# Patient Record
Sex: FEMALE | Race: WHITE | NOT HISPANIC OR LATINO | Employment: FULL TIME | URBAN - METROPOLITAN AREA
[De-identification: names, ages, dates, MRNs, and addresses within clinical notes are randomized per-mention and may not be internally consistent; named-entity substitution may affect disease eponyms.]

---

## 2017-03-20 ENCOUNTER — ALLSCRIPTS OFFICE VISIT (OUTPATIENT)
Dept: OTHER | Facility: OTHER | Age: 37
End: 2017-03-20

## 2017-04-08 ENCOUNTER — HOSPITAL ENCOUNTER (EMERGENCY)
Facility: HOSPITAL | Age: 37
Discharge: HOME | End: 2017-04-08
Attending: EMERGENCY MEDICINE | Admitting: EMERGENCY MEDICINE
Payer: COMMERCIAL

## 2017-04-08 VITALS
WEIGHT: 145.5 LBS | TEMPERATURE: 98.3 F | HEIGHT: 63 IN | HEART RATE: 100 BPM | SYSTOLIC BLOOD PRESSURE: 112 MMHG | RESPIRATION RATE: 16 BRPM | OXYGEN SATURATION: 99 % | DIASTOLIC BLOOD PRESSURE: 64 MMHG | BODY MASS INDEX: 25.78 KG/M2

## 2017-04-08 DIAGNOSIS — F41.0 ANXIETY ATTACK: ICD-10-CM

## 2017-04-08 DIAGNOSIS — E86.0 DEHYDRATION: Primary | ICD-10-CM

## 2017-04-08 DIAGNOSIS — R11.10 VOMITING: ICD-10-CM

## 2017-04-08 LAB
ALBUMIN SERPL BCP-MCNC: 4.3 G/DL (ref 3.5–5)
ALP SERPL-CCNC: 69 U/L (ref 46–116)
ALT SERPL W P-5'-P-CCNC: 32 U/L (ref 12–78)
AMYLASE SERPL-CCNC: 56 IU/L (ref 25–115)
ANION GAP SERPL CALCULATED.3IONS-SCNC: 15 MMOL/L (ref 4–13)
AST SERPL W P-5'-P-CCNC: 22 U/L (ref 5–45)
BASOPHILS # BLD MANUAL: 0 THOUSAND/UL (ref 0–0.1)
BASOPHILS NFR MAR MANUAL: 0 % (ref 0–1)
BILIRUB SERPL-MCNC: 2.4 MG/DL (ref 0.2–1)
BILIRUB UR QL STRIP: NEGATIVE
BUN SERPL-MCNC: 20 MG/DL (ref 5–25)
CALCIUM SERPL-MCNC: 9.2 MG/DL (ref 8.3–10.1)
CHLORIDE SERPL-SCNC: 104 MMOL/L (ref 100–108)
CLARITY UR: CLEAR
CO2 SERPL-SCNC: 19 MMOL/L (ref 21–32)
COLOR UR: YELLOW
CREAT SERPL-MCNC: 0.83 MG/DL (ref 0.6–1.3)
EOSINOPHIL # BLD MANUAL: 0 THOUSAND/UL (ref 0–0.4)
EOSINOPHIL NFR BLD MANUAL: 0 % (ref 0–6)
ERYTHROCYTE [DISTWIDTH] IN BLOOD BY AUTOMATED COUNT: 12 % (ref 11.6–15.1)
GFR SERPL CREATININE-BSD FRML MDRD: >60 ML/MIN/1.73SQ M
GLUCOSE SERPL-MCNC: 151 MG/DL (ref 65–140)
GLUCOSE UR STRIP-MCNC: NEGATIVE MG/DL
HCG UR QL: NEGATIVE
HCT VFR BLD AUTO: 44.5 % (ref 34.8–46.1)
HGB BLD-MCNC: 15.8 G/DL (ref 11.5–15.4)
HGB UR QL STRIP.AUTO: NEGATIVE
KETONES UR STRIP-MCNC: ABNORMAL MG/DL
LEUKOCYTE ESTERASE UR QL STRIP: NEGATIVE
LIPASE SERPL-CCNC: 93 U/L (ref 73–393)
LYMPHOCYTES # BLD AUTO: 0.35 THOUSAND/UL (ref 0.6–4.47)
LYMPHOCYTES # BLD AUTO: 5 % (ref 14–44)
MCH RBC QN AUTO: 29.4 PG (ref 26.8–34.3)
MCHC RBC AUTO-ENTMCNC: 35.5 G/DL (ref 31.4–37.4)
MCV RBC AUTO: 83 FL (ref 82–98)
MONOCYTES # BLD AUTO: 0.21 THOUSAND/UL (ref 0–1.22)
MONOCYTES NFR BLD: 3 % (ref 4–12)
NEUTROPHILS # BLD MANUAL: 6.28 THOUSAND/UL (ref 1.85–7.62)
NEUTS BAND NFR BLD MANUAL: 3 % (ref 0–8)
NEUTS SEG NFR BLD AUTO: 88 % (ref 43–75)
NITRITE UR QL STRIP: NEGATIVE
NRBC BLD AUTO-RTO: 0 /100 WBCS
PH UR STRIP.AUTO: 6 [PH] (ref 4.5–8)
PLATELET # BLD AUTO: 220 THOUSANDS/UL (ref 149–390)
PLATELET BLD QL SMEAR: ADEQUATE
PMV BLD AUTO: 10.1 FL (ref 8.9–12.7)
POTASSIUM SERPL-SCNC: 4.2 MMOL/L (ref 3.5–5.3)
PROT SERPL-MCNC: 8 G/DL (ref 6.4–8.2)
PROT UR STRIP-MCNC: NEGATIVE MG/DL
RBC # BLD AUTO: 5.37 MILLION/UL (ref 3.81–5.12)
RBC MORPH BLD: NORMAL
SODIUM SERPL-SCNC: 138 MMOL/L (ref 136–145)
SP GR UR STRIP.AUTO: 1.02 (ref 1–1.03)
TOTAL CELLS COUNTED SPEC: 100
UROBILINOGEN UR QL STRIP.AUTO: 0.2 E.U./DL
VARIANT LYMPHS # BLD AUTO: 1 %
WBC # BLD AUTO: 6.9 THOUSAND/UL (ref 4.31–10.16)

## 2017-04-08 PROCEDURE — 96361 HYDRATE IV INFUSION ADD-ON: CPT

## 2017-04-08 PROCEDURE — 93005 ELECTROCARDIOGRAM TRACING: CPT

## 2017-04-08 PROCEDURE — 99283 EMERGENCY DEPT VISIT LOW MDM: CPT

## 2017-04-08 PROCEDURE — 80053 COMPREHEN METABOLIC PANEL: CPT | Performed by: EMERGENCY MEDICINE

## 2017-04-08 PROCEDURE — 96376 TX/PRO/DX INJ SAME DRUG ADON: CPT

## 2017-04-08 PROCEDURE — 96375 TX/PRO/DX INJ NEW DRUG ADDON: CPT

## 2017-04-08 PROCEDURE — 81025 URINE PREGNANCY TEST: CPT | Performed by: EMERGENCY MEDICINE

## 2017-04-08 PROCEDURE — 85007 BL SMEAR W/DIFF WBC COUNT: CPT | Performed by: EMERGENCY MEDICINE

## 2017-04-08 PROCEDURE — 36415 COLL VENOUS BLD VENIPUNCTURE: CPT | Performed by: EMERGENCY MEDICINE

## 2017-04-08 PROCEDURE — 82150 ASSAY OF AMYLASE: CPT | Performed by: EMERGENCY MEDICINE

## 2017-04-08 PROCEDURE — 85027 COMPLETE CBC AUTOMATED: CPT | Performed by: EMERGENCY MEDICINE

## 2017-04-08 PROCEDURE — 83690 ASSAY OF LIPASE: CPT | Performed by: EMERGENCY MEDICINE

## 2017-04-08 PROCEDURE — 81003 URINALYSIS AUTO W/O SCOPE: CPT | Performed by: EMERGENCY MEDICINE

## 2017-04-08 PROCEDURE — 96374 THER/PROPH/DIAG INJ IV PUSH: CPT

## 2017-04-08 RX ORDER — LORAZEPAM 2 MG/ML
INJECTION INTRAMUSCULAR
Status: COMPLETED
Start: 2017-04-08 | End: 2017-04-08

## 2017-04-08 RX ORDER — LORAZEPAM 1 MG/1
TABLET ORAL
Qty: 10 TABLET | Refills: 0 | Status: SHIPPED | OUTPATIENT
Start: 2017-04-08 | End: 2018-02-27 | Stop reason: HOSPADM

## 2017-04-08 RX ORDER — LORAZEPAM 2 MG/ML
1 INJECTION INTRAMUSCULAR ONCE
Status: COMPLETED | OUTPATIENT
Start: 2017-04-08 | End: 2017-04-08

## 2017-04-08 RX ORDER — ONDANSETRON 2 MG/ML
4 INJECTION INTRAMUSCULAR; INTRAVENOUS ONCE
Status: COMPLETED | OUTPATIENT
Start: 2017-04-08 | End: 2017-04-08

## 2017-04-08 RX ORDER — ONDANSETRON 2 MG/ML
INJECTION INTRAMUSCULAR; INTRAVENOUS
Status: COMPLETED
Start: 2017-04-08 | End: 2017-04-08

## 2017-04-08 RX ORDER — ONDANSETRON 4 MG/1
4 TABLET, FILM COATED ORAL EVERY 6 HOURS PRN
Qty: 10 TABLET | Refills: 0 | Status: SHIPPED | OUTPATIENT
Start: 2017-04-08 | End: 2017-04-08 | Stop reason: SDUPTHER

## 2017-04-08 RX ORDER — ONDANSETRON 4 MG/1
4 TABLET, FILM COATED ORAL EVERY 6 HOURS PRN
Qty: 10 TABLET | Refills: 0 | Status: SHIPPED | OUTPATIENT
Start: 2017-04-08 | End: 2018-02-27 | Stop reason: HOSPADM

## 2017-04-08 RX ORDER — KETOROLAC TROMETHAMINE 30 MG/ML
30 INJECTION, SOLUTION INTRAMUSCULAR; INTRAVENOUS ONCE
Status: COMPLETED | OUTPATIENT
Start: 2017-04-08 | End: 2017-04-08

## 2017-04-08 RX ADMIN — ONDANSETRON 4 MG: 2 INJECTION INTRAMUSCULAR; INTRAVENOUS at 13:50

## 2017-04-08 RX ADMIN — LORAZEPAM 1 MG: 2 INJECTION INTRAMUSCULAR at 14:50

## 2017-04-08 RX ADMIN — LORAZEPAM 1 MG: 2 INJECTION, SOLUTION INTRAMUSCULAR; INTRAVENOUS at 14:50

## 2017-04-08 RX ADMIN — LORAZEPAM 1 MG: 2 INJECTION INTRAMUSCULAR at 13:50

## 2017-04-08 RX ADMIN — SODIUM CHLORIDE 1000 ML: 0.9 INJECTION, SOLUTION INTRAVENOUS at 13:49

## 2017-04-08 RX ADMIN — LORAZEPAM 1 MG: 2 INJECTION, SOLUTION INTRAMUSCULAR; INTRAVENOUS at 13:50

## 2017-04-08 RX ADMIN — KETOROLAC TROMETHAMINE 30 MG: 30 INJECTION, SOLUTION INTRAMUSCULAR at 13:52

## 2017-04-08 RX ADMIN — ONDANSETRON 4 MG: 2 INJECTION INTRAMUSCULAR; INTRAVENOUS at 14:51

## 2017-04-08 RX ADMIN — SODIUM CHLORIDE 1000 ML: 0.9 INJECTION, SOLUTION INTRAVENOUS at 14:52

## 2017-04-11 LAB
ATRIAL RATE: 110 BPM
P AXIS: 62 DEGREES
PR INTERVAL: 144 MS
QRS AXIS: 93 DEGREES
QRSD INTERVAL: 94 MS
QT INTERVAL: 354 MS
QTC INTERVAL: 479 MS
T WAVE AXIS: 67 DEGREES
VENTRICULAR RATE: 110 BPM

## 2017-04-24 ENCOUNTER — ALLSCRIPTS OFFICE VISIT (OUTPATIENT)
Dept: OTHER | Facility: OTHER | Age: 37
End: 2017-04-24

## 2018-01-10 NOTE — PROGRESS NOTES
2016         RE: Sari Verde                                To: [facility]  MR#: [id number]                                   [address]   : [address]:                                              Thanh, [facility] Peterson   (Exam #: [id number])                           Fax: [phone]      The LMP of this 30year old,  1, para 0 patient was 2015,   giving her an JUAN of 2016 and a current gestational age of 37 weeks   6 days by dates  A sonographic examination was performed on 2016   using real time equipment  The ultrasound examination was performed using   abdominal technique  The patient has a BMI of 28 2  Her blood pressure   today was 127/78  Earliest ultrasound found in her record: 7/15/15  11w5d  16 JUAN      Problem list   1  Multiple fibroids with 2 fibroids larger than 5 cm  One intramural left   sided posterior at 7 5 x 6 6 x 7 3 cm and one subserosal fundal at 6 4 x   4 3 x 6 2 cm at 20 weeks  2  History of a prior myomectomy by hysteroscopic resection that should   not prevent a vaginal delivery  3  Patient has required one course of Indocin for a degenerating   fibroid this pregnancy  4  The FOB was a 10 lb baby           Cardiac motion was observed at 161 bpm       INDICATIONS      declines genetic screening   leiomyoma (fibroids)   prior myomectomies   Interval growth assesment      Exam Types      Level I      RESULTS      Fetus # 1 of 1   Vertex presentation   Fetal growth appeared normal   Placenta Location = Anterior   No placenta previa   Placenta Grade = II      MEASUREMENTS (* Included In Average GA)      OFD             12 3 cm   AC              36 8 cm        41 weeks 1 day * (91%)   BPD              9 6 cm        39 weeks 0 days* (70%)   HC              34 6 cm        39 weeks 3 days* (60%)   Femur            7 3 cm        36 weeks 6 days* (35%) Cerebellum       5 5 cm        36 weeks 4 days      HC/AC           0 94   FL/AC           0 20   FL/BPD          0 76   EFW (Ac/Fl/Hc)  3848 grams - 8 lbs 8 oz                 (86%)      THE AVERAGE GESTATIONAL AGE is 39 weeks 1 day +/- 21 days  AMNIOTIC FLUID      Q1: 4 2      Q2: 1 5      Q3: 7 3      Q4: 11 4   ZACARIAS Total = 24 4 cm   Amniotic Fluid: POLYHYDRAMNIOS      FETAL VESSELS                                     S/D   PI    RI    PSV   AEDV RF                                                    cm/s       Umbilical Artery           2 61  0 88  0 62        No   No      ANATOMY DETAILS      Visualized Appearing Sonographically Normal:   HEAD: (Calvarium, BPD Level, Lateral Ventricles, Choroid Plexus,   Cerebellum);    TH  CAV : (Diaphragm); HEART: (Proximal Right Outflow);      ABD  CAV , STOMACH, RIGHT KIDNEY, LEFT KIDNEY, BLADDER, PLACENTA      Suboptimally Visualized:   HEAD: (Cisterna Magna); HEART: (Cardiac Axis, Cardiac Position)      Not Visualized:   HEART: (Four Chamber View, Proximal Left Outflow)      FIBROIDS      Locn  Yayo  Locn  Region   Position  Consist     Size (cm)       Col Dopp   Yayo  Intramural  Corpus   Mid Ant               4 6 x 6 8 x 2 4      BIOPHYSICAL PROFILE      The Biophysical Profile score was 10/10  Breathin  Movement: 2  Tone: 2  AFV: 2  NST: 2      IMPRESSION      Ceja IUP   39 weeks and 1 day by this ultrasound  (JUAN=2016)   Vertex presentation   Fetal growth appeared normal   Regular fetal heart rate of 161 bpm   Polyhydramnios   Anterior placenta   No placenta previa      GENERAL COMMENT      On exam today the patient appears visibly upset  She appears to be very   concerned with increased pelvic pressure as well as is feeling very   uncomfortable at this point in the pregnancy  She denies any regular   contractions  Her abdomen is non-tender on exam       There has been appropriate interval fetal growth    Good fetal movement and   tone are seen  The amniotic fluid volume appears consistent with mild   polyhydramnios  The placenta is anterior and it appears sonographically   normal   Fetal Dopplers studies are normal for gestational age  The   anatomic structures listed above could not be optimally imaged today   because of fetal position; however, these structures were seen on a prior   ultrasound and appeared sonographically normal   The patient was informed   of today's findings and all of her questions were answered  The   limitations of ultrasound were reviewed with the patient, which she   accepts  Precautions and fetal kick counts were reviewed  The patient appears to be very concerned about the size of her baby as   well as worried about delivery  I reviewed with her that overall the   estimated fetal weight is within normal limits albeit above average  There is mild polyhydramnios  We discussed the various etiologies of   polyhydramnios including but not limited to diabetes, idiopathic,   gastrointestinal abnormalities at present fetal swallowing such as an   upper GI atresia, and neurogenic etiologies that prevent fetal swallowing  There is no sonographic evidence of a fetal structural abnormality on   today's ultrasound  We discussed the increased risk of labor and   premature rupture membranes  Given the new onset mild polyhydramnios, I recommend weekly antepartum   surveillance and consideration of delivery between 39-40 weeks given the   increased risk of adverse pregnancy outcomes and stillbirth at term in   patients with polyhydramnios  Consideration could also be given to repeat   Glucola testing, but given her gestational age, and the fact that the   estimated fetal weight is below the threshold for recommendation of    section by ACOG even if the patient had gestational diabetes, an   induction would be most appropriate        Please note, in addition to the time spent discussing the results of the   ultrasound, I spent approximately 15 minutes of face-to-face time with the   patient, greater than 50% of which was spent in counseling and the   coordination of care for this patient  Thank you very much for allowing us to participate in the care of this   very nice patient  Should you have any questions, please do not hesitate   to contact our office  GATITO Donis M D     Electronically signed 01/25/16 12:00

## 2018-01-13 VITALS
BODY MASS INDEX: 28.53 KG/M2 | WEIGHT: 161 LBS | DIASTOLIC BLOOD PRESSURE: 70 MMHG | HEIGHT: 63 IN | SYSTOLIC BLOOD PRESSURE: 112 MMHG

## 2018-01-13 NOTE — MISCELLANEOUS
Message      Recorded as Task   Date: 03/25/2016 08:56 AM, Created By: Nury Decker   Task Name: Medical Complaint Callback   Assigned To: Nazario Kirby   Regarding Patient: Edgar Herrera, Status: In Progress   Comment:    Patricarylan Jeronimo - 25 Mar 2016 8:56 AM     TASK CREATED  Caller: Self; Medical Complaint; [phone] (Home); [phone] (Work)  Patient had surgery yesterday, she is calling complaining of excruciating pain even after taking the percocet and toradol  She is also complaining of nausea  She has not done any soaks as her discharge instructions recommended  She is questioning if there is something else she can take for pain and if she can take zofran for the nausea, she has 4mg of zofran  She also mentioned to keep in mind she is nursing  She is going to try the soaks as her instructions say now, I told her someone would wait at least 45 minutes to call her  Sallie Flower - 25 Mar 2016 9:31 AM     TASK REPLIED TO: Previously Assigned To [facility],Team  zofran is safe to take  another option is to also prescribe phenergan- it is safe for nursing and it may help the percocet work better-  it will make her more sleepyClemencia Lavern - 25 Mar 2016 11:44 AM     TASK IN PROGRESS   Precious Perales - 25 Mar 2016 11:59 AM     TASK REPLIED TO: Previously Assigned To [address] with pt  Has some Zofran at home, but is looking for refil  Rx refil sent to [facility]  Pt would also like to try Vicodin - has some at home  Per [facility] this is also fine as long as it has been 3 hours since her last dose of Percocet  PT encouraged to use non-pharm methods of pain relief, ice, baths  Pt to call on call this weekend with any concerns  Active Problems    1  Acute pain (338 19) (R52)   2  Disruption of episiotomy wound in the puerperium (674 24) (O90 1)   3  Encounter for postpartum visit (V24 2) (Z39 2)   4  Fibroid (218 9) (D25 9)   5  Gastric reflux (530 81) (K21 9)   6   General counselling and advice on contraception (V25 09) (Z30 09)   7  History of migraine headaches (V12 49) (Z86 69)   8  Insomnia (780 52) (G47 00)   9  Sciatica of right side (724 3) (M54 31)    Current Meds   1  Evita 0 35 MG Oral Tablet; Take 1 tablet daily; Therapy: 65VQL2051 to (Evaluate:42Hub5174)  Requested for: 96TPI4395; Last   Rx:09Mar2016 Ordered   2  Complete Prenatal/DHA MISC; Therapy: (Recorded:24Jun2015) to Recorded   3  Ketorolac Tromethamine 10 MG Oral Tablet; TAKE 1 TABLET EVERY 6 HOURS AS   NEEDED; Therapy: 12QQK6592 to (Evaluate:23Apr2016); Last Rx:24Mar2016 Ordered   4  Lidocaine 5 % External Ointment; apply twice daily as needed  for pain; Therapy: 42MNN2564 to (Evaluate:03Feb2016); Last Rx:30Jan2016 Ordered   5  Oxycodone-Acetaminophen 5-325 MG Oral Tablet (Percocet); TAKE 1 TABLET Every 4   hours PRN pain; Therapy: 80EYG7510 to (Evaluate:29Mar2016); Last Rx:24Mar2016 Ordered   6  Ranitidine HCl - 150 MG Oral Tablet; TAKE 1 TABLET EVERY 12 HOURS DAILY; Therapy: 83RMH0008 to (Evaluate:17Oii8597); Last Rx:13Vgf1725 Ordered   7  Tylenol TABS; Therapy: (Recorded:15Jan2016) to Recorded    Allergies    1  No Known Drug Allergies    2  No Known Environmental Allergies   3   No Known Food Allergies    Plan  Acute pain, Disruption of episiotomy wound in the puerperium    · Ondansetron 4 MG Oral Tablet Dispersible; take one q6hrs for nausea and  vommiting    Signatures   Electronically signed by : Sangeeta Harrison, ; Mar 25 2016 12:00PM EST                       (Author)

## 2018-01-14 VITALS
SYSTOLIC BLOOD PRESSURE: 110 MMHG | BODY MASS INDEX: 28.53 KG/M2 | WEIGHT: 161 LBS | DIASTOLIC BLOOD PRESSURE: 64 MMHG | HEIGHT: 63 IN

## 2018-01-15 NOTE — PROGRESS NOTES
Assessment    1  Polyhydramnios in third trimester, not applicable or unspecified fetus (657 03)   (O40 3XX0)   2  Protein in urine (791 0) (R80 9)    Chief Complaint  Chief Complaint Free Text Note Form: The patient was seen today for an ultrasound and NST  Please see ultrasound report for additional details  Active Problems    1  Fibroid (218 9) (D25 9)   2  Gastric reflux (530 81) (K21 9)   3  History of migraine headaches (V12 49) (Z86 69)   4  Insomnia (780 52) (G47 00)   5  Pregnancy headache in third trimester (883 74,620 3) (J88 462,V78)   6  Pregnancy, first, obstetrical care, third trimester (V22 0) (Z34 03)   7  Protein in urine (791 0) (R80 9)   8  Sciatica of right side (724 3) (M54 31)    Past Medical History    1  History of anxiety (V11 8) (Z86 59)    Surgical History    1  History of Cholecystectomy Laparoscopic   2  History of Oral Surgery Tooth Extraction    Family History    1  Family history of malignant neoplasm of breast (V16 3) (Z80 3)    Social History    · Always uses seat belt   ·    · Never a smoker    Current Meds   1  Complete Prenatal/DHA MISC; Therapy: (Recorded:24Jun2015) to Recorded   2  Ranitidine HCl - 150 MG Oral Tablet; TAKE 1 TABLET EVERY 12 HOURS DAILY; Therapy: 40ADI1421 to (Evaluate:58Wda0712); Last Rx:03Alu5353 Ordered   3  Tylenol TABS; Therapy: (Recorded:15Jan2016) to Recorded    Allergies    1  No Known Drug Allergies    2  No Known Environmental Allergies   3  No Known Food Allergies    Vitals  Vital Signs [Data Includes: Current Encounter]    Recorded: 83RMP5991 97:85ZX   Systolic 161, LUE, Sitting   Diastolic 78, LUE, Sitting   Height 5 ft 3 in   Weight 159 lb 6 4 oz   BMI Calculated 28 24   BSA Calculated 1 76   Pain Scale 0     Procedure    G/P 1/0   EDC 2/2/16   /7   /78   Indication: polyhydramnios  Duration of Test 20 minutes  Result: Reactive and > 15 bpm with movement  Baseline Rate 150 bpm    Deceleration: None  Uterine Activity: None  Required # Stimuli Response: No    Comment: nst and fetal kick count instructions reviewed written and verbal instructions given pt receptive and verbalizes understanding   Fetal kick counts were reviewed with the patient  Recommend NST: weekly  Recommend ZACARIAS: weekly  Future Appointments    Date/Time Provider Specialty Site   2016 02:00 PM HIRAL Andrade   Obstetrics/Gynecology [facility]   2016 10:00 AM  [address]   2016 10:30 AM  [address]   2016 03:00 PM Rosey Molina MD Obstetrics/Gynecology [facility]     Signatures   Electronically signed by : Shasta Sicard, ; 2016 11:44AM EST                       (Author)    Electronically signed by : HIRAL Barnett ; 2016 11:52AM EST                       (Author)

## 2018-01-15 NOTE — MISCELLANEOUS
Message   Recorded as Task   Date: 01/25/2016 11:56 AM, Created By: Matt Ceja   Task Name: Call Back   Assigned To: [facility]y [facility],Team   Regarding Patient: Parker Brooke, Status: In Progress   Comment:    Matt Ceja - 25 Jan 2016 11:56 AM     TASK CREATED  Caller: Self; Results Inquiry; [phone] (Home)  pt stopped in office - had labs done ealier today in - [facility] in [facility]  She's anxious for results as soon as they come in - (she was here today for p-jimena u/s upstairs) - please call  [phone]   Raiza Trejo - 25 Jan 2016 12:54 PM     TASK REPLIED TO: Previously Assigned To [facility],Team  p/c to pt explained that this is a draw station and the results will not be available to us until later today if not tomorro  Pt will be informed as soon as we have the results  Raiza Trejo - 25 Jan 2016 12:54 PM     TASK IN PROGRESS   Precious Pete - 27 Jan 2016 9:11 AM     TASK REPLIED TO: Previously Assigned To [facility],Team  Pt delivered baby 1/26        Active Problems    1  Fibroid (218 9) (D25 9)   2  Gastric reflux (530 81) (K21 9)   3  History of migraine headaches (V12 49) (Z86 69)   4  Insomnia (780 52) (G47 00)   5  Polyhydramnios in third trimester, not applicable or unspecified fetus (657 03)   (O40 3XX0)   6  Pregnancy headache in third trimester (312 59,227 4) (S44 251,H29)   7  Pregnancy, first, obstetrical care, third trimester (V22 0) (Z34 03)   8  Protein in urine (791 0) (R80 9)   9  Sciatica of right side (724 3) (M54 31)    Current Meds   1  Complete Prenatal/DHA MISC; Therapy: (Recorded:24Jun2015) to Recorded   2  Ranitidine HCl - 150 MG Oral Tablet; TAKE 1 TABLET EVERY 12 HOURS DAILY; Therapy: 70DSS7364 to (Evaluate:43Ceb3722); Last Rx:20Rqb5024 Ordered   3  Tylenol TABS; Therapy: (Recorded:15Jan2016) to Recorded    Allergies    1  No Known Drug Allergies    2  No Known Environmental Allergies   3   No Known Food Allergies    Signatures   Electronically signed by : Celine Siomn, ; Jan 27 2016  9:12AM EST                       (Author)

## 2018-01-18 NOTE — MISCELLANEOUS
Message   Recorded as Task   Date: 05/11/2016 09:16 AM, Created By: Marky Augustine   Task Name: Medical Complaint Callback   Assigned To: Marques Mayer   Regarding Patient: Noah Reyes, Status: Active   Anna Barber - 11 May 2016 9:16 AM     TASK CREATED  Pt called office today requesting to speak with you  Pt was hesitant to speak fully with me, however, states she is feeling a worsening of her anxiety  States she has spoken with you about this in the past  Last few weeks have been very hard and she feels something needs to be done - would like to discuss starting medication again  Pt requesting call back if you are available?   increased anxiety- history of anxiety/depression in past- was treated with kkonipin and lexapro  will restart meds  also discussed getting ou to f the house- walks/ exercise etc  healhty diet  has help with baby Ernestine   mom and  very supportive      Plan  Anxiety    · ClonazePAM 0 5 MG Oral Tablet; TAKE 1 TABLET 3 TIMES DAILY AS NEEDED  Postpartum depression    · Escitalopram Oxalate 10 MG Oral Tablet (Lexapro); take 1 tablet every day    Signatures   Electronically signed by : Donna Neely MD; May 11 2016 12:59PM EST                       (Author)

## 2018-02-27 ENCOUNTER — OFFICE VISIT (OUTPATIENT)
Dept: OBGYN CLINIC | Facility: CLINIC | Age: 38
End: 2018-02-27
Payer: COMMERCIAL

## 2018-02-27 VITALS — BODY MASS INDEX: 26.05 KG/M2 | HEIGHT: 63 IN | WEIGHT: 147 LBS

## 2018-02-27 DIAGNOSIS — Z01.419 ENCOUNTER FOR ANNUAL ROUTINE GYNECOLOGICAL EXAMINATION: Primary | ICD-10-CM

## 2018-02-27 DIAGNOSIS — F41.9 ANXIETY: ICD-10-CM

## 2018-02-27 PROCEDURE — S0612 ANNUAL GYNECOLOGICAL EXAMINA: HCPCS | Performed by: OBSTETRICS & GYNECOLOGY

## 2018-02-27 RX ORDER — ESCITALOPRAM OXALATE 10 MG/1
10 TABLET ORAL DAILY
Refills: 3 | COMMUNITY
Start: 2017-12-17 | End: 2018-02-27 | Stop reason: SDUPTHER

## 2018-02-27 RX ORDER — ESCITALOPRAM OXALATE 10 MG/1
10 TABLET ORAL DAILY
Qty: 90 TABLET | Refills: 3 | Status: SHIPPED | OUTPATIENT
Start: 2018-02-27 | End: 2019-07-25 | Stop reason: SDUPTHER

## 2018-02-27 RX ORDER — CLONAZEPAM 0.5 MG/1
1 TABLET ORAL 3 TIMES DAILY PRN
COMMUNITY
Start: 2016-05-11 | End: 2018-02-27 | Stop reason: SDUPTHER

## 2018-02-27 RX ORDER — CLONAZEPAM 0.5 MG/1
0.5 TABLET ORAL 3 TIMES DAILY PRN
Qty: 30 TABLET | Refills: 0 | Status: SHIPPED | OUTPATIENT
Start: 2018-02-27 | End: 2019-03-05 | Stop reason: SDUPTHER

## 2018-02-27 NOTE — PROGRESS NOTES
Assessment/Plan:    Encounter for annual routine gynecological examination  Pap/HPV  neg/neg   Due   Contraception: Mirena IUD inserted 3/10/17    H/o 3rd degree laceration repair/breakdown/revision  Dyspareunia now resolved  Most likely not interested in another pregnancy at this time  Postpartum depression /anxiety - doing well on Lexapro  Klonopin as needed  Diagnoses and all orders for this visit:    Encounter for annual routine gynecological examination    Anxiety  -     escitalopram (LEXAPRO) 10 mg tablet; Take 1 tablet (10 mg total) by mouth daily  -     clonazePAM (KlonoPIN) 0 5 mg tablet; Take 1 tablet (0 5 mg total) by mouth 3 (three) times a day as needed for anxiety    Other orders  -     Discontinue: clonazePAM (KlonoPIN) 0 5 mg tablet; Take 1 tablet by mouth 3 (three) times a day as needed  -     Discontinue: escitalopram (LEXAPRO) 10 mg tablet; Take 10 mg by mouth daily        Subjective:      Patient ID: Filiberto Santiago is a 40 y o  female  40year old  here for annual exam     Doing well, amenorrheic with Mirena  Dyspareunia improved  Gynecologic Exam   The patient's pertinent negatives include no genital itching, genital lesions, genital odor, genital rash, pelvic pain, vaginal bleeding or vaginal discharge  The patient is experiencing no pain  Pertinent negatives include no chills, constipation, diarrhea, fever, nausea, sore throat or vomiting  Her past medical history is significant for a gynecological surgery (3rd degree repair revision)  The following portions of the patient's history were reviewed and updated as appropriate: She  has a past medical history of Anxiety; Depression; Disruption of episiotomy wound in the puerperium; External hemorrhoids; Fibroid uterus; GERD (gastroesophageal reflux disease);  Headache; Leiomyoma of uterus; Migraine; Migraine; Polyhydramnios in third trimester, not applicable or unspecified fetus; Pregnancy headache in third trimester; Protein in urine; Sciatica of right side; Third degree laceration of perineum, type 3B (1/26/2016); and Varicella  She   Patient Active Problem List    Diagnosis Date Noted    Encounter for annual routine gynecological examination 02/27/2018    Anxiety 05/11/2016    Fibroid uterus 01/26/2016    GERD (gastroesophageal reflux disease) 01/26/2016    Third degree laceration of perineum, type 3B 01/26/2016     She  has a past surgical history that includes Myomectomy; [place] tooth extraction; pr post colporrhaphy,rectum/vagina (N/A, 3/24/2016); and Cholecystectomy (2006)  Her family history includes Cancer in her maternal aunt and mother  She  reports that she has never smoked  She has never used smokeless tobacco  She reports that she drinks alcohol  She reports that she does not use drugs  Current Outpatient Prescriptions   Medication Sig Dispense Refill    clonazePAM (KlonoPIN) 0 5 mg tablet Take 1 tablet (0 5 mg total) by mouth 3 (three) times a day as needed for anxiety 30 tablet 0    escitalopram (LEXAPRO) 10 mg tablet Take 1 tablet (10 mg total) by mouth daily 90 tablet 3     No current facility-administered medications for this visit  Current Outpatient Prescriptions on File Prior to Visit   Medication Sig    [DISCONTINUED] acetaminophen (TYLENOL) 325 mg tablet Take 2 tablets (650 mg total) by mouth every 6 (six) hours as needed for headaches   [DISCONTINUED] LORazepam (ATIVAN) 1 mg tablet Take one pill 1mg if you experience an anxiety attack that you cannot control    [DISCONTINUED] ondansetron (ZOFRAN) 4 mg tablet Take 1 tablet by mouth every 6 (six) hours as needed for nausea or vomiting for up to 10 doses    [DISCONTINUED] oxyCODONE-acetaminophen (PERCOCET) 5-325 mg per tablet Take 1 tablet by mouth every 4 (four) hours as needed for moderate pain for up to 5 doses   Max Daily Amount: 6 tablets    [DISCONTINUED] Prenatal Vit-Fe Fumarate-FA (PRENATAL VITAMIN PO) Take by mouth     [DISCONTINUED] ranitidine (ZANTAC) 150 mg tablet Take 150 mg by mouth Daily at 2am      No current facility-administered medications on file prior to visit  She is allergic to macrolides and ketolides       Review of Systems   Constitutional: Negative for activity change, appetite change, chills, fatigue and fever  HENT: Negative for rhinorrhea, sneezing and sore throat  Eyes: Negative for visual disturbance  Respiratory: Negative for cough, shortness of breath and wheezing  Cardiovascular: Negative for chest pain, palpitations and leg swelling  Gastrointestinal: Negative for abdominal distention, constipation, diarrhea, nausea and vomiting  Genitourinary: Negative for difficulty urinating, pelvic pain and vaginal discharge  Neurological: Negative for syncope and light-headedness  Objective:      Ht 5' 3" (1 6 m)   Wt 66 7 kg (147 lb)   Breastfeeding? No   BMI 26 04 kg/m²          Physical Exam   Constitutional: She is oriented to person, place, and time  Genitourinary: Vagina normal and uterus normal  No breast swelling, tenderness, discharge or bleeding  There is no rash, tenderness, lesion or injury on the right labia  There is no rash, tenderness, lesion or injury on the left labia  Uterus is not deviated, not enlarged, not fixed and not tender  Cervix exhibits no motion tenderness, no discharge and no friability  Right adnexum displays no mass, no tenderness and no fullness  Left adnexum displays no mass, no tenderness and no fullness  No tenderness or bleeding in the vagina  No vaginal discharge found  Neurological: She is alert and oriented to person, place, and time  She has normal reflexes

## 2018-02-27 NOTE — PATIENT INSTRUCTIONS
Levonorgestrel (Into the uterus)   Levonorgestrel (mut-gvu-aom-LONDON-trel)  Prevents pregnancy and treats heavy menstrual bleeding  This is an intrauterine device (IUD), which is a reversible form of birth control  This IUD slowly releases levonorgestrel, a hormone  Brand Name(s): Tyrone Pacer, Mirena, [place]   There may be other brand names for this medicine  When This Medicine Should Not Be Used: This device is not right for everyone  Do not use it if you had an allergic reaction to levonorgestrel, or you are pregnant  How to Use This Medicine:   Device  · The IUD is usually inserted by your doctor during your monthly period  You will need to see your doctor 4 to 6 weeks after the IUD is placed and then once a year  · Your IUD has a string or "tail" that is made of plastic thread  About one or two inches of this string hangs into your vagina  You cannot see this string, and it will not cause problems when you have sex  Check your IUD after each monthly period  You may not be protected against pregnancy if you cannot feel the string or if you feel plastic  Do the following to check the placement of your IUD:  [facility] your hands with soap and warm water  Dry them with a clean towel  ¨ Bend your knees and squat low to the ground  ¨ Gently put your index finger high inside your vagina  The cervix is at the top of the vagina  Find the IUD string coming from your cervix  Never pull on the string  You should not be able to feel the plastic of the IUD itself  Wash your hands after you are done checking your IUD string  · Your doctor will need to replace your IUD after 3 years for [facility] or [place], or after 5 years for [facility] or [place] [address]  You will also need to have it replaced if it comes out of your uterus  Drugs and Foods to Avoid:   Ask your doctor or pharmacist before using any other medicine, including over-the-counter medicines, vitamins, and herbal products    · Some medicines can affect how this device works  Tell your doctor if you are using a blood thinner (including warfarin)  Warnings While Using This Medicine:   · Tell your doctor if you are breastfeeding, or you have had a baby, miscarriage, or  in the past 3 months  Tell your doctor if you have liver disease (including tumor or cancer), breast cancer, heart or blood circulation problems, including a history of heart valve problems, heart disease, blood clotting problems, stroke, heart attack, or high blood pressure  Tell your doctor if you have problems with your immune system or have had surgery on your female organs (especially fallopian tubes)  · Tell your doctor if you have had any problems, infections, or other conditions that affected your reproductive system  There are many problems that could make an IUD a bad choice for you, including if you have fibroids, unexplained bleeding, a uterus that has an unusual shape, a recent infection, pelvic inflammatory disease, abnormal Pap test, ectopic pregnancy, cancer or suspected cancer, or an existing IUD  · There is a small chance that you could get pregnant when using an IUD, just as there is with any birth control  If you get pregnant, your doctor may remove your IUD to lower the risk of miscarriage or other problems  · This medicine may cause the following problems:  ¨ Increased risk of ectopic pregnancy (pregnancy outside the uterus)  ¨ Increased risk of a serious infection called pelvic inflammatory disease (PID)  ¨ Increased risk for ovarian cysts  ¨ Perforation (hole in the wall of your uterus), which can damage other organs  · You might have some spotting and cramping during the first weeks after the IUD has been inserted  These symptoms should decrease or go away within a few weeks up to 6 months  · You could have less bleeding or even stop having periods by the end of the first year   Call your doctor if you have a change from the regular bleeding pattern after you have had your IUD for awhile, such as more bleeding or if you miss a period (and you were having periods even with your IUD)  · An IUD can slip partly or all of the way out of your uterus  If this happens, use condoms or another form of birth control, and call your doctor right away  · This IUD will not protect you from HIV/AIDS, herpes, or other sexually transmitted diseases  · If you have the Deborrah Ramonet or Teri Semen, tell your healthcare provider before you have an MRI test   Possible Side Effects While Using This Medicine:   Call your doctor right away if you notice any of these side effects:  · Allergic reaction: Itching or hives, swelling in your face or hands, swelling or tingling in your mouth or throat, chest tightness, trouble breathing  · Chest pain, problems with speech or walking, numbness or weakness in your arm or leg or on one side of your body  · Heavy bleeding from your vagina  · Pain during sex, or if your partner feels the hard plastic of the IUD during sex  · Severe headache, vision changes  · Stomach or pelvic pain, tenderness, or cramping that is sudden or severe  · Vaginal discharge has a bad smell, fever, chills, sores on your genitals  · Yellow skin or eyes  If you notice these less serious side effects, talk with your doctor:   · Acne or other skin changes  · Breast pain  · Change in bleeding pattern after the first few months  · Dizziness or lightheadedness after IUD is placed  · Mild itching around your vagina and genitals  If you notice other side effects that you think are caused by this medicine, tell your doctor  Call your doctor for medical advice about side effects  You may report side effects to FDA at 7-125-FDA-5255  © 2017 [address] [employer] Information is for End User's use only and may not be sold, redistributed or otherwise used for commercial purposes  The above information is an  only  It is not intended as medical advice for individual conditions or treatments  Talk to your doctor, nurse or pharmacist before following any medical regimen to see if it is safe and effective for you

## 2018-02-27 NOTE — ASSESSMENT & PLAN NOTE
Pap/HPV 2015 neg/neg   Due 2020  Contraception: Mirena IUD inserted 3/10/17    H/o 3rd degree laceration repair/breakdown/revision  Dyspareunia now resolved  Most likely not interested in another pregnancy at this time  Postpartum depression /anxiety - doing well on Lexapro  Klonopin as needed

## 2018-03-21 ENCOUNTER — HOSPITAL ENCOUNTER (EMERGENCY)
Facility: HOSPITAL | Age: 38
Discharge: HOME | End: 2018-03-21
Attending: EMERGENCY MEDICINE | Admitting: EMERGENCY MEDICINE
Payer: COMMERCIAL

## 2018-03-21 ENCOUNTER — APPOINTMENT (EMERGENCY)
Dept: RADIOLOGY | Facility: HOSPITAL | Age: 38
End: 2018-03-21
Payer: COMMERCIAL

## 2018-03-21 ENCOUNTER — APPOINTMENT (EMERGENCY)
Dept: CT IMAGING | Facility: HOSPITAL | Age: 38
End: 2018-03-21
Payer: COMMERCIAL

## 2018-03-21 VITALS
SYSTOLIC BLOOD PRESSURE: 99 MMHG | WEIGHT: 140 LBS | BODY MASS INDEX: 24.8 KG/M2 | RESPIRATION RATE: 18 BRPM | HEART RATE: 106 BPM | HEIGHT: 63 IN | OXYGEN SATURATION: 97 % | TEMPERATURE: 98.1 F | DIASTOLIC BLOOD PRESSURE: 50 MMHG

## 2018-03-21 DIAGNOSIS — R07.9 CHEST PAIN: ICD-10-CM

## 2018-03-21 DIAGNOSIS — K52.9 GASTROENTERITIS: ICD-10-CM

## 2018-03-21 DIAGNOSIS — R10.9 ABDOMINAL PAIN, VOMITING, AND DIARRHEA: Primary | ICD-10-CM

## 2018-03-21 DIAGNOSIS — R19.7 ABDOMINAL PAIN, VOMITING, AND DIARRHEA: Primary | ICD-10-CM

## 2018-03-21 DIAGNOSIS — R11.10 ABDOMINAL PAIN, VOMITING, AND DIARRHEA: Primary | ICD-10-CM

## 2018-03-21 DIAGNOSIS — R11.10 VOMITING: ICD-10-CM

## 2018-03-21 LAB
ALBUMIN SERPL BCP-MCNC: 4.2 G/DL (ref 3.5–5)
ALP SERPL-CCNC: 53 U/L (ref 46–116)
ALT SERPL W P-5'-P-CCNC: 30 U/L (ref 12–78)
ANION GAP SERPL CALCULATED.3IONS-SCNC: 19 MMOL/L (ref 4–13)
AST SERPL W P-5'-P-CCNC: 17 U/L (ref 5–45)
ATRIAL RATE: 92 BPM
BACTERIA UR QL AUTO: ABNORMAL /HPF
BASOPHILS # BLD MANUAL: 0 THOUSAND/UL (ref 0–0.1)
BASOPHILS NFR MAR MANUAL: 0 % (ref 0–1)
BILIRUB SERPL-MCNC: 2.6 MG/DL (ref 0.2–1)
BILIRUB UR QL STRIP: NEGATIVE
BUN SERPL-MCNC: 24 MG/DL (ref 5–25)
CALCIUM SERPL-MCNC: 8.9 MG/DL (ref 8.3–10.1)
CHLORIDE SERPL-SCNC: 106 MMOL/L (ref 100–108)
CLARITY UR: CLEAR
CO2 SERPL-SCNC: 17 MMOL/L (ref 21–32)
COLOR UR: YELLOW
CREAT SERPL-MCNC: 0.87 MG/DL (ref 0.6–1.3)
DEPRECATED D DIMER PPP: 448 NG/ML (FEU) (ref 0–424)
EOSINOPHIL # BLD MANUAL: 0 THOUSAND/UL (ref 0–0.4)
EOSINOPHIL NFR BLD MANUAL: 0 % (ref 0–6)
ERYTHROCYTE [DISTWIDTH] IN BLOOD BY AUTOMATED COUNT: 12.3 % (ref 11.6–15.1)
EXT PREG TEST URINE: NORMAL
GFR SERPL CREATININE-BSD FRML MDRD: 86 ML/MIN/1.73SQ M
GLUCOSE SERPL-MCNC: 172 MG/DL (ref 65–140)
GLUCOSE UR STRIP-MCNC: NEGATIVE MG/DL
HCT VFR BLD AUTO: 43.2 % (ref 34.8–46.1)
HGB BLD-MCNC: 15.2 G/DL (ref 11.5–15.4)
HGB UR QL STRIP.AUTO: ABNORMAL
KETONES UR STRIP-MCNC: ABNORMAL MG/DL
LEUKOCYTE ESTERASE UR QL STRIP: NEGATIVE
LIPASE SERPL-CCNC: 100 U/L (ref 73–393)
LYMPHOCYTES # BLD AUTO: 0.39 THOUSAND/UL (ref 0.6–4.47)
LYMPHOCYTES # BLD AUTO: 4 % (ref 14–44)
MCH RBC QN AUTO: 29.5 PG (ref 26.8–34.3)
MCHC RBC AUTO-ENTMCNC: 35.2 G/DL (ref 31.4–37.4)
MCV RBC AUTO: 84 FL (ref 82–98)
MONOCYTES # BLD AUTO: 0.2 THOUSAND/UL (ref 0–1.22)
MONOCYTES NFR BLD: 2 % (ref 4–12)
NEUTROPHILS # BLD MANUAL: 9.27 THOUSAND/UL (ref 1.85–7.62)
NEUTS BAND NFR BLD MANUAL: 6 % (ref 0–8)
NEUTS SEG NFR BLD AUTO: 88 % (ref 43–75)
NITRITE UR QL STRIP: NEGATIVE
NON-SQ EPI CELLS URNS QL MICRO: ABNORMAL /HPF
NRBC BLD AUTO-RTO: 0 /100 WBCS
P AXIS: 60 DEGREES
PH UR STRIP.AUTO: 7 [PH] (ref 4.5–8)
PLATELET # BLD AUTO: 203 THOUSANDS/UL (ref 149–390)
PLATELET BLD QL SMEAR: ADEQUATE
PMV BLD AUTO: 10.1 FL (ref 8.9–12.7)
POTASSIUM SERPL-SCNC: 3.5 MMOL/L (ref 3.5–5.3)
PR INTERVAL: 142 MS
PROT SERPL-MCNC: 7.8 G/DL (ref 6.4–8.2)
PROT UR STRIP-MCNC: NEGATIVE MG/DL
QRS AXIS: 93 DEGREES
QRSD INTERVAL: 92 MS
QT INTERVAL: 396 MS
QTC INTERVAL: 489 MS
RBC # BLD AUTO: 5.15 MILLION/UL (ref 3.81–5.12)
RBC #/AREA URNS AUTO: ABNORMAL /HPF
SODIUM SERPL-SCNC: 142 MMOL/L (ref 136–145)
SP GR UR STRIP.AUTO: 1.01 (ref 1–1.03)
T WAVE AXIS: 62 DEGREES
TOTAL CELLS COUNTED SPEC: 100
TROPONIN I SERPL-MCNC: <0.02 NG/ML
UROBILINOGEN UR QL STRIP.AUTO: 0.2 E.U./DL
VENTRICULAR RATE: 92 BPM
WBC # BLD AUTO: 9.86 THOUSAND/UL (ref 4.31–10.16)
WBC #/AREA URNS AUTO: ABNORMAL /HPF

## 2018-03-21 PROCEDURE — 93005 ELECTROCARDIOGRAM TRACING: CPT

## 2018-03-21 PROCEDURE — 71275 CT ANGIOGRAPHY CHEST: CPT

## 2018-03-21 PROCEDURE — 85379 FIBRIN DEGRADATION QUANT: CPT | Performed by: EMERGENCY MEDICINE

## 2018-03-21 PROCEDURE — 84484 ASSAY OF TROPONIN QUANT: CPT | Performed by: EMERGENCY MEDICINE

## 2018-03-21 PROCEDURE — 96376 TX/PRO/DX INJ SAME DRUG ADON: CPT

## 2018-03-21 PROCEDURE — 96366 THER/PROPH/DIAG IV INF ADDON: CPT

## 2018-03-21 PROCEDURE — 96375 TX/PRO/DX INJ NEW DRUG ADDON: CPT

## 2018-03-21 PROCEDURE — 85027 COMPLETE CBC AUTOMATED: CPT | Performed by: EMERGENCY MEDICINE

## 2018-03-21 PROCEDURE — 99284 EMERGENCY DEPT VISIT MOD MDM: CPT

## 2018-03-21 PROCEDURE — 96361 HYDRATE IV INFUSION ADD-ON: CPT

## 2018-03-21 PROCEDURE — 96365 THER/PROPH/DIAG IV INF INIT: CPT

## 2018-03-21 PROCEDURE — 85007 BL SMEAR W/DIFF WBC COUNT: CPT | Performed by: EMERGENCY MEDICINE

## 2018-03-21 PROCEDURE — 80053 COMPREHEN METABOLIC PANEL: CPT | Performed by: EMERGENCY MEDICINE

## 2018-03-21 PROCEDURE — 81025 URINE PREGNANCY TEST: CPT | Performed by: EMERGENCY MEDICINE

## 2018-03-21 PROCEDURE — 81001 URINALYSIS AUTO W/SCOPE: CPT | Performed by: EMERGENCY MEDICINE

## 2018-03-21 PROCEDURE — 71046 X-RAY EXAM CHEST 2 VIEWS: CPT

## 2018-03-21 PROCEDURE — 36415 COLL VENOUS BLD VENIPUNCTURE: CPT

## 2018-03-21 PROCEDURE — 83690 ASSAY OF LIPASE: CPT | Performed by: EMERGENCY MEDICINE

## 2018-03-21 PROCEDURE — 96368 THER/DIAG CONCURRENT INF: CPT

## 2018-03-21 PROCEDURE — 93010 ELECTROCARDIOGRAM REPORT: CPT | Performed by: INTERNAL MEDICINE

## 2018-03-21 RX ORDER — ONDANSETRON 2 MG/ML
4 INJECTION INTRAMUSCULAR; INTRAVENOUS ONCE
Status: COMPLETED | OUTPATIENT
Start: 2018-03-21 | End: 2018-03-21

## 2018-03-21 RX ORDER — POTASSIUM CHLORIDE 20 MEQ/1
40 TABLET, EXTENDED RELEASE ORAL ONCE
Status: COMPLETED | OUTPATIENT
Start: 2018-03-21 | End: 2018-03-21

## 2018-03-21 RX ORDER — PROMETHAZINE HYDROCHLORIDE 25 MG/ML
25 INJECTION, SOLUTION INTRAMUSCULAR; INTRAVENOUS ONCE
Status: DISCONTINUED | OUTPATIENT
Start: 2018-03-21 | End: 2018-03-21

## 2018-03-21 RX ORDER — LORAZEPAM 2 MG/ML
0.5 INJECTION INTRAMUSCULAR ONCE
Status: COMPLETED | OUTPATIENT
Start: 2018-03-21 | End: 2018-03-21

## 2018-03-21 RX ORDER — DIPHENHYDRAMINE HYDROCHLORIDE 50 MG/ML
25 INJECTION INTRAMUSCULAR; INTRAVENOUS ONCE
Status: DISCONTINUED | OUTPATIENT
Start: 2018-03-21 | End: 2018-03-21

## 2018-03-21 RX ORDER — DICYCLOMINE HCL 20 MG
20 TABLET ORAL ONCE
Status: COMPLETED | OUTPATIENT
Start: 2018-03-21 | End: 2018-03-21

## 2018-03-21 RX ORDER — KETOROLAC TROMETHAMINE 30 MG/ML
15 INJECTION, SOLUTION INTRAMUSCULAR; INTRAVENOUS ONCE
Status: COMPLETED | OUTPATIENT
Start: 2018-03-21 | End: 2018-03-21

## 2018-03-21 RX ORDER — MAGNESIUM SULFATE HEPTAHYDRATE 40 MG/ML
2 INJECTION, SOLUTION INTRAVENOUS ONCE
Status: COMPLETED | OUTPATIENT
Start: 2018-03-21 | End: 2018-03-21

## 2018-03-21 RX ORDER — ONDANSETRON 4 MG/1
4 TABLET, ORALLY DISINTEGRATING ORAL EVERY 8 HOURS PRN
Qty: 30 TABLET | Refills: 0 | Status: SHIPPED | OUTPATIENT
Start: 2018-03-21 | End: 2021-08-24 | Stop reason: HOSPADM

## 2018-03-21 RX ADMIN — SODIUM CHLORIDE, SODIUM LACTATE, POTASSIUM CHLORIDE, AND CALCIUM CHLORIDE 1000 ML: .6; .31; .03; .02 INJECTION, SOLUTION INTRAVENOUS at 06:46

## 2018-03-21 RX ADMIN — DICYCLOMINE HYDROCHLORIDE 20 MG: 20 TABLET ORAL at 05:15

## 2018-03-21 RX ADMIN — SODIUM CHLORIDE, SODIUM LACTATE, POTASSIUM CHLORIDE, AND CALCIUM CHLORIDE 1000 ML: .6; .31; .03; .02 INJECTION, SOLUTION INTRAVENOUS at 05:16

## 2018-03-21 RX ADMIN — LORAZEPAM 0.5 MG: 2 INJECTION, SOLUTION INTRAMUSCULAR; INTRAVENOUS at 07:16

## 2018-03-21 RX ADMIN — ONDANSETRON 4 MG: 2 INJECTION INTRAMUSCULAR; INTRAVENOUS at 04:35

## 2018-03-21 RX ADMIN — SODIUM CHLORIDE 1000 ML: 0.9 INJECTION, SOLUTION INTRAVENOUS at 04:32

## 2018-03-21 RX ADMIN — POTASSIUM CHLORIDE 40 MEQ: 1500 TABLET, EXTENDED RELEASE ORAL at 07:19

## 2018-03-21 RX ADMIN — IOHEXOL 85 ML: 350 INJECTION, SOLUTION INTRAVENOUS at 07:49

## 2018-03-21 RX ADMIN — KETOROLAC TROMETHAMINE 15 MG: 30 INJECTION, SOLUTION INTRAMUSCULAR at 06:45

## 2018-03-21 RX ADMIN — LORAZEPAM 0.5 MG: 2 INJECTION INTRAMUSCULAR; INTRAVENOUS at 05:25

## 2018-03-21 RX ADMIN — MAGNESIUM SULFATE HEPTAHYDRATE 2 G: 40 INJECTION, SOLUTION INTRAVENOUS at 06:10

## 2018-03-21 RX ADMIN — ONDANSETRON 4 MG: 2 INJECTION INTRAMUSCULAR; INTRAVENOUS at 05:14

## 2018-03-21 NOTE — ED PROVIDER NOTES
History  Chief Complaint   Patient presents with    Vomiting     c/o vomiting at least 15 times and lose stools since 9 pm last night   also c/o chest pain and dizziness, hx of panic attacks  Took an ativan at 12am but thinks it was thrown up     40y o  year-old female presents with 8 hours of epigastric pain without radiation  Patient associates 15 episodes of non bloody, non bilious emesis and numerous amounts of nonmucousy diarrhea  Patient describes moderate crampy that came on gradually and worsened  Patient states vomiting aggravates the pain and nothing alleviates it  Patient affirms prior chest pain  Notes that she has repeated panic attacks and states that she took lorazepam approximately midnight prior to 1 of the episodes of vomiting  States that she has increasing panic attacks whenever she has episodes such as this  ROS: Patient denies fever/chills, dyspnea, anorexia, vaginal bleeding or discharge, constipation, diaphoresis, groin pain, dysuria, hematuria, melena, or back/neck pain  All other systems reviewed and negative  Patient affirms contacts with similar symptoms  Patient denies any recent use of antibiotics, international travel, or trauma  Patient notes history of prior cholecystectomy  Objective:   Vital signs reviewed  Constitutional: moderate acute distress  Eyes: No scleral icterus  HENT: Head normocephalic  Pharynx moist    CV: Regular rate and rhythm  Respiratory: Lungs clear to auscultation bilaterally without adventitious sounds  Abdomen: Inspection of an abdomen with previous abdominal surgical incisions noted without erythema, rashes or ecchymosis noted  No abdominal pulsations noted  Normal bowel sounds with no bruit auscultated  Soft abdomen  Palpitation noted tenderness in the epigastrium with no tenderness in the lower abdominal field; tenderness not over McBurney's point  No masses or pulsatile aorta noted on examination   No rebound or guarding noted on examination, non-peritoneal exam  Negative psoas, obturator, and Rovsing's signs  Negative Colón's sign  Back: No rash or signs of herpes zoster  Skin: No ecchymosis of the umbilicus (negative Keith's sign) or flank (negative Grey Henry's sign)  Warm and dry  Extremities: Non-tender lower extremities without asymmetry  Neuro: Alert  Answers questions appropriately  Psych: Normal mood and affect  Medical Decision Making   Epigastric Pain with a broad differential  Patient does not have any history of cardiac disease or any symptoms concerning with angina equivalents so ACS less likely  Patient has no history of risk factors or associated symptoms concerning for pancreatitis  Patient has a non-surgical abdomen with no history and no exam findings concerning for appendicitis, obstruction, or other intraabdominal pathology  Based on the patient's history and exam most likely infectious or inflammatory  Will attempt symptomatic management with the patient with ondansetron and lorazepam      As patient has a moderate risk of pathology, will obtain laboratory evaluation including CBC to evaluate for anemia and leukocytosis; CMP to evaluate electrolytes, renal and hepatic function; lipase to evaluate potential for new onset pancreatitis  Will obtain urinalysis to evaluate for possible urinary infectious sources and ketones to evaluate potential hydration state  Will reassess patient after symptomatic treatment to ensure improvement in symptoms and no additional findings  Reassessment:  Patient's vomiting and abdominal pain have improved but now patient notes progressive chest pain similar to the prior episode that she described  Initial evaluation with EKG and troponin on remarkable however patient is on implanted estrogen containing products, as such D-dimer sent for evaluation of alternative etiologies though patient's overall history makes pulmonary embolism less likely    Patient is low risk [place] and can be ruled out with a negative D-dimer  If positive, CTA of the chest will be obtained to further evaluate this  Patient notes continued anxiety, will treat this with additional lorazepam   Patient signed out for reassessment for Dr Camelia Nicole  Vomiting       Prior to Admission Medications   Prescriptions Last Dose Informant Patient Reported? Taking? clonazePAM (KlonoPIN) 0 5 mg tablet 3/21/2018 at Unknown time  No Yes   Sig: Take 1 tablet (0 5 mg total) by mouth 3 (three) times a day as needed for anxiety   escitalopram (LEXAPRO) 10 mg tablet 3/21/2018 at Unknown time  No Yes   Sig: Take 1 tablet (10 mg total) by mouth daily      Facility-Administered Medications: None       Past Medical History:   Diagnosis Date    Anxiety     Depression     Disruption of episiotomy wound in the puerperium     Last assessed 06/22/16    External hemorrhoids     Last assessed 02/05/16    Fibroid uterus     GERD (gastroesophageal reflux disease)     Headache     Leiomyoma of uterus     Migraine     Migraine     Polyhydramnios in third trimester, not applicable or unspecified fetus     Last assessed 01/25/16    Pregnancy headache in third trimester     Resolved 02/05/16    Protein in urine     Last assessed 01/25/16    Sciatica of right side     Third degree laceration of perineum, type 3B 1/26/2016    Varicella        Past Surgical History:   Procedure Laterality Date    CHOLECYSTECTOMY  2006    MYOMECTOMY      CA POST COLPORRHAPHY,RECTUM/VAGINA N/A 3/24/2016    Procedure: Tracy Pen ;  Surgeon: Robet Peabody, MD;  [facility];  Service: Gynecology    WISDOM TOOTH EXTRACTION         Family History   Problem Relation Age of Onset    Cancer Mother      Breast    Cancer Maternal Aunt      Breast     I have reviewed and agree with the history as documented      Social History   Substance Use Topics    Smoking status: Never Smoker    Smokeless tobacco: Never Used Comment: As per allscripts Former smoker    Alcohol use Yes      Comment: socail         Review of Systems   Gastrointestinal: Positive for vomiting  All other systems reviewed and are negative        Physical Exam  ED Triage Vitals [03/21/18 0414]   Temperature Pulse Respirations Blood Pressure SpO2   98 1 °F (36 7 °C) 100 18 113/84 99 %      Temp Source Heart Rate Source Patient Position - Orthostatic VS BP Location FiO2 (%)   Oral Monitor Sitting Right arm --      Pain Score       8           Orthostatic Vital Signs  Vitals:    03/21/18 0414 03/21/18 1037   BP: 113/84 99/50   Pulse: 100 (!) 106   Patient Position - Orthostatic VS: Sitting Lying       Physical Exam    ED Medications  Medications   ondansetron (ZOFRAN) injection 4 mg (4 mg Intravenous Given 3/21/18 0435)   sodium chloride 0 9 % bolus 1,000 mL (0 mL Intravenous Stopped 3/21/18 0516)   lactated ringers bolus 1,000 mL (0 mL Intravenous Stopped 3/21/18 0611)   ondansetron (ZOFRAN) injection 4 mg (4 mg Intravenous Given 3/21/18 0514)   dicyclomine (BENTYL) tablet 20 mg (20 mg Oral Given 3/21/18 0515)   LORazepam (ATIVAN) 2 mg/mL injection 0 5 mg (0 5 mg Intravenous Given 3/21/18 0525)   magnesium sulfate 2 g/50 mL IVPB (premix) 2 g (0 g Intravenous Stopped 3/21/18 0730)   lactated ringers bolus 1,000 mL (0 mL Intravenous Stopped 3/21/18 1052)   ketorolac (TORADOL) injection 15 mg (15 mg Intravenous Given 3/21/18 0645)   potassium chloride (K-DUR,KLOR-CON) CR tablet 40 mEq (40 mEq Oral Given 3/21/18 0719)   LORazepam (ATIVAN) 2 mg/mL injection 0 5 mg (0 5 mg Intravenous Given 3/21/18 0716)   iohexol (OMNIPAQUE) 350 MG/ML injection (MULTI-DOSE) 85 mL (85 mL Intravenous Given 3/21/18 0749)       Diagnostic Studies  Results Reviewed     Procedure Component Value Units Date/Time    D-dimer, quantitative [[id number]]  (Abnormal) Collected:  03/21/18 0657    Lab Status:  Final result Specimen:  Blood from Arm, Left Updated:  03/21/18 0719     D-Dimer, Quant 448 (H) ng/ml (FEU)     POCT pregnancy, urine [[id number]]  (Normal) Resulted:  03/21/18 0703    Lab Status:  Final result Updated:  03/21/18 0703     EXT PREG TEST UR (Ref: Negative) neg    Urine Microscopic [[id number]]  (Abnormal) Collected:  03/21/18 0612    Lab Status:  Final result Specimen:  Urine from Urine, Clean Catch Updated:  03/21/18 0627     RBC, UA 1-2 (A) /hpf      WBC, UA 2-4 (A) /hpf      Epithelial Cells Occasional /hpf      Bacteria, UA Occasional /hpf     UA w Reflex to Microscopic [[id number]]  (Abnormal) Collected:  03/21/18 0612    Lab Status:  Final result Specimen:  Urine from Urine, Clean Catch Updated:  03/21/18 0619     Color, UA Yellow     Clarity, UA Clear     Specific Gravity, UA 1 015     pH, UA 7 0     Leukocytes, UA Negative     Nitrite, UA Negative     Protein, UA Negative mg/dl      Glucose, UA Negative mg/dl      Ketones, UA 40 (2+) (A) mg/dl      Urobilinogen, UA 0 2 E U /dl      Bilirubin, UA Negative     Blood, UA Trace-Intact (A)    Troponin I [[id number]]  (Normal) Collected:  03/21/18 0526    Lab Status:  Final result Specimen:  Blood from Arm, Right Updated:  03/21/18 0557     Troponin I <0 02 ng/mL     Narrative:         [employer] Chemistry analyzer 99% cutoff is > 0 04 ng/mL in network labs    o cTnI 99% cutoff is useful only when applied to patients in the clinical setting of myocardial ischemia  o cTnI 99% cutoff should be interpreted in the context of clinical history, ECG findings and possibly cardiac imaging to establish correct diagnosis  o cTnI 99% cutoff may be suggestive but clearly not indicative of a coronary event without the clinical setting of myocardial ischemia      CBC and differential [[id number]]  (Abnormal) Collected:  03/21/18 0432    Lab Status:  Final result Specimen:  Blood from Arm, Right Updated:  03/21/18 0502     WBC 9 86 Thousand/uL      RBC 5 15 (H) Million/uL      Hemoglobin 15 2 g/dL      Hematocrit 43 2 %      MCV 84 fL      MCH 29 5 pg MCHC 35 2 g/dL      RDW 12 3 %      MPV 10 1 fL      Platelets 844 Thousands/uL      nRBC 0 /100 WBCs     Narrative: This is an appended report  These results have been appended to a previously verified report  Comprehensive metabolic panel [[id number]]  (Abnormal) Collected:  03/21/18 0432    Lab Status:  Final result Specimen:  Blood from Arm, Right Updated:  03/21/18 0453     Sodium 142 mmol/L      Potassium 3 5 mmol/L      Chloride 106 mmol/L      CO2 17 (L) mmol/L      Anion Gap 19 (H) mmol/L      BUN 24 mg/dL      Creatinine 0 87 mg/dL      Glucose 172 (H) mg/dL      Calcium 8 9 mg/dL      AST 17 U/L      ALT 30 U/L      Alkaline Phosphatase 53 U/L      Total Protein 7 8 g/dL      Albumin 4 2 g/dL      Total Bilirubin 2 60 (H) mg/dL      eGFR 86 ml/min/1 73sq m     Narrative:         National Kidney Disease Education Program recommendations are as follows:  GFR calculation is accurate only with a steady state creatinine  Chronic Kidney disease less than 60 ml/min/1 73 sq  meters  Kidney failure less than 15 ml/min/1 73 sq  meters  Lipase [[id number]]  (Normal) Collected:  03/21/18 0432    Lab Status:  Final result Specimen:  Blood from Arm, Right Updated:  03/21/18 0453     Lipase 100 u/L                  CTA ED chest PE study   Final Result by Gume Main MD (03/21 2116)      No acute pathology  No pulmonary embolism  4 mm right middle lobe nodule  Based on current Fleischner Society 2017 Guidelines on incidental pulmonary nodule, no routine follow-up is needed if the patient is considered low risk for lung cancer  If the patient is considered high risk for lung    cancer, 12 month follow-up non-contrast chest CT is recommended  Workstation performed: [id number]         XR chest 2 views   Final Result by Douglas Burgos DO (03/21 3935)      No acute cardiopulmonary disease              Workstation performed: [id number]                    Procedures  Procedures       Phone Contacts  ED Phone Contact    ED Course  ED Course as of Mar 21 2007   Wed Mar 21, 2018   0533 EKG demonstrates sinus rhythm  There is a borderline QTC at 489  No acute ST segment changes  Will hold additional QTC prolonging agents  MDM  CritCare Time    Disposition  Final diagnoses:   Abdominal pain, vomiting, and diarrhea   Chest pain   Vomiting   Gastroenteritis     Time reflects when diagnosis was documented in both MDM as applicable and the Disposition within this note     Time User Action Codes Description Comment    3/21/2018  6:49 AM Nancyann Gory Add [R11 10] Vomiting     3/21/2018  6:49 AM Nancyann Gory Add [R10 9,  R11 10,  R19 7] Abdominal pain, vomiting, and diarrhea     3/21/2018  6:49 AM Nancyann Gory Modify [R10 9,  R11 10,  R19 7] Abdominal pain, vomiting, and diarrhea     3/21/2018  6:49 AM Nancyann Gory Remove [R11 10] Vomiting     3/21/2018  6:49 AM Nancyann Gory Add [R07 9] Chest pain     3/21/2018 10:43 AM Colby Houstona Add [R11 10] Vomiting     3/21/2018 10:43 AM Josh Felix Add [K52 9] Gastroenteritis       ED Disposition     ED Disposition Condition Comment    Discharge  Yariel Ross discharge to home/self care      Condition at discharge: Stable        Follow-up Information     Follow up With Specialties Details Why Contact Info Additional Information    Tai Carlton MD Obstetrics and Gynecology Schedule an appointment as soon as possible for a visit  [address] Emergency Department Emergency Medicine  If symptoms worsen [address] [place] [id number] [phone] [place] ED, [address] [place] [address], [id number]        Discharge Medication List as of 3/21/2018 10:46 AM      START taking these medications    Details   ondansetron (ZOFRAN-ODT) 4 mg disintegrating tablet Take 1 tablet (4 mg total) by mouth every 8 (eight) hours as needed for nausea or vomiting, Starting Wed 3/21/2018, Print         CONTINUE these medications which have NOT CHANGED    Details   clonazePAM (KlonoPIN) 0 5 mg tablet Take 1 tablet (0 5 mg total) by mouth 3 (three) times a day as needed for anxiety, Starting Tue 2/27/2018, Print      escitalopram (LEXAPRO) 10 mg tablet Take 1 tablet (10 mg total) by mouth daily, Starting Tue 2/27/2018, Normal           No discharge procedures on file      ED Provider  Electronically Signed by           Han Reyes MD  03/21/18 2011

## 2018-03-21 NOTE — DISCHARGE INSTRUCTIONS
Abdominal Pain   WHAT YOU NEED TO KNOW:   Abdominal pain can be dull, achy, or sharp  You may have pain in one area of your abdomen, or in your entire abdomen  Your pain may be caused by a condition such as constipation, food sensitivity or poisoning, infection, or a blockage  Abdominal pain can also be from a hernia, appendicitis, or an ulcer  Liver, gallbladder, or kidney conditions can also cause abdominal pain  The cause of your abdominal pain may be unknown  DISCHARGE INSTRUCTIONS:   Return to the emergency department if:   · You have new chest pain or shortness of breath  · You have pulsing pain in your upper abdomen or lower back that suddenly becomes constant  · Your pain is in the right lower abdominal area and worsens with movement  · You have a fever over 100 4°F (38°C) or shaking chills  · You are vomiting and cannot keep food or liquids down  · Your pain does not improve or gets worse over the next 8 to 12 hours  · You see blood in your vomit or bowel movements, or they look black and tarry  · Your skin or the whites of your eyes turn yellow  · You are a woman and have a large amount of vaginal bleeding that is not your monthly period  Contact your healthcare provider if:   · You have pain in your lower back  · You are a man and have pain in your testicles  · You have pain when you urinate  · You have questions or concerns about your condition or care  Follow up with your healthcare provider within 24 hours or as directed:  Write down your questions so you remember to ask them during your visits  Medicines:   · Medicines  may be given to calm your stomach and prevent vomiting or to decrease pain  Ask how to take pain medicine safely  · Take your medicine as directed  Contact your healthcare provider if you think your medicine is not helping or if you have side effects  Tell him of her if you are allergic to any medicine   Keep a list of the medicines, vitamins, and herbs you take  Include the amounts, and when and why you take them  Bring the list or the pill bottles to follow-up visits  Carry your medicine list with you in case of an emergency  © 2017 [employer] Information is for End User's use only and may not be sold, redistributed or otherwise used for commercial purposes  All illustrations and images included in CareNotes® are the copyrighted property of A [employer]  or Ronan Barkley  The above information is an  only  It is not intended as medical advice for individual conditions or treatments  Talk to your doctor, nurse or pharmacist before following any medical regimen to see if it is safe and effective for you  Acute Diarrhea   WHAT YOU NEED TO KNOW:   Acute diarrhea starts quickly and lasts a short time, usually 1 to 3 days  It can last up to 2 weeks  You may not be able to control your diarrhea  Acute diarrhea usually stops on its own  DISCHARGE INSTRUCTIONS:   Return to the emergency department if:   · You feel confused  · Your heartbeat is faster than normal      · Your eyes look deeply sunken, or you have no tears when you cry  · You urinate less than usual, or your urine is dark yellow  · You have blood or mucus in your stools  · You have severe abdominal pain  · You are unable to drink any liquids  Contact your healthcare provider if:   · Your symptoms do not get better with treatment  · You have a fever higher than 101 3°F (38 5°C)  · You have trouble eating and drinking because you are vomiting  · You are thirsty or have a dry mouth  · Your diarrhea does not get better in 7 days  · You have questions or concerns about your condition or care  Follow up with your healthcare provider as directed:  Write down your questions so you remember to ask them during your visits     Medicines:  · Diarrhea medicine  is an over-the-counter medicine that helps slow or stop your diarrhea  If you take other medicines, talk to your healthcare provider before you take diarrhea medicine  · Antibiotics  may be given to help treat an infection caused by bacteria  · Antiparasitics  may be given to treat an infection caused by parasites  · Take your medicine as directed  Contact your healthcare provider if you think your medicine is not helping or if you have side effects  Tell him of her if you are allergic to any medicine  Keep a list of the medicines, vitamins, and herbs you take  Include the amounts, and when and why you take them  Bring the list or the pill bottles to follow-up visits  Carry your medicine list with you in case of an emergency  Self-care:   · Drink liquids as directed  Liquids will help prevent dehydration caused by diarrhea  Ask your healthcare provider how much liquid to drink each day and which liquids are best for you  You may need to drink an oral rehydration solution (ORS)  An ORS has the right amounts of water, salts, and sugar you need to replace body fluids  You can buy an ORS at most grocery stores and pharmacies  · Eat foods that are easy to digest   Examples include rice, lentils, cereal, bananas, potatoes, and bread  It also includes some fruits (bananas, melon), well-cooked vegetables, and lean meats  Avoid foods high in fiber, fat, and sugar  Also avoid caffeine, alcohol, dairy, and red meat until your diarrhea is gone  Prevent acute diarrhea:   · Wash your hands often  Use soap and water  Wash your hands before you eat or prepare food  Also wash your hands after you use the bathroom  Use an alcohol-based hand gel when soap and water are not available  · Keep bathroom surfaces clean  This helps prevent the spread of germs that cause acute diarrhea  · Wash fruits and vegetables well before you eat them  This can help remove germs that cause diarrhea   If possible, remove the skin from fruits and vegetables, or cook them well before you eat them  · Cook meat as directed  ¨ Cook ground meat  to 160°F      ¨ Cook ground poultry, whole poultry, or cuts of poultry  to at least 165°F  Remove the meat from heat  Let it stand for 3 minutes before you eat it  ¨ Cook whole cuts of meat other than poultry  to at least 145°F  Remove the meat from heat  Let it stand for 3 minutes before you eat it  · Wash dishes that have touched raw meat with hot water and soap  This includes cutting boards, utensils, dishes, and serving containers  · Place raw or cooked meat in the refrigerator as soon as possible  Bacteria can grow in meat that is left at room temperature too long  · Do not eat raw or undercooked oysters, clams, or mussels  These foods may be contaminated and cause infection  · Drink filtered or treated water only when you travel  Do not put ice in your drinks  Drink bottled water whenever possible  © 2017 [employer] Information is for End User's use only and may not be sold, redistributed or otherwise used for commercial purposes  All illustrations and images included in CareNotes® are the copyrighted property of [employer]  or Ronan Barkley  The above information is an  only  It is not intended as medical advice for individual conditions or treatments  Talk to your doctor, nurse or pharmacist before following any medical regimen to see if it is safe and effective for you  Acute Nausea and Vomiting   WHAT YOU NEED TO KNOW:   Acute nausea and vomiting start suddenly, worsen quickly, and last a short time  DISCHARGE INSTRUCTIONS:   Return to the emergency department if:   · You see blood in your vomit or your bowel movements  · You have sudden, severe pain in your chest and upper abdomen after hard vomiting or retching  · You have swelling in your neck and chest      · You are dizzy, cold, and thirsty and your eyes and mouth are dry      · You are urinating very little or not at all  · You have muscle weakness, leg cramps, and trouble breathing  · Your heart is beating much faster than normal      · You continue to vomit for more than 48 hours  Contact your healthcare provider if:   · You have frequent dry heaves (vomiting but nothing comes out)  · Your nausea and vomiting does not get better or go away after you use medicine  · You have questions or concerns about your condition or treatment  Medicines: You may need any of the following:  · Medicines  may be given to calm your stomach and stop your vomiting  You may also need medicines to help you feel more relaxed or to stop nausea and vomiting caused by motion sickness  · Gastrointestinal stimulants  are used to help empty your stomach and bowels  This may help decrease nausea and vomiting  · Take your medicine as directed  Contact your healthcare provider if you think your medicine is not helping or if you have side effects  Tell him or her if you are allergic to any medicine  Keep a list of the medicines, vitamins, and herbs you take  Include the amounts, and when and why you take them  Bring the list or the pill bottles to follow-up visits  Carry your medicine list with you in case of an emergency  Prevent or manage acute nausea and vomiting:   · Do not drink alcohol  Alcohol may upset or irritate your stomach  Too much alcohol can also cause acute nausea and vomiting  · Control stress  Headaches due to stress may cause nausea and vomiting  Find ways to relax and manage your stress  Get more rest and sleep  · Drink more liquids as directed  Vomiting can lead to dehydration  It is important to drink more liquids to help replace lost body fluids  Ask your healthcare provider how much liquid to drink each day and which liquids are best for you  Your provider may recommend that you drink an oral rehydration solution (ORS)   ORS contains water, salts, and sugar that are needed to replace the lost body fluids  Ask what kind of ORS to use, how much to drink, and where to get it  · Eat smaller meals, more often  Eat small amounts of food every 2 to 3 hours, even if you are not hungry  Food in your stomach may decrease your nausea  · Talk to your healthcare provider before you take over-the-counter (OTC) medicines  These medicines can cause serious problems if you use certain other medicines, or you have a medical condition  You may have problems if you use too much or use them for longer than the label says  Follow directions on the label carefully  Follow up with your healthcare provider as directed:  Write down your questions so you remember to ask them during your follow-up visits  © 2017 [facility] Information is for End User's use only and may not be sold, redistributed or otherwise used for commercial purposes  All illustrations and images included in CareNotes® are the copyrighted property of A [employer] M [employer]  or Ronan Barkley  The above information is an  only  It is not intended as medical advice for individual conditions or treatments  Talk to your doctor, nurse or pharmacist before following any medical regimen to see if it is safe and effective for you

## 2018-03-21 NOTE — ED CARE HANDOFF
Emergency Department Sign Out Note        Sign out and transfer of care from Dr Mauricio Proctor  See Separate Emergency Department note  The patient, Hailee Mesa, was evaluated by the previous provider for vomiting and chest pain  Workup Completed:  Labs and IVF resucitation    ED Course / Workup Pending (followup): Waiting for Ddimer and reevaluation after meds and IVF  ED Course      Procedures  MDM  Number of Diagnoses or Management Options  Abdominal pain, vomiting, and diarrhea: new and requires workup  Chest pain: new and requires workup  Gastroenteritis:   Vomiting:   Diagnosis management comments: 7:35 AM  D-dimer elevated  Will send for CT chest rule out Pe     9:27 AM  Patient is sleeping  CT chest negative for PE  Will p o  trial and wakes up and attempt Dc     10:22 AM  Patient awake  States she's feeling better  Will PO trial and attempt d/c          Amount and/or Complexity of Data Reviewed  Clinical lab tests: reviewed  Tests in the radiology section of CPT®: reviewed and ordered  Tests in the medicine section of CPT®: reviewed  Decide to obtain previous medical records or to obtain history from someone other than the patient: yes  Review and summarize past medical records: yes  Discuss the patient with other providers: yes  Independent visualization of images, tracings, or specimens: yes    Risk of Complications, Morbidity, and/or Mortality  Presenting problems: high  Diagnostic procedures: high  Management options: moderate      CritCare Time      Disposition  Final diagnoses:   Abdominal pain, vomiting, and diarrhea   Chest pain   Vomiting   Gastroenteritis     Time reflects when diagnosis was documented in both MDM as applicable and the Disposition within this note     Time User Action Codes Description Comment    3/21/2018  6:49 AM Carol Chao [R11 10] Vomiting     3/21/2018  6:49 AM Carol Chao [R10 9,  R11 10,  R19 7] Abdominal pain, vomiting, and diarrhea     3/21/2018  6:49 AM Dewitte Beery Modify [R10 9,  R11 10,  R19 7] Abdominal pain, vomiting, and diarrhea     3/21/2018  6:49 AM Dewitte Beery Remove [R11 10] Vomiting     3/21/2018  6:49 AM Dewitte Beery Add [R07 9] Chest pain     3/21/2018 10:43 AM Jacob Bridge Add [R11 10] Vomiting     3/21/2018 10:43 AM Liliana Felix Add [K52 9] Gastroenteritis       ED Disposition     ED Disposition Condition Comment    Discharge  Filiberto Santiago discharge to home/self care  Condition at discharge: Stable        Follow-up Information     Follow up With Specialties Details Why Contact Info Additional Information    J Carlos Phillips MD Obstetrics and Gynecology Schedule an appointment as soon as possible for a visit  [address] Emergency Department Emergency Medicine  If symptoms worsen [address] [place] [id number]  [phone] [place] [facility], [address], [place], [facility]        Discharge Medication List as of 3/21/2018 10:46 AM      START taking these medications    Details   ondansetron (ZOFRAN-ODT) 4 mg disintegrating tablet Take 1 tablet (4 mg total) by mouth every 8 (eight) hours as needed for nausea or vomiting, Starting Wed 3/21/2018, Print         CONTINUE these medications which have NOT CHANGED    Details   clonazePAM (KlonoPIN) 0 5 mg tablet Take 1 tablet (0 5 mg total) by mouth 3 (three) times a day as needed for anxiety, Starting Tue 2/27/2018, Print      escitalopram (LEXAPRO) 10 mg tablet Take 1 tablet (10 mg total) by mouth daily, Starting Tue 2/27/2018, Normal           No discharge procedures on file         ED Provider  Electronically Signed by     Marco Baldwin DO  03/21/18 7982

## 2018-03-21 NOTE — ED NOTES
Pt yelling multiple times for nurse, pt was asked if she had her call bell in reach, it was  Pt states her "insides feel weird" and c/o nausea  Nurse notified        Tapany Brine  03/21/18 1986

## 2019-03-05 ENCOUNTER — ANNUAL EXAM (OUTPATIENT)
Dept: OBGYN CLINIC | Facility: CLINIC | Age: 39
End: 2019-03-05
Payer: COMMERCIAL

## 2019-03-05 VITALS
WEIGHT: 145.13 LBS | SYSTOLIC BLOOD PRESSURE: 112 MMHG | HEIGHT: 63 IN | BODY MASS INDEX: 25.71 KG/M2 | DIASTOLIC BLOOD PRESSURE: 86 MMHG

## 2019-03-05 DIAGNOSIS — F41.9 ANXIETY: ICD-10-CM

## 2019-03-05 DIAGNOSIS — Z01.419 ENCOUNTER FOR GYNECOLOGICAL EXAMINATION WITHOUT ABNORMAL FINDING: Primary | ICD-10-CM

## 2019-03-05 DIAGNOSIS — Z80.3 FAMILY HISTORY OF BREAST CANCER: ICD-10-CM

## 2019-03-05 PROCEDURE — S0612 ANNUAL GYNECOLOGICAL EXAMINA: HCPCS | Performed by: OBSTETRICS & GYNECOLOGY

## 2019-03-05 RX ORDER — CLONAZEPAM 0.5 MG/1
0.5 TABLET ORAL 3 TIMES DAILY PRN
Qty: 30 TABLET | Refills: 0 | Status: SHIPPED | OUTPATIENT
Start: 2019-03-05 | End: 2019-03-05 | Stop reason: SDUPTHER

## 2019-03-05 RX ORDER — CLONAZEPAM 0.5 MG/1
0.5 TABLET ORAL 3 TIMES DAILY PRN
Qty: 30 TABLET | Refills: 0 | Status: SHIPPED | OUTPATIENT
Start: 2019-03-05 | End: 2020-03-09 | Stop reason: SDUPTHER

## 2019-03-05 NOTE — PROGRESS NOTES
Assessment/Plan:    Encounter for gynecological examination without abnormal finding  Pap/HPV current  Contraception IUD Mirena placed     Family history of breast cancer (mother, Aunt) plan for yearly 3D mammogram   MOther had BRCA testing, negative       Diagnoses and all orders for this visit:    Encounter for gynecological examination without abnormal finding    Anxiety  -     clonazePAM (KlonoPIN) 0 5 mg tablet; Take 1 tablet (0 5 mg total) by mouth 3 (three) times a day as needed for anxiety    Family history of breast cancer  -     Mammo screening bilateral w 3d & cad; Future    Other orders  -     levonorgestrel (MIRENA) 20 MCG/24HR IUD; 1 each by Intrauterine route once          Subjective:      Patient ID: Saint Render is a 41 y o  female  Patient here for yearly exam  No current complaints at this time  Age of first period:2    Lmp: no periods w/ iud  Birth control: mirena (inserted )  Last pap: 7/15/15 neg,hpv- (due )  Patient is not a smoker  Patient is a social drinker  Patient doesn't exercise  NO complaints  Doing well    Daughter is 3  Family history of breast cancer- mother was tested for BRCA neg  Gynecologic Exam   The patient's pertinent negatives include no genital itching, genital lesions, genital odor, genital rash, pelvic pain, vaginal bleeding or vaginal discharge  The patient is experiencing no pain  Pertinent negatives include no chills, constipation, diarrhea, fever, frequency, nausea, painful intercourse, sore throat, urgency or vomiting         The following portions of the patient's history were reviewed and updated as appropriate:   She  has a past medical history of Anxiety, Depression, Disruption of episiotomy wound in the puerperium, External hemorrhoids, Fibroid uterus, GERD (gastroesophageal reflux disease), Headache, Leiomyoma of uterus, Migraine, Migraine, Polyhydramnios in third trimester, not applicable or unspecified fetus, Pregnancy headache in third trimester, Protein in urine, Sciatica of right side, Third degree laceration of perineum, type 3b (1/26/2016), and Varicella  She   Patient Active Problem List    Diagnosis Date Noted    Encounter for gynecological examination without abnormal finding 03/05/2019    Family history of breast cancer 03/05/2019    Anxiety 05/11/2016    Fibroid uterus 01/26/2016    GERD (gastroesophageal reflux disease) 01/26/2016    Third degree laceration of perineum, type 3b 01/26/2016     She  has a past surgical history that includes Myomectomy; [place] tooth extraction; pr post colporrhaphy,rectum/vagina (N/A, 3/24/2016); and Cholecystectomy (2006)  Her family history includes Cancer in her maternal aunt and mother  She  reports that she has never smoked  She has never used smokeless tobacco  She reports that she drinks alcohol  She reports that she does not use drugs  Current Outpatient Medications   Medication Sig Dispense Refill    clonazePAM (KlonoPIN) 0 5 mg tablet Take 1 tablet (0 5 mg total) by mouth 3 (three) times a day as needed for anxiety 30 tablet 0    escitalopram (LEXAPRO) 10 mg tablet Take 1 tablet (10 mg total) by mouth daily 90 tablet 3    levonorgestrel (MIRENA) 20 MCG/24HR IUD 1 each by Intrauterine route once      ondansetron (ZOFRAN-ODT) 4 mg disintegrating tablet Take 1 tablet (4 mg total) by mouth every 8 (eight) hours as needed for nausea or vomiting (Patient not taking: Reported on 3/5/2019) 30 tablet 0     No current facility-administered medications for this visit        Current Outpatient Medications on File Prior to Visit   Medication Sig    escitalopram (LEXAPRO) 10 mg tablet Take 1 tablet (10 mg total) by mouth daily    levonorgestrel (MIRENA) 20 MCG/24HR IUD 1 each by Intrauterine route once    [DISCONTINUED] clonazePAM (KlonoPIN) 0 5 mg tablet Take 1 tablet (0 5 mg total) by mouth 3 (three) times a day as needed for anxiety    ondansetron (ZOFRAN-ODT) 4 mg disintegrating tablet Take 1 tablet (4 mg total) by mouth every 8 (eight) hours as needed for nausea or vomiting (Patient not taking: Reported on 3/5/2019)     No current facility-administered medications on file prior to visit  She is allergic to demerol [meperidine] and macrolides and ketolides       Review of Systems   Constitutional: Negative for activity change, appetite change, chills, fatigue and fever  HENT: Negative for rhinorrhea, sneezing and sore throat  Eyes: Negative for visual disturbance  Respiratory: Negative for cough, shortness of breath and wheezing  Cardiovascular: Negative for chest pain, palpitations and leg swelling  Gastrointestinal: Negative for abdominal distention, constipation, diarrhea, nausea and vomiting  Genitourinary: Negative for difficulty urinating, frequency, pelvic pain, urgency and vaginal discharge  Neurological: Negative for syncope and light-headedness  Objective:      /86 (BP Location: Right arm, Patient Position: Sitting, Cuff Size: Standard)   Ht 5' 3" (1 6 m)   Wt 65 8 kg (145 lb 2 oz)   LMP  (LMP Unknown)   Breastfeeding? No   BMI 25 71 kg/m²          Physical Exam   Constitutional: She is oriented to person, place, and time  Pulmonary/Chest: No breast tenderness, discharge or bleeding  Genitourinary: Vagina normal and uterus normal  No breast tenderness, discharge or bleeding  There is no rash, tenderness, lesion or injury on the right labia  There is no rash, tenderness, lesion or injury on the left labia  Uterus is not deviated, not enlarged, not fixed and not tender  Cervix exhibits no motion tenderness, no discharge and no friability  Right adnexum displays no mass, no tenderness and no fullness  Left adnexum displays no mass, no tenderness and no fullness  No tenderness or bleeding in the vagina  No vaginal discharge found  Neurological: She is alert and oriented to person, place, and time

## 2019-03-05 NOTE — ASSESSMENT & PLAN NOTE
Pap/HPV current  Contraception IUD Mirena placed 2017    Family history of breast cancer (mother, Aunt) plan for yearly 3D mammogram   MOther had BRCA testing, negative

## 2019-03-05 NOTE — PATIENT INSTRUCTIONS
Wellness Visit for Adults   WHAT YOU NEED TO KNOW:   What is a wellness visit? A wellness visit is when you see your healthcare provider to get screened for health problems  You can also get advice on how to stay healthy  Write down your questions so you remember to ask them  Ask your healthcare provider how often you should have a wellness visit  What happens at a wellness visit? Your healthcare provider will ask about your health, and your family history of health problems  This includes high blood pressure, heart disease, and cancer  He or she will ask if you have symptoms that concern you, if you smoke, and about your mood  You may also be asked about your intake of medicines, supplements, food, and alcohol  Any of the following may be done:  · Your weight  will be checked  Your height may also be checked so your body mass index (BMI) can be calculated  Your BMI shows if you are at a healthy weight  · Your blood pressure  and heart rate will be checked  Your temperature may also be checked  · Blood and urine tests  may be done  Blood tests may be done to check your cholesterol levels  Abnormal cholesterol levels increase your risk for heart disease and stroke  You may also need a blood or urine test to check for diabetes if you are at increased risk  Urine tests may be done to look for signs of an infection or kidney disease  · A physical exam  includes checking your heartbeat and lungs with a stethoscope  Your healthcare provider may also check your skin to look for sun damage  · Screening tests  may be recommended  A screening test is done to check for diseases that may not cause symptoms  The screening tests you may need depend on your age, gender, family history, and lifestyle habits  For example, colorectal screening may be recommended if you are 48years old or older  What screening tests do I need if I am a woman? · A Pap smear  is used to screen for cervical cancer   Pap smears are usually done every 3 to 5 years depending on your age  You may need them more often if you have had abnormal Pap smear test results in the past  Ask your healthcare provider how often you should have a Pap smear  · A mammogram  is an x-ray of your breasts to screen for breast cancer  Experts recommend mammograms every 2 years starting at age 48 years  You may need a mammogram at age 52 years or younger if you have an increased risk for breast cancer  Talk to your healthcare provider about when you should start having mammograms and how often you need them  What vaccines might I need? · Get an influenza vaccine  every year  The influenza vaccine protects you from the flu  Several types of viruses cause the flu  The viruses change over time, so new vaccines are made each year  · Get a tetanus-diphtheria (Td) booster vaccine  every 10 years  This vaccine protects you against tetanus and diphtheria  Tetanus is a severe infection that may cause painful muscle spasms and lockjaw  Diphtheria is a severe bacterial infection that causes a thick covering in the back of your mouth and throat  · Get a human papillomavirus (HPV) vaccine  if you are female and aged 23 to 32 or male 23 to 24 and never received it  This vaccine protects you from HPV infection  HPV is the most common infection spread by sexual contact  HPV may also cause vaginal, penile, and anal cancers  · Get a pneumococcal vaccine  if you are aged 72 years or older  The pneumococcal vaccine is an injection given to protect you from pneumococcal disease  Pneumococcal disease is an infection caused by pneumococcal bacteria  The infection may cause pneumonia, meningitis, or an ear infection  · Get a shingles vaccine  if you are aged 61 or older, even if you have had shingles before  The shingles vaccine is an injection to protect you from the varicella-zoster virus  This is the same virus that causes chickenpox   Shingles is a painful rash that develops in people who had chickenpox or have been exposed to the virus  How can I eat healthy? My Plate is a model for planning healthy meals  It shows the types and amounts of foods that should go on your plate  Fruits and vegetables make up about half of your plate, and grains and protein make up the other half  A serving of dairy is included on the side of your plate  The amount of calories and serving sizes you need depends on your age, gender, weight, and height  Examples of healthy foods are listed below:  · Eat a variety of vegetables  such as dark green, red, and orange vegetables  You can also include canned vegetables low in sodium (salt) and frozen vegetables without added butter or sauces  · Eat a variety of fresh fruits , canned fruit in 100% juice, frozen fruit, and dried fruit  · Include whole grains  At least half of the grains you eat should be whole grains  Examples include whole-wheat bread, wheat pasta, brown rice, and whole-grain cereals such as oatmeal     · Eat a variety of protein foods such as seafood (fish and shellfish), lean meat, and poultry without skin (turkey and chicken)  Examples of lean meats include pork leg, shoulder, or tenderloin, and beef round, sirloin, tenderloin, and extra lean ground beef  Other protein foods include eggs and egg substitutes, beans, peas, soy products, nuts, and seeds  · Choose low-fat dairy products such as skim or 1% milk or low-fat yogurt, cheese, and cottage cheese  · Limit unhealthy fats  such as butter, hard margarine, and shortening  How much exercise do I need? Exercise at least 30 minutes per day on most days of the week  Some examples of exercise include walking, biking, dancing, and swimming  You can also fit in more physical activity by taking the stairs instead of the elevator or parking farther away from stores  Include muscle strengthening activities 2 days each week  Regular exercise provides many health benefits  It helps you manage your weight, and decreases your risk for type 2 diabetes, heart disease, stroke, and high blood pressure  Exercise can also help improve your mood  Ask your healthcare provider about the best exercise plan for you  What are some general health and safety guidelines I should follow? · Do not smoke  Nicotine and other chemicals in cigarettes and cigars can cause lung damage  Ask your healthcare provider for information if you currently smoke and need help to quit  E-cigarettes or smokeless tobacco still contain nicotine  Talk to your healthcare provider before you use these products  · Limit alcohol  A drink of alcohol is 12 ounces of beer, 5 ounces of wine, or 1½ ounces of liquor  · Lose weight, if needed  Being overweight increases your risk of certain health conditions  These include heart disease, high blood pressure, type 2 diabetes, and certain types of cancer  · Protect your skin  Do not sunbathe or use tanning beds  Use sunscreen with a SPF 15 or higher  Apply sunscreen at least 15 minutes before you go outside  Reapply sunscreen every 2 hours  Wear protective clothing, hats, and sunglasses when you are outside  · Drive safely  Always wear your seatbelt  Make sure everyone in your car wears a seatbelt  A seatbelt can save your life if you are in an accident  Do not use your cell phone when you are driving  This could distract you and cause an accident  Pull over if you need to make a call or send a text message  · Practice safe sex  Use latex condoms if are sexually active and have more than one partner  Your healthcare provider may recommend screening tests for sexually transmitted infections (STIs)  · Wear helmets, lifejackets, and protective gear  Always wear a helmet when you ride a bike or motorcycle, go skiing, or play sports that could cause a head injury  Wear protective equipment when you play sports   Wear a lifejacket when you are on a boat or doing water sports  CARE AGREEMENT:   You have the right to help plan your care  Learn about your health condition and how it may be treated  Discuss treatment options with your caregivers to decide what care you want to receive  You always have the right to refuse treatment  The above information is an  only  It is not intended as medical advice for individual conditions or treatments  Talk to your doctor, nurse or pharmacist before following any medical regimen to see if it is safe and effective for you  © 2017 [employer] Information is for End User's use only and may not be sold, redistributed or otherwise used for commercial purposes  All illustrations and images included in CareNotes® are the copyrighted property of [employer]  or [employer]

## 2019-07-25 DIAGNOSIS — F41.9 ANXIETY: ICD-10-CM

## 2019-07-25 RX ORDER — ESCITALOPRAM OXALATE 10 MG/1
10 TABLET ORAL DAILY
Qty: 90 TABLET | Refills: 0 | Status: SHIPPED | OUTPATIENT
Start: 2019-07-25 | End: 2019-10-13 | Stop reason: SDUPTHER

## 2019-10-13 DIAGNOSIS — F41.9 ANXIETY: ICD-10-CM

## 2019-10-13 RX ORDER — ESCITALOPRAM OXALATE 10 MG/1
TABLET ORAL
Qty: 90 TABLET | Refills: 2 | Status: SHIPPED | OUTPATIENT
Start: 2019-10-13 | End: 2020-03-09 | Stop reason: SDUPTHER

## 2020-03-09 ENCOUNTER — ANNUAL EXAM (OUTPATIENT)
Dept: OBGYN CLINIC | Facility: CLINIC | Age: 40
End: 2020-03-09
Payer: COMMERCIAL

## 2020-03-09 VITALS
DIASTOLIC BLOOD PRESSURE: 70 MMHG | BODY MASS INDEX: 24.63 KG/M2 | SYSTOLIC BLOOD PRESSURE: 106 MMHG | WEIGHT: 139 LBS | HEIGHT: 63 IN

## 2020-03-09 DIAGNOSIS — Z01.419 ENCOUNTER FOR GYNECOLOGICAL EXAMINATION WITHOUT ABNORMAL FINDING: Primary | ICD-10-CM

## 2020-03-09 DIAGNOSIS — F41.9 ANXIETY: ICD-10-CM

## 2020-03-09 PROCEDURE — 87624 HPV HI-RISK TYP POOLED RSLT: CPT | Performed by: OBSTETRICS & GYNECOLOGY

## 2020-03-09 PROCEDURE — S0612 ANNUAL GYNECOLOGICAL EXAMINA: HCPCS | Performed by: OBSTETRICS & GYNECOLOGY

## 2020-03-09 PROCEDURE — G0145 SCR C/V CYTO,THINLAYER,RESCR: HCPCS | Performed by: OBSTETRICS & GYNECOLOGY

## 2020-03-09 RX ORDER — CLONAZEPAM 0.5 MG/1
0.5 TABLET ORAL 3 TIMES DAILY PRN
Qty: 30 TABLET | Refills: 0 | Status: SHIPPED | OUTPATIENT
Start: 2020-03-09 | End: 2021-03-23 | Stop reason: SDUPTHER

## 2020-03-09 RX ORDER — ESCITALOPRAM OXALATE 10 MG/1
10 TABLET ORAL DAILY
Qty: 90 TABLET | Refills: 3 | Status: SHIPPED | OUTPATIENT
Start: 2020-03-09 | End: 2021-03-23 | Stop reason: SDUPTHER

## 2020-03-09 NOTE — ASSESSMENT & PLAN NOTE
Pap/HPV collected today  Mammogram ordered    Contraception Mirena placed 2017    Encourage healthy diet, exercise, Calcium 1200mg per day and at least 800 iu Vitamin D daily

## 2020-03-09 NOTE — PROGRESS NOTES
Assessment/Plan:    Encounter for gynecological examination without abnormal finding  Pap/HPV collected today  Mammogram ordered    Contraception Mirena placed 2017    Encourage healthy diet, exercise, Calcium 1200mg per day and at least 800 iu Vitamin D daily  Diagnoses and all orders for this visit:    Encounter for gynecological examination without abnormal finding  -     Liquid-based pap, screening    Anxiety  -     escitalopram (LEXAPRO) 10 mg tablet; Take 1 tablet (10 mg total) by mouth daily  -     clonazePAM (KlonoPIN) 0 5 mg tablet; Take 1 tablet (0 5 mg total) by mouth 3 (three) times a day as needed for anxiety          Subjective:      Patient ID: Jj Riggins is a 46 y o  female  Here today for her annual Exam  Current complaints no complaints    Menarche 16  LMP does not get a cycle with her Mirena   Birth Control IUD-Mirena   Last PAP 7/15-due today  Last Mammo still has active order due to family history     Smoking Status no  Drinking Status socially   Recreational Drug Use  no  Exercise Routine yes goes to gym 3 days a week      Overall doing well  Daughter is now 3, on  2 days a week  Gynecologic Exam   The patient's pertinent negatives include no genital itching, genital lesions, genital odor, genital rash, pelvic pain, vaginal bleeding or vaginal discharge  The patient is experiencing no pain  Pertinent negatives include no chills, constipation, diarrhea, fever, frequency, nausea, painful intercourse, sore throat, urgency or vomiting  She is sexually active  She uses an IUD for contraception         The following portions of the patient's history were reviewed and updated as appropriate:   She  has a past medical history of Anxiety, Depression, Disruption of episiotomy wound in the puerperium, External hemorrhoids, Fibroid uterus, GERD (gastroesophageal reflux disease), Headache, Leiomyoma of uterus, Migraine, Migraine, Polyhydramnios in third trimester, not applicable or unspecified fetus, Pregnancy headache in third trimester, Protein in urine, Sciatica of right side, Third degree laceration of perineum, type 3b (1/26/2016), and Varicella  She   Patient Active Problem List    Diagnosis Date Noted    Encounter for gynecological examination without abnormal finding 03/05/2019    Family history of breast cancer 03/05/2019    Anxiety 05/11/2016    Fibroid uterus 01/26/2016    GERD (gastroesophageal reflux disease) 01/26/2016    Third degree laceration of perineum, type 3b 01/26/2016     She  has a past surgical history that includes Myomectomy; Richey tooth extraction; pr post colporrhaphy,rectum/vagina (N/A, 3/24/2016); and Cholecystectomy (2006)  Her family history includes Cancer in her maternal aunt and mother  She  reports that she has never smoked  She has never used smokeless tobacco  She reports that she drinks alcohol  She reports that she does not use drugs  Current Outpatient Medications   Medication Sig Dispense Refill    clonazePAM (KlonoPIN) 0 5 mg tablet Take 1 tablet (0 5 mg total) by mouth 3 (three) times a day as needed for anxiety 30 tablet 0    escitalopram (LEXAPRO) 10 mg tablet Take 1 tablet (10 mg total) by mouth daily 90 tablet 3    levonorgestrel (MIRENA) 20 MCG/24HR IUD 1 each by Intrauterine route once      ondansetron (ZOFRAN-ODT) 4 mg disintegrating tablet Take 1 tablet (4 mg total) by mouth every 8 (eight) hours as needed for nausea or vomiting (Patient not taking: Reported on 3/5/2019) 30 tablet 0     No current facility-administered medications for this visit        Current Outpatient Medications on File Prior to Visit   Medication Sig    levonorgestrel (MIRENA) 20 MCG/24HR IUD 1 each by Intrauterine route once    ondansetron (ZOFRAN-ODT) 4 mg disintegrating tablet Take 1 tablet (4 mg total) by mouth every 8 (eight) hours as needed for nausea or vomiting (Patient not taking: Reported on 3/5/2019)    [DISCONTINUED] clonazePAM (KlonoPIN) 0 5 mg tablet Take 1 tablet (0 5 mg total) by mouth 3 (three) times a day as needed for anxiety    [DISCONTINUED] escitalopram (LEXAPRO) 10 mg tablet TAKE 1 TABLET BY MOUTH EVERY DAY     No current facility-administered medications on file prior to visit  She is allergic to demerol [meperidine] and macrolides and ketolides       Review of Systems   Constitutional: Negative for activity change, appetite change, chills, fatigue and fever  HENT: Negative for rhinorrhea, sneezing and sore throat  Eyes: Negative for visual disturbance  Respiratory: Negative for cough, shortness of breath and wheezing  Cardiovascular: Negative for chest pain, palpitations and leg swelling  Gastrointestinal: Negative for abdominal distention, constipation, diarrhea, nausea and vomiting  Genitourinary: Negative for difficulty urinating, frequency, pelvic pain, urgency and vaginal discharge  Neurological: Negative for syncope and light-headedness  Objective:      /70 (BP Location: Right arm, Patient Position: Sitting, Cuff Size: Standard)   Ht 5' 3" (1 6 m)   Wt 63 kg (139 lb)   BMI 24 62 kg/m²          Physical Exam   Constitutional: She is oriented to person, place, and time  Pulmonary/Chest: Right breast exhibits no inverted nipple, no mass, no nipple discharge, no skin change and no tenderness  Left breast exhibits no inverted nipple, no mass, no nipple discharge, no skin change and no tenderness  No breast tenderness, discharge or bleeding  Breasts are symmetrical    Genitourinary: Vagina normal and uterus normal  No breast tenderness, discharge or bleeding  There is no rash, tenderness, lesion or injury on the right labia  There is no rash, tenderness, lesion or injury on the left labia  Uterus is not deviated, not enlarged, not fixed and not tender  Cervix exhibits no motion tenderness, no discharge and no friability   Right adnexum displays no mass, no tenderness and no fullness  Left adnexum displays no mass, no tenderness and no fullness  No tenderness or bleeding in the vagina  No vaginal discharge found  Neurological: She is alert and oriented to person, place, and time

## 2020-03-09 NOTE — PATIENT INSTRUCTIONS

## 2020-03-11 LAB
HPV HR 12 DNA CVX QL NAA+PROBE: NEGATIVE
HPV16 DNA CVX QL NAA+PROBE: NEGATIVE
HPV18 DNA CVX QL NAA+PROBE: NEGATIVE

## 2020-03-16 LAB
LAB AP GYN PRIMARY INTERPRETATION: NORMAL
Lab: NORMAL

## 2020-03-17 ENCOUNTER — TELEPHONE (OUTPATIENT)
Dept: OBGYN CLINIC | Facility: CLINIC | Age: 40
End: 2020-03-17

## 2020-03-17 NOTE — TELEPHONE ENCOUNTER
Left message reviewing results per Dr Vinicio Harden, advised to call back with any questions   ----- Message from Vitaly Mcknight MD sent at 3/16/2020 12:11 PM EDT -----  Please call patient  Pap and HPV are negative

## 2020-09-01 ENCOUNTER — TELEPHONE (OUTPATIENT)
Dept: OBGYN CLINIC | Age: 40
End: 2020-09-01

## 2020-09-01 NOTE — TELEPHONE ENCOUNTER
Pt need refill on Lexapro 10 mg tablet and would like that sent to [facility] in [facility] in [address]  Thank you!

## 2021-03-05 ENCOUNTER — TELEPHONE (OUTPATIENT)
Dept: OBGYN CLINIC | Facility: CLINIC | Age: 41
End: 2021-03-05

## 2021-03-05 NOTE — TELEPHONE ENCOUNTER
Patient needs a letter stating we prescribe  Erica Murcia for aniexty disorder  When letter is complete please call patient to get fax

## 2021-03-05 NOTE — TELEPHONE ENCOUNTER
Spoke to pt and told her I will write the letter for her  She cannot find fax so I told her if she sets up [employer] she will be able to receive it  Sent her email to sign up

## 2021-03-23 ENCOUNTER — ANNUAL EXAM (OUTPATIENT)
Dept: OBGYN CLINIC | Facility: CLINIC | Age: 41
End: 2021-03-23
Payer: COMMERCIAL

## 2021-03-23 VITALS — WEIGHT: 138.2 LBS | DIASTOLIC BLOOD PRESSURE: 70 MMHG | BODY MASS INDEX: 24.48 KG/M2 | SYSTOLIC BLOOD PRESSURE: 118 MMHG

## 2021-03-23 DIAGNOSIS — F41.9 ANXIETY: ICD-10-CM

## 2021-03-23 DIAGNOSIS — Z12.31 SCREENING MAMMOGRAM, ENCOUNTER FOR: ICD-10-CM

## 2021-03-23 DIAGNOSIS — Z01.419 ENCOUNTER FOR GYNECOLOGICAL EXAMINATION WITHOUT ABNORMAL FINDING: Primary | ICD-10-CM

## 2021-03-23 PROCEDURE — S0612 ANNUAL GYNECOLOGICAL EXAMINA: HCPCS | Performed by: OBSTETRICS & GYNECOLOGY

## 2021-03-23 RX ORDER — CLONAZEPAM 0.5 MG/1
0.5 TABLET ORAL 3 TIMES DAILY PRN
Qty: 30 TABLET | Refills: 0 | Status: SHIPPED | OUTPATIENT
Start: 2021-03-23 | End: 2021-08-12 | Stop reason: SDUPTHER

## 2021-03-23 RX ORDER — ESCITALOPRAM OXALATE 10 MG/1
10 TABLET ORAL DAILY
Qty: 90 TABLET | Refills: 3 | Status: SHIPPED | OUTPATIENT
Start: 2021-03-23 | End: 2021-08-24 | Stop reason: HOSPADM

## 2021-03-23 NOTE — ASSESSMENT & PLAN NOTE
Pap/HPV current  Mammogram ordered  IUD plan for exchange next year    Encourage healthy diet, exercise, Calcium 1200mg per day and at least 800 iu Vitamin D daily

## 2021-03-23 NOTE — PATIENT INSTRUCTIONS
Wellness Visit for Adults   WHAT YOU NEED TO KNOW:   What is a wellness visit? A wellness visit is when you see your healthcare provider to get screened for health problems  Your healthcare provider will also give you advice on how to stay healthy  Write down your questions so you remember to ask them  Ask your healthcare provider how often you should have a wellness visit  What happens at a wellness visit? Your healthcare provider will ask about your health, and your family history of health problems  This includes high blood pressure, heart disease, and cancer  He or she will ask if you have symptoms that concern you, if you smoke, and about your mood  You may also be asked about your intake of medicines, supplements, food, and alcohol  Any of the following may be done:  · Your weight  will be checked  Your height may also be checked so your body mass index (BMI) can be calculated  Your BMI shows if you are at a healthy weight  · Your blood pressure  and heart rate will be checked  Your temperature may also be checked  · Blood and urine tests  may be done  Blood tests may be done to check your cholesterol levels  Abnormal cholesterol levels increase your risk for heart disease and stroke  You may also need a blood or urine test to check for diabetes if you are at increased risk  Urine tests may be done to look for signs of an infection or kidney disease  · A physical exam  includes checking your heartbeat and lungs with a stethoscope  Your healthcare provider may also check your skin to look for sun damage  · Screening tests  may be recommended  A screening test is done to check for diseases that may not cause symptoms  The screening tests you may need depend on your age, gender, family history, and lifestyle habits  For example, colorectal screening may be recommended if you are 48years old or older  What screening tests do I need if I am a woman?    · A Pap smear  is used to screen for cervical cancer  Pap smears are usually done every 3 to 5 years depending on your age  You may need them more often if you have had abnormal Pap smear test results in the past  Ask your healthcare provider how often you should have a Pap smear  · A mammogram  is an x-ray of your breasts to screen for breast cancer  Experts recommend mammograms every 2 years starting at age 48 years  You may need a mammogram at age 52 years or younger if you have an increased risk for breast cancer  Talk to your healthcare provider about when you should start having mammograms and how often you need them  What vaccines might I need? · Get an influenza vaccine  every year  The influenza vaccine protects you from the flu  Several types of viruses cause the flu  The viruses change over time, so new vaccines are made each year  · Get a tetanus-diphtheria (Td) booster vaccine  every 10 years  This vaccine protects you against tetanus and diphtheria  Tetanus is a severe infection that may cause painful muscle spasms and lockjaw  Diphtheria is a severe bacterial infection that causes a thick covering in the back of your mouth and throat  · Get a human papillomavirus (HPV) vaccine  if you are female and aged 23 to 32 or male 23 to 24 and never received it  This vaccine protects you from HPV infection  HPV is the most common infection spread by sexual contact  HPV may also cause vaginal, penile, and anal cancers  · Get a pneumococcal vaccine  if you are aged 72 years or older  The pneumococcal vaccine is an injection given to protect you from pneumococcal disease  Pneumococcal disease is an infection caused by pneumococcal bacteria  The infection may cause pneumonia, meningitis, or an ear infection  · Get a shingles vaccine  if you are 60 or older, even if you have had shingles before  The shingles vaccine is an injection to protect you from the varicella-zoster virus  This is the same virus that causes chickenpox   Shingles is a painful rash that develops in people who had chickenpox or have been exposed to the virus  How can I eat healthy? My Plate is a model for planning healthy meals  It shows the types and amounts of foods that should go on your plate  Fruits and vegetables make up about half of your plate, and grains and protein make up the other half  A serving of dairy is included on the side of your plate  The amount of calories and serving sizes you need depends on your age, gender, weight, and height  Examples of healthy foods are listed below:  · Eat a variety of vegetables  such as dark green, red, and orange vegetables  You can also include canned vegetables low in sodium (salt) and frozen vegetables without added butter or sauces  · Eat a variety of fresh fruits , canned fruit in 100% juice, frozen fruit, and dried fruit  · Include whole grains  At least half of the grains you eat should be whole grains  Examples include whole-wheat bread, wheat pasta, brown rice, and whole-grain cereals such as oatmeal     · Eat a variety of protein foods such as seafood (fish and shellfish), lean meat, and poultry without skin (turkey and chicken)  Examples of lean meats include pork leg, shoulder, or tenderloin, and beef round, sirloin, tenderloin, and extra lean ground beef  Other protein foods include eggs and egg substitutes, beans, peas, soy products, nuts, and seeds  · Choose low-fat dairy products such as skim or 1% milk or low-fat yogurt, cheese, and cottage cheese  · Limit unhealthy fats  such as butter, hard margarine, and shortening  How much exercise do I need? Exercise at least 30 minutes per day on most days of the week  Some examples of exercise include walking, biking, dancing, and swimming  You can also fit in more physical activity by taking the stairs instead of the elevator or parking farther away from stores  Include muscle strengthening activities 2 days each week   Regular exercise provides many health benefits  It helps you manage your weight, and decreases your risk for type 2 diabetes, heart disease, stroke, and high blood pressure  Exercise can also help improve your mood  Ask your healthcare provider about the best exercise plan for you  What are some general health and safety guidelines I should follow? · Do not smoke  Nicotine and other chemicals in cigarettes and cigars can cause lung damage  Ask your healthcare provider for information if you currently smoke and need help to quit  E-cigarettes or smokeless tobacco still contain nicotine  Talk to your healthcare provider before you use these products  · Limit alcohol  A drink of alcohol is 12 ounces of beer, 5 ounces of wine, or 1½ ounces of liquor  · Lose weight, if needed  Being overweight increases your risk of certain health conditions  These include heart disease, high blood pressure, type 2 diabetes, and certain types of cancer  · Protect your skin  Do not sunbathe or use tanning beds  Use sunscreen with a SPF 15 or higher  Apply sunscreen at least 15 minutes before you go outside  Reapply sunscreen every 2 hours  Wear protective clothing, hats, and sunglasses when you are outside  · Drive safely  Always wear your seatbelt  Make sure everyone in your car wears a seatbelt  A seatbelt can save your life if you are in an accident  Do not use your cell phone when you are driving  This could distract you and cause an accident  Pull over if you need to make a call or send a text message  · Practice safe sex  Use latex condoms if are sexually active and have more than one partner  Your healthcare provider may recommend screening tests for sexually transmitted infections (STIs)  · Wear helmets, lifejackets, and protective gear  Always wear a helmet when you ride a bike or motorcycle, go skiing, or play sports that could cause a head injury  Wear protective equipment when you play sports   Wear a lifejacket when you are on a boat or doing water sports  CARE AGREEMENT:   You have the right to help plan your care  Learn about your health condition and how it may be treated  Discuss treatment options with your healthcare providers to decide what care you want to receive  You always have the right to refuse treatment  The above information is an  only  It is not intended as medical advice for individual conditions or treatments  Talk to your doctor, nurse or pharmacist before following any medical regimen to see if it is safe and effective for you  © Copyright [address] Information is for End User's use only and may not be sold, redistributed or otherwise used for commercial purposes   All illustrations and images included in CareNotes® are the copyrighted property of [employer]  or [address] [place] [address]

## 2021-03-23 NOTE — PROGRESS NOTES
Assessment/Plan:    Encounter for gynecological examination without abnormal finding  Pap/HPV current  Mammogram ordered  IUD plan for exchange next year    Encourage healthy diet, exercise, Calcium 1200mg per day and at least 800 iu Vitamin D daily  Diagnoses and all orders for this visit:    Encounter for gynecological examination without abnormal finding    Screening mammogram, encounter for  -     Mammo screening bilateral w 3d & cad; Future    Anxiety  -     clonazePAM (KlonoPIN) 0 5 mg tablet; Take 1 tablet (0 5 mg total) by mouth 3 (three) times a day as needed for anxiety  -     escitalopram (LEXAPRO) 10 mg tablet; Take 1 tablet (10 mg total) by mouth daily          Subjective:      Patient ID: Linda You is a 45 y o  female  Patient presents for a routine annual visit  Menarche-12 Y/O  Last Pap Smear- 3/9/20 Neg-HPV  LMP-Mirena   Birth control-Mirena    Non smoker  Social drinker  Currently sexually active  Patient would like refills of klonopin and lexapro sent to pharmacy on file  Daughter is now 11! 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  Currently working from home ( is as well) but everything is going well  Anxiety is controlled with klonopin and lexapro  Is in process of getting approval for medical marijuana card as well  Gynecologic Exam  The patient's pertinent negatives include no genital itching, genital lesions, genital odor, genital rash, pelvic pain, vaginal bleeding or vaginal discharge  The patient is experiencing no pain  Pertinent negatives include no chills, constipation, diarrhea, fever, frequency, nausea, painful intercourse, sore throat, urgency or vomiting  She is sexually active         The following portions of the patient's history were reviewed and updated as appropriate:   She  has a past medical history of Anxiety, Depression, Disruption of episiotomy wound in the puerperium, External hemorrhoids, Fibroid uterus, GERD (gastroesophageal reflux disease), Headache, Leiomyoma of uterus, Migraine, Migraine, Polyhydramnios in third trimester, not applicable or unspecified fetus, Pregnancy headache in third trimester, Protein in urine, Sciatica of right side, Third degree laceration of perineum, type 3b (1/26/2016), and Varicella  She   Patient Active Problem List    Diagnosis Date Noted    Encounter for gynecological examination without abnormal finding 03/05/2019    Family history of breast cancer 03/05/2019    Anxiety 05/11/2016    Fibroid uterus 01/26/2016    GERD (gastroesophageal reflux disease) 01/26/2016    Third degree laceration of perineum, type 3b 01/26/2016     She  has a past surgical history that includes Myomectomy; [place] tooth extraction; pr post colporrhaphy,rectum/vagina (N/A, 3/24/2016); and Cholecystectomy (2006)  Her family history includes Cancer in her maternal aunt and mother  She  reports that she has never smoked  She has never used smokeless tobacco  She reports current alcohol use  She reports that she does not use drugs  Current Outpatient Medications   Medication Sig Dispense Refill    clonazePAM (KlonoPIN) 0 5 mg tablet Take 1 tablet (0 5 mg total) by mouth 3 (three) times a day as needed for anxiety 30 tablet 0    escitalopram (LEXAPRO) 10 mg tablet Take 1 tablet (10 mg total) by mouth daily 90 tablet 3    levonorgestrel (MIRENA) 20 MCG/24HR IUD 1 each by Intrauterine route once      ondansetron (ZOFRAN-ODT) 4 mg disintegrating tablet Take 1 tablet (4 mg total) by mouth every 8 (eight) hours as needed for nausea or vomiting (Patient not taking: Reported on 3/5/2019) 30 tablet 0     No current facility-administered medications for this visit        Current Outpatient Medications on File Prior to Visit   Medication Sig    levonorgestrel (MIRENA) 20 MCG/24HR IUD 1 each by Intrauterine route once    [DISCONTINUED] clonazePAM (KlonoPIN) 0 5 mg tablet Take 1 tablet (0 5 mg total) by mouth 3 (three) times a day as needed for anxiety    [DISCONTINUED] escitalopram (LEXAPRO) 10 mg tablet Take 1 tablet (10 mg total) by mouth daily    ondansetron (ZOFRAN-ODT) 4 mg disintegrating tablet Take 1 tablet (4 mg total) by mouth every 8 (eight) hours as needed for nausea or vomiting (Patient not taking: Reported on 3/5/2019)     No current facility-administered medications on file prior to visit  She is allergic to demerol [meperidine] and macrolides and ketolides       Review of Systems   Constitutional: Negative for activity change, appetite change, chills, fatigue and fever  HENT: Negative for rhinorrhea, sneezing and sore throat  Eyes: Negative for visual disturbance  Respiratory: Negative for cough, shortness of breath and wheezing  Cardiovascular: Negative for chest pain, palpitations and leg swelling  Gastrointestinal: Negative for abdominal distention, constipation, diarrhea, nausea and vomiting  Genitourinary: Negative for difficulty urinating, frequency, pelvic pain, urgency and vaginal discharge  Neurological: Negative for syncope and light-headedness  Objective:      /70   Wt 62 7 kg (138 lb 3 2 oz)   BMI 24 48 kg/m²          Physical Exam  Chest:      Breasts: Breasts are symmetrical          Right: No inverted nipple, mass, nipple discharge, skin change or tenderness  Left: No inverted nipple, mass, nipple discharge, skin change or tenderness  Genitourinary:     Labia:         Right: No rash, tenderness, lesion or injury  Left: No rash, tenderness, lesion or injury  Vagina: Normal  No vaginal discharge, tenderness or bleeding  Cervix: No cervical motion tenderness, discharge or friability  Uterus: Not deviated, not enlarged, not fixed and not tender  Adnexa:         Right: No mass, tenderness or fullness  Left: No mass, tenderness or fullness          Comments: String visualized  Neurological:      Mental Status: She is alert and oriented to person, place, and time

## 2021-04-08 DIAGNOSIS — Z23 ENCOUNTER FOR IMMUNIZATION: ICD-10-CM

## 2021-05-27 ENCOUNTER — TELEPHONE (OUTPATIENT)
Dept: OBGYN CLINIC | Facility: CLINIC | Age: 41
End: 2021-05-27

## 2021-07-26 ENCOUNTER — HOSPITAL ENCOUNTER (EMERGENCY)
Facility: HOSPITAL | Age: 41
Discharge: HOME | End: 2021-07-26
Attending: EMERGENCY MEDICINE | Admitting: EMERGENCY MEDICINE
Payer: COMMERCIAL

## 2021-07-26 VITALS
HEART RATE: 76 BPM | BODY MASS INDEX: 24.63 KG/M2 | RESPIRATION RATE: 18 BRPM | HEIGHT: 63 IN | TEMPERATURE: 97.9 F | OXYGEN SATURATION: 97 % | SYSTOLIC BLOOD PRESSURE: 126 MMHG | WEIGHT: 139 LBS | DIASTOLIC BLOOD PRESSURE: 81 MMHG

## 2021-07-26 DIAGNOSIS — F41.0 PANIC ATTACK: ICD-10-CM

## 2021-07-26 LAB
ANION GAP SERPL CALCULATED.3IONS-SCNC: 13 MMOL/L (ref 4–13)
BASOPHILS # BLD AUTO: 0.06 THOUSANDS/ΜL (ref 0–0.1)
BASOPHILS NFR BLD AUTO: 1 % (ref 0–1)
BILIRUB UR QL STRIP: NEGATIVE
BUN SERPL-MCNC: 9 MG/DL (ref 5–25)
CALCIUM SERPL-MCNC: 9 MG/DL (ref 8.3–10.1)
CHLORIDE SERPL-SCNC: 104 MMOL/L (ref 100–108)
CLARITY UR: CLEAR
CO2 SERPL-SCNC: 21 MMOL/L (ref 21–32)
COLOR UR: YELLOW
CREAT SERPL-MCNC: 0.9 MG/DL (ref 0.6–1.3)
EOSINOPHIL # BLD AUTO: 0.08 THOUSAND/ΜL (ref 0–0.61)
EOSINOPHIL NFR BLD AUTO: 1 % (ref 0–6)
ERYTHROCYTE [DISTWIDTH] IN BLOOD BY AUTOMATED COUNT: 12.4 % (ref 11.6–15.1)
EXT PREG TEST URINE: NEGATIVE
EXT. CONTROL ED NAV: NORMAL
GFR SERPL CREATININE-BSD FRML MDRD: 81 ML/MIN/1.73SQ M
GLUCOSE SERPL-MCNC: 110 MG/DL (ref 65–140)
GLUCOSE UR STRIP-MCNC: NEGATIVE MG/DL
HCT VFR BLD AUTO: 43.9 % (ref 34.8–46.1)
HGB BLD-MCNC: 15.4 G/DL (ref 11.5–15.4)
HGB UR QL STRIP.AUTO: NEGATIVE
IMM GRANULOCYTES # BLD AUTO: 0.03 THOUSAND/UL (ref 0–0.2)
IMM GRANULOCYTES NFR BLD AUTO: 0 % (ref 0–2)
KETONES UR STRIP-MCNC: ABNORMAL MG/DL
LEUKOCYTE ESTERASE UR QL STRIP: NEGATIVE
LYMPHOCYTES # BLD AUTO: 2.13 THOUSANDS/ΜL (ref 0.6–4.47)
LYMPHOCYTES NFR BLD AUTO: 24 % (ref 14–44)
MCH RBC QN AUTO: 29.8 PG (ref 26.8–34.3)
MCHC RBC AUTO-ENTMCNC: 35.1 G/DL (ref 31.4–37.4)
MCV RBC AUTO: 85 FL (ref 82–98)
MONOCYTES # BLD AUTO: 0.37 THOUSAND/ΜL (ref 0.17–1.22)
MONOCYTES NFR BLD AUTO: 4 % (ref 4–12)
NEUTROPHILS # BLD AUTO: 6.33 THOUSANDS/ΜL (ref 1.85–7.62)
NEUTS SEG NFR BLD AUTO: 70 % (ref 43–75)
NITRITE UR QL STRIP: NEGATIVE
NRBC BLD AUTO-RTO: 0 /100 WBCS
PH UR STRIP.AUTO: 8 [PH]
PLATELET # BLD AUTO: 257 THOUSANDS/UL (ref 149–390)
PMV BLD AUTO: 9.8 FL (ref 8.9–12.7)
POTASSIUM SERPL-SCNC: 3.4 MMOL/L (ref 3.5–5.3)
PROT UR STRIP-MCNC: NEGATIVE MG/DL
RBC # BLD AUTO: 5.16 MILLION/UL (ref 3.81–5.12)
SODIUM SERPL-SCNC: 138 MMOL/L (ref 136–145)
SP GR UR STRIP.AUTO: 1.01 (ref 1–1.03)
UROBILINOGEN UR QL STRIP.AUTO: 0.2 E.U./DL
WBC # BLD AUTO: 9 THOUSAND/UL (ref 4.31–10.16)

## 2021-07-26 PROCEDURE — 81003 URINALYSIS AUTO W/O SCOPE: CPT | Performed by: EMERGENCY MEDICINE

## 2021-07-26 PROCEDURE — 99285 EMERGENCY DEPT VISIT HI MDM: CPT | Performed by: EMERGENCY MEDICINE

## 2021-07-26 PROCEDURE — 81025 URINE PREGNANCY TEST: CPT | Performed by: EMERGENCY MEDICINE

## 2021-07-26 PROCEDURE — 96376 TX/PRO/DX INJ SAME DRUG ADON: CPT

## 2021-07-26 PROCEDURE — 85025 COMPLETE CBC W/AUTO DIFF WBC: CPT | Performed by: EMERGENCY MEDICINE

## 2021-07-26 PROCEDURE — 99284 EMERGENCY DEPT VISIT MOD MDM: CPT

## 2021-07-26 PROCEDURE — 36415 COLL VENOUS BLD VENIPUNCTURE: CPT | Performed by: EMERGENCY MEDICINE

## 2021-07-26 PROCEDURE — 93005 ELECTROCARDIOGRAM TRACING: CPT

## 2021-07-26 PROCEDURE — 96361 HYDRATE IV INFUSION ADD-ON: CPT

## 2021-07-26 PROCEDURE — 96374 THER/PROPH/DIAG INJ IV PUSH: CPT

## 2021-07-26 PROCEDURE — 80048 BASIC METABOLIC PNL TOTAL CA: CPT | Performed by: EMERGENCY MEDICINE

## 2021-07-26 PROCEDURE — 96375 TX/PRO/DX INJ NEW DRUG ADDON: CPT

## 2021-07-26 RX ORDER — LORAZEPAM 1 MG/1
1 TABLET ORAL ONCE
Status: DISCONTINUED | OUTPATIENT
Start: 2021-07-26 | End: 2021-07-26 | Stop reason: HOSPADM

## 2021-07-26 RX ORDER — KETOROLAC TROMETHAMINE 30 MG/ML
15 INJECTION, SOLUTION INTRAMUSCULAR; INTRAVENOUS ONCE
Status: COMPLETED | OUTPATIENT
Start: 2021-07-26 | End: 2021-07-26

## 2021-07-26 RX ORDER — NAPROXEN 250 MG/1
500 TABLET ORAL ONCE
Status: DISCONTINUED | OUTPATIENT
Start: 2021-07-26 | End: 2021-07-26 | Stop reason: HOSPADM

## 2021-07-26 RX ORDER — OLANZAPINE 5 MG/1
10 TABLET, ORALLY DISINTEGRATING ORAL ONCE
Status: COMPLETED | OUTPATIENT
Start: 2021-07-26 | End: 2021-07-26

## 2021-07-26 RX ORDER — LORAZEPAM 2 MG/ML
1 INJECTION INTRAMUSCULAR ONCE
Status: COMPLETED | OUTPATIENT
Start: 2021-07-26 | End: 2021-07-26

## 2021-07-26 RX ORDER — PROMETHAZINE HYDROCHLORIDE 25 MG/ML
12.5 INJECTION, SOLUTION INTRAMUSCULAR; INTRAVENOUS ONCE
Status: COMPLETED | OUTPATIENT
Start: 2021-07-26 | End: 2021-07-26

## 2021-07-26 RX ORDER — ACETAMINOPHEN 325 MG/1
975 TABLET ORAL ONCE
Status: DISCONTINUED | OUTPATIENT
Start: 2021-07-26 | End: 2021-07-26 | Stop reason: HOSPADM

## 2021-07-26 RX ORDER — ONDANSETRON 4 MG/1
4 TABLET, ORALLY DISINTEGRATING ORAL EVERY 6 HOURS PRN
Qty: 20 TABLET | Refills: 0 | Status: SHIPPED | OUTPATIENT
Start: 2021-07-26 | End: 2021-08-24 | Stop reason: HOSPADM

## 2021-07-26 RX ORDER — ONDANSETRON 2 MG/ML
4 INJECTION INTRAMUSCULAR; INTRAVENOUS ONCE
Status: COMPLETED | OUTPATIENT
Start: 2021-07-26 | End: 2021-07-26

## 2021-07-26 RX ORDER — METOCLOPRAMIDE 10 MG/1
10 TABLET ORAL ONCE
Status: DISCONTINUED | OUTPATIENT
Start: 2021-07-26 | End: 2021-07-26 | Stop reason: HOSPADM

## 2021-07-26 RX ADMIN — SODIUM CHLORIDE 1000 ML: 0.9 INJECTION, SOLUTION INTRAVENOUS at 14:53

## 2021-07-26 RX ADMIN — OLANZAPINE 10 MG: 5 TABLET, ORALLY DISINTEGRATING ORAL at 16:12

## 2021-07-26 RX ADMIN — LORAZEPAM 1 MG: 2 INJECTION INTRAMUSCULAR; INTRAVENOUS at 15:46

## 2021-07-26 RX ADMIN — PROMETHAZINE HYDROCHLORIDE 12.5 MG: 25 INJECTION INTRAMUSCULAR; INTRAVENOUS at 16:12

## 2021-07-26 RX ADMIN — KETOROLAC TROMETHAMINE 15 MG: 30 INJECTION, SOLUTION INTRAMUSCULAR at 14:52

## 2021-07-26 RX ADMIN — LORAZEPAM 1 MG: 2 INJECTION INTRAMUSCULAR; INTRAVENOUS at 14:52

## 2021-07-26 RX ADMIN — ONDANSETRON 4 MG: 2 INJECTION INTRAMUSCULAR; INTRAVENOUS at 14:52

## 2021-07-26 NOTE — Clinical Note
Ryan Norwood was seen and treated in our emergency department on 7/26/2021  Diagnosis:     Consuello Mcburney  may return to work on return date  She may return on this date: 07/28/2021         If you have any questions or concerns, please don't hesitate to call        Mamta Archibald MD    ______________________________           _______________          _______________  Hospital Representative                              Date                                Time

## 2021-07-27 LAB
ATRIAL RATE: 74 BPM
ATRIAL RATE: 76 BPM
P AXIS: 74 DEGREES
P AXIS: 79 DEGREES
PR INTERVAL: 144 MS
PR INTERVAL: 146 MS
QRS AXIS: 93 DEGREES
QRS AXIS: 93 DEGREES
QRSD INTERVAL: 104 MS
QRSD INTERVAL: 92 MS
QT INTERVAL: 406 MS
QT INTERVAL: 414 MS
QTC INTERVAL: 451 MS
QTC INTERVAL: 465 MS
T WAVE AXIS: 63 DEGREES
T WAVE AXIS: 69 DEGREES
VENTRICULAR RATE: 74 BPM
VENTRICULAR RATE: 76 BPM

## 2021-07-27 PROCEDURE — 93010 ELECTROCARDIOGRAM REPORT: CPT | Performed by: INTERNAL MEDICINE

## 2021-08-09 ENCOUNTER — HOSPITAL ENCOUNTER (EMERGENCY)
Facility: HOSPITAL | Age: 41
Discharge: HOME | End: 2021-08-09
Attending: EMERGENCY MEDICINE | Admitting: EMERGENCY MEDICINE
Payer: COMMERCIAL

## 2021-08-09 VITALS
OXYGEN SATURATION: 100 % | HEART RATE: 85 BPM | SYSTOLIC BLOOD PRESSURE: 124 MMHG | RESPIRATION RATE: 23 BRPM | DIASTOLIC BLOOD PRESSURE: 69 MMHG | TEMPERATURE: 97.9 F | BODY MASS INDEX: 24.6 KG/M2 | WEIGHT: 138.89 LBS

## 2021-08-09 DIAGNOSIS — F41.9 ANXIETY: Primary | ICD-10-CM

## 2021-08-09 DIAGNOSIS — F41.0 PANIC ATTACK: ICD-10-CM

## 2021-08-09 LAB
ALBUMIN SERPL BCP-MCNC: 4.3 G/DL (ref 3.5–5)
ALP SERPL-CCNC: 59 U/L (ref 46–116)
ALT SERPL W P-5'-P-CCNC: 38 U/L (ref 12–78)
ANION GAP SERPL CALCULATED.3IONS-SCNC: 19 MMOL/L (ref 4–13)
AST SERPL W P-5'-P-CCNC: 27 U/L (ref 5–45)
BASOPHILS # BLD AUTO: 0.02 THOUSANDS/ΜL (ref 0–0.1)
BASOPHILS NFR BLD AUTO: 0 % (ref 0–1)
BILIRUB SERPL-MCNC: 2.94 MG/DL (ref 0.2–1)
BUN SERPL-MCNC: 15 MG/DL (ref 5–25)
CALCIUM SERPL-MCNC: 8.9 MG/DL (ref 8.3–10.1)
CHLORIDE SERPL-SCNC: 104 MMOL/L (ref 100–108)
CO2 SERPL-SCNC: 18 MMOL/L (ref 21–32)
CREAT SERPL-MCNC: 0.92 MG/DL (ref 0.6–1.3)
EOSINOPHIL # BLD AUTO: 0 THOUSAND/ΜL (ref 0–0.61)
EOSINOPHIL NFR BLD AUTO: 0 % (ref 0–6)
ERYTHROCYTE [DISTWIDTH] IN BLOOD BY AUTOMATED COUNT: 11.9 % (ref 11.6–15.1)
ETHANOL SERPL-MCNC: <3 MG/DL (ref 0–3)
GFR SERPL CREATININE-BSD FRML MDRD: 79 ML/MIN/1.73SQ M
GLUCOSE SERPL-MCNC: 129 MG/DL (ref 65–140)
HCT VFR BLD AUTO: 40.6 % (ref 34.8–46.1)
HGB BLD-MCNC: 14.3 G/DL (ref 11.5–15.4)
IMM GRANULOCYTES # BLD AUTO: 0.04 THOUSAND/UL (ref 0–0.2)
IMM GRANULOCYTES NFR BLD AUTO: 0 % (ref 0–2)
LYMPHOCYTES # BLD AUTO: 0.88 THOUSANDS/ΜL (ref 0.6–4.47)
LYMPHOCYTES NFR BLD AUTO: 9 % (ref 14–44)
MCH RBC QN AUTO: 29.7 PG (ref 26.8–34.3)
MCHC RBC AUTO-ENTMCNC: 35.2 G/DL (ref 31.4–37.4)
MCV RBC AUTO: 84 FL (ref 82–98)
MONOCYTES # BLD AUTO: 0.23 THOUSAND/ΜL (ref 0.17–1.22)
MONOCYTES NFR BLD AUTO: 2 % (ref 4–12)
NEUTROPHILS # BLD AUTO: 8.44 THOUSANDS/ΜL (ref 1.85–7.62)
NEUTS SEG NFR BLD AUTO: 89 % (ref 43–75)
NRBC BLD AUTO-RTO: 0 /100 WBCS
PLATELET # BLD AUTO: 262 THOUSANDS/UL (ref 149–390)
PMV BLD AUTO: 10.1 FL (ref 8.9–12.7)
POTASSIUM SERPL-SCNC: 3.7 MMOL/L (ref 3.5–5.3)
PROT SERPL-MCNC: 7.8 G/DL (ref 6.4–8.2)
RBC # BLD AUTO: 4.82 MILLION/UL (ref 3.81–5.12)
SODIUM SERPL-SCNC: 141 MMOL/L (ref 136–145)
TROPONIN I SERPL-MCNC: <0.02 NG/ML
WBC # BLD AUTO: 9.61 THOUSAND/UL (ref 4.31–10.16)

## 2021-08-09 PROCEDURE — 96361 HYDRATE IV INFUSION ADD-ON: CPT

## 2021-08-09 PROCEDURE — 96374 THER/PROPH/DIAG INJ IV PUSH: CPT

## 2021-08-09 PROCEDURE — 99285 EMERGENCY DEPT VISIT HI MDM: CPT | Performed by: EMERGENCY MEDICINE

## 2021-08-09 PROCEDURE — 84484 ASSAY OF TROPONIN QUANT: CPT | Performed by: EMERGENCY MEDICINE

## 2021-08-09 PROCEDURE — 96372 THER/PROPH/DIAG INJ SC/IM: CPT

## 2021-08-09 PROCEDURE — 85025 COMPLETE CBC W/AUTO DIFF WBC: CPT | Performed by: EMERGENCY MEDICINE

## 2021-08-09 PROCEDURE — 80053 COMPREHEN METABOLIC PANEL: CPT | Performed by: EMERGENCY MEDICINE

## 2021-08-09 PROCEDURE — 93005 ELECTROCARDIOGRAM TRACING: CPT

## 2021-08-09 PROCEDURE — 99284 EMERGENCY DEPT VISIT MOD MDM: CPT

## 2021-08-09 PROCEDURE — 82077 ASSAY SPEC XCP UR&BREATH IA: CPT | Performed by: EMERGENCY MEDICINE

## 2021-08-09 PROCEDURE — 36415 COLL VENOUS BLD VENIPUNCTURE: CPT | Performed by: EMERGENCY MEDICINE

## 2021-08-09 RX ORDER — HYDROXYZINE 50 MG/1
50 TABLET, FILM COATED ORAL EVERY 8 HOURS PRN
Qty: 40 TABLET | Refills: 0 | Status: SHIPPED | OUTPATIENT
Start: 2021-08-09 | End: 2021-08-24 | Stop reason: HOSPADM

## 2021-08-09 RX ORDER — ONDANSETRON 2 MG/ML
4 INJECTION INTRAMUSCULAR; INTRAVENOUS ONCE
Status: COMPLETED | OUTPATIENT
Start: 2021-08-09 | End: 2021-08-09

## 2021-08-09 RX ORDER — DIAZEPAM 5 MG/ML
10 INJECTION, SOLUTION INTRAMUSCULAR; INTRAVENOUS ONCE
Status: COMPLETED | OUTPATIENT
Start: 2021-08-09 | End: 2021-08-09

## 2021-08-09 RX ORDER — BENZTROPINE MESYLATE 1 MG/ML
2 INJECTION INTRAMUSCULAR; INTRAVENOUS ONCE
Status: COMPLETED | OUTPATIENT
Start: 2021-08-09 | End: 2021-08-09

## 2021-08-09 RX ADMIN — ONDANSETRON 4 MG: 2 INJECTION INTRAMUSCULAR; INTRAVENOUS at 18:33

## 2021-08-09 RX ADMIN — BENZTROPINE MESYLATE 2 MG: 1 INJECTION INTRAMUSCULAR; INTRAVENOUS at 14:32

## 2021-08-09 RX ADMIN — SODIUM CHLORIDE 2000 ML: 0.9 INJECTION, SOLUTION INTRAVENOUS at 17:45

## 2021-08-09 RX ADMIN — DIAZEPAM 10 MG: 5 INJECTION, SOLUTION INTRAMUSCULAR; INTRAVENOUS at 14:32

## 2021-08-09 NOTE — ED NOTES
Patient offered outpatient resources to follow up with at discharge, per the request of the ED physician      Pelon Rascon LMSW  08/09/21  [id number]

## 2021-08-09 NOTE — ED PROVIDER NOTES
History  Chief Complaint   Patient presents with    Panic Attack     Pt's  states the pt has been having a panic attack the last 4 hours  Pt took clonopine at home with no relief      45year old female patient with known history of anxiety presents emergency department having a panic attack since this morning  She tried her Klonopin at home, and then tried some hold Ativan one of which worked  Patient is apparently going back to work today after vacation and started with her panic attack  She does have a large amount of life stress including selling house  She is not suicidal, not homicidal, she is here in the company of her   I had crisis come and speak with patient to give her appropriate resources for referral as I explained to her that the emergency department is not a in place for primary psychiatric care and that although I am happy to see her in attempt to help her with her panic attack the medications that she needs has to be done through a primary psychiatrist         History provided by:  Patient   used: No    Panic Attack  Presenting symptoms: no aggressive behavior, no paranoid behavior, no suicidal thoughts and no suicidal threats    Patient accompanied by:  Family member  Degree of incapacity (severity):  Mild  Onset quality:  Gradual  Timing:  Constant  Progression:  Worsening  Chronicity:  New  Context: not drug abuse and not noncompliant    Treatment compliance: All of the time  Worsened by:  Nothing  Ineffective treatments:  Anti-anxiety medications  Associated symptoms: no abdominal pain, no chest pain and no hyperventilation        Prior to Admission Medications   Prescriptions Last Dose Informant Patient Reported? Taking?    clonazePAM (KlonoPIN) 0 5 mg tablet   No No   Sig: Take 1 tablet (0 5 mg total) by mouth 3 (three) times a day as needed for anxiety   escitalopram (LEXAPRO) 10 mg tablet   No No   Sig: Take 1 tablet (10 mg total) by mouth daily levonorgestrel (MIRENA) 20 MCG/24HR IUD  Self Yes No   Si each by Intrauterine route once   ondansetron (ZOFRAN-ODT) 4 mg disintegrating tablet  Self No No   Sig: Take 1 tablet (4 mg total) by mouth every 8 (eight) hours as needed for nausea or vomiting   Patient not taking: Reported on 3/5/2019   ondansetron (ZOFRAN-ODT) 4 mg disintegrating tablet   No No   Sig: Take 1 tablet (4 mg total) by mouth every 6 (six) hours as needed for nausea      Facility-Administered Medications: None       Past Medical History:   Diagnosis Date    Anxiety     Depression     Disruption of episiotomy wound in the puerperium     Last assessed 16    External hemorrhoids     Last assessed 16    Fibroid uterus     GERD (gastroesophageal reflux disease)     Headache     Leiomyoma of uterus     Migraine     Migraine     Polyhydramnios in third trimester, not applicable or unspecified fetus     Last assessed 16    Pregnancy headache in third trimester     Resolved 16    Protein in urine     Last assessed 16    Sciatica of right side     Third degree laceration of perineum, type 3b 2016    Varicella        Past Surgical History:   Procedure Laterality Date    CHOLECYSTECTOMY  2006    MYOMECTOMY      NV POST COLPORRHAPHY,RECTUM/VAGINA N/A 3/24/2016    Procedure: Geri Hargrove ;  Surgeon: Donna Neely MD;  Location: [facility];  Service: Gynecology    WISDOM TOOTH EXTRACTION         Family History   Problem Relation Age of Onset    Cancer Mother         Breast    Cancer Maternal Aunt         Breast     I have reviewed and agree with the history as documented  E-Cigarette/Vaping     E-Cigarette/Vaping Substances     Social History     Tobacco Use    Smoking status: Never Smoker    Smokeless tobacco: Never Used   Substance Use Topics    Alcohol use: Yes     Comment: socially     Drug use: No       Review of Systems   Cardiovascular: Negative for chest pain  Gastrointestinal: Negative for abdominal pain  Psychiatric/Behavioral: Negative for paranoia and suicidal ideas  All other systems reviewed and are negative  Physical Exam  Physical Exam  Vitals and nursing note reviewed  Constitutional:       Appearance: She is well-developed  HENT:      Head: Normocephalic and atraumatic  Right Ear: External ear normal       Left Ear: External ear normal    Eyes:      Conjunctiva/sclera: Conjunctivae normal    Neck:      Thyroid: No thyromegaly  Vascular: No JVD  Trachea: No tracheal deviation  Cardiovascular:      Rate and Rhythm: Normal rate  Pulmonary:      Effort: Pulmonary effort is normal       Breath sounds: Normal breath sounds  No stridor  Abdominal:      General: There is no distension  Palpations: Abdomen is soft  There is no mass  Tenderness: There is no abdominal tenderness  There is no guarding  Hernia: No hernia is present  Musculoskeletal:         General: No tenderness or deformity  Normal range of motion  Lymphadenopathy:      Cervical: No cervical adenopathy  Skin:     General: Skin is warm  Coloration: Skin is not pale  Findings: No erythema or rash  Neurological:      Mental Status: She is alert and oriented to person, place, and time     Psychiatric:         Behavior: Behavior normal          Vital Signs  ED Triage Vitals [08/09/21 1236]   Temperature Pulse Respirations Blood Pressure SpO2   97 9 °F (36 6 °C) 96 (!) 26 138/65 100 %      Temp Source Heart Rate Source Patient Position - Orthostatic VS BP Location FiO2 (%)   Oral Monitor Sitting Left arm --      Pain Score       --           Vitals:    08/09/21 1236 08/09/21 1730   BP: 138/65 124/69   Pulse: 96 85   Patient Position - Orthostatic VS: Sitting          Visual Acuity      ED Medications  Medications   diazepam (VALIUM) injection 10 mg (10 mg Intramuscular Given 8/9/21 1432)   benztropine (COGENTIN) injection 2 mg (2 mg Intramuscular Given 8/9/21 1432)   sodium chloride 0 9 % bolus 2,000 mL (0 mL Intravenous Stopped 8/9/21 1839)   ondansetron (ZOFRAN) injection 4 mg (4 mg Intravenous Given 8/9/21 1833)       Diagnostic Studies  Results Reviewed     Procedure Component Value Units Date/Time    Troponin I [[id number]]  (Normal) Collected: 08/09/21 1722    Lab Status: Final result Specimen: Blood from Arm, Right Updated: 08/09/21 1750     Troponin I <0 02 ng/mL     Comprehensive metabolic panel [[id number]]  (Abnormal) Collected: 08/09/21 1722    Lab Status: Final result Specimen: Blood from Arm, Right Updated: 08/09/21 1747     Sodium 141 mmol/L      Potassium 3 7 mmol/L      Chloride 104 mmol/L      CO2 18 mmol/L      ANION GAP 19 mmol/L      BUN 15 mg/dL      Creatinine 0 92 mg/dL      Glucose 129 mg/dL      Calcium 8 9 mg/dL      AST 27 U/L      ALT 38 U/L      Alkaline Phosphatase 59 U/L      Total Protein 7 8 g/dL      Albumin 4 3 g/dL      Total Bilirubin 2 94 mg/dL      eGFR 79 ml/min/1 73sq m     Narrative:      [place] guidelines for Chronic Kidney Disease (CKD):     Stage 1 with normal or high GFR (GFR > 90 mL/min/1 73 square meters)    Stage 2 Mild CKD (GFR = 60-89 mL/min/1 73 square meters)    Stage 3A Moderate CKD (GFR = 45-59 mL/min/1 73 square meters)    Stage 3B Moderate CKD (GFR = 30-44 mL/min/1 73 square meters)    Stage 4 Severe CKD (GFR = 15-29 mL/min/1 73 square meters)    Stage 5 End Stage CKD (GFR <15 mL/min/1 73 square meters)  Note: GFR calculation is accurate only with a steady state creatinine    Ethanol [[id number]]  (Normal) Collected: 08/09/21 1722    Lab Status: Final result Specimen: Blood from Arm, Right Updated: 08/09/21 1742     Ethanol Lvl <3 mg/dL     CBC and differential [[id number]]  (Abnormal) Collected: 08/09/21 1722    Lab Status: Final result Specimen: Blood from Arm, Right Updated: 08/09/21 1731     WBC 9 61 Thousand/uL      RBC 4 82 Million/uL      Hemoglobin 14 3 g/dL      Hematocrit 40 6 %      MCV 84 fL      MCH 29 7 pg      MCHC 35 2 g/dL      RDW 11 9 %      MPV 10 1 fL      Platelets 680 Thousands/uL      nRBC 0 /100 WBCs      Neutrophils Relative 89 %      Immat GRANS % 0 %      Lymphocytes Relative 9 %      Monocytes Relative 2 %      Eosinophils Relative 0 %      Basophils Relative 0 %      Neutrophils Absolute 8 44 Thousands/µL      Immature Grans Absolute 0 04 Thousand/uL      Lymphocytes Absolute 0 88 Thousands/µL      Monocytes Absolute 0 23 Thousand/µL      Eosinophils Absolute 0 00 Thousand/µL      Basophils Absolute 0 02 Thousands/µL     Novel Coronavirus (Covid-19),PCR [facility] [[id number]]     Lab Status: No result Specimen: Nares from Nose                  No orders to display              Procedures  Procedures         ED Course                             SBIRT 20yo+      Most Recent Value   SBIRT (24 yo +)   In order to provide better care to our patients, we are screening all of our patients for alcohol and drug use  Would it be okay to ask you these screening questions? Yes Filed at: 08/09/2021 1417   Initial Alcohol Screen: US AUDIT-C    1  How often do you have a drink containing alcohol?  0 Filed at: 08/09/2021 1417   2  How many drinks containing alcohol do you have on a typical day you are drinking? 0 Filed at: 08/09/2021 1417   3a  Male UNDER 65: How often do you have five or more drinks on one occasion? 0 Filed at: 08/09/2021 1417   3b  FEMALE Any Age, or MALE 65+: How often do you have 4 or more drinks on one occassion? 0 Filed at: 08/09/2021 1417   Audit-C Score  0 Filed at: 08/09/2021 1417   BULMARO: How many times in the past year have you    Used an illegal drug or used a prescription medication for non-medical reasons?   Never Filed at: 08/09/2021 1417                    MDM  Number of Diagnoses or Management Options  Anxiety: new and requires workup  Panic attack: new and requires workup     Amount and/or Complexity of Data Reviewed  Clinical lab tests: ordered and reviewed  Tests in the radiology section of CPT®: reviewed and ordered  Decide to obtain previous medical records or to obtain history from someone other than the patient: yes  Review and summarize past medical records: yes    Patient Progress  Patient progress: stable      Disposition  Final diagnoses:   Anxiety   Panic attack     Time reflects when diagnosis was documented in both MDM as applicable and the Disposition within this note     Time User Action Codes Description Comment    8/9/2021  2:50 PM Rochele Grundy Add [F41 9] Anxiety     8/9/2021  2:50 PM Rochele Grundy Add [F41 0] Panic attack       ED Disposition     ED Disposition Condition Date/Time Comment    Discharge Stable Mon Aug 9, 2021  2:50 PM Antionette Selby discharge to home/self care  Follow-up Information    None         Discharge Medication List as of 8/9/2021  2:51 PM      START taking these medications    Details   hydrOXYzine HCL (ATARAX) 50 mg tablet Take 1 tablet (50 mg total) by mouth every 8 (eight) hours as needed for itching for up to 10 days, Starting Mon 8/9/2021, Until Thu 8/19/2021 at 2359, Normal         CONTINUE these medications which have NOT CHANGED    Details   clonazePAM (KlonoPIN) 0 5 mg tablet Take 1 tablet (0 5 mg total) by mouth 3 (three) times a day as needed for anxiety, Starting Tue 3/23/2021, Normal      escitalopram (LEXAPRO) 10 mg tablet Take 1 tablet (10 mg total) by mouth daily, Starting Tue 3/23/2021, Normal      levonorgestrel (MIRENA) 20 MCG/24HR IUD 1 each by Intrauterine route once, Historical Med      !! ondansetron (ZOFRAN-ODT) 4 mg disintegrating tablet Take 1 tablet (4 mg total) by mouth every 8 (eight) hours as needed for nausea or vomiting, Starting Wed 3/21/2018, Print      !! ondansetron (ZOFRAN-ODT) 4 mg disintegrating tablet Take 1 tablet (4 mg total) by mouth every 6 (six) hours as needed for nausea, Starting Mon 7/26/2021, Print       ! ! - Potential duplicate medications found  Please discuss with provider  No discharge procedures on file      PDMP Review     None          ED Provider  Electronically Signed by           Natalee Olvera DO  08/10/21 5691

## 2021-08-09 NOTE — ED NOTES
CW met briefly with pt who denied any suicidal or homicidal ideations, but admitted to feeling very anxious since she woke up this morning  Pt repeatedly had difficulty staying focused on this evaluation, putting her head down in her lap, fidgeting with her IV and asking for her  father  Upon speaking with pt's , he noted that she is being prescribed Lexapro 10mg via her OB, Dr Kimber Jenkins for the past 5 yrs, as well as Klonopin prn  Pt has no psychiatrist, and as per , has not been attending her therapy appt's   notes that current stressors include 2 of their friends currently , their upcoming move this month and their daughter about to start  and pt being worried that their daughter will get carsick on the school bus  CW discussed in-pt Tx with pt and her , who are both not interested in this   notes that he scheduled an luther't with Raquel in [facility] for Wednesday of this week for an initial consult  He is asking for something to help pt sleep tonight  Both CW and pt's nurse Stephani Borjas discussed this case with Dr Sherryle Krone who will prescribe Zofran for pt and D/C her home      Anahy Jackson Miranda, KEKE  21 18:40

## 2021-08-10 LAB
ATRIAL RATE: 71 BPM
P AXIS: 69 DEGREES
PR INTERVAL: 130 MS
QRS AXIS: 91 DEGREES
QRSD INTERVAL: 98 MS
QT INTERVAL: 434 MS
QTC INTERVAL: 471 MS
T WAVE AXIS: 62 DEGREES
VENTRICULAR RATE: 71 BPM

## 2021-08-10 PROCEDURE — 93010 ELECTROCARDIOGRAM REPORT: CPT | Performed by: INTERNAL MEDICINE

## 2021-08-11 NOTE — ED PROVIDER NOTES
History  Chief Complaint   Patient presents with    Panic Attack     Pt reports panic attack that began around noon, hx of same  Has rescue meds at home she took with no relief  C/o nausea  History provided by:  Patient and spouse   used: No    Panic Attack  Presenting symptoms: no hallucinations and no suicidal thoughts    Presenting symptoms comment:  Anxiety  Patient accompanied by: spouse  Degree of incapacity (severity): Moderate  Onset quality:  Gradual  Timing:  Constant  Progression:  Worsening  Chronicity:  Recurrent  Treatment compliance: All of the time  Relieved by:  Nothing  Exacerbated by: "moving/selling a house"  Ineffective treatments:  Anti-anxiety medications  Associated symptoms: anxiety    Associated symptoms: no abdominal pain and no chest pain        Prior to Admission Medications   Prescriptions Last Dose Informant Patient Reported? Taking?    clonazePAM (KlonoPIN) 0 5 mg tablet   No No   Sig: Take 1 tablet (0 5 mg total) by mouth 3 (three) times a day as needed for anxiety   escitalopram (LEXAPRO) 10 mg tablet   No No   Sig: Take 1 tablet (10 mg total) by mouth daily   levonorgestrel (MIRENA) 20 MCG/24HR IUD  Self Yes No   Si each by Intrauterine route once   ondansetron (ZOFRAN-ODT) 4 mg disintegrating tablet  Self No No   Sig: Take 1 tablet (4 mg total) by mouth every 8 (eight) hours as needed for nausea or vomiting   Patient not taking: Reported on 3/5/2019      Facility-Administered Medications: None       Past Medical History:   Diagnosis Date    Anxiety     Depression     Disruption of episiotomy wound in the puerperium     Last assessed 16    External hemorrhoids     Last assessed 16    Fibroid uterus     GERD (gastroesophageal reflux disease)     Headache     Leiomyoma of uterus     Migraine     Migraine     Polyhydramnios in third trimester, not applicable or unspecified fetus     Last assessed 16    Pregnancy headache in third trimester     Resolved 02/05/16    Protein in urine     Last assessed 01/25/16    Sciatica of right side     Third degree laceration of perineum, type 3b 1/26/2016    Varicella        Past Surgical History:   Procedure Laterality Date    CHOLECYSTECTOMY  2006    MYOMECTOMY      [place] POST COLPORRHAPHY,RECTUM/VAGINA N/A 3/24/2016    Procedure: Shawnee Espinal ;  Surgeon: Alice Carter MD;  [facility];  Service: Gynecology    WISDOM TOOTH EXTRACTION         Family History   Problem Relation Age of Onset    Cancer Mother         Breast    Cancer Maternal Aunt         Breast     I have reviewed and agree with the history as documented  E-Cigarette/Vaping     E-Cigarette/Vaping Substances     Social History     Tobacco Use    Smoking status: Never Smoker    Smokeless tobacco: Never Used   Substance Use Topics    Alcohol use: Yes     Comment: socially     Drug use: No       Review of Systems   Constitutional: Negative for chills and fever  HENT: Negative for ear pain and sore throat  Eyes: Negative for pain and visual disturbance  Respiratory: Negative for cough and shortness of breath  Cardiovascular: Positive for palpitations  Negative for chest pain  Gastrointestinal: Negative for abdominal pain and vomiting  Genitourinary: Negative for dysuria and hematuria  Musculoskeletal: Negative for arthralgias and back pain  Skin: Negative for color change and rash  Neurological: Negative for seizures and syncope  Psychiatric/Behavioral: Negative for hallucinations and suicidal ideas  The patient is nervous/anxious  All other systems reviewed and are negative  Physical Exam  Physical Exam  Vitals and nursing note reviewed  Constitutional:       General: She is not in acute distress  Appearance: She is well-developed  HENT:      Head: Normocephalic and atraumatic     Eyes:      Conjunctiva/sclera: Conjunctivae normal    Cardiovascular:      Rate and Rhythm: Normal rate and regular rhythm  Heart sounds: No murmur heard  Pulmonary:      Effort: Pulmonary effort is normal  No respiratory distress  Breath sounds: Normal breath sounds  Abdominal:      Palpations: Abdomen is soft  Tenderness: There is no abdominal tenderness  Musculoskeletal:      Cervical back: Neck supple  Skin:     General: Skin is warm and dry  Capillary Refill: Capillary refill takes less than 2 seconds  Neurological:      General: No focal deficit present  Mental Status: She is alert and oriented to person, place, and time  Psychiatric:      Comments: Appears anxious and is intermittently tearful   Denies SI/HI, AH/VH         Vital Signs  ED Triage Vitals   Temperature Pulse Respirations Blood Pressure SpO2   07/26/21 1404 07/26/21 1404 07/26/21 1404 07/26/21 1404 07/26/21 1404   97 9 °F (36 6 °C) 88 20 147/88 100 %      Temp Source Heart Rate Source Patient Position - Orthostatic VS BP Location FiO2 (%)   07/26/21 1404 07/26/21 1404 07/26/21 1404 07/26/21 1404 --   Oral Monitor Sitting Left arm       Pain Score       07/26/21 1436       7           Vitals:    07/26/21 1404 07/26/21 1616   BP: 147/88 126/81   Pulse: 88 76   Patient Position - Orthostatic VS: Sitting          Visual Acuity      ED Medications  Medications   sodium chloride 0 9 % bolus 1,000 mL (0 mL Intravenous Stopped 7/26/21 1616)   LORazepam (ATIVAN) injection 1 mg (1 mg Intravenous Given 7/26/21 1452)   ketorolac (TORADOL) injection 15 mg (15 mg Intravenous Given 7/26/21 1452)   ondansetron (ZOFRAN) injection 4 mg (4 mg Intravenous Given 7/26/21 1452)   LORazepam (ATIVAN) injection 1 mg (1 mg Intravenous Given 7/26/21 1546)   promethazine (PHENERGAN) injection 12 5 mg (12 5 mg Intravenous Given 7/26/21 1612)   OLANZapine (ZyPREXA ZYDIS) dispersible tablet 10 mg (10 mg Oral Given 7/26/21 1612)       Diagnostic Studies  Results Reviewed     Procedure Component Value Units Date/Time    UA (URINE) with reflex to Scope [[id number]]  (Abnormal) Collected: 07/26/21 1520    Lab Status: Final result Specimen: Urine, Clean Catch Updated: 07/26/21 1533     Color, UA Yellow     Clarity, UA Clear     Specific Gravity, UA 1 015     pH, UA 8 0     Leukocytes, UA Negative     Nitrite, UA Negative     Protein, UA Negative mg/dl      Glucose, UA Negative mg/dl      Ketones, UA 15 (1+) mg/dl      Urobilinogen, UA 0 2 E U /dl      Bilirubin, UA Negative     Blood, UA Negative    POCT pregnancy, urine [[id number]]  (Normal) Resulted: 07/26/21 1532    Lab Status: Final result Updated: 07/26/21 1532     EXT PREG TEST UR (Ref: Negative) negative     Control valid    Basic metabolic panel [[id number]]  (Abnormal) Collected: 07/26/21 1453    Lab Status: Final result Specimen: Blood from Arm, Right Updated: 07/26/21 1507     Sodium 138 mmol/L      Potassium 3 4 mmol/L      Chloride 104 mmol/L      CO2 21 mmol/L      ANION GAP 13 mmol/L      BUN 9 mg/dL      Creatinine 0 90 mg/dL      Glucose 110 mg/dL      Calcium 9 0 mg/dL      eGFR 81 ml/min/1 73sq m     Narrative:      [place] guidelines for Chronic Kidney Disease (CKD):     Stage 1 with normal or high GFR (GFR > 90 mL/min/1 73 square meters)    Stage 2 Mild CKD (GFR = 60-89 mL/min/1 73 square meters)    Stage 3A Moderate CKD (GFR = 45-59 mL/min/1 73 square meters)    Stage 3B Moderate CKD (GFR = 30-44 mL/min/1 73 square meters)    Stage 4 Severe CKD (GFR = 15-29 mL/min/1 73 square meters)    Stage 5 End Stage CKD (GFR <15 mL/min/1 73 square meters)  Note: GFR calculation is accurate only with a steady state creatinine    CBC and differential [[id number]]  (Abnormal) Collected: 07/26/21 1453    Lab Status: Final result Specimen: Blood from Arm, Right Updated: 07/26/21 1457     WBC 9 00 Thousand/uL      RBC 5 16 Million/uL      Hemoglobin 15 4 g/dL      Hematocrit 43 9 %      MCV 85 fL      MCH 29 8 pg      MCHC 35 1 g/dL      RDW 12 4 %      MPV 9 8 fL      Platelets 123 Thousands/uL      nRBC 0 /100 WBCs      Neutrophils Relative 70 %      Immat GRANS % 0 %      Lymphocytes Relative 24 %      Monocytes Relative 4 %      Eosinophils Relative 1 %      Basophils Relative 1 %      Neutrophils Absolute 6 33 Thousands/µL      Immature Grans Absolute 0 03 Thousand/uL      Lymphocytes Absolute 2 13 Thousands/µL      Monocytes Absolute 0 37 Thousand/µL      Eosinophils Absolute 0 08 Thousand/µL      Basophils Absolute 0 06 Thousands/µL                  No orders to display              Procedures  Procedures         ED Course  ED Course as of Aug 10 2246   Mon Jul 26, 2021   1535 EKG reviewed by me, normal sinus rhythm at rate of 74  Rightward axis within the right bundle tom block, normal intervals, no acute ST changes to suggest cardiac ischemia  SBIRT 22yo+      Most Recent Value   SBIRT (24 yo +)   In order to provide better care to our patients, we are screening all of our patients for alcohol and drug use  Would it be okay to ask you these screening questions? Yes Filed at: 07/26/2021 1540   Initial Alcohol Screen: US AUDIT-C    1  How often do you have a drink containing alcohol?  0 Filed at: 07/26/2021 1540   2  How many drinks containing alcohol do you have on a typical day you are drinking? 0 Filed at: 07/26/2021 1540   3b  FEMALE Any Age, or MALE 65+: How often do you have 4 or more drinks on one occassion? 0 Filed at: 07/26/2021 1540   Audit-C Score  0 Filed at: 07/26/2021 1540   BULMARO: How many times in the past year have you    Used an illegal drug or used a prescription medication for non-medical reasons? Never Filed at: 07/26/2021 1540                    MDM  Number of Diagnoses or Management Options  Panic attack: new and requires workup  Diagnosis management comments: 44 yo F with hx anxiety presented c/o of a panic attack   States she is under a lot of stress 2/2 stress from work and recently moving/selling her home  She takes a daily SSRI and took a clonazepam earlier with minimal relief  describes racing thoughts and anxiousness along with palpitations, nausea, and "tightness" in her chest, all typical of panic attacks she has had in the past  Part way though pt's ED stay, she was joined by her , who agreed that her current sx were similar if not nearly identical to sx she has had in the past, stating "she gets these a few times a year " review of the medical record also shows similar presentations in the past      Due to c/o palpitations and chest "tightness," ekg and troponin were performed and were unremarkable/reassuring  Vitals and PE were also reassuring  Patient was treated sx and improved  Pt denied SI/HI and though I offered the opportunity to speak to our ED crisis worker, patient declined  Discussed op f/u and return precautions  Amount and/or Complexity of Data Reviewed  Clinical lab tests: ordered and reviewed  Review and summarize past medical records: yes  Independent visualization of images, tracings, or specimens: yes        Disposition  Final diagnoses:   Panic attack     Time reflects when diagnosis was documented in both MDM as applicable and the Disposition within this note     Time User Action Codes Description Comment    7/26/2021  3:40 PM Meagan Mckinney Add [F41 0] Panic attack     7/26/2021  3:40 PM Meagan Mckinney Modify [F41 0] Panic attack       ED Disposition     ED Disposition Condition Date/Time Comment    Discharge Stable Mon Jul 26, 2021 [place] 73 discharge to home/self care              Follow-up Information     Follow up With Specialties Details Why Contact Info    Mary Rowell, [address] [place], Nurse Practitioner   21 [phone] [address]  [phone]            Discharge Medication List as of 7/26/2021  3:41 PM      START taking these medications    Details   !! ondansetron (ZOFRAN-ODT) 4 mg disintegrating tablet Take 1 tablet (4 mg total) by mouth every 6 (six) hours as needed for nausea, Starting Mon 7/26/2021, Print       !! - Potential duplicate medications found  Please discuss with provider  CONTINUE these medications which have NOT CHANGED    Details   clonazePAM (KlonoPIN) 0 5 mg tablet Take 1 tablet (0 5 mg total) by mouth 3 (three) times a day as needed for anxiety, Starting Tue 3/23/2021, Normal      escitalopram (LEXAPRO) 10 mg tablet Take 1 tablet (10 mg total) by mouth daily, Starting Tue 3/23/2021, Normal      levonorgestrel (MIRENA) 20 MCG/24HR IUD 1 each by Intrauterine route once, Historical Med      !! ondansetron (ZOFRAN-ODT) 4 mg disintegrating tablet Take 1 tablet (4 mg total) by mouth every 8 (eight) hours as needed for nausea or vomiting, Starting Wed 3/21/2018, Print       !! - Potential duplicate medications found  Please discuss with provider  No discharge procedures on file      PDMP Review     None          ED Provider  Electronically Signed by           Tai Martel MD  08/10/21 2565

## 2021-08-12 ENCOUNTER — HOSPITAL ENCOUNTER (EMERGENCY)
Facility: HOSPITAL | Age: 41
End: 2021-08-13
Attending: EMERGENCY MEDICINE | Admitting: EMERGENCY MEDICINE
Payer: COMMERCIAL

## 2021-08-12 ENCOUNTER — TELEPHONE (OUTPATIENT)
Dept: OBGYN CLINIC | Facility: CLINIC | Age: 41
End: 2021-08-12

## 2021-08-12 DIAGNOSIS — F41.9 ANXIETY: Primary | ICD-10-CM

## 2021-08-12 DIAGNOSIS — F41.9 ANXIETY: ICD-10-CM

## 2021-08-12 DIAGNOSIS — F32.9 MAJOR DEPRESSION: ICD-10-CM

## 2021-08-12 LAB
AMPHETAMINES SERPL QL SCN: NEGATIVE
ANION GAP SERPL CALCULATED.3IONS-SCNC: 16 MMOL/L (ref 4–13)
BARBITURATES UR QL: NEGATIVE
BASOPHILS # BLD AUTO: 0.04 THOUSANDS/ΜL (ref 0–0.1)
BASOPHILS NFR BLD AUTO: 1 % (ref 0–1)
BENZODIAZ UR QL: POSITIVE
BUN SERPL-MCNC: 15 MG/DL (ref 5–25)
CALCIUM SERPL-MCNC: 8.8 MG/DL (ref 8.3–10.1)
CHLORIDE SERPL-SCNC: 107 MMOL/L (ref 100–108)
CO2 SERPL-SCNC: 19 MMOL/L (ref 21–32)
COCAINE UR QL: NEGATIVE
CREAT SERPL-MCNC: 0.84 MG/DL (ref 0.6–1.3)
EOSINOPHIL # BLD AUTO: 0.01 THOUSAND/ΜL (ref 0–0.61)
EOSINOPHIL NFR BLD AUTO: 0 % (ref 0–6)
ERYTHROCYTE [DISTWIDTH] IN BLOOD BY AUTOMATED COUNT: 11.8 % (ref 11.6–15.1)
EXT PREG TEST URINE: NEGATIVE
EXT. CONTROL ED NAV: NEGATIVE
GFR SERPL CREATININE-BSD FRML MDRD: 88 ML/MIN/1.73SQ M
GLUCOSE SERPL-MCNC: 97 MG/DL (ref 65–140)
HCT VFR BLD AUTO: 41.4 % (ref 34.8–46.1)
HGB BLD-MCNC: 14.9 G/DL (ref 11.5–15.4)
IMM GRANULOCYTES # BLD AUTO: 0.02 THOUSAND/UL (ref 0–0.2)
IMM GRANULOCYTES NFR BLD AUTO: 0 % (ref 0–2)
LYMPHOCYTES # BLD AUTO: 1.95 THOUSANDS/ΜL (ref 0.6–4.47)
LYMPHOCYTES NFR BLD AUTO: 25 % (ref 14–44)
MCH RBC QN AUTO: 29.7 PG (ref 26.8–34.3)
MCHC RBC AUTO-ENTMCNC: 36 G/DL (ref 31.4–37.4)
MCV RBC AUTO: 83 FL (ref 82–98)
METHADONE UR QL: NEGATIVE
MONOCYTES # BLD AUTO: 0.45 THOUSAND/ΜL (ref 0.17–1.22)
MONOCYTES NFR BLD AUTO: 6 % (ref 4–12)
NEUTROPHILS # BLD AUTO: 5.38 THOUSANDS/ΜL (ref 1.85–7.62)
NEUTS SEG NFR BLD AUTO: 68 % (ref 43–75)
NRBC BLD AUTO-RTO: 0 /100 WBCS
OPIATES UR QL SCN: NEGATIVE
OXYCODONE+OXYMORPHONE UR QL SCN: NEGATIVE
PCP UR QL: NEGATIVE
PLATELET # BLD AUTO: 249 THOUSANDS/UL (ref 149–390)
PMV BLD AUTO: 9.4 FL (ref 8.9–12.7)
POTASSIUM SERPL-SCNC: 3.1 MMOL/L (ref 3.5–5.3)
RBC # BLD AUTO: 5.02 MILLION/UL (ref 3.81–5.12)
SODIUM SERPL-SCNC: 142 MMOL/L (ref 136–145)
THC UR QL: POSITIVE
TROPONIN I SERPL-MCNC: <0.02 NG/ML
WBC # BLD AUTO: 7.85 THOUSAND/UL (ref 4.31–10.16)

## 2021-08-12 PROCEDURE — U0003 INFECTIOUS AGENT DETECTION BY NUCLEIC ACID (DNA OR RNA); SEVERE ACUTE RESPIRATORY SYNDROME CORONAVIRUS 2 (SARS-COV-2) (CORONAVIRUS DISEASE [COVID-19]), AMPLIFIED PROBE TECHNIQUE, MAKING USE OF HIGH THROUGHPUT TECHNOLOGIES AS DESCRIBED BY CMS-2020-01-R: HCPCS | Performed by: PHYSICIAN ASSISTANT

## 2021-08-12 PROCEDURE — 80307 DRUG TEST PRSMV CHEM ANLYZR: CPT | Performed by: PHYSICIAN ASSISTANT

## 2021-08-12 PROCEDURE — 81025 URINE PREGNANCY TEST: CPT | Performed by: PHYSICIAN ASSISTANT

## 2021-08-12 PROCEDURE — 85025 COMPLETE CBC W/AUTO DIFF WBC: CPT | Performed by: PHYSICIAN ASSISTANT

## 2021-08-12 PROCEDURE — 84484 ASSAY OF TROPONIN QUANT: CPT | Performed by: PHYSICIAN ASSISTANT

## 2021-08-12 PROCEDURE — 93005 ELECTROCARDIOGRAM TRACING: CPT

## 2021-08-12 PROCEDURE — 99285 EMERGENCY DEPT VISIT HI MDM: CPT

## 2021-08-12 PROCEDURE — 80048 BASIC METABOLIC PNL TOTAL CA: CPT | Performed by: PHYSICIAN ASSISTANT

## 2021-08-12 PROCEDURE — 36415 COLL VENOUS BLD VENIPUNCTURE: CPT | Performed by: PHYSICIAN ASSISTANT

## 2021-08-12 PROCEDURE — 82077 ASSAY SPEC XCP UR&BREATH IA: CPT | Performed by: PHYSICIAN ASSISTANT

## 2021-08-12 PROCEDURE — U0005 INFEC AGEN DETEC AMPLI PROBE: HCPCS | Performed by: PHYSICIAN ASSISTANT

## 2021-08-12 PROCEDURE — 96372 THER/PROPH/DIAG INJ SC/IM: CPT

## 2021-08-12 PROCEDURE — 99285 EMERGENCY DEPT VISIT HI MDM: CPT | Performed by: PHYSICIAN ASSISTANT

## 2021-08-12 RX ORDER — HALOPERIDOL 5 MG/ML
5 INJECTION INTRAMUSCULAR ONCE
Status: COMPLETED | OUTPATIENT
Start: 2021-08-12 | End: 2021-08-12

## 2021-08-12 RX ORDER — CLONAZEPAM 0.5 MG/1
0.5 TABLET ORAL 3 TIMES DAILY
Qty: 30 TABLET | Refills: 0 | Status: SHIPPED | OUTPATIENT
Start: 2021-08-12 | End: 2021-08-24 | Stop reason: HOSPADM

## 2021-08-12 RX ORDER — ONDANSETRON 4 MG/1
4 TABLET, ORALLY DISINTEGRATING ORAL ONCE
Status: COMPLETED | OUTPATIENT
Start: 2021-08-12 | End: 2021-08-12

## 2021-08-12 RX ORDER — POTASSIUM CHLORIDE 20 MEQ/1
40 TABLET, EXTENDED RELEASE ORAL ONCE
Status: COMPLETED | OUTPATIENT
Start: 2021-08-13 | End: 2021-08-13

## 2021-08-12 RX ADMIN — HALOPERIDOL LACTATE 5 MG: 5 INJECTION, SOLUTION INTRAMUSCULAR at 23:20

## 2021-08-12 RX ADMIN — ONDANSETRON 4 MG: 4 TABLET, ORALLY DISINTEGRATING ORAL at 23:20

## 2021-08-12 NOTE — TELEPHONE ENCOUNTER
I called and a man answered pts phone, saying "this is [employer] phone," I asked to speak to charles  Pt got on phone and I told her that medication was sent to pharm  Man got back on the call saying [facility] [place] [facility]  I informed that since KTM is out of the office unsure how often she is checking her messages so not sure when we would hear back to change to diff pharm  Agreed to wait until tomorrow to pick it up and would call if there are any issues

## 2021-08-12 NOTE — LETTER
[address] [facility] [address]  [address] [place] [address]  Dept: [phone]      EMTALA TRANSFER CONSENT    NAME Leda Sierra                                         1980                              MRN [id number]    I have been informed of my rights regarding examination, treatment, and transfer   by Dr Gerry Orellana MD    Benefits: Continuity of care    Risks: Potential for delay in receiving treatment      Consent for Transfer:  I acknowledge that my medical condition has been evaluated and explained to me by the emergency department physician or other qualified medical person and/or my attending physician, who has recommended that I be transferred to the service of  Accepting Physician: Brittny Marrufo at [address], [address] : [address] [place]  The above potential benefits of such transfer, the potential risks associated with such transfer, and the probable risks of not being transferred have been explained to me, and I fully understand them  The doctor has explained that, in my case, the benefits of transfer outweigh the risks  I agree to be transferred  I authorize the performance of emergency medical procedures and treatments upon me in both transit and upon arrival at the receiving facility  Additionally, I authorize the release of any and all medical records to the receiving facility and request they be transported with me, if possible  I understand that the safest mode of transportation during a medical emergency is an ambulance and that the Hospital advocates the use of this mode of transport  Risks of traveling to the receiving facility by car, including absence of medical control, life sustaining equipment, such as oxygen, and medical personnel has been explained to me and I fully understand them  (HEAVEN CORRECT BOX BELOW)  [  ]  I consent to the stated transfer and to be transported by ambulance/helicopter    [  ]  I consent to the stated transfer, but refuse transportation by ambulance and accept full responsibility for my transportation by car  I understand the risks of non-ambulance transfers and I exonerate the Hospital and its staff from any deterioration in my condition that results from this refusal     X___________________________________________    DATE  21  TIME________  Signature of patient or legally responsible individual signing on patient behalf           RELATIONSHIP TO PATIENT_________________________          Provider Certification    NAME Jez Anderson                                         1980                              MRN [id number]    A medical screening exam was performed on the above named patient  Based on the examination:    Condition Necessitating Transfer The primary encounter diagnosis was Anxiety  A diagnosis of Major depression was also pertinent to this visit  Patient Condition: The patient has been stabilized such that within reasonable medical probability, no material deterioration of the patient condition or the condition of the unborn child(dilan) is likely to result from the transfer    Reason for Transfer: Level of Care needed not available at this facility    Transfer Requirements: [facility] [address] [place]   · Space available and qualified personnel available for treatment as acknowledged by [facility], [phone]  · Agreed to accept transfer and to provide appropriate medical treatment as acknowledged by       [facility]  · Appropriate medical records of the examination and treatment of the patient are provided at the time of transfer   [address] [facility], [address] _______  · Transfer will be performed by qualified personnel from [address] [place] [address]  and appropriate transfer equipment as required, including the use of necessary and appropriate life support measures      Provider Certification: I have examined the patient and explained the following risks and benefits of being transferred/refusing transfer to the patient/family:  General risk, such as traffic hazards, adverse weather conditions, rough terrain or turbulence, possible failure of equipment (including vehicle or aircraft), or consequences of actions of persons outside the control of the transport personnel, The patient is stable for psychiatric transfer because they are medically stable, and is protected from harming him/herself or others during transport      Based on these reasonable risks and benefits to the patient and/or the unborn child(dilan), and based upon the information available at the time of the patients examination, I certify that the medical benefits reasonably to be expected from the provision of appropriate medical treatments at another medical facility outweigh the increasing risks, if any, to the individuals medical condition, and in the case of labor to the unborn child, from effecting the transfer      X____________________________________________ DATE 08/13/21        TIME_______      ORIGINAL - SEND TO MEDICAL RECORDS   COPY - SEND WITH PATIENT DURING TRANSFER

## 2021-08-12 NOTE — TELEPHONE ENCOUNTER
Pt lm on rx line that she needed a refill of her klonopin and recently in ER for panic attack  They did give her referral to psychiatry and had behavioral health eval at bedside

## 2021-08-13 ENCOUNTER — HOSPITAL ENCOUNTER (INPATIENT)
Facility: HOSPITAL | Age: 41
LOS: 11 days | Discharge: HOME | DRG: 880 | End: 2021-08-24
Attending: STUDENT IN AN ORGANIZED HEALTH CARE EDUCATION/TRAINING PROGRAM | Admitting: STUDENT IN AN ORGANIZED HEALTH CARE EDUCATION/TRAINING PROGRAM
Payer: COMMERCIAL

## 2021-08-13 VITALS
OXYGEN SATURATION: 100 % | TEMPERATURE: 98 F | SYSTOLIC BLOOD PRESSURE: 132 MMHG | DIASTOLIC BLOOD PRESSURE: 82 MMHG | RESPIRATION RATE: 18 BRPM | HEART RATE: 72 BPM

## 2021-08-13 DIAGNOSIS — F32.9 MAJOR DEPRESSION: ICD-10-CM

## 2021-08-13 DIAGNOSIS — F41.9 ANXIETY: ICD-10-CM

## 2021-08-13 DIAGNOSIS — F41.0 PANIC DISORDER WITHOUT AGORAPHOBIA WITH SEVERE PANIC ATTACKS: Primary | ICD-10-CM

## 2021-08-13 LAB
ETHANOL SERPL-MCNC: <3 MG/DL (ref 0–3)
POTASSIUM SERPL-SCNC: 3.7 MMOL/L (ref 3.5–5.3)
SARS-COV-2 RNA RESP QL NAA+PROBE: NEGATIVE

## 2021-08-13 PROCEDURE — 36415 COLL VENOUS BLD VENIPUNCTURE: CPT | Performed by: EMERGENCY MEDICINE

## 2021-08-13 PROCEDURE — 84132 ASSAY OF SERUM POTASSIUM: CPT | Performed by: EMERGENCY MEDICINE

## 2021-08-13 RX ORDER — LORAZEPAM 2 MG/ML
2 INJECTION INTRAMUSCULAR EVERY 6 HOURS PRN
Status: CANCELLED | OUTPATIENT
Start: 2021-08-13

## 2021-08-13 RX ORDER — HYDROXYZINE HYDROCHLORIDE 25 MG/1
100 TABLET, FILM COATED ORAL
Status: CANCELLED | OUTPATIENT
Start: 2021-08-13

## 2021-08-13 RX ORDER — BENZTROPINE MESYLATE 1 MG/ML
1 INJECTION INTRAMUSCULAR; INTRAVENOUS
Status: CANCELLED | OUTPATIENT
Start: 2021-08-13

## 2021-08-13 RX ORDER — OLANZAPINE 10 MG/1
2.5 INJECTION, POWDER, LYOPHILIZED, FOR SOLUTION INTRAMUSCULAR
Status: DISCONTINUED | OUTPATIENT
Start: 2021-08-13 | End: 2021-08-24 | Stop reason: HOSPADM

## 2021-08-13 RX ORDER — LORAZEPAM 1 MG/1
2 TABLET ORAL ONCE
Status: COMPLETED | OUTPATIENT
Start: 2021-08-13 | End: 2021-08-13

## 2021-08-13 RX ORDER — OLANZAPINE 2.5 MG/1
5 TABLET ORAL
Status: CANCELLED | OUTPATIENT
Start: 2021-08-13

## 2021-08-13 RX ORDER — OLANZAPINE 5 MG/1
10 TABLET, ORALLY DISINTEGRATING ORAL ONCE
Status: COMPLETED | OUTPATIENT
Start: 2021-08-13 | End: 2021-08-13

## 2021-08-13 RX ORDER — LORAZEPAM 1 MG/1
1 TABLET ORAL ONCE
Status: COMPLETED | OUTPATIENT
Start: 2021-08-13 | End: 2021-08-13

## 2021-08-13 RX ORDER — DIPHENHYDRAMINE HYDROCHLORIDE 50 MG/ML
50 INJECTION INTRAMUSCULAR; INTRAVENOUS EVERY 6 HOURS PRN
Status: CANCELLED | OUTPATIENT
Start: 2021-08-13

## 2021-08-13 RX ORDER — BENZTROPINE MESYLATE 1 MG/ML
1 INJECTION INTRAMUSCULAR; INTRAVENOUS
Status: DISCONTINUED | OUTPATIENT
Start: 2021-08-13 | End: 2021-08-24 | Stop reason: HOSPADM

## 2021-08-13 RX ORDER — OLANZAPINE 5 MG/1
5 TABLET ORAL
Status: DISCONTINUED | OUTPATIENT
Start: 2021-08-13 | End: 2021-08-24 | Stop reason: HOSPADM

## 2021-08-13 RX ORDER — ACETAMINOPHEN 325 MG/1
975 TABLET ORAL EVERY 6 HOURS PRN
Status: CANCELLED | OUTPATIENT
Start: 2021-08-13

## 2021-08-13 RX ORDER — DIPHENHYDRAMINE HYDROCHLORIDE 50 MG/ML
50 INJECTION INTRAMUSCULAR; INTRAVENOUS EVERY 6 HOURS PRN
Status: DISCONTINUED | OUTPATIENT
Start: 2021-08-13 | End: 2021-08-14

## 2021-08-13 RX ORDER — MAGNESIUM HYDROXIDE/ALUMINUM HYDROXICE/SIMETHICONE 120; 1200; 1200 MG/30ML; MG/30ML; MG/30ML
30 SUSPENSION ORAL EVERY 4 HOURS PRN
Status: CANCELLED | OUTPATIENT
Start: 2021-08-13

## 2021-08-13 RX ORDER — ACETAMINOPHEN 325 MG/1
650 TABLET ORAL EVERY 4 HOURS PRN
Status: CANCELLED | OUTPATIENT
Start: 2021-08-13

## 2021-08-13 RX ORDER — MAGNESIUM HYDROXIDE/ALUMINUM HYDROXICE/SIMETHICONE 120; 1200; 1200 MG/30ML; MG/30ML; MG/30ML
30 SUSPENSION ORAL EVERY 4 HOURS PRN
Status: DISCONTINUED | OUTPATIENT
Start: 2021-08-13 | End: 2021-08-24 | Stop reason: HOSPADM

## 2021-08-13 RX ORDER — HYDROXYZINE 50 MG/1
50 TABLET, FILM COATED ORAL
Status: DISCONTINUED | OUTPATIENT
Start: 2021-08-13 | End: 2021-08-14

## 2021-08-13 RX ORDER — ONDANSETRON 4 MG/1
4 TABLET, ORALLY DISINTEGRATING ORAL ONCE
Status: COMPLETED | OUTPATIENT
Start: 2021-08-13 | End: 2021-08-13

## 2021-08-13 RX ORDER — LORAZEPAM 2 MG/ML
2 INJECTION INTRAMUSCULAR EVERY 6 HOURS PRN
Status: DISCONTINUED | OUTPATIENT
Start: 2021-08-13 | End: 2021-08-21

## 2021-08-13 RX ORDER — OLANZAPINE 10 MG/1
2.5 INJECTION, POWDER, LYOPHILIZED, FOR SOLUTION INTRAMUSCULAR
Status: CANCELLED | OUTPATIENT
Start: 2021-08-13

## 2021-08-13 RX ORDER — OLANZAPINE 2.5 MG/1
2.5 TABLET ORAL
Status: DISCONTINUED | OUTPATIENT
Start: 2021-08-13 | End: 2021-08-24 | Stop reason: HOSPADM

## 2021-08-13 RX ORDER — POLYETHYLENE GLYCOL 3350 17 G/17G
17 POWDER, FOR SOLUTION ORAL DAILY PRN
Status: DISCONTINUED | OUTPATIENT
Start: 2021-08-13 | End: 2021-08-24 | Stop reason: HOSPADM

## 2021-08-13 RX ORDER — ACETAMINOPHEN 325 MG/1
975 TABLET ORAL EVERY 6 HOURS PRN
Status: DISCONTINUED | OUTPATIENT
Start: 2021-08-13 | End: 2021-08-24 | Stop reason: HOSPADM

## 2021-08-13 RX ORDER — TRAZODONE HYDROCHLORIDE 50 MG/1
50 TABLET ORAL
Status: DISCONTINUED | OUTPATIENT
Start: 2021-08-13 | End: 2021-08-24 | Stop reason: HOSPADM

## 2021-08-13 RX ORDER — POLYETHYLENE GLYCOL 3350 17 G/17G
17 POWDER, FOR SOLUTION ORAL DAILY PRN
Status: CANCELLED | OUTPATIENT
Start: 2021-08-13

## 2021-08-13 RX ORDER — TRAZODONE HYDROCHLORIDE 50 MG/1
50 TABLET ORAL
Status: CANCELLED | OUTPATIENT
Start: 2021-08-13

## 2021-08-13 RX ORDER — HYDROXYZINE HYDROCHLORIDE 25 MG/1
25 TABLET, FILM COATED ORAL
Status: DISCONTINUED | OUTPATIENT
Start: 2021-08-13 | End: 2021-08-14

## 2021-08-13 RX ORDER — OLANZAPINE 10 MG/1
5 INJECTION, POWDER, LYOPHILIZED, FOR SOLUTION INTRAMUSCULAR
Status: DISCONTINUED | OUTPATIENT
Start: 2021-08-13 | End: 2021-08-24 | Stop reason: HOSPADM

## 2021-08-13 RX ORDER — OLANZAPINE 10 MG/1
5 INJECTION, POWDER, LYOPHILIZED, FOR SOLUTION INTRAMUSCULAR
Status: CANCELLED | OUTPATIENT
Start: 2021-08-13

## 2021-08-13 RX ORDER — HYDROXYZINE HYDROCHLORIDE 25 MG/1
25 TABLET, FILM COATED ORAL
Status: CANCELLED | OUTPATIENT
Start: 2021-08-13

## 2021-08-13 RX ORDER — ACETAMINOPHEN 325 MG/1
650 TABLET ORAL EVERY 4 HOURS PRN
Status: DISCONTINUED | OUTPATIENT
Start: 2021-08-13 | End: 2021-08-24 | Stop reason: HOSPADM

## 2021-08-13 RX ORDER — HYDROXYZINE HYDROCHLORIDE 25 MG/1
50 TABLET, FILM COATED ORAL
Status: CANCELLED | OUTPATIENT
Start: 2021-08-13

## 2021-08-13 RX ORDER — CLONAZEPAM 0.5 MG/1
0.5 TABLET ORAL 3 TIMES DAILY
Qty: 30 TABLET | Refills: 0 | OUTPATIENT
Start: 2021-08-13

## 2021-08-13 RX ORDER — ACETAMINOPHEN 325 MG/1
650 TABLET ORAL EVERY 6 HOURS PRN
Status: CANCELLED | OUTPATIENT
Start: 2021-08-13

## 2021-08-13 RX ORDER — HYDROXYZINE 50 MG/1
100 TABLET, FILM COATED ORAL
Status: DISCONTINUED | OUTPATIENT
Start: 2021-08-13 | End: 2021-08-21

## 2021-08-13 RX ORDER — OLANZAPINE 2.5 MG/1
2.5 TABLET ORAL
Status: CANCELLED | OUTPATIENT
Start: 2021-08-13

## 2021-08-13 RX ORDER — ACETAMINOPHEN 325 MG/1
650 TABLET ORAL EVERY 6 HOURS PRN
Status: DISCONTINUED | OUTPATIENT
Start: 2021-08-13 | End: 2021-08-24 | Stop reason: HOSPADM

## 2021-08-13 RX ADMIN — LORAZEPAM 1 MG: 1 TABLET ORAL at 11:48

## 2021-08-13 RX ADMIN — LORAZEPAM 1 MG: 1 TABLET ORAL at 10:16

## 2021-08-13 RX ADMIN — POTASSIUM CHLORIDE 40 MEQ: 1500 TABLET, EXTENDED RELEASE ORAL at 06:42

## 2021-08-13 RX ADMIN — OLANZAPINE 10 MG: 5 TABLET, ORALLY DISINTEGRATING ORAL at 12:35

## 2021-08-13 RX ADMIN — LORAZEPAM 2 MG: 1 TABLET ORAL at 19:13

## 2021-08-13 RX ADMIN — ONDANSETRON 4 MG: 4 TABLET, ORALLY DISINTEGRATING ORAL at 16:41

## 2021-08-13 NOTE — ED NOTES
Pt's  is at bedside   brought extra belongings for Pt  Belongings were checked for contraband, and include:      Toiletries  Makeup and brushes   Extra clothing   Slippers   Family pictures     Mukul Henny  08/13/21 9790

## 2021-08-13 NOTE — ED NOTES
Patient is accepted at [facility]  [facility]    Transportation is arranged with [facility]  Transportation is scheduled for TBD  Patient may go to the floor at [email or web address] report is to be called to [phone] prior to patient transfer

## 2021-08-13 NOTE — ED NOTES
No beds currently available within [facility]  Bed search initiated:    [facility]- beds available  [facility]- beds available      Clinical faxed to Lawson Song and Vinh for review  Bed search to continue if needed

## 2021-08-13 NOTE — ED NOTES
Pt presents to the ED with her  for the 3rd time in the past week or two due to c/o severe and debilitating anxiety  Pt is observed pacing, placing her head in her lap, moaning and stating that she "can't take the pain " Pt states "everything hurts"  Pt denies any suicidal or homicidal ideations, nor any auditory or visual hallucinations at this time  Pt does appear to be suffering from some form of psychosis, yelling for her father who is , as per pt's   Pt denies any D &A problems, nor any leagl issues, but notes she has a Medical Marijuana card for her anxiety  Pt denies any Hx of abuse or neglect  Pt reports good sleep, but states she hasn't been eating for the past 4 days, only drinking water and gatorade  Pt c/o nausea, but is observed as though she is making herself gag  Pt continues to scream for meds, and willfully states she will not cooperate with staff until she gets meds  Pt states she works f/t in [employer], but notes she hasn't been able to do any work for the past 4 days  Pt notes current stressors include an upcoming move within her current neighborhood and her 11 yr old daughter about to start kinderten and pt is worried her daughter will become carsick on the school bus  Pt notes she has not seen her therapist in 2 yrs, and has been prescribed both Klonopin and Lexapro for the past 5 yrs via her OB/GYN, Dr Nancie Johansen  Pt adds that she seer [place] once a year, but the doctor continues to prescribe these meds without seeing pt more frequently  Pt has been to the Ed at both [facility] and to [facility] seeking meds  It is noted in pt's chart that she was also prescribed Klonopin today by her OB/GYN but when she couldn't get said script at the pharmacy, she came to the ED @ [facility] today asking for meds   CW discussed Tx options with pt, as did her provider, Olena Fang PA-C, and pt agreed to sign a 201 for in-pt Tx  Pt's  is in favor of pt receiving this level of care as he agrees that pt cannot function as is, and she cannot continue to go on this way without intervention  CW discussed this case with David Heck PA-C who is in agreement with this Tx plan      Jackson Espinosa Lucile Golds  08/13/21 00:24

## 2021-08-13 NOTE — ED NOTES
CIS met with patient to have her sign EMTALA  Patient appears anxious and is requesting medication before she leaves  Nurse was notified by the tech

## 2021-08-13 NOTE — ED NOTES
Pt requesting more anxiety medications, and is complaining of nausea  Staff noted that Pt did not eat her breakfast and did not want any lunch  RN made aware  Pt was given apple juice and offered crackers or toast  Pt declined any food  RN made Pt aware that she will continue to feel nauseous if she does not eat  A light dinner tray was ordered for Pt  Pt will be given crackers in the mean time        [facility]  08/13/21 4938

## 2021-08-13 NOTE — ED NOTES
Insurance Authorization for admission:   Phone call placed to [employer] number:[phone]   Spoke to Bárbara Schaeffer    As per insurance patient is not listed on account needed to add her to plan, and will contact once precert can be completed      EVS (Eligibility Verification System) called - [phone]    Automated system indicates: not on file

## 2021-08-13 NOTE — ED NOTES
Both pt and Dr Gideon Rowley signed the 12 document      [facility], [facility], [address]  08/12/21 23:29

## 2021-08-13 NOTE — ED NOTES
Patient's , Jordan Gaucher, requested contact information for [address]  CIS provided phone number, address for [address], and phone number for [facility]  German reports that he informed patient's employer that she would not be at work, and he will contact  for work excuse paperwork after she is admitted

## 2021-08-13 NOTE — ED CARE HANDOFF
Emergency Department Sign Out Note        The patient, Bulmaro Abreu, was evaluated by the previous provider for anxiety  She signed a 201  She is medically clear for inpatient psychiatric treatment at this time  Procedures  MDM    Disposition  Final diagnoses:   Anxiety     Time reflects when diagnosis was documented in both MDM as applicable and the Disposition within this note     Time User Action Codes Description Comment    8/13/2021  9:17 AM Papo Millervern Add [F41 9] Anxiety       ED Disposition     ED Disposition Condition Date/Time Comment    Transfer to [facility] Aug 13, 2021  9:17 AM Bulmaro Abreu should be transferred out to [facility] and has been medically cleared  MD Documentation      Most Recent Value   Sending MD Dr Malissa Valverde    None       Patient's Medications   Discharge Prescriptions    No medications on file     No discharge procedures on file         ED Provider  Electronically Signed by     Ani Hoff MD  08/13/21 2141

## 2021-08-13 NOTE — ED NOTES
[facility]IS informed of new pickup time of 2130  CIS informed Christiano Knight nurse Marisol and Grand leach nurse  Report to be called to: Jimmy Mccloud, [phone]

## 2021-08-13 NOTE — ED NOTES
Insurance Authorization for admission:   Phone call placed to [employer] number: [phone]  Spoke to Barbaraann Seip  3 days approved  Level of care: 201/IP  Review on 8/16/2021  Authorization #   [id number]       EVS (Eligibility Verification System) called - [phone]    Automated system indicates:     Insurance Authorization for Transportation:    Not needed for higher level of care

## 2021-08-14 PROBLEM — F43.23 ADJUSTMENT DISORDER WITH MIXED ANXIETY AND DEPRESSED MOOD: Status: ACTIVE | Noted: 2021-08-14

## 2021-08-14 PROBLEM — Z00.8 MEDICAL CLEARANCE FOR PSYCHIATRIC ADMISSION: Status: ACTIVE | Noted: 2019-03-05

## 2021-08-14 LAB
ALBUMIN SERPL BCP-MCNC: 3.9 G/DL (ref 3.5–5)
ALP SERPL-CCNC: 54 U/L (ref 46–116)
ALT SERPL W P-5'-P-CCNC: 49 U/L (ref 12–78)
ANION GAP SERPL CALCULATED.3IONS-SCNC: 7 MMOL/L (ref 4–13)
AST SERPL W P-5'-P-CCNC: 27 U/L (ref 5–45)
BASOPHILS # BLD AUTO: 0.06 THOUSANDS/ΜL (ref 0–0.1)
BASOPHILS NFR BLD AUTO: 1 % (ref 0–1)
BILIRUB SERPL-MCNC: 2.34 MG/DL (ref 0.2–1)
BUN SERPL-MCNC: 13 MG/DL (ref 5–25)
CALCIUM SERPL-MCNC: 8.7 MG/DL (ref 8.3–10.1)
CHLORIDE SERPL-SCNC: 112 MMOL/L (ref 100–108)
CHOLEST SERPL-MCNC: 155 MG/DL (ref 50–200)
CO2 SERPL-SCNC: 22 MMOL/L (ref 21–32)
CREAT SERPL-MCNC: 0.71 MG/DL (ref 0.6–1.3)
EOSINOPHIL # BLD AUTO: 0.15 THOUSAND/ΜL (ref 0–0.61)
EOSINOPHIL NFR BLD AUTO: 2 % (ref 0–6)
ERYTHROCYTE [DISTWIDTH] IN BLOOD BY AUTOMATED COUNT: 12.1 % (ref 11.6–15.1)
GFR SERPL CREATININE-BSD FRML MDRD: 108 ML/MIN/1.73SQ M
GLUCOSE P FAST SERPL-MCNC: 73 MG/DL (ref 65–99)
GLUCOSE SERPL-MCNC: 73 MG/DL (ref 65–140)
HCG SERPL QL: NEGATIVE
HCT VFR BLD AUTO: 40.6 % (ref 34.8–46.1)
HDLC SERPL-MCNC: 44 MG/DL
HGB BLD-MCNC: 14.4 G/DL (ref 11.5–15.4)
IMM GRANULOCYTES # BLD AUTO: 0.01 THOUSAND/UL (ref 0–0.2)
IMM GRANULOCYTES NFR BLD AUTO: 0 % (ref 0–2)
LDLC SERPL CALC-MCNC: 88 MG/DL (ref 0–100)
LYMPHOCYTES # BLD AUTO: 3.24 THOUSANDS/ΜL (ref 0.6–4.47)
LYMPHOCYTES NFR BLD AUTO: 44 % (ref 14–44)
MCH RBC QN AUTO: 30.2 PG (ref 26.8–34.3)
MCHC RBC AUTO-ENTMCNC: 35.5 G/DL (ref 31.4–37.4)
MCV RBC AUTO: 85 FL (ref 82–98)
MONOCYTES # BLD AUTO: 0.44 THOUSAND/ΜL (ref 0.17–1.22)
MONOCYTES NFR BLD AUTO: 6 % (ref 4–12)
NEUTROPHILS # BLD AUTO: 3.44 THOUSANDS/ΜL (ref 1.85–7.62)
NEUTS SEG NFR BLD AUTO: 47 % (ref 43–75)
NONHDLC SERPL-MCNC: 111 MG/DL
NRBC BLD AUTO-RTO: 0 /100 WBCS
PLATELET # BLD AUTO: 204 THOUSANDS/UL (ref 149–390)
PMV BLD AUTO: 10.5 FL (ref 8.9–12.7)
POTASSIUM SERPL-SCNC: 3.3 MMOL/L (ref 3.5–5.3)
PROT SERPL-MCNC: 7 G/DL (ref 6.4–8.2)
RBC # BLD AUTO: 4.77 MILLION/UL (ref 3.81–5.12)
SODIUM SERPL-SCNC: 141 MMOL/L (ref 136–145)
TRIGL SERPL-MCNC: 113 MG/DL
TSH SERPL DL<=0.05 MIU/L-ACNC: 2.11 UIU/ML (ref 0.36–3.74)
WBC # BLD AUTO: 7.34 THOUSAND/UL (ref 4.31–10.16)

## 2021-08-14 PROCEDURE — 93005 ELECTROCARDIOGRAM TRACING: CPT

## 2021-08-14 PROCEDURE — 86592 SYPHILIS TEST NON-TREP QUAL: CPT | Performed by: NURSE PRACTITIONER

## 2021-08-14 PROCEDURE — 84703 CHORIONIC GONADOTROPIN ASSAY: CPT | Performed by: NURSE PRACTITIONER

## 2021-08-14 PROCEDURE — 82652 VIT D 1 25-DIHYDROXY: CPT | Performed by: NURSE PRACTITIONER

## 2021-08-14 PROCEDURE — 99232 SBSQ HOSP IP/OBS MODERATE 35: CPT | Performed by: PHYSICIAN ASSISTANT

## 2021-08-14 PROCEDURE — 99223 1ST HOSP IP/OBS HIGH 75: CPT | Performed by: STUDENT IN AN ORGANIZED HEALTH CARE EDUCATION/TRAINING PROGRAM

## 2021-08-14 PROCEDURE — 80053 COMPREHEN METABOLIC PANEL: CPT | Performed by: NURSE PRACTITIONER

## 2021-08-14 PROCEDURE — 84443 ASSAY THYROID STIM HORMONE: CPT | Performed by: NURSE PRACTITIONER

## 2021-08-14 PROCEDURE — 80061 LIPID PANEL: CPT | Performed by: NURSE PRACTITIONER

## 2021-08-14 PROCEDURE — 85025 COMPLETE CBC W/AUTO DIFF WBC: CPT | Performed by: NURSE PRACTITIONER

## 2021-08-14 RX ORDER — CLONAZEPAM 0.5 MG/1
0.5 TABLET ORAL 2 TIMES DAILY
Status: DISCONTINUED | OUTPATIENT
Start: 2021-08-14 | End: 2021-08-14

## 2021-08-14 RX ORDER — LORAZEPAM 1 MG/1
1 TABLET ORAL ONCE
Status: COMPLETED | OUTPATIENT
Start: 2021-08-14 | End: 2021-08-14

## 2021-08-14 RX ORDER — ONDANSETRON 4 MG/1
4 TABLET, ORALLY DISINTEGRATING ORAL EVERY 6 HOURS PRN
Status: DISCONTINUED | OUTPATIENT
Start: 2021-08-14 | End: 2021-08-14

## 2021-08-14 RX ORDER — ESCITALOPRAM OXALATE 10 MG/1
10 TABLET ORAL DAILY
Status: COMPLETED | OUTPATIENT
Start: 2021-08-14 | End: 2021-08-14

## 2021-08-14 RX ORDER — CLONAZEPAM 0.5 MG/1
0.5 TABLET ORAL 2 TIMES DAILY
Status: DISCONTINUED | OUTPATIENT
Start: 2021-08-14 | End: 2021-08-15

## 2021-08-14 RX ORDER — LORAZEPAM 1 MG/1
1 TABLET ORAL EVERY 4 HOURS PRN
Status: DISCONTINUED | OUTPATIENT
Start: 2021-08-14 | End: 2021-08-24 | Stop reason: HOSPADM

## 2021-08-14 RX ORDER — PROMETHAZINE HYDROCHLORIDE 25 MG/1
25 TABLET ORAL EVERY 6 HOURS PRN
Status: DISCONTINUED | OUTPATIENT
Start: 2021-08-14 | End: 2021-08-24 | Stop reason: HOSPADM

## 2021-08-14 RX ADMIN — LORAZEPAM 1 MG: 1 TABLET ORAL at 12:47

## 2021-08-14 RX ADMIN — ESCITALOPRAM OXALATE 10 MG: 10 TABLET ORAL at 12:47

## 2021-08-14 RX ADMIN — OLANZAPINE 5 MG: 5 TABLET, FILM COATED ORAL at 20:28

## 2021-08-14 RX ADMIN — CLONAZEPAM 0.5 MG: 0.5 TABLET ORAL at 17:03

## 2021-08-14 RX ADMIN — HYDROXYZINE HYDROCHLORIDE 100 MG: 50 TABLET, FILM COATED ORAL at 17:23

## 2021-08-14 RX ADMIN — LORAZEPAM 1 MG: 1 TABLET ORAL at 19:27

## 2021-08-14 RX ADMIN — PROMETHAZINE HYDROCHLORIDE 25 MG: 25 TABLET ORAL at 18:53

## 2021-08-14 RX ADMIN — TRAZODONE HYDROCHLORIDE 50 MG: 50 TABLET ORAL at 22:39

## 2021-08-14 NOTE — ED NOTES
[facility] arrived at approximately 2140 to transport patient  Patient left with CTS at 2150       Antionette HIRAL Chapa  08/13/21 2150

## 2021-08-14 NOTE — TREATMENT PLAN
RN will meet with patient twice daily to assess any concerns and will give education on diagnosis, prescribed medications, and healthy coping skills  RN will encourage and support healthy coping skills and group attendance

## 2021-08-14 NOTE — NURSING NOTE
Pt was severely anxious after dinner, tearful, breathing heavily, and requesting Ativan  Pt received scheduled evening Klonopin at 1703 and received PRN Atarax 100 mg PO at 1723 for severe anxiety  PRN ineffective  Pt was resting in bed briefly but at 1815, pt reported that she was nauseated and threw up recently, pt continues to report severe anxiety  Pt requested Zofran, pharmacy reported that pt had prolonged QTc on 8/9/21, physician changed order to Phenergan  Pt received Phenergan 25 mg PO at 1853 for nausea and vomiting  Pt continues to panic at 1927, requested PRN Ativan for severe anxiety, pt received Ativan 1 mg PO at 1927 for severe anxiety  PRN Phenergan was partially effective for nausea, pt denies further vomiting  PRN Ativan was ineffective, pt remains severely anxious and moderately agitated at 2028  Pt received Zyprexa 5 mg PO at 2028 for moderate agitation  Pt reports attempting to use coping skills, distractions, and deep breathing, reports nothing is helping  PRN Zyprexa partially effective, pt appears calmer at the end of the shift and reports that she is finally starting to feel better at 2230

## 2021-08-14 NOTE — TREATMENT PLAN
TREATMENT PLAN REVIEW - [id number] Mary Ann Huynh 45 y o  1980 female MRN: [id number]    [address] [place] [address] Room / Bed: Lucila Aleman [facility] 909-66 Encounter: [id number]          Admit Date/Time:  8/13/2021 10:44 PM    Treatment Team: Attending Provider: Jamey Ascencio MD; Consulting Physician: Lizabeth Gallego MD; Patient Care Technician: Fabio Obregon; Licensed Practical Nurse: Selma Agarwal LPN; Security: Jim Tolentino; Registered Nurse: Rabia Johnson RN; Registered Nurse: Magda Larson, NAIN; Patient Care Technician: Sedrick Young; Patient Care Assistant: Aubrey Lopez    Diagnosis: Principal Problem:    Panic disorder without agoraphobia with severe panic attacks  Active Problems:    Medical clearance for psychiatric admission    Adjustment disorder with mixed anxiety and depressed mood    Patient Strengths/Assets: average or above intelligence, capable of independent living, communication skills, family ties, financial means   Patient Barriers/Limitations: limited motivation, low self esteem  Short Term Goals: decrease in depressive symptoms, decrease in anxiety symptoms    Long Term Goals: improvement in depression, improvement in anxiety    Progress Towards Goals: restarting psychiatric medications as prescribed    Recommended Treatment: medication management, patient medication education, group therapy, milieu therapy, continued Behavioral Health psychiatric evaluation/assessment process     Treatment Frequency: daily medication monitoring, group and milieu therapy daily, monitoring through interdisciplinary rounds, monitoring through weekly patient care conferences    Expected Discharge Date: 4 days - 8/18/2021    Discharge Plan: referral for outpatient medication management with a psychiatrist, referral for outpatient psychotherapy    Treatment Plan Created/Updated By: Yevgeniy Arriaza MD

## 2021-08-14 NOTE — NURSING NOTE
Pt denies SI/HI, verbally contracts for safety  Tearful in conversation-states feeling stressed out over home situation of buying a new house and moving  Also patients daughter is starting  this fall which has caused some added stress  Patient states she had a psychologist who she would speak with however stopped seeing them several years ago and would like to start seeing someone again soon  Pt reports poor appetite due to anxiety  Reviewed scheduled medications  Pt attending groups and social with peers  Denies any concerns at this time

## 2021-08-14 NOTE — CONSULTS
Chantelle Brothers 20 1980, 45 y o  female MRN: [id number]  Unit/Bed#: [facility] 254-02 Encounter: [id number]  Primary Care Provider: No primary care provider on file  Date and time admitted to hospital: 8/13/2021 10:44 PM    Inpatient consult for Medical Clearance for [address] patient  Consult performed by: Erica Ruth PA-C  Consult ordered by: OLE Paula        Medical clearance for psychiatric admission  Assessment & Plan  · Vital signs stable at time of assessment  · CBC, CMP, TSH ordered not yet drawn  · EKG: NSR normal QTc  · Patient appears medically stable at this time for inpatient psychiatric treatment    Anxiety  Assessment & Plan  · Presented to the ED with worsening panic attacks  · Further plan per psychiatry    VTE Prophylaxis: Reason for no pharmacologic prophylaxis low risk, ambulate  / reason for no mechanical VTE prophylaxis psychiatric unit, ambulate     Recommendations for Discharge:  · Follow up with PCP after discharge    Counseling / Coordination of Care Time: 20 minutes  Greater than 50% of total time spent on patient counseling and coordination of care  Collaboration of Care: Were Recommendations Directly Discussed with Primary Treatment Team? - No     History of Present Illness:    Martín Reyes is a 45 y o  female who is originally admitted to the psychiatry service due to panic attacks  We are consulted for medical clearance  Past medical history significant for anxiety  Presented to the ED due to worsening panic attacks  Denies any physical complaints including chest pain/palpitations, shortness of breath, nausea/vomiting, abdominal pain  Review of Systems:    Review of Systems   Constitutional: Negative for chills, fatigue, fever and unexpected weight change  HENT: Negative for congestion, sore throat and trouble swallowing  Eyes: Negative for photophobia, pain and visual disturbance     Respiratory: Negative for cough, shortness of breath and wheezing  Cardiovascular: Negative for chest pain, palpitations and leg swelling  Gastrointestinal: Negative for abdominal pain, constipation, diarrhea, nausea and vomiting  Endocrine: Negative for polyuria  Genitourinary: Negative for difficulty urinating, dysuria, flank pain, hematuria and urgency  Musculoskeletal: Negative for back pain, myalgias, neck pain and neck stiffness  Skin: Negative for pallor and rash  Neurological: Negative for dizziness, tremors, syncope, weakness, light-headedness, numbness and headaches  Hematological: Does not bruise/bleed easily  Psychiatric/Behavioral: Negative for agitation and confusion  The patient is nervous/anxious  Past Medical and Surgical History:     Past Medical History:   Diagnosis Date    Anxiety     Depression     Disruption of episiotomy wound in the puerperium     Last assessed 06/22/16    External hemorrhoids     Last assessed 02/05/16    Fibroid uterus     GERD (gastroesophageal reflux disease)     Headache     Leiomyoma of uterus     Migraine     Migraine     Polyhydramnios in third trimester, not applicable or unspecified fetus     Last assessed 01/25/16    Pregnancy headache in third trimester     Resolved 02/05/16    Protein in urine     Last assessed 01/25/16    Sciatica of right side     Third degree laceration of perineum, type 3b 1/26/2016    Varicella        Past Surgical History:   Procedure Laterality Date    CHOLECYSTECTOMY  2006    MYOMECTOMY      ME POST COLPORRHAPHY,RECTUM/VAGINA N/A 3/24/2016    Procedure: Donald Carreno ;  Surgeon: Kimber Hampton MD;  Location: [facility];  Service: Gynecology    WISDOM TOOTH EXTRACTION         Meds/Allergies:    all medications and allergies reviewed    Allergies:    Allergies   Allergen Reactions    Demerol [Meperidine] Hyperactivity    Macrolides And Ketolides        Social History:     Marital Status: /Civil Union    Substance Use History:   Social History     Substance and Sexual Activity   Alcohol Use Yes    Comment: socially; 3-4 drinks/week (8/13)     Social History     Tobacco Use   Smoking Status Never Smoker   Smokeless Tobacco Never Used     Social History     Substance and Sexual Activity   Drug Use Yes    Types: Marijuana    Comment: Pt has a medical marijuana card (8/13)       Family History:    Family History   Problem Relation Age of Onset    Cancer Mother         Breast    Cancer Maternal Aunt         Breast       Physical Exam:     Vitals:   Blood Pressure: 115/75 (08/13/21 2320)  Pulse: 91 (08/13/21 2320)  Temperature: 98 8 °F (37 1 °C) (08/13/21 2320)  Temp Source: Tympanic (08/13/21 2320)  Respirations: 16 (08/13/21 2320)  Height: 5' 3" (160 cm) (08/13/21 2320)  Weight - Scale: 57 5 kg (126 lb 12 8 oz) (08/13/21 2320)  SpO2: 99 % (08/13/21 2320)    Physical Exam  Vitals and nursing note reviewed  Constitutional:       Appearance: Normal appearance  Comments: Appears comfortable, no acute distress   HENT:      Head: Normocephalic  Eyes:      General: No scleral icterus  Extraocular Movements: Extraocular movements intact  Conjunctiva/sclera: Conjunctivae normal    Cardiovascular:      Rate and Rhythm: Normal rate and regular rhythm  Heart sounds: S1 normal and S2 normal    Pulmonary:      Effort: Pulmonary effort is normal       Breath sounds: Normal breath sounds  No wheezing, rhonchi or rales  Abdominal:      General: Bowel sounds are normal       Palpations: Abdomen is soft  Tenderness: There is no abdominal tenderness  There is no guarding or rebound  Musculoskeletal:         General: No swelling, tenderness or deformity  Cervical back: Normal range of motion  Comments: Ambulating unit without difficulty, no edema   Skin:     General: Skin is warm and dry     Neurological:      Mental Status: She is alert and oriented to person, place, and time    Psychiatric:         Mood and Affect: Mood is anxious  Speech: Speech normal          Behavior: Behavior normal            Additional Data:     Lab Results: I have personally reviewed pertinent reports  Results from last 7 days   Lab Units 08/12/21  2323   WBC Thousand/uL 7 85   HEMOGLOBIN g/dL 14 9   HEMATOCRIT % 41 4   PLATELETS Thousands/uL 249   NEUTROS PCT % 68   LYMPHS PCT % 25   MONOS PCT % 6   EOS PCT % 0     Results from last 7 days   Lab Units 08/13/21  1012 08/12/21  2323 08/09/21  1722   SODIUM mmol/L  --  142 141   POTASSIUM mmol/L 3 7 3 1* 3 7   CHLORIDE mmol/L  --  107 104   CO2 mmol/L  --  19* 18*   BUN mg/dL  --  15 15   CREATININE mg/dL  --  0 84 0 92   ANION GAP mmol/L  --  16* 19*   CALCIUM mg/dL  --  8 8 8 9   ALBUMIN g/dL  --   --  4 3   TOTAL BILIRUBIN mg/dL  --   --  2 94*   ALK PHOS U/L  --   --  59   ALT U/L  --   --  38   AST U/L  --   --  27   GLUCOSE RANDOM mg/dL  --  97 129         Results from last 7 days   Lab Units 08/12/21 2323 08/09/21  1722   TROPONIN I ng/mL <0 02 <0 02     No results found for: HGBA1C            Imaging: I have personally reviewed pertinent reports  No orders to display       EKG, Pathology, and Other Studies Reviewed on Admission:   · EKG: NSR normal qtc    ** Please Note: This note has been constructed using a voice recognition system   **

## 2021-08-14 NOTE — ASSESSMENT & PLAN NOTE
· Vital signs stable at time of assessment  · CBC, CMP, TSH ordered not yet drawn  · EKG: NSR normal QTc  · Patient appears medically stable at this time for inpatient psychiatric treatment

## 2021-08-14 NOTE — NURSING NOTE
Pt is 201 from [place] ED  Pt denies SI/HI/AVH  Pt stated that prior to ED admission she had experienced an uncontrolled panic attack which led her to seeking treatment  She also stated that she has had increased anxiety related to buying a new home and selling her current home, family related issues (which she did not wish to disclose), and her [de-identified] year old daughter starting school soon  Pt UDS was Benzo and THC (+)  Pt stated that she has a medical marijuana card  Pt also stated that she has a Mirena IUD implant  Patient contracts for safety on the unit  Pt is currently resting in bed and has no questions or concerns at this time

## 2021-08-14 NOTE — PLAN OF CARE
Pt is new admission     Problem: Ineffective Coping  Goal: Cooperates with admission process  Description: Interventions:   - Complete admission process  Outcome: Not Progressing  Goal: Identifies ineffective coping skills  Outcome: Not Progressing  Goal: Identifies healthy coping skills  Outcome: Not Progressing  Goal: Demonstrates healthy coping skills  Outcome: Not Progressing  Goal: Participates in unit activities  Description: Interventions:  - Provide therapeutic environment   - Provide required programming   - Redirect inappropriate behaviors   Outcome: Not Progressing  Goal: Patient/Family participate in treatment and DC plans  Description: Interventions:  - Provide therapeutic environment  Outcome: Not Progressing  Goal: Patient/Family verbalizes awareness of resources  Outcome: Not Progressing  Goal: Understands least restrictive measures  Description: Interventions:  - Utilize least restrictive behavior  Outcome: Not Progressing  Goal: Free from restraint events  Description: - Utilize least restrictive measures   - Provide behavioral interventions   - Redirect inappropriate behaviors   Outcome: Not Progressing     Problem: Risk for Self Injury/Neglect  Goal: Treatment Goal: Remain safe during length of stay, learn and adopt new coping skills, and be free of self-injurious ideation, impulses and acts at the time of discharge  Outcome: Not Progressing  Goal: Verbalize thoughts and feelings  Description: Interventions:  - Assess and re-assess patient's lethality and potential for self-injury  - Engage patient in 1:1 interactions, daily, for a minimum of 15 minutes  - Encourage patient to express feelings, fears, frustrations, hopes  - Establish rapport/trust with patient   Outcome: Not Progressing  Goal: Refrain from harming self  Description: Interventions:  - Monitor patient closely, per order  - Develop a trusting relationship  - Supervise medication ingestion, monitor effects and side effects   Outcome: Not Progressing  Goal: Attend and participate in unit activities, including therapeutic, recreational, and educational groups  Description: Interventions:  - Provide therapeutic and educational activities daily, encourage attendance and participation, and document same in the medical record  - Obtain collateral information, encourage visitation and family involvement in care   Outcome: Not Progressing  Goal: Recognize maladaptive responses and adopt new coping mechanisms  Outcome: Not Progressing  Goal: Complete daily ADLs, including personal hygiene independently, as able  Description: Interventions:  - Observe, teach, and assist patient with ADLS  - Monitor and promote a balance of rest/activity, with adequate nutrition and elimination  Outcome: Not Progressing     Problem: Depression  Goal: Treatment Goal: Demonstrate behavioral control of depressive symptoms, verbalize feelings of improved mood/affect, and adopt new coping skills prior to discharge  Outcome: Not Progressing  Goal: Verbalize thoughts and feelings  Description: Interventions:  - Assess and re-assess patient's level of risk   - Engage patient in 1:1 interactions, daily, for a minimum of 15 minutes   - Encourage patient to express feelings, fears, frustrations, hopes   Outcome: Not Progressing  Goal: Refrain from harming self  Description: Interventions:  - Monitor patient closely, per order   - Supervise medication ingestion, monitor effects and side effects   Outcome: Not Progressing  Goal: Refrain from isolation  Description: Interventions:  - Develop a trusting relationship   - Encourage socialization   Outcome: Not Progressing  Goal: Refrain from self-neglect  Outcome: Not Progressing  Goal: Attend and participate in unit activities, including therapeutic, recreational, and educational groups  Description: Interventions:  - Provide therapeutic and educational activities daily, encourage attendance and participation, and document same in the medical record   Outcome: Not Progressing  Goal: Complete daily ADLs, including personal hygiene independently, as able  Description: Interventions:  - Observe, teach, and assist patient with ADLS  -  Monitor and promote a balance of rest/activity, with adequate nutrition and elimination   Outcome: Not Progressing     Problem: Anxiety  Goal: Anxiety is at manageable level  Description: Interventions:  - Assess and monitor patient's anxiety level  - Monitor for signs and symptoms (heart palpitations, chest pain, shortness of breath, headaches, nausea, feeling jumpy, restlessness, irritable, apprehensive)  - Collaborate with interdisciplinary team and initiate plan and interventions as ordered    - Guys patient to unit/surroundings  - Explain treatment plan  - Encourage participation in care  - Encourage verbalization of concerns/fears  - Identify coping mechanisms  - Assist in developing anxiety-reducing skills  - Administer/offer alternative therapies  - Limit or eliminate stimulants  Outcome: Not Progressing

## 2021-08-14 NOTE — PLAN OF CARE
Problem: Risk for Self Injury/Neglect  Goal: Treatment Goal: Remain safe during length of stay, learn and adopt new coping skills, and be free of self-injurious ideation, impulses and acts at the time of discharge  Outcome: Progressing  Goal: Verbalize thoughts and feelings  Description: Interventions:  - Assess and re-assess patient's lethality and potential for self-injury  - Engage patient in 1:1 interactions, daily, for a minimum of 15 minutes  - Encourage patient to express feelings, fears, frustrations, hopes  - Establish rapport/trust with patient   Outcome: Progressing  Goal: Refrain from harming self  Description: Interventions:  - Monitor patient closely, per order  - Develop a trusting relationship  - Supervise medication ingestion, monitor effects and side effects   Outcome: Progressing  Goal: Attend and participate in unit activities, including therapeutic, recreational, and educational groups  Description: Interventions:  - Provide therapeutic and educational activities daily, encourage attendance and participation, and document same in the medical record  - Obtain collateral information, encourage visitation and family involvement in care   Outcome: Progressing  Goal: Recognize maladaptive responses and adopt new coping mechanisms  Outcome: Progressing  Goal: Complete daily ADLs, including personal hygiene independently, as able  Description: Interventions:  - Observe, teach, and assist patient with ADLS  - Monitor and promote a balance of rest/activity, with adequate nutrition and elimination  Outcome: Progressing     Problem: Depression  Goal: Treatment Goal: Demonstrate behavioral control of depressive symptoms, verbalize feelings of improved mood/affect, and adopt new coping skills prior to discharge  Outcome: Progressing  Goal: Verbalize thoughts and feelings  Description: Interventions:  - Assess and re-assess patient's level of risk   - Engage patient in 1:1 interactions, daily, for a minimum of 15 minutes   - Encourage patient to express feelings, fears, frustrations, hopes   Outcome: Progressing  Goal: Refrain from harming self  Description: Interventions:  - Monitor patient closely, per order   - Supervise medication ingestion, monitor effects and side effects   Outcome: Progressing  Goal: Refrain from isolation  Description: Interventions:  - Develop a trusting relationship   - Encourage socialization   Outcome: Progressing  Goal: Refrain from self-neglect  Outcome: Progressing  Goal: Attend and participate in unit activities, including therapeutic, recreational, and educational groups  Description: Interventions:  - Provide therapeutic and educational activities daily, encourage attendance and participation, and document same in the medical record   Outcome: Progressing  Goal: Complete daily ADLs, including personal hygiene independently, as able  Description: Interventions:  - Observe, teach, and assist patient with ADLS  -  Monitor and promote a balance of rest/activity, with adequate nutrition and elimination   Outcome: Progressing     Problem: Anxiety  Goal: Anxiety is at manageable level  Description: Interventions:  - Assess and monitor patient's anxiety level  - Monitor for signs and symptoms (heart palpitations, chest pain, shortness of breath, headaches, nausea, feeling jumpy, restlessness, irritable, apprehensive)  - Collaborate with interdisciplinary team and initiate plan and interventions as ordered    - Morris patient to unit/surroundings  - Explain treatment plan  - Encourage participation in care  - Encourage verbalization of concerns/fears  - Identify coping mechanisms  - Assist in developing anxiety-reducing skills  - Administer/offer alternative therapies  - Limit or eliminate stimulants  Outcome: Progressing

## 2021-08-14 NOTE — NURSING NOTE
Pt is visible in the milieu and is pleasant and cooperative in conversation with a bright affect  Pt reports to this writer regarding her peers, "I talked to a few people, making me feel better" and denies SI/HI/AVH  Pt also was hoping she could speak with the doctor about taking Klonopin and Ativan together for sleep  Education was given to pt on both medication as neither of them are for sleep  Pt verbalized understanding of the same  Pt denies having any other questions or concerns at the present

## 2021-08-14 NOTE — H&P
Psychiatric Evaluation - [address] [place] [address] Anum 45 y o  female MRN: [id number]  Unit/Bed#: Kellee Dumont 895-47 Encounter: [id number]      Chief Complaint:  "I have been having panic attacks over the past 4 days "    History of Present Illness   Per ED Note by Mele Huff on 21:  Pt presents to the ED with her  for the 3rd time in the past week or two due to c/o severe and debilitating anxiety  Pt is observed pacing, placing her head in her lap, moaning and stating that she "can't take the pain " Pt states "everything hurts"  Pt denies any suicidal or homicidal ideations, nor any auditory or visual hallucinations at this time  Pt does appear to be suffering from some form of psychosis, yelling for her father who is , as per pt's   Pt denies any D &A problems, nor any leagl issues, but notes she has a Medical Marijuana card for her anxiety  Pt denies any Hx of abuse or neglect  Pt reports good sleep, but states she hasn't been eating for the past 4 days, only drinking water and gatorade  Pt c/o nausea, but is observed as though she is making herself gag  Pt continues to scream for meds, and willfully states she will not cooperate with staff until she gets meds  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, but notes she hasn't been able to do any work for the past 4 days  Pt notes current stressors include an upcoming move within her current neighborhood and her 11 yr old daughter about to start kinderten and pt is worried her daughter will become carsick on the school bus  Pt notes she has not seen her therapist in 2 yrs, and has been prescribed both Klonopin and Lexapro for the past 5 yrs via her OB/GYN, Dr Saeed Pfeiffer  Pt adds that she seer [facility] once a year, but the doctor continues to prescribe these meds without seeing pt more frequently  Pt has been to the Ed at both [facility] and to [facility] seeking meds   It is noted in pt's chart that she was also prescribed Klonopin today by her OB/GYN but when she couldn't get said script at the pharmacy, she came to the ED @ [facility] today asking for meds  CW discussed Tx options with pt, as did her provider, Ozzie Gottlieb PA-C, and pt agreed to sign a 201 for in-pt Tx  Pt's  is in favor of pt receiving this level of care as he agrees that pt cannot function as is, and she cannot continue to go on this way without intervention  CW discussed this case with Tess Braga PA-C who is in agreement with this Tx plan  Patient was admitted to psychiatric unit on a voluntarily 201 commitment basis  Per Admission Interview with Dr Izabella Hood on 8/14/21:  45 y o  White female,  for 12 years, domiciled with , daughter (12 y/o) in [place], currently employed in [employer] for 13 years, [address] [facility] significant for h/o panic disorder, 2 past psychiatric hospitalizations (1st time at 26 y/o for panic disorder, 2nd hospitalization at [facility] about7 years ago for panic disorder), no past suicide attempts, no h/o self-injurious behaviors, no h/o physical aggression, PMH significant for migraine, substance abuse history significant for medical cannabis, social alcohol use, presents to [facility] inpatient psychiatry unit transferred from [facility] ED for inpatient psychiatric hospitalization for debilitating anxiety, r/o psychosis, with Leda Sierra reporting "I have been having panic attacks over the past 4 days "    Per patient, patient reports that she has been doing well for several years up until about the end of July 2021  She reports that there has been a lot of work stressors reports feeling overwhelmed at work at times  She reports that she puts a lot of pressure on herself at work  She reports that she is currently in the process of moving to a new house in [place], reports closing is at the end of the month  She reports that her daughter will be starting  at the end of month    She reports some worries about her daughter on the bus, reports worrying that she will get motion sick on the bus  She reports that she takes Lexapro 10 mg daily and then takes Klonopin 0 5 mg up to bid prn anxiety  She reports that over the past week, she has been taking up to Klonopin 0 5 mg three times per day  She reports that it was helping for a bit but then when she gets more worked up it stops working  She reports that her psychiatric medications were being prescribed by her Ob/Gyn doctor  She reports that she was seeing a psychiatrist Dr Lali Yee for several year who retired about 5 years ago  She was pregnant with her daughter at the time so then Ob/Gyn started prescribing the medications after the pregnancy  She reports her Zandra/Gyn had maintained her on the Lexapro since that time  Patient reports that she has been psychologist for several years but hasn't seen him in about 3 years  She reports about 3 weeks ago, she started getting panic attacks again  She reports that she went to [facility] ED on 7/26 While in the ED, she received Lorazepam, Promethazine, and Olanzapine  She was discharged on Zofran prn  She reports that she subsequently was on vacation the first week on August   During that time, she was feeling pretty good, didn't have any panic attacks while she was there  When she returned home and went back to work, her anxiety came back  She started having panic attacks again and went to [facility] ED on 8/9/21  Per the ED note, she received Diazepam 10 mg and Cogentin 2 mg  She reports on the Diazepam she was having double vision  She was discharged on a script of Hydroxyzine 50 mg prn and continued to use her Klonopin  She reports that it would last for a little bit but then would have another panic spiral   She has an upcoming psychiatric evaluation scheduled at [facility] on Wednesday 8/18/21    She returned to the [facility] ED on 8/11/21 with panic attacks and was discharged on Ativan 1 mg prn for anxiety  She reports that following being discharged from [facility] ED, she quickly started feeling overwhelmed again and went to [facility] ED  When she arrived at [facility] ED, she was pretty agitated and received Haldol 5 mg IM  During the ED stay, she also received Ativan total dose of 4 mg and Olanzapine 10 mg  She was subsequently transferred to [address] [place] [address] for inpatient psychiatric hospitalization  In terms of mood symptoms, patient describes her mood as "happy," denying overwhelming feelings of sadness or depression, denying anger or irritability  Patient reports that she is generally able to sleep at night with medication, sleeping about 8 hours per night  Patient reports that she hasn't been eating well, has no appetite  She reports trying to stay hydrated  She reports that she wasn't working over the past few weeks due to her anxiety  She denies any passive or active suicidal ideation, intent, or plan  In terms of anxiety symptoms, patient reports that she worries about having panic attacks, has been having them on a daily basis several times per day  She rates her anxiety 10/10 intensity over the past few weeks  She reports some social anxiety  She denies any PTSD symptoms  On psychiatric ROS, patient denies any perceptual disturbances  She denies any feelings of paranoia  She denies any h/o or current manic symptoms  Patient denies any h/o physical or sexual abuse  She reports taking the medication on daily basis  Historical Information     Past Psychiatric History:   H/o panic disorder, 2 past psychiatric hospitalizations (1st time at 26 y/o for panic disorder, 2nd hospitalization at [facility] about7 years ago for panic disorder), no past suicide attempts, no h/o self-injurious behaviors, no h/o physical aggression  Currently in outpatient psychotherapy with Mimi Dan    Has an upcoming intake appointment at [facility] on Wednesday 8/18/21  Jeanine Gallardo Past Psychiatric medication trial: None (has taken Valium in ED, Hydroxyzine, Zyprexa, Haldol in ED)  Current Psych Medications: Lexapro 10 mg daily, Klonopin 0 5 mg bid prn anxiety, Ativan 1 mg prn    Substance Abuse History:  Social History     Tobacco History     Smoking Status  Never Smoker    Smokeless Tobacco Use  Never Used          Alcohol History     Alcohol Use Status  Yes Comment  socially; 3-4 drinks/week (8/13)          Drug Use     Drug Use Status  Yes Types  Marijuana Comment  Pt has a medical marijuana card (8/13)          Sexual Activity     Sexually Active  Yes Partners  Male Birth Control/Protection  I U D  Activities of Daily Living    Not Asked               Additional Substance Use Detail     Questions Responses    Problems Due to Past Use of Alcohol? No    Problems Due to Past Use of Substances?  No    Alcohol Use Frequency 1 or 2 times/week    Cannabis frequency 3 or more times/week    Comment: 3 or more times/week on 8/14/2021     Heroin Frequency Denies use in past 12 months    Cannabis method     Comment: Various methods     Cocaine frequency Never used    Comment: Never used on 8/14/2021     Crack Cocaine Frequency Denies use in past 12 months    Methamphetamine Frequency Denies use in past 12 months    Narcotic Frequency Denies use in past 12 months    Benzodiazepine Frequency Denies use in past 12 months    Amphetamine frequency Denies use in past 12 months    Barbituate Frequency Denies use use in past 12 months    Inhalant frequency Never used    Comment: Never used on 8/14/2021     Hallucinogen frequency Never used    Comment: Never used on 8/14/2021     Ecstasy frequency Never used    Comment: Never used on 8/14/2021     Other drug frequency Never used    Comment: Never used on 8/14/2021     Opiate frequency Denies use in past 12 months    Last reviewed by Sanya Boston on 8/14/2021      Medical Cannabis- uses it a few times per day  Alcohol- about once per week  No cigarette use    I have assessed this patient for substance use within the past 12 months    Family Psychiatric History:   None  No FH of suicide    Social History:  Lives with , daughter (12 y/o)  Graduated college, works in [employer] over past 10-15 years   works as a  for the arm  Access to firearm- kept locked up  Abuse History: None        Past Medical History:   Diagnosis Date    Anxiety     Depression     Disruption of episiotomy wound in the puerperium     Last assessed 06/22/16    External hemorrhoids     Last assessed 02/05/16    Fibroid uterus     GERD (gastroesophageal reflux disease)     Headache     Leiomyoma of uterus     Migraine     Migraine     Polyhydramnios in third trimester, not applicable or unspecified fetus     Last assessed 01/25/16    Pregnancy headache in third trimester     Resolved 02/05/16    Protein in urine     Last assessed 01/25/16    Sciatica of right side     Third degree laceration of perineum, type 3b 1/26/2016    Varicella        Medical Review Of Systems:  Comprehensive ROS was negative except as noted in HPI and Headaches, stomach aches, tremor, palpitations      Meds/Allergies   all current active meds have been reviewed  Allergies   Allergen Reactions    Demerol [Meperidine] Hyperactivity    Macrolides And Ketolides        Objective   Vital signs in last 24 hours:  Temp:  [98 °F (36 7 °C)-99 1 °F (37 3 °C)] 99 1 °F (37 3 °C)  HR:  [72-91] 81  Resp:  [15-18] 15  BP: (104-132)/(70-82) 104/70    Mental status:  Appearance restless and fidgety, dressed in casual clothing, adequate hygiene and grooming, cooperative with interview, anxious and tearful throughout interview   Mood "Anxious, overwhelmed"   Affect Appears constricted in depressed range, tearful and anxious, stable, mood-congruent   Speech Normal rate, rhythm, and volume   Thought Processes Linear and goal directed Associations intact associations   Hallucinations Denies any auditory or visual hallucinations   Thought Content No passive or active suicidal or homicidal ideation, intent, or plan  Feelings of hopelessness and despair   Orientation Oriented to person, place, time, and situation   Recent and Remote Memory Grossly intact   Attention Span Inattentive at times   Intellect Appears to be of Average Intelligence   Insight Insight intact   Judgement judgment was impaired   Muscle Strength Muscle strength and tone were normal   Language Within normal limits   Fund of Knowledge Age appropriate   Pain None       Patient Strengths/Assets: average or above intelligence, capable of independent living, communication skills, family ties, financial means   Patient Barriers/Limitations: limited motivation, low self esteem  Lab Results:   I have personally reviewed all pertinent laboratory/tests results  Labs in last 72 hours:   Recent Labs     08/12/21  2323 08/13/21  1012   WBC 7 85  --    RBC 5 02  --    HGB 14 9  --    HCT 41 4  --      --    RDW 11 8  --    NEUTROABS 5 38  --    SODIUM 142  --    K 3 1* 3 7     --    CO2 19*  --    BUN 15  --    CREATININE 0 84  --    GLUC 97  --    CALCIUM 8 8  --        EKG, Pathology, and Other Studies (8/10/21): NSR with sinus arrythmia, incomplete RBBB, QTc 471     Code Status: Level 1 - Full Code    Assessment/Plan   Principal Problem:    Panic disorder without agoraphobia with severe panic attacks  Active Problems:    Medical clearance for psychiatric admission    Adjustment disorder with mixed anxiety and depressed mood    45 y o   White female,  for 12 years, domiciled with , daughter (12 y/o) in [facility] [facility], currently employed in [employer] for 13 years, [address] significant for h/o panic disorder, 2 past psychiatric hospitalizations (1st time at 26 y/o for panic disorder, 2nd hospitalization at [facility] about7 years ago for panic disorder), no past suicide attempts, no h/o self-injurious behaviors, no h/o physical aggression, PMH significant for migraine, substance abuse history significant for medical cannabis, social alcohol use, presents to [facility] inpatient psychiatry unit transferred from [facility] ED for inpatient psychiatric hospitalization for debilitating anxiety, r/o psychosis, with Chemo Plan reporting "I have been having panic attacks over the past 4 days "    On assessment today, patient with a long history of panic disorder with 2 past psychiatric hospitalizations for severe anxiety most recently several years ago presenting with a severe exacerbation of panic disorder with multiple panic attacks everyday, feeling overwhelmed and unable to function at work, several ED visits over the past few weeks due to anxiety, in psychosocial context of upcoming move, work stressors, daughter transitioning to   No current passive or active SI, intent, or plan but feelings of despair and hopelessness  Given current severity of symptoms, patient would benefit from inpatient psychiatric hospitalization at this time  Plan:   Risks, benefits and possible side effects of Medications:   Risks, benefits, and possible side effects of medications explained to patient and patient verbalizes understanding  Plan:  1  Admit to [facility] on 201 status for safety and treatment of panic disorder  2  No 1:1 CO needed at this time as patient feels safe on the unit  3  Psych- Will re-start Lexapro 10 mg daily for 1 dose (patient hasn't taken in several days), then titrate to Lexapro 15 mg daily starting tomorrow  Will start standing Klonopin 0 5 mg bid given severity of anxiety  Will use Ativan 1 mg q4 prn break-through panic attacks  4  Medical- No active medical issues  5  CW to gain collateral from , confirm outpatient appointments  Certification: Estimated length of stay: More than 2 midnights    I certify that inpatient services are medically necessary for this patient for a duration of greater that 2 midnights  See H&P and MD Progress Notes for additional information about the patient's course of treatment

## 2021-08-14 NOTE — PROGRESS NOTES
Pt belongings @ bedside:  t-shirt x2  Pullover (no correa or drawstring)  Sports bra  Socks x2 pair  glasses    @ locker:    Large duffle bag "TRAVIS"  Sweatshirts x3  Shorts x2  Leggings x2  Socks x3 pr  Sports bra  Underwear x7  Slides  Sandals  t-shirts x3    Contact lenses  Contact solution  Lip balm  Deodorant  Toothpaste  Toothbrush  Contact lens case  Glasses case  Lotion  Hair Curl cream  Hair mousse  Retinol cream  Retinol serum  dermalogica serum  Facial cleanser  muliti-v cream  Hydration serum  Moisturizer  Eye cream  Makeup bag w?  Brushes, powders, eye liners, eyelash tool, hair bands, enmanuel pins, compact  ziploc bag containing assorted photos

## 2021-08-14 NOTE — TREATMENT TEAM
08/14/21 0700   Team Meeting   Meeting Type Daily Rounds   Team Members Present   Team Members Present Physician;Nurse   Physician Team Member Dr Ximena Pulido Team Member CM   Patient/Family Present   Patient Present No   Patient's Family Present No     AM rounds- new admit- 3rd visit to ED in one week  Anxious and pacing, stating "everything hurts" Reports feeling anxiety is overwhelming and having multiple anxiety attacks       Readmit score 11

## 2021-08-15 PROBLEM — F39 MOOD DISORDER (HCC): Status: ACTIVE | Noted: 2021-08-14

## 2021-08-15 LAB
ATRIAL RATE: 65 BPM
P AXIS: 59 DEGREES
PR INTERVAL: 138 MS
QRS AXIS: 87 DEGREES
QRSD INTERVAL: 98 MS
QT INTERVAL: 436 MS
QTC INTERVAL: 453 MS
T WAVE AXIS: 73 DEGREES
VENTRICULAR RATE: 65 BPM

## 2021-08-15 PROCEDURE — 93010 ELECTROCARDIOGRAM REPORT: CPT | Performed by: INTERNAL MEDICINE

## 2021-08-15 PROCEDURE — 99232 SBSQ HOSP IP/OBS MODERATE 35: CPT | Performed by: STUDENT IN AN ORGANIZED HEALTH CARE EDUCATION/TRAINING PROGRAM

## 2021-08-15 RX ORDER — CLONAZEPAM 1 MG/1
1 TABLET ORAL 2 TIMES DAILY
Status: DISCONTINUED | OUTPATIENT
Start: 2021-08-15 | End: 2021-08-24 | Stop reason: HOSPADM

## 2021-08-15 RX ADMIN — CLONAZEPAM 0.5 MG: 0.5 TABLET ORAL at 09:56

## 2021-08-15 RX ADMIN — PROMETHAZINE HYDROCHLORIDE 25 MG: 25 TABLET ORAL at 16:51

## 2021-08-15 RX ADMIN — ESCITALOPRAM 15 MG: 5 TABLET, FILM COATED ORAL at 09:56

## 2021-08-15 RX ADMIN — CLONAZEPAM 1 MG: 1 TABLET ORAL at 17:16

## 2021-08-15 RX ADMIN — LORAZEPAM 1 MG: 1 TABLET ORAL at 02:37

## 2021-08-15 RX ADMIN — HYDROXYZINE HYDROCHLORIDE 100 MG: 50 TABLET, FILM COATED ORAL at 10:46

## 2021-08-15 RX ADMIN — OLANZAPINE 5 MG: 10 INJECTION, POWDER, LYOPHILIZED, FOR SOLUTION INTRAMUSCULAR at 11:57

## 2021-08-15 RX ADMIN — HYDROXYZINE HYDROCHLORIDE 100 MG: 50 TABLET, FILM COATED ORAL at 18:55

## 2021-08-15 RX ADMIN — OLANZAPINE 5 MG: 5 TABLET, FILM COATED ORAL at 21:57

## 2021-08-15 NOTE — NURSING NOTE
Good effect obtained from Ativan 1 mg po prn given at 0235 for moderate anxiety, as observed asleep with the hr  & remains asleep at present  Will continue to monitor

## 2021-08-15 NOTE — PLAN OF CARE
Problem: Risk for Self Injury/Neglect  Goal: Treatment Goal: Remain safe during length of stay, learn and adopt new coping skills, and be free of self-injurious ideation, impulses and acts at the time of discharge  Outcome: Progressing  Goal: Verbalize thoughts and feelings  Description: Interventions:  - Assess and re-assess patient's lethality and potential for self-injury  - Engage patient in 1:1 interactions, daily, for a minimum of 15 minutes  - Encourage patient to express feelings, fears, frustrations, hopes  - Establish rapport/trust with patient   Outcome: Progressing  Goal: Refrain from harming self  Description: Interventions:  - Monitor patient closely, per order  - Develop a trusting relationship  - Supervise medication ingestion, monitor effects and side effects   Outcome: Progressing  Goal: Attend and participate in unit activities, including therapeutic, recreational, and educational groups  Description: Interventions:  - Provide therapeutic and educational activities daily, encourage attendance and participation, and document same in the medical record  - Obtain collateral information, encourage visitation and family involvement in care   Outcome: Progressing  Goal: Recognize maladaptive responses and adopt new coping mechanisms  Outcome: Progressing  Goal: Complete daily ADLs, including personal hygiene independently, as able  Description: Interventions:  - Observe, teach, and assist patient with ADLS  - Monitor and promote a balance of rest/activity, with adequate nutrition and elimination  Outcome: Progressing     Problem: Depression  Goal: Treatment Goal: Demonstrate behavioral control of depressive symptoms, verbalize feelings of improved mood/affect, and adopt new coping skills prior to discharge  Outcome: Progressing  Goal: Verbalize thoughts and feelings  Description: Interventions:  - Assess and re-assess patient's level of risk   - Engage patient in 1:1 interactions, daily, for a minimum of 15 minutes   - Encourage patient to express feelings, fears, frustrations, hopes   Outcome: Progressing  Goal: Refrain from harming self  Description: Interventions:  - Monitor patient closely, per order   - Supervise medication ingestion, monitor effects and side effects   Outcome: Progressing  Goal: Refrain from isolation  Description: Interventions:  - Develop a trusting relationship   - Encourage socialization   Outcome: Progressing  Goal: Refrain from self-neglect  Outcome: Progressing  Goal: Attend and participate in unit activities, including therapeutic, recreational, and educational groups  Description: Interventions:  - Provide therapeutic and educational activities daily, encourage attendance and participation, and document same in the medical record   Outcome: Progressing  Goal: Complete daily ADLs, including personal hygiene independently, as able  Description: Interventions:  - Observe, teach, and assist patient with ADLS  -  Monitor and promote a balance of rest/activity, with adequate nutrition and elimination   Outcome: Progressing     Problem: Anxiety  Goal: Anxiety is at manageable level  Description: Interventions:  - Assess and monitor patient's anxiety level  - Monitor for signs and symptoms (heart palpitations, chest pain, shortness of breath, headaches, nausea, feeling jumpy, restlessness, irritable, apprehensive)  - Collaborate with interdisciplinary team and initiate plan and interventions as ordered    - [place] patient to unit/surroundings  - Explain treatment plan  - Encourage participation in care  - Encourage verbalization of concerns/fears  - Identify coping mechanisms  - Assist in developing anxiety-reducing skills  - Administer/offer alternative therapies  - Limit or eliminate stimulants  Outcome: Progressing

## 2021-08-15 NOTE — NURSING NOTE
Pt slept through breakfast When pt woke, states anxiety was elevated  Pt received scheduled medication however approached desk about 45 minutes later reporting anxiety remained elevated and wanting prn ativan "I want to nip this in the bud before it gets worse "  Explained to patient she could not receive this prn as she just received scheduled medication and needing to wait for effects however attempted to discuss with patient several healthy coping skills she could try to help alleviate some anxiety  Pt however disinterested with this stating "the anxiety is bad  Only ativan in IV form helps " Pt requesting to shower, explained to patient the showers were not open at this time  Another peer states to this patient "Just tell them you're anxious and they will let you in "  pt then began sitting on the floor in hallway, rocking back and forth  Pulling at shirt and hair  Pt states "please help me! Im so anxious!" This writer sat in patients room with patient, practiced several different coping techniques such as deep breathing, grounding exercises and stretches  Pt states neither was effective and began marching in room, dropped down to floor and was thrashing her body around on floor and bed stating "I need help! I need the ER!" Pt then states she doesn't feel like a shower will help and would like a bath  Explained to patient the unit does not have baths for patients to use  Pt reports feeling nauseas and feeling like she may vomit  Pt currently sitting on floor of room, tearful  States "I'll try to hold it together until I can get meds   That's the only thing that will help when its this bad "

## 2021-08-15 NOTE — NURSING NOTE
Pt reported to this writer that she threw up  It was unwitnessed  Pt still struggling with anxiety and "panic attacks"  This writer offered patient Zyprexa 5 mg IM for severe agitation @9776  Patient agreed to the IM due not being able to keep down PO medication  Broset 3  Upon follow up, pt is resting in bed  Pt no longer rocking back and forth in bed or hysterically crying  Medication effective

## 2021-08-15 NOTE — PROGRESS NOTES
Progress Note - [address] [place] [address] Anum 45 y o  female MRN: [id number]  Unit/Bed#: Myriam Rosas 821-28 Encounter: [id number]    Subjective:    Per nursing, patient was tearful in conversation and stressed over stressors yesterday afternoon  She felt better talking to peers yesterday afternoon  She had severe anxiety after dinner requesting Ativan  She continued to escalate throughout the evening receiving Zyprexa 5 mg prn at 8:30 PM   reported to nursing staff that patient tends to be med seeking when having severe anxiety  Overnight, patient was given Trazodone 50 mg prn and Ativan 1 mg prn  She received Zyprexa 5 mg IM at 12 PM       Per patient, patient reports feeling drowsy  She reports that yesterday was a tough day, reports her anxiety was very high  She reports that the only thing that has been helping has been the prn Zyprexa  She reports decreased appetite with nausea at times  She denies any suicidal or homicidal ideation,m intent, or plan  She endorses headaches, significant feelings of anxiety inside her body  Behavior over the last 24 hours:  improved  Medication side effects: No  ROS: headache, drowsiness    Objective:    Temp:  [98 2 °F (36 8 °C)] 98 2 °F (36 8 °C)  HR:  [83] 83  Resp:  [17] 17  BP: (130)/(84) 130/84    Mental Status Evaluation:  Appearance:  Lying in bed, appearing drowsy, comfortable   Behavior:  No tics, tremors, or behaviors observed   Speech:  Normal rate, rhythm, and volume   Mood:  "Anxious"   Affect:  Appears mildly constricted in depressed range, stable, mood-congruent   Thought Process:  Linear and goal directed   Associations intact associations   Thought Content:  No passive or active suicidal or homicidal ideation, intent, or plan     Perceptual Disturbances: Denies any auditory or visual hallucinations   Sensorium:  Oriented to person, place, time, and situation   Memory:  recent and remote memory grossly intact   Consciousness:  alert and awake Attention: mild concentration impairments   Insight:  Limited   Judgment: fair   Gait/Station: normal gait/station   Motor Activity: no abnormal movements       Labs: I have personally reviewed all pertinent laboratory/tests results  Labs in last 72 hours:   Recent Labs     08/14/21 0704 08/14/21  0705   WBC  --  7 34   RBC  --  4 77   HGB  --  14 4   HCT  --  40 6   PLT  --  204   RDW  --  12 1   NEUTROABS  --  3 44   SODIUM 141  --    K 3 3*  --    *  --    CO2 22  --    BUN 13  --    CREATININE 0 71  --    GLUC 73  --    GLUF 73  --    CALCIUM 8 7  --    AST 27  --    ALT 49  --    ALKPHOS 54  --    TP 7 0  --    ALB 3 9  --    TBILI 2 34*  --    CHOLESTEROL 155  --    HDL 44  --    TRIG 113  --    LDLCALC 88  --    [id number] 2 110  --    PREGSERUM Negative  --        Progress Toward Goals: Progressing    Recommended Treatment: Continue with group therapy, milieu therapy and occupational therapy  Risks, benefits and possible side effects of Medications:   Risks, benefits, and possible side effects of medications explained to patient and patient verbalizes understanding  Medications: all current active meds have been reviewed    Current Facility-Administered Medications   Medication Dose Route Frequency Provider Last Rate    acetaminophen  650 mg Oral Q6H PRN Taran Monroy MD      acetaminophen  650 mg Oral Q4H PRN Taran Monroy MD      acetaminophen  975 mg Oral Q6H PRN Taran Monroy MD      aluminum-magnesium hydroxide-simethicone  30 mL Oral Q4H PRN Taran Monroy MD      benztropine  1 mg Intramuscular Q4H PRN Max 6/day Taran Monroy MD      clonazePAM  0 5 mg Oral BID Taran Monroy MD      escitalopram  15 mg Oral Daily Taran Monroy MD      hydrOXYzine HCL  100 mg Oral Q6H PRN Max 4/day Taran Monroy MD      Or    LORazepam  2 mg Intramuscular Q6H PRN Taran Monroy MD      LORazepam  1 mg Oral Q4H PRN Taran Monroy MD      OLANZapine  5 mg Oral Q4H PRN Max 3/day Basilio Collado MD      Or    OLANZapine  2 5 mg Intramuscular Q4H PRN Max 3/day Lowella Tobias, MD      OLANZapine  5 mg Oral Q3H PRN Max 3/day Donovanella MD Tobias      Or    OLANZapine  5 mg Intramuscular Q3H PRN Max 3/day Donovanella Tobias, MD      OLANZapine  2 5 mg Oral Q4H PRN Max 6/day Basilio Collado MD      polyethylene glycol  17 g Oral Daily PRN Basilio Collado MD      promethazine  25 mg Oral Q6H PRN Darra Prose      traZODone  50 mg Oral HS PRN Basilio Collado MD             Assessment/Plan   Principal Problem:    Panic disorder without agoraphobia with severe panic attacks  Active Problems:    Medical clearance for psychiatric admission    Adjustment disorder with mixed anxiety and depressed mood    46 y/o Female with panic disorder- continues to have severe anxiety with periods of agitation due to internal restlessness, has been using Ativan prn and Zyprexa prn regularly    Plan:  -Will titrate Klonopin to 1 mg bid  -Will continue Lexapro 15 mg daily  -Continue to encourage patient to utilize coping skills  -May consider standing Zyprexa if continues to require SGA for agitaion

## 2021-08-15 NOTE — NURSING NOTE
Was given Trazodone 50 mg po prn/sleep at 2239  Good effect obtained, as was sleeping in bed at the onset of the shift & remained asleep until 0235, when awakened with Moderate anxiety  (Lisa=18) Given Ativan 1 mg po prn/same  Reminded that we will be alternating medications for anxiety & cooperative with same  Effect pending

## 2021-08-15 NOTE — NURSING NOTE
Pt received Phenergan 25 mg PO at 1651 for nausea  PRN partially effective, though pt's appetite remains low due to anxiety  Pt received Atarax 100 mg PO at [address] [facility] for severe anxiety  Anxiety is improved since yesterday but remains severe  PRN partially effective, though pt remains anxious and agitated at HS and requests PRN Zyprexa  Pt received Zyprexa 5 mg PO at 2157 for moderate agitation  Pt sleeping on reassessment

## 2021-08-15 NOTE — TREATMENT TEAM
08/15/21 0700   Team Meeting   Meeting Type Daily Rounds   Team Members Present   Team Members Present Physician;Nurse   Physician Team Member Dr Sriram Ocampo Team Member CM   Patient/Family Present   Patient Present No   Patient's Family Present No     AM rounds- social on the unit, denies SI/HI, reports elevated anxiety  Loud and tearful in the evening  Focused on medications and requesting combinations of medications  Needing redirection for this  Poor sleep overnight-sleeping only 45 minutes overall

## 2021-08-15 NOTE — NURSING NOTE
Pt is seclusive to room and is scant in conversation  Pt currently rates her anxiety 2/10 and denies depression as well as SI/HI/AVH  Pt reports that she is presently doing "good" and is sleepy from her medication  Pt denies having any questions or concerns at the present

## 2021-08-15 NOTE — NURSING NOTE
Pt signed TAYLOR after  called for updates  Anna Hsu, pt's  reported to this writer via telephone that the pt has not been taking Ativan and Klonopin every night  Pt "came home from the ED Thursday morning with 12 pills of Ativan 1mg and 8 pills are left"  Pt called  after lunch today and reported to him that she was "happy to eat lunch and she had a grill cheese sandwich and he told her that was great"  "Then she called me late afternoon and told him that she was having panic attacks again"  "Then she called two hours later to inform me that she was feeling better after she was given Ativan"  Pt's  continued to report, pt has been using her medical marijuana card for [facility] "Vape oil pens and vape from flower" and that the marijuana use has been "frequent, a couple times a night and every weekend"  "It's always a very social thing  She has been using marijuana her whole life"  Pt's  continued, pt "doesn't have a psychiatrist or an active therapist"  Pt "has not seen Dr Vianey Brady in years"  "Michelle Patterson is a drug seeker in her panic mode, she wants to go to sleep, she wants the symptoms to go away"  "She will throw up and not eat"  Pt  expressed concern for the pt's well being and wanted the doctor to be informed so the can be helped

## 2021-08-16 LAB
1,25(OH)2D3 SERPL-MCNC: 80.4 PG/ML (ref 19.9–79.3)
ALBUMIN SERPL BCP-MCNC: 3.7 G/DL (ref 3.5–5)
ALP SERPL-CCNC: 56 U/L (ref 46–116)
ALT SERPL W P-5'-P-CCNC: 33 U/L (ref 12–78)
ANION GAP SERPL CALCULATED.3IONS-SCNC: 10 MMOL/L (ref 4–13)
AST SERPL W P-5'-P-CCNC: 17 U/L (ref 5–45)
BILIRUB SERPL-MCNC: 2.3 MG/DL (ref 0.2–1)
BUN SERPL-MCNC: 14 MG/DL (ref 5–25)
CALCIUM SERPL-MCNC: 8.4 MG/DL (ref 8.3–10.1)
CHLORIDE SERPL-SCNC: 107 MMOL/L (ref 100–108)
CO2 SERPL-SCNC: 24 MMOL/L (ref 21–32)
CREAT SERPL-MCNC: 0.79 MG/DL (ref 0.6–1.3)
GFR SERPL CREATININE-BSD FRML MDRD: 95 ML/MIN/1.73SQ M
GLUCOSE P FAST SERPL-MCNC: 81 MG/DL (ref 65–99)
GLUCOSE SERPL-MCNC: 81 MG/DL (ref 65–140)
POTASSIUM SERPL-SCNC: 3.6 MMOL/L (ref 3.5–5.3)
PROT SERPL-MCNC: 6.8 G/DL (ref 6.4–8.2)
RPR SER QL: NORMAL
SODIUM SERPL-SCNC: 141 MMOL/L (ref 136–145)

## 2021-08-16 PROCEDURE — 80053 COMPREHEN METABOLIC PANEL: CPT | Performed by: STUDENT IN AN ORGANIZED HEALTH CARE EDUCATION/TRAINING PROGRAM

## 2021-08-16 PROCEDURE — 99232 SBSQ HOSP IP/OBS MODERATE 35: CPT | Performed by: STUDENT IN AN ORGANIZED HEALTH CARE EDUCATION/TRAINING PROGRAM

## 2021-08-16 RX ORDER — GABAPENTIN 300 MG/1
300 CAPSULE ORAL 3 TIMES DAILY
Status: DISCONTINUED | OUTPATIENT
Start: 2021-08-16 | End: 2021-08-17

## 2021-08-16 RX ADMIN — GABAPENTIN 300 MG: 300 CAPSULE ORAL at 12:07

## 2021-08-16 RX ADMIN — GABAPENTIN 300 MG: 300 CAPSULE ORAL at 15:32

## 2021-08-16 RX ADMIN — CLONAZEPAM 1 MG: 1 TABLET ORAL at 17:57

## 2021-08-16 RX ADMIN — TRAZODONE HYDROCHLORIDE 50 MG: 50 TABLET ORAL at 21:38

## 2021-08-16 RX ADMIN — PROMETHAZINE HYDROCHLORIDE 25 MG: 25 TABLET ORAL at 10:46

## 2021-08-16 RX ADMIN — OLANZAPINE 5 MG: 10 INJECTION, POWDER, LYOPHILIZED, FOR SOLUTION INTRAMUSCULAR at 09:53

## 2021-08-16 RX ADMIN — HYDROXYZINE HYDROCHLORIDE 100 MG: 50 TABLET, FILM COATED ORAL at 12:08

## 2021-08-16 RX ADMIN — CLONAZEPAM 1 MG: 1 TABLET ORAL at 08:58

## 2021-08-16 RX ADMIN — ESCITALOPRAM 15 MG: 5 TABLET, FILM COATED ORAL at 08:57

## 2021-08-16 RX ADMIN — GABAPENTIN 300 MG: 300 CAPSULE ORAL at 21:36

## 2021-08-16 NOTE — PLAN OF CARE
Pt did not attend any of four groups nor complete a self assessment interview yet     Problem: Ineffective Coping  Goal: Participates in unit activities  Description: Interventions:  - Provide therapeutic environment   - Provide required programming   - Redirect inappropriate behaviors   Outcome: Not Progressing

## 2021-08-16 NOTE — NURSING NOTE
Pt is seclusive to room and is cooperative in conversation  Pt is tearful at times during the interview due to her anxiety which she reports she was recently given medication for  Pt denies  SI/HI/AVH and was encouraged to utilize breathing and walking as coping skills for her anxiety  Pt denies having any questions or concerns at the present

## 2021-08-16 NOTE — PLAN OF CARE
Problem: Risk for Self Injury/Neglect  Goal: Treatment Goal: Remain safe during length of stay, learn and adopt new coping skills, and be free of self-injurious ideation, impulses and acts at the time of discharge  Outcome: Progressing  Goal: Verbalize thoughts and feelings  Description: Interventions:  - Assess and re-assess patient's lethality and potential for self-injury  - Engage patient in 1:1 interactions, daily, for a minimum of 15 minutes  - Encourage patient to express feelings, fears, frustrations, hopes  - Establish rapport/trust with patient   Outcome: Progressing  Goal: Refrain from harming self  Description: Interventions:  - Monitor patient closely, per order  - Develop a trusting relationship  - Supervise medication ingestion, monitor effects and side effects   Outcome: Progressing  Goal: Attend and participate in unit activities, including therapeutic, recreational, and educational groups  Description: Interventions:  - Provide therapeutic and educational activities daily, encourage attendance and participation, and document same in the medical record  - Obtain collateral information, encourage visitation and family involvement in care   Outcome: Progressing  Goal: Recognize maladaptive responses and adopt new coping mechanisms  Outcome: Progressing  Goal: Complete daily ADLs, including personal hygiene independently, as able  Description: Interventions:  - Observe, teach, and assist patient with ADLS  - Monitor and promote a balance of rest/activity, with adequate nutrition and elimination  Outcome: Progressing     Problem: Depression  Goal: Treatment Goal: Demonstrate behavioral control of depressive symptoms, verbalize feelings of improved mood/affect, and adopt new coping skills prior to discharge  Outcome: Progressing  Goal: Verbalize thoughts and feelings  Description: Interventions:  - Assess and re-assess patient's level of risk   - Engage patient in 1:1 interactions, daily, for a minimum of 15 minutes   - Encourage patient to express feelings, fears, frustrations, hopes   Outcome: Progressing  Goal: Refrain from harming self  Description: Interventions:  - Monitor patient closely, per order   - Supervise medication ingestion, monitor effects and side effects   Outcome: Progressing  Goal: Refrain from isolation  Description: Interventions:  - Develop a trusting relationship   - Encourage socialization   Outcome: Progressing  Goal: Refrain from self-neglect  Outcome: Progressing  Goal: Attend and participate in unit activities, including therapeutic, recreational, and educational groups  Description: Interventions:  - Provide therapeutic and educational activities daily, encourage attendance and participation, and document same in the medical record   Outcome: Progressing  Goal: Complete daily ADLs, including personal hygiene independently, as able  Description: Interventions:  - Observe, teach, and assist patient with ADLS  -  Monitor and promote a balance of rest/activity, with adequate nutrition and elimination   Outcome: Progressing     Problem: Anxiety  Goal: Anxiety is at manageable level  Description: Interventions:  - Assess and monitor patient's anxiety level  - Monitor for signs and symptoms (heart palpitations, chest pain, shortness of breath, headaches, nausea, feeling jumpy, restlessness, irritable, apprehensive)  - Collaborate with interdisciplinary team and initiate plan and interventions as ordered    - [place] patient to unit/surroundings  - Explain treatment plan  - Encourage participation in care  - Encourage verbalization of concerns/fears  - Identify coping mechanisms  - Assist in developing anxiety-reducing skills  - Administer/offer alternative therapies  - Limit or eliminate stimulants  Outcome: Progressing

## 2021-08-16 NOTE — PROGRESS NOTES
Progress Note - [address] [place] [address] Anum 45 y o  female MRN: [id number]  Unit/Bed#: Ada Norris 254-02 Encounter: [id number]       Patient was visited on unit for continuing care; chart reviewed and discussed with the nursing staff  On approach, the patient was sitting in her room, moving her legs and irritable, reported feeling extremely anxious and having a panic attack, slightly irritable, preoccupied with getting medication for anxiety, asking for Ativan or IM meds  She denied any depressive sxs, but noted feeling very anxious; however, could not elaborate on her stressors  Reported poor appetite and nausea and vomitting earlier this morning, but denied N/V at this time  Denied any changes in her energy level, and denied insomnia  Denied A/VH currently  Denied SI/HI, intent or plan upon direct inquiry at this time  Patient continues to be anxious and irritable intermittently, asking for PRN meds, and has received Atarax 100 mg po at 1046, 1855 yesterday and 1208 today, as well as Ativan 1 mg at 2:37, and Zyprexa 5 mg po at 2157 yesterday and Zyprexa 5 mg IM at 1157 yesterday and 953 this morning, as well as Phenergan 25 mg at 1651 yesterday and 1046 this morning  Patient accepted all offered medications and reported feeling the same  No adverse effects of medications noted or reported  The patient started on Neurontin 300 mg TID for anxiety and somatic sxs, and other meds maintained the same dose as just up-titrated yesterday       Current Mental Status Evaluation:  Appearance and attitude: appeared as stated age, casually dressed with good hygiene, sitting in her room w/ PMA and irritable  Eye contact: good  Motor Function: PMA  Gait/station: normal gait/station and normal balance  Speech: normal for rate, rhythm, volume, latency, amount  Language: Able to name objects and Able to repeat phrases  Mood/affect: anxious, irritable / Affect was constricted, hyper-intense, mood-congruent  Thought Processes: linear, preoccupied with receiving PRN medication  Thought content: denied suicidal ideations or homicidal ideations, no overt delusions elicited, preoccupied with receiving PRN medication  Associations: concrete associations  Perceptual disturbances: denies Auditory/Visual/Tactile Hallucinations  Orientation: oriented to time, person, place and to the situational context  Cognitive Function: intact  Memory: not cooperative with formal MMSE  Intellect: average  Fund of knowledge: aware of current events, aware of past history and vocabulary average  Impulse control: fair  Insight/judgment: fair/fair    Pain: denied  Pain scale: 0    Vital signs in last 24 hours:    Temp:  [97 7 °F (36 5 °C)-98 1 °F (36 7 °C)] 97 7 °F (36 5 °C)  HR:  [91-95] 95  Resp:  [16-17] 16  BP: (123-144)/(81-90) 123/90    Laboratory results: I have personally reviewed all pertinent laboratory/tests results    Most Recent Labs:   Lab Results   Component Value Date    WBC 7 34 08/14/2021    RBC 4 77 08/14/2021    HGB 14 4 08/14/2021    HCT 40 6 08/14/2021     08/14/2021    RDW 12 1 08/14/2021    NEUTROABS 3 44 08/14/2021    SODIUM 141 08/16/2021    K 3 6 08/16/2021     08/16/2021    CO2 24 08/16/2021    BUN 14 08/16/2021    CREATININE 0 79 08/16/2021    GLUC 81 08/16/2021    CALCIUM 8 4 08/16/2021    AST 17 08/16/2021    ALT 33 08/16/2021    ALKPHOS 56 08/16/2021    TP 6 8 08/16/2021    ALB 3 7 08/16/2021    TBILI 2 30 (H) 08/16/2021    CHOLESTEROL 155 08/14/2021    HDL 44 08/14/2021    TRIG 113 08/14/2021    [address] 88 08/14/2021    [place] 111 08/14/2021    [id number] 2 110 08/14/2021    PREGUR NEGATIVE 08/12/2021    PREGSERUM Negative 08/14/2021    RPR Non-Reactive 08/14/2021       Progress Toward Goals: limited improvement    Assessment:  Principal Problem:    Panic disorder without agoraphobia with severe panic attacks  Active Problems:    Medical clearance for psychiatric admission    Mood disorder [facility])        Plan:  - Continue medication titration and treatment plan; adjust medication to optimize treatment response and as clinically indicated       Scheduled medications:  Current Facility-Administered Medications   Medication Dose Route Frequency Provider Last Rate    acetaminophen  650 mg Oral Q6H PRN Geovanni Green MD      acetaminophen  650 mg Oral Q4H PRN Geovanni Green MD      acetaminophen  975 mg Oral Q6H PRN Geovanni Green MD      aluminum-magnesium hydroxide-simethicone  30 mL Oral Q4H PRN Geovanni Green MD      benztropine  1 mg Intramuscular Q4H PRN Max 6/day Geovanni Green MD      clonazePAM  1 mg Oral BID Geovanni Green MD      escitalopram  15 mg Oral Daily Geovanni Green MD      gabapentin  300 mg Oral TID Diomedes Pham MD      hydrOXYzine HCL  100 mg Oral Q6H PRN Max 4/day Geovanni Green MD      Or    LORazepam  2 mg Intramuscular Q6H PRN Geovanni Green MD      LORazepam  1 mg Oral Q4H PRN Geovanni Green MD      OLANZapine  5 mg Oral Q4H PRN Max 3/day Geovanni Green MD      Or    OLANZapine  2 5 mg Intramuscular Q4H PRN Max 3/day Geovanni Green MD      OLANZapine  5 mg Oral Q3H PRN Max 3/day Geovanni Green MD      Or    OLANZapine  5 mg Intramuscular Q3H PRN Max 3/day Geovanni Green MD      OLANZapine  2 5 mg Oral Q4H PRN Max 6/day Geovanni Green MD      polyethylene glycol  17 g Oral Daily PRN Geovanni Green MD      promethazine  25 mg Oral Q6H PRN Daryl Sloan      traZODone  50 mg Oral HS PRN Geovanni Green MD          PRN:  [employer]  acetaminophen    acetaminophen    acetaminophen    aluminum-magnesium hydroxide-simethicone    benztropine    hydrOXYzine HCL **OR** LORazepam    LORazepam    OLANZapine **OR** OLANZapine    OLANZapine **OR** OLANZapine    OLANZapine    polyethylene glycol    promethazine    traZODone    - Observation: routine    - VS: as per unit protocol  - Diet: Regular diet  - Psychoeducation (benefits and potential risks) discussed, importance of compliance with the psychiatric treatment reiterated, and the patient verbalized understanding of the matter  - Encourage group attendance and milieu therapy    - The pt was educated and agreed to verbalize any suicidal thoughts, frustrations or concerns to the nursing staff, immediately  - Dispo: Returning to the living arrangement pending psychiatric stabilization      Next of Kin  · Extended Emergency Contact Information  · Primary Emergency Contact: German Huynh  · Address: [address]  ·          [facility], [facility] [address]  · Home Phone: [phone]  · Mobile Phone: [phone]  · Relation: Spouse  · Secondary Emergency Contact: Татьяна Barillas  · Address: [address]  ·          [address] [facility]  · Mobile Phone: [phone]  · Relation: Mother      Counseling / Coordination of Care     Total floor / unit time spent today 20 minutes  Greater than 50% of total time was spent with the patient and / or family counseling and / or coordination of care  A description of the counseling / coordination of care  Patient's Rights, confidentiality and exceptions to confidentiality, use of automated medical record, [address] [place] [address] staff access to medical record, and consent to treatment reviewed      Judi Pollock MD  Attending [address]

## 2021-08-16 NOTE — NURSING NOTE
Given PRN Zyprexa 5mg IM given per Dr Downey Kidney  Pt sleeping presently in no signs of distress  Pt given Phenergan 25mg at 1046 for nausea

## 2021-08-16 NOTE — NURSING NOTE
Pt given Atarax 100mg @ 5139 for increased anxiety  Lisa score 26  On follow up pt sleeping in no signs of distress

## 2021-08-16 NOTE — NURSING NOTE
Pt reports feeling anxious throughout the day and describes it as "panick attack"  Received multiple PRNs  Staff encouraged coping skills such as walking halls, utilizing sound machine and a handout on grounding techniques  Pt minimally responsive to these suggestions and requesting Ativan  Received Atarax 100mg and newly ordered Neurontin per provider  Currently sleeping in bed

## 2021-08-17 LAB
ATRIAL RATE: 82 BPM
P AXIS: 63 DEGREES
PR INTERVAL: 134 MS
QRS AXIS: 86 DEGREES
QRSD INTERVAL: 90 MS
QT INTERVAL: 398 MS
QTC INTERVAL: 464 MS
T WAVE AXIS: 67 DEGREES
VENTRICULAR RATE: 82 BPM

## 2021-08-17 PROCEDURE — 99232 SBSQ HOSP IP/OBS MODERATE 35: CPT | Performed by: STUDENT IN AN ORGANIZED HEALTH CARE EDUCATION/TRAINING PROGRAM

## 2021-08-17 PROCEDURE — 93010 ELECTROCARDIOGRAM REPORT: CPT | Performed by: INTERNAL MEDICINE

## 2021-08-17 RX ORDER — GABAPENTIN 400 MG/1
400 CAPSULE ORAL 3 TIMES DAILY
Status: DISCONTINUED | OUTPATIENT
Start: 2021-08-17 | End: 2021-08-18

## 2021-08-17 RX ORDER — ESCITALOPRAM OXALATE 20 MG/1
20 TABLET ORAL DAILY
Status: DISCONTINUED | OUTPATIENT
Start: 2021-08-17 | End: 2021-08-24 | Stop reason: HOSPADM

## 2021-08-17 RX ADMIN — LORAZEPAM 1 MG: 1 TABLET ORAL at 14:21

## 2021-08-17 RX ADMIN — CLONAZEPAM 1 MG: 1 TABLET ORAL at 18:47

## 2021-08-17 RX ADMIN — GABAPENTIN 400 MG: 400 CAPSULE ORAL at 16:29

## 2021-08-17 RX ADMIN — GABAPENTIN 400 MG: 400 CAPSULE ORAL at 21:13

## 2021-08-17 RX ADMIN — PROMETHAZINE HYDROCHLORIDE 25 MG: 25 TABLET ORAL at 12:48

## 2021-08-17 RX ADMIN — ESCITALOPRAM OXALATE 20 MG: 20 TABLET ORAL at 09:48

## 2021-08-17 RX ADMIN — GABAPENTIN 300 MG: 300 CAPSULE ORAL at 09:48

## 2021-08-17 RX ADMIN — HYDROXYZINE HYDROCHLORIDE 100 MG: 50 TABLET, FILM COATED ORAL at 12:23

## 2021-08-17 RX ADMIN — CLONAZEPAM 1 MG: 1 TABLET ORAL at 09:48

## 2021-08-17 NOTE — PROGRESS NOTES
Progress Note - [address] [place] [address] Anum 45 y o  female MRN: [id number]  Unit/Bed#: [facility] 254-02 Encounter: [id number]       Patient was visited on unit for continuing care; chart reviewed and discussed with the nursing staff  On approach, the patient was calm and cooperative  She reported feeling "much better" this morning, and stated that she thinks like herself after a long time  Endorsed better mood, appetite, and energy level  No problem initiating and maintaining sleep  Denied A/VH currently  Denied SI/HI, intent or plan upon direct inquiry at this time  Supportive psychotherapy provided, and the patient was educated about mindfulness and breathing techniques to control her anxiety sxs and panic attacks  She was receptive to the education  However, the patient was visited later in the hallway after lunch, while she was restless and reported feeling nauseous and anxious, asking for PRN meds  No reports of aggression or self-injurious behavior on unit  Received Phenergan 25 mg and Atarax 100 mg at 1223 today for anxiety, and Trazodone 50 mg last night at 2138 for insomnia  Patient accepted all offered medications and reported feeling better  No adverse effects of medications noted or reported  Neurontin increased to 400 mg TID  Klonopin to be tapered off as tolerated subsequently       Current Mental Status Evaluation:  Appearance and attitude: appeared as stated age, casually dressed with good hygiene, more pleasant and calm  Eye contact: good  Motor Function: within normal limits, intact gait, No PMA/PMR  Gait/station: normal gait/station and normal balance  Speech: normal for rate, rhythm, volume, latency, amount  Language: Able to name objects and Able to repeat phrases  Mood/affect: less anxious, less dysphoric / Affect was constricted but reactive, mood congruent  Thought Processes: sequential and goal-directed  Thought content: denied suicidal ideations or homicidal ideations, no overt delusions elicited  Associations: concrete associations  Perceptual disturbances: denies Auditory/Visual/Tactile Hallucinations  Orientation: oriented to time, person, place and to the situational context  Cognitive Function: intact  Memory: intact short-term and long-term  Intellect: average  Fund of knowledge: aware of current events, aware of past history and vocabulary average  Impulse control: good  Insight/judgment: fair/fair    Vital signs in last 24 hours:    Temp:  [97 8 °F (36 6 °C)-100 °F (37 8 °C)] 98 °F (36 7 °C)  HR:  [] 102  Resp:  [16] 16  BP: (124-142)/(90-91) 124/91    Laboratory results: I have personally reviewed all pertinent laboratory/tests results    Most Recent Labs:   Lab Results   Component Value Date    WBC 7 34 08/14/2021    RBC 4 77 08/14/2021    HGB 14 4 08/14/2021    HCT 40 6 08/14/2021     08/14/2021    RDW 12 1 08/14/2021    NEUTROABS 3 44 08/14/2021    SODIUM 141 08/16/2021    K 3 6 08/16/2021     08/16/2021    CO2 24 08/16/2021    BUN 14 08/16/2021    CREATININE 0 79 08/16/2021    GLUC 81 08/16/2021    CALCIUM 8 4 08/16/2021    AST 17 08/16/2021    ALT 33 08/16/2021    ALKPHOS 56 08/16/2021    TP 6 8 08/16/2021    ALB 3 7 08/16/2021    TBILI 2 30 (H) 08/16/2021    CHOLESTEROL 155 08/14/2021    HDL 44 08/14/2021    TRIG 113 08/14/2021    [facility] 08/14/[facility]    [place] 111 08/14/2021    [id number] 2 110 08/14/2021    PREGUR NEGATIVE 08/12/2021    PREGSERUM Negative 08/14/2021    RPR Non-Reactive 08/14/2021       Progress Toward Goals: improving gradually    Assessment:  Principal Problem:    Panic disorder without agoraphobia with severe panic attacks  Active Problems:    Medical clearance for psychiatric admission    Mood disorder ([facility] [address] )        Plan:  - Continue medication titration and treatment plan; adjust medication to optimize treatment response and as clinically indicated       Scheduled medications:  Current Facility-Administered Medications   Medication Dose Route Frequency Provider Last Rate    acetaminophen  650 mg Oral Q6H PRN Mirian Johnson MD      acetaminophen  650 mg Oral Q4H PRN Mirian Johnson MD      acetaminophen  975 mg Oral Q6H PRN Mirian Johnson MD      aluminum-magnesium hydroxide-simethicone  30 mL Oral Q4H PRN Mirian Johnson MD      benztropine  1 mg Intramuscular Q4H PRN Max 6/day Mirian Johnson MD      clonazePAM  1 mg Oral BID Mirian Johnson MD      escitalopram  20 mg Oral Daily Gerard Browning MD      gabapentin  400 mg Oral TID Gerard Browning MD      hydrOXYzine HCL  100 mg Oral Q6H PRN Max 4/day Mirian Johnson MD      Or    LORazepam  2 mg Intramuscular Q6H PRN Mirian Johnson MD      LORazepam  1 mg Oral Q4H PRN Mirian Johnson MD      OLANZapine  5 mg Oral Q4H PRN Max 3/day Mirian Johnson MD      Or    OLANZapine  2 5 mg Intramuscular Q4H PRN Max 3/day Mirian Johnson MD      OLANZapine  5 mg Oral Q3H PRN Max 3/day Mirian Johnson MD      Or    OLANZapine  5 mg Intramuscular Q3H PRN Max 3/day Mirian Johnson MD      OLANZapine  2 5 mg Oral Q4H PRN Max 6/day Mirian Johnson MD      polyethylene glycol  17 g Oral Daily PRN Mirian Johnson MD      promethazine  25 mg Oral Q6H PRN Alvin Awad      traZODone  50 mg Oral HS PRN Mirian Johnson MD          PRN:  Mayme Anderson  acetaminophen    acetaminophen    acetaminophen    aluminum-magnesium hydroxide-simethicone    benztropine    hydrOXYzine HCL **OR** LORazepam    LORazepam    OLANZapine **OR** OLANZapine    OLANZapine **OR** OLANZapine    OLANZapine    polyethylene glycol    promethazine    traZODone    - Observation: routine    - VS: as per unit protocol  - Diet: Regular diet  - Psychoeducation (benefits and potential risks) discussed, importance of compliance with the psychiatric treatment reiterated, and the patient verbalized understanding of the matter  - Encourage group attendance and milieu therapy    - The pt was educated and agreed to verbalize any suicidal thoughts, frustrations or concerns to the nursing staff, immediately  - Dispo: Returning to the living arrangement pending psychiatric stabilization      Next of Kin  Extended Emergency Contact Information  Primary Emergency Contact: German Huynh  Address: [facility], [address] Phone: [phone]  Mobile Phone: [phone]  Relation: Spouse  Secondary Emergency Contact: Татьяна Barillas  Address:  [address] [facility]           Meggan Diasgamayank [address] [place] [address]  Mobile Phone: [phone]  Relation: Mother      Counseling / Coordination of Care     Total floor / unit time spent today 20 minutes  Greater than 50% of total time was spent with the patient and / or family counseling and / or coordination of care  A description of the counseling / coordination of care  Patient's Rights, confidentiality and exceptions to confidentiality, use of automated medical record, [facility] staff access to medical record, and consent to treatment reviewed      Dalton Samuels MD  Attending [address]

## 2021-08-17 NOTE — NURSING NOTE
RN spoke with after lunch  Reports feeling better today and was able to eat 50% of meal   Visible on unit and attending groups  A short time later, pt sitting on the floor rocking  Pt waiting for med RN to return in order to receive PRN for anxiety and nausea at that time  Received PRNs and is currently resting in bed

## 2021-08-17 NOTE — PROGRESS NOTES
Diagnosis of Panic disorder without agoraphobia with severe panic attacks reviewed  Short term goals for decrease in depressive symptoms, decrease in anxiety symptoms discussed  All present parties in agreement and tx plan signed      08/17/21 1400   Team Meeting   Meeting Type Tx Team Meeting   Team Members Present   Team Members Present Physician;Nurse;   Physician Team Member Dr Quan Fay Team Member Gordon Management Team Member Fantasma   Patient/Family Present   Patient Present No (pt declined to meet with team)   Patient's Family Present No

## 2021-08-17 NOTE — NURSING NOTE
Pt remains somatic and histrionic in her presentation, frequent requests for prn's, states prn's are ineffective  Demanding IM zyprexa for anxiety, stating "I know I have an order for it, the doctor ordered it"  Pt wailing and doubled over in hallway, at times breathing heavily, at times holding her breath, refuses to go to relaxation group "nothing works when I get like this  I go to the emergency room"  Pt given prn ativan 1mg po at 1421and encouraged to try relaxation techniques currently being taught in goup but so far pt refuses

## 2021-08-17 NOTE — NURSING NOTE
Pt visible at medication room with her roommate trying to speak for her to get prn for anxiety  Pt was intrusive directing roommate to say she was anxious, even going as far as asking if roommate had "ativan" ordered  Pt redirected multiple times to let roommate speak for herself, when roommate told RN she was "sad" pt told her again "no your anxious " Pt redirected one last time to focus on self

## 2021-08-17 NOTE — CASE MANAGEMENT
CM attempted to meet with pt to complete intake and review treatment plan but pt was sound asleep in her room  CM will attempt again on 8/17/2021

## 2021-08-17 NOTE — PROGRESS NOTES
New admission - 201 from [facility] ED  Increased anxiety and possible psychosis  UDS + for Benzos and THC  Pt visible and bright affect  Denies SI/HI   Slept for 4 hours then was up reading and writing in journal      DC: TBD - dc date not yet determined     08/16/21 0756   Team Meeting   Meeting Type Daily Rounds   Team Members Present   Team Members Present Physician;Nurse;;Occupational Therapist   Physician Team Member Dr Adia Chen / Lex Perrin Team Member Hua Garcia / Leopoldo Berlin Management Team Member Shannan Salgado / Remigio Kaye   OT Team Member Mandy Pretty   Patient/Family Present   Patient Present No   Patient's Family Present No

## 2021-08-17 NOTE — CASE MANAGEMENT
CM attempted to meet with pt to complete intake but pt refused  Pt was in her room, on her bed with her head between her legs  CM asked pt if she was up to meeting with CM and pt reported "no I am not " CM informed pt that CM will try again at a later time and pt responded by saying "okay "     Pt signed TAYLOR for her  Enoc Keenanrodríguez, so CM informed pt that CM will call  to provide an update and pt verbalized agreement  CM will attempt to meet with pt again to complete intake and begin dc planning

## 2021-08-17 NOTE — PROGRESS NOTES
Pt was very labile during the day  Reported having Panic attacks  Given an IM of Zyprexa  Reported that it did not work but then fell asleep  Pt med seeking when anxious, staff attempted to redirect pt  Focused on Ativan  Pt slept overnight       DC: TBD - dc date not yet determined      08/17/21 0754   Team Meeting   Meeting Type Daily Rounds   Team Members Present   Team Members Present Physician;Nurse;;Occupational Therapist   Physician Team Member Dr Jaelyn Angelo / Joesph Bejarano Team Member Micheline Tohmpson / Rashel Gifford Management Team Member Andi Kaplan / Stephon Jovel / Bharath Frank   OT Team Member Devorah Queen   Patient/Family Present   Patient Present No   Patient's Family Present No

## 2021-08-17 NOTE — NURSING NOTE
Pt not visible in community , isolative to room  Pt talked about her panic attack earlier cannot identify any triggers, except "I started not feeling right when I was eating lunch " Pt questioned if she has problems eating at home, and is that why she uses medical marijuana pt responded "yes " Pt encouraged to attempt to have something to eat for dinner because of the medications she takes, pt remains in her room refusing dinner

## 2021-08-17 NOTE — PLAN OF CARE
Pt was cooperative during self assessment interview and spontaneous during group on approaching happiness  Hyper verbal at times when expressing her enthusiasm for decorating  Pt  did not attend afternoon groups    Problem: Ineffective Coping  Goal: Participates in unit activities  Description: Interventions:  - Provide therapeutic environment   - Provide required programming   - Redirect inappropriate behaviors   Outcome: Progressing

## 2021-08-18 PROCEDURE — 99232 SBSQ HOSP IP/OBS MODERATE 35: CPT | Performed by: PHYSICIAN ASSISTANT

## 2021-08-18 RX ORDER — OLANZAPINE 5 MG/1
5 TABLET ORAL
Status: DISCONTINUED | OUTPATIENT
Start: 2021-08-18 | End: 2021-08-24 | Stop reason: HOSPADM

## 2021-08-18 RX ORDER — GABAPENTIN 400 MG/1
400 CAPSULE ORAL 4 TIMES DAILY
Status: DISCONTINUED | OUTPATIENT
Start: 2021-08-18 | End: 2021-08-24 | Stop reason: HOSPADM

## 2021-08-18 RX ADMIN — ACETAMINOPHEN 650 MG: 325 TABLET, FILM COATED ORAL at 17:55

## 2021-08-18 RX ADMIN — ESCITALOPRAM OXALATE 20 MG: 20 TABLET ORAL at 08:26

## 2021-08-18 RX ADMIN — GABAPENTIN 400 MG: 400 CAPSULE ORAL at 17:55

## 2021-08-18 RX ADMIN — CLONAZEPAM 1 MG: 1 TABLET ORAL at 08:26

## 2021-08-18 RX ADMIN — OLANZAPINE 5 MG: 5 TABLET, FILM COATED ORAL at 21:32

## 2021-08-18 RX ADMIN — GABAPENTIN 400 MG: 400 CAPSULE ORAL at 08:26

## 2021-08-18 RX ADMIN — CLONAZEPAM 1 MG: 1 TABLET ORAL at 17:55

## 2021-08-18 RX ADMIN — GABAPENTIN 400 MG: 400 CAPSULE ORAL at 21:32

## 2021-08-18 RX ADMIN — HYDROXYZINE HYDROCHLORIDE 100 MG: 50 TABLET, FILM COATED ORAL at 12:58

## 2021-08-18 NOTE — NURSING NOTE
Upon waking up this morning, pt expresses hopefulness that today would be better in regards to anxiety  Reports having a good night of sleep  Pt remained seculsive to room throughout the day and expresses having "no energy" d/t poor intake over the last several days  Pt states "I can't eat when I'm in a panic state"  Pt states the anxiety causes pt to vomit which then leads to more anxiety  Pt has a large cup of ice at bedside as well as crackers

## 2021-08-18 NOTE — PLAN OF CARE
Problem: Ineffective Coping  Goal: Identifies healthy coping skills  Outcome: Progressing     Problem: Ineffective Coping  Goal: Participates in unit activities  Description: Interventions:  - Provide therapeutic environment   - Provide required programming   - Redirect inappropriate behaviors   Outcome: Progressing     Problem: Ineffective Coping  Goal: Patient/Family verbalizes awareness of resources  Outcome: Progressing

## 2021-08-18 NOTE — NURSING NOTE
Pleasant , concerned about panic attacks  When waking up in am, given journal to list positive and thing to be happy  About  When she feels anxious

## 2021-08-18 NOTE — CASE MANAGEMENT
CM received a call from pt  Celia Calhoun ([phone])      wanted to pass along some information as well as things he has noticed talking to pt in the past few days  He reported her main barrier at this time has been food  He reported that she does not like how her body feels after she eats or takes medications (n[place]au) and then she thinks too much then gets into a "loop of anxiety and panic "      reported that he has an idea of why pt had a difficult day and he felt that pt reported that doctor talked to pt about potential dc for Thursday, she got very excited and her body had adrenaline reactions which lead to her panic and anxiety  He reported that then pt tends to  throw up to release the panic  Pt reported to  on the phone that she feels that staff and peers are watching her and judging her while she is eating and it causes panic  Pt also reported that she is concerned that staff will think she has an eating disorder but she does not   reported that pt had first panic episode in college, her mom told he she had to go somewhere involuntarily  Pt Panicked, threw up and then she reportedly was treated as she had an eating disorder  Hisband and pt feel that situation was traumatic for pt and it is appearing again at this time   reported "we have been together 15 years and its "been bad like this twice "  then reported that "it always goes back to food issues "     reported that the food issues are barely there on a regular basis, but pt experiences generalized anxiety on a daily basis but she is usually able to get through it and "sleep it off "      reported that pt uses CBD oil pens and medical marijuana        reported that pt said she "knows shes not ready to leave and that she needs to fight to 'feel good' "     informed CM that she has been seeing therapist Dr Brody Samuels for a long time and pt reported he has been very helpful   reported that Dr Montserrat Blackmon schedule has not been open to see pt as much as she needs an appointment   also reported that pt was just set up with [facility] for an intake for psychiatrist and her first appt was for this morning  CM will follow up to reschedule intake for after pt is discharged   hopes pt will be open to seeing a new therapist at [facility] that may have more availability  CM will continue to provide  with updates

## 2021-08-18 NOTE — QUICK NOTE
Pt had a drop off of snacks and clothing  Did not go through them yet  Three plastic bags in printer room with labels on them  Pt was given some of her Gatorade after this writer checked to make sure the seal was not broken prior to this writer opening it and given to pt  Red Gatorade opened and back in belongings

## 2021-08-18 NOTE — PROGRESS NOTES
Progress Note - [address] 45 y o  female MRN: [id number]   Unit/Bed#: [facility]of[facility] 254-02 Encounter: [id number]    Behavior over the last 24 hours: eduarda Oliver Is a 71-year-old female with a history of panic disorder who presents for psychiatric follow-up  Patient is initially calm and cooperative upon approach, however, she becomes visibly anxious and upset as the conversation progresses  She states that she is unable to eat or drink secondary to severe nausea in the context of ongoing anxiety  She states, "I get like this  I get so worked up that I make myself throw up and eventually I have to go to the ER to get IV fluids  "  She has been medication compliant and her gabapentin was adjusted yesterday  Staff reports that she has been seeking p r n  medications including injectable Zyprexa which she feels helps her calm down  She is at times tearful during the encounter and states that she misses her  and daughter  No SI or HI  No AH or VH      Sleep: decreased  Appetite: poor oral intake  Medication side effects: No   ROS: reports nausea, vomiting and denies any abd pain, heartburn, constipation or bloating, all other systems are negative    Mental Status Evaluation:    Appearance:  age appropriate, casually dressed, adequate grooming   Behavior:  restless and fidgety   Speech:  fluent, clear, slow   Mood:  dysphoric, still very anxious   Affect:  tearful, mood-congruent   Thought Process:  organized, linear   Associations: intact associations   Thought Content:  no overt delusions, negative thoughts, ruminating thoughts   Perceptual Disturbances: no auditory hallucinations, no visual hallucinations, does not appear responding to internal stimuli   Risk Potential: Suicidal ideation - None at present, contracts for safety on the unit  Homicidal ideation - None at present  Potential for aggression - No   Sensorium:  oriented to person, place and time/date   Memory: recent and remote memory grossly intact   Consciousness:  alert and awake   Attention/Concentration: attention span and concentration are age appropriate   Insight:  fair   Judgment: fair   Gait/Station: normal gait/station   Motor Activity: no abnormal movements     Vital signs in last 24 hours:    Temp:  [99 °F (37 2 °C)-99 6 °F (37 6 °C)] 99 °F (37 2 °C)  HR:  [] 92  Resp:  [16-18] 16  BP: (123-150)/(66-78) 123/66    Laboratory results: I have personally reviewed all pertinent laboratory/tests results    Most Recent Labs:   Lab Results   Component Value Date    WBC 7 34 08/14/2021    RBC 4 77 08/14/2021    HGB 14 4 08/14/2021    HCT 40 6 08/14/2021     08/14/2021    RDW 12 1 08/14/2021    NEUTROABS 3 44 08/14/2021    SODIUM 141 08/16/2021    K 3 6 08/16/2021     08/16/2021    CO2 24 08/16/2021    BUN 14 08/16/2021    CREATININE 0 79 08/16/2021    GLUC 81 08/16/2021    CALCIUM 8 4 08/16/2021    AST 17 08/16/2021    ALT 33 08/16/2021    ALKPHOS 56 08/16/2021    TP 6 8 08/16/2021    ALB 3 7 08/16/2021    TBILI 2 30 (H) 08/16/2021    CHOLESTEROL 155 08/14/2021    HDL 44 08/14/2021    TRIG 113 08/14/2021    [address] 88 08/14/2021    [place] 111 08/14/2021    [id number] 2 110 08/14/2021    PREGUR NEGATIVE 08/12/2021    PREGSERUM Negative 08/14/2021    RPR Non-Reactive 08/14/2021       Progress Toward Goals: progressing, still very anxious, poor PO intake    Assessment/Plan   Principal Problem:    Panic disorder without agoraphobia with severe panic attacks  Active Problems:    Medical clearance for psychiatric admission    Mood disorder ([facility] [place] [address] )      Recommended Treatment:     Planned medication and treatment changes: All current active medications have been reviewed  Encourage group therapy, milieu therapy and occupational therapy  Behavioral Health checks every 7 minutes    Start Zyprexa 5mg qhs scheduled for adjunctive treatment of depression and anxiety  Should also help stimulate appetite  Discussed Buspar and Remeron as other options as well but feel as though Zyprexa may work faster given that she has responded favorably to Zyprexa IM during this hospitalization  Increase gabapentin to 400mg QID for anxiety/panic  PRN Atarax for anxiety  PRN Phenergan for nausea  EKG from 8/14 demonstrated a QTc of 464  Encouraged adequate PO fluid intake and coping strategies for anxiety  Patient receptive to counseling and in agreement with medication adjustments      Current Facility-Administered Medications   Medication Dose Route Frequency Provider Last Rate    acetaminophen  650 mg Oral Q6H PRN Genoveva Mccarty MD      acetaminophen  650 mg Oral Q4H PRN Genoveva Mccarty MD      acetaminophen  975 mg Oral Q6H PRN Genoveva Mccarty MD      aluminum-magnesium hydroxide-simethicone  30 mL Oral Q4H PRN Genoveva Mccarty MD      benztropine  1 mg Intramuscular Q4H PRN Max 6/day Genoveva Mccarty MD      clonazePAM  1 mg Oral BID Genoveva Mccarty MD      escitalopram  20 mg Oral Daily Sharon Crabtree MD      gabapentin  400 mg Oral 4x Daily Nidhi Sterling PA-C      hydrOXYzine HCL  100 mg Oral Q6H PRN Max 4/day Genoveva Mccarty MD      Or    LORazepam  2 mg Intramuscular Q6H PRN Genoveva Mccarty MD      LORazepam  1 mg Oral Q4H PRN Genoveva Mccarty MD      OLANZapine  5 mg Oral Q4H PRN Max 3/day Genoveva Mccarty MD      Or    OLANZapine  2 5 mg Intramuscular Q4H PRN Max 3/day Genoveva Mccarty MD      OLANZapine  5 mg Oral Q3H PRN Max 3/day Genoveva Mccarty MD      Or    OLANZapine  5 mg Intramuscular Q3H PRN Max 3/day Genoveva Mccarty MD      OLANZapine  2 5 mg Oral Q4H PRN Max 6/day Genoveva Mccarty MD      OLANZapine  5 mg Oral HS Nidhi Sterling PA-C      polyethylene glycol  17 g Oral Daily PRN Genoveva Mccarty MD      promethazine  25 mg Oral Q6H PRN Laray Eisenmenger      traZODone  50 mg Oral HS PRN Genoveva Mccarty MD       Risks / Benefits of Treatment:    Risks, benefits, and possible side effects of medications explained to patient and patient verbalizes understanding and agreement for treatment  Counseling / Coordination of Care:    Patient's progress discussed with staff in treatment team meeting  Medications, treatment progress and treatment plan reviewed with patient      Jimmie Berg PA-C 08/18/21

## 2021-08-19 PROCEDURE — 99232 SBSQ HOSP IP/OBS MODERATE 35: CPT | Performed by: PHYSICIAN ASSISTANT

## 2021-08-19 RX ADMIN — OLANZAPINE 5 MG: 5 TABLET, FILM COATED ORAL at 21:29

## 2021-08-19 RX ADMIN — GABAPENTIN 400 MG: 400 CAPSULE ORAL at 13:24

## 2021-08-19 RX ADMIN — GABAPENTIN 400 MG: 400 CAPSULE ORAL at 17:23

## 2021-08-19 RX ADMIN — CLONAZEPAM 1 MG: 1 TABLET ORAL at 08:07

## 2021-08-19 RX ADMIN — GABAPENTIN 400 MG: 400 CAPSULE ORAL at 08:07

## 2021-08-19 RX ADMIN — GABAPENTIN 400 MG: 400 CAPSULE ORAL at 21:30

## 2021-08-19 RX ADMIN — ACETAMINOPHEN 650 MG: 325 TABLET, FILM COATED ORAL at 08:08

## 2021-08-19 RX ADMIN — CLONAZEPAM 1 MG: 1 TABLET ORAL at 17:23

## 2021-08-19 RX ADMIN — ACETAMINOPHEN 650 MG: 325 TABLET, FILM COATED ORAL at 21:29

## 2021-08-19 RX ADMIN — ESCITALOPRAM OXALATE 20 MG: 20 TABLET ORAL at 08:07

## 2021-08-19 RX ADMIN — HYDROXYZINE HYDROCHLORIDE 100 MG: 50 TABLET, FILM COATED ORAL at 10:31

## 2021-08-19 NOTE — NURSING NOTE
Patient more isolated to her room today  Denies SI, and appeared to be improving this morning, visibly less anxious and more communicative  Pt then just briefly thanked the staff for switching out her locker items and after being validated began to cry, however never produced tears and asked a staff member to walk and talk with her  After a few minutes, pt was calm and agreed to use the radio to cope  Is presently in bed resting, will continue to monitor her anxiety and document changes in her progress

## 2021-08-19 NOTE — NURSING NOTE
Patient on phone at times with , crying and then returning to a pleasant affect, then back to crying again  Pt refused lunch and dinner today, claims that despite breakfast indicating 100% consumed she feels this is not true  Patient reports that she feels better in the evenings, yet refused to go down to dinner  Pt is redirectable after talking with a staff member, however can work herself back up to hysterics quickly in conversation  Pt offered a snack, as 2 providers have agreed upon her not needing ensure when she can consume food, as she has been able to do so on several occasions since her admission  Pt  called unit to check in with pt primary nurse regarding his wifes current state and was upset that he was allegedly told yesterday that she would be receiving Ensures with her meals to help her caloric intake  This writer validated husbands concerns and informed him that this writer sifted through the documentation and was unable to find a note or order by a provider regarding the Ensure being ordered  Pt was encouraged to change her menu when planning her choices to reflect more bland options that may bring less of a smell to help decrease the nausea to which pt seemed open-minded to do  She also agreed to try a snack at this time due to be unable to eat dinner

## 2021-08-19 NOTE — DISCHARGE INSTR - APPOINTMENTS
Sindy Jarquin RN, our [employer], will be calling you after your discharge, on the phone number that you provided  She will be available as an additional support, if needed  If you wish to speak with her, you may contact Moses Fernández at [phone]

## 2021-08-19 NOTE — DISCHARGE INSTR - OTHER ORDERS
You will be discharged today to your home at [address] [facility] - your  will pick you up around [address]  Telephone Service [phone] provides 24 hour, 7 day a week crisis phone service for any individual in mental health crisis in [facility] [place]  The telephone service is a hotline, manned by a trained professional  Individuals can receive support, information and referral services 24 hours a day 7 days a week  Text CONNECT to 558879 from anywhere in the [place], anytime, about any type of crisis  A live, trained Crisis Counselor receives the text and lets you know that they are here to listen  The volunteer Crisis Counselor will help you move from a hot moment to a cool moment  The [employer] is a toll free telephone line that can be accessed by consumers who are looking for someone to talk to for support or guidance  It is a support service that is designed to promote recovery while focusing on strengths and providing guidance  This line is run by a provider, but is staffed with consumers and/or certified peer specialists who receive a great deal of training and supervision  MH/DS funds this program  The phone number is [phone]  The hours of operation are from 6 PM to 10 PM daily  [employer] and [employer]  Call [phone] for crisis and emergency counseling and referral 24 hours a day, and services [facility]  [employer] and [place] Crisis Intervention Services provides assessment, crisis intervention counseling, crisis stabilization, referral and linkage with other services, and assessment of the need for emergency hospitalization at consumers homes or at other community locations   The [facility] [place] Crisis Intervention Service provides a registered nurse and a mental health professional who, in collaboration with a licensed psychiatrist, assess crises related to consumers psychiatric medication  [place] Suicide Prevention Hotline [phone]    NAME SPECIALITY INSURANCES/PAYMENTS ADDRESS/PHONE   Depression/Bipolar Support Group 1st & 3rd Wednesdays of each Month - 7:00 pm - 9:00 pm [facility] [address], [address] [facility]   Alcoholics Anonymous  Age: All The only requirement is a desire to stop drinking  Free Answering Service for times, locations and assistance  [phone]     Get Help Now    For information on how to access Drug and Alcohol treatment services please contact:  After hours 24/7 number:  [facility]:  [phone]     [address] Outpatient:  [phone]      [place]  Alcohol Commission  [facility] [address] [facility], [address] · [phone]     [address] · [phone]     Automatic Data on Drug Abuse (Teens)     [address] [place] [address] [facility] and Childhood Psychiatry/Teens: Alcohol and other Drugs (Teens)     [facility]  org - Drug and Alcohol Information (Teens)     Partnership for Drug-free Kids  (Parents)  5-453-HYSMYBVS ([phone])     Automatic Data on Drug Abuse (Parents)        During your stay your Hospital  provided you with information on [facility] Partial program but reported you wanted traditional outpatient treatment at this time  If you change your mind and would like to pursue partial, please call them at the below number to discuss your options       [facility] Partial Hospitalization Program   Phone: [phone]   (Please contact [employer] [phone] to see if they accept your insurance and if you would be eligible for

## 2021-08-19 NOTE — PLAN OF CARE
Pt engaged in 3 of 4 sessions not able to remain in full sessions due to tearfulness    Problem: Ineffective Coping  Goal: Participates in unit activities  Description: Interventions:  - Provide therapeutic environment   - Provide required programming   - Redirect inappropriate behaviors   Outcome: Progressing

## 2021-08-19 NOTE — PROGRESS NOTES
Progress Note - [address] 45 y o  female MRN: [id number]   Unit/Bed#: -01 Encounter: [id number]    Behavior over the last 24 hours: limited improvement  Melody Is a 23-year-old female with a history of panic disorder with severe panic attacks who presents for psychiatric follow-up  Patient is pleasant, calm and cooperative upon approach  Feels the medication changes from yesterday have been helpful, and notes improved sleep last night and feeling "more stable" throughout the day so far  However, she continues to endorse severe anxiety with associated chest tightness, nausea and feelings of impending doom  She is tolerating p o  fluids again and was able to eat a little bit  Her affect is brighter relative to yesterday and she does laugh and smile appropriately during our conversation  Denies any SI or HI  Denies any AH or VH  Continues to in endorse severe anxiety      Sleep: improved, slept better  Appetite: improving  Medication side effects: No   ROS: reports chest discomfort, nausea and restlessness, all other systems are negative    Mental Status Evaluation:    Appearance:  age appropriate, casually dressed, adequate grooming   Behavior:  pleasant, cooperative, more calm   Speech:  normal rate and volume   Mood:  still very anxious   Affect:  reactive, slightly brighter   Thought Process:  organized, linear   Associations: intact associations   Thought Content:  no overt delusions, negative thinking, intrusive thoughts, ruminating thoughts   Perceptual Disturbances: no auditory hallucinations, no visual hallucinations, does not appear responding to internal stimuli   Risk Potential: Suicidal ideation - None at present  Homicidal ideation - None at present  Potential for aggression - No   Sensorium:  oriented to person, place and time/date   Memory:  recent and remote memory grossly intact   Consciousness:  alert and awake   Attention/Concentration: attention span and concentration are age appropriate   Insight:  fair   Judgment: fair   Gait/Station: normal gait/station   Motor Activity: no abnormal movements     Vital signs in last 24 hours:    Temp:  [98 3 °F (36 8 °C)-99 1 °F (37 3 °C)] 98 3 °F (36 8 °C)  HR:  [104-135] 104  Resp:  [16-18] 18  BP: (111-158)/() 111/78    Laboratory results: I have personally reviewed all pertinent laboratory/tests results    Most Recent Labs:   Lab Results   Component Value Date    WBC 7 34 08/14/2021    RBC 4 77 08/14/2021    HGB 14 4 08/14/2021    HCT 40 6 08/14/2021     08/14/2021    RDW 12 1 08/14/2021    NEUTROABS 3 44 08/14/2021    SODIUM 141 08/16/2021    K 3 6 08/16/2021     08/16/2021    CO2 24 08/16/2021    BUN 14 08/16/2021    CREATININE 0 79 08/16/2021    GLUC 81 08/16/2021    CALCIUM 8 4 08/16/2021    AST 17 08/16/2021    ALT 33 08/16/2021    ALKPHOS 56 08/16/2021    TP 6 8 08/16/2021    ALB 3 7 08/16/2021    TBILI 2 30 (H) 08/16/2021    CHOLESTEROL 155 08/14/2021    HDL 44 08/14/2021    TRIG 113 08/14/2021    [facility] 88 08/14/2021    [place] 111 08/14/2021    IER1FXTLJWGG 2 110 08/14/2021    PREGUR NEGATIVE 08/12/2021    PREGSERUM Negative 08/14/2021    RPR Non-Reactive 08/14/2021       Progress Toward Goals: progressing, attends groups, participates in milieu therapy, still very anxious, working on coping skills    Assessment/Plan   Principal Problem:    Panic disorder without agoraphobia with severe panic attacks  Active Problems:    Medical clearance for psychiatric admission    Mood disorder ([facility] [address] )      Recommended Treatment:     Planned medication and treatment changes: All current active medications have been reviewed  Encourage group therapy, milieu therapy and occupational therapy  Behavioral Health checks every 7 minutes    Patient somewhat improved today relative to yesterday  Feels med adjustments were helpful  Continue current medications   Continue to encourage effective coping skills and behavioral strategies first before becoming reliant on PRN anxiolytics  Patient will benefit from outpatient therapy and she states she is motivated to pursue this upon eventual discharge  She feels she is making progress but believes she would end up right back in the hospital if discharged today  Current Facility-Administered Medications   Medication Dose Route Frequency Provider Last Rate    acetaminophen  650 mg Oral Q6H PRN Juanita Seo MD      acetaminophen  650 mg Oral Q4H PRN Juanita Seo MD      acetaminophen  975 mg Oral Q6H PRN Juanita Seo MD      aluminum-magnesium hydroxide-simethicone  30 mL Oral Q4H PRN Juanita Seo MD      benztropine  1 mg Intramuscular Q4H PRN Max 6/day Juanita Seo MD      clonazePAM  1 mg Oral BID Juanita Seo MD      escitalopram  20 mg Oral Daily Odilia Jones MD      gabapentin  400 mg Oral 4x Daily Destini Vega PA-C      hydrOXYzine HCL  100 mg Oral Q6H PRN Max 4/day Juanita Seo MD      Or    LORazepam  2 mg Intramuscular Q6H PRN Juanita Seo MD      LORazepam  1 mg Oral Q4H PRN Juanita Seo MD      OLANZapine  5 mg Oral Q4H PRN Max 3/day Juanita Seo MD      Or    OLANZapine  2 5 mg Intramuscular Q4H PRN Max 3/day Juanita Seo MD      OLANZapine  5 mg Oral Q3H PRN Max 3/day Juanita Seo MD      Or    OLANZapine  5 mg Intramuscular Q3H PRN Max 3/day Juanita Seo MD      OLANZapine  2 5 mg Oral Q4H PRN Max 6/day Juanita Seo MD      OLANZapine  5 mg Oral HS Destini Vega PA-C      polyethylene glycol  17 g Oral Daily PRN Juanita Seo MD      promethazine  25 mg Oral Q6H PRN Marina Apgar      traZODone  50 mg Oral HS PRN Juanita Seo MD       Risks / Benefits of Treatment:    Risks, benefits, and possible side effects of medications explained to patient and patient verbalizes understanding and agreement for treatment      Counseling / Coordination of Care:    Patient's progress discussed with staff in treatment team meeting  Medications, treatment progress and treatment plan reviewed with patient      Lennox Hone, PA-C 08/19/21

## 2021-08-19 NOTE — QUICK NOTE
Pt had teams video call with  and daughter while this writer monitored   This writer noticed  patient ate a full bag of gold fish while talking to family members  Pt is calm after the video chat ended and more relaxed  Pt is now resting in room  Pt expressed extreme thankfulness to have had the chance to video chat with her family

## 2021-08-19 NOTE — PROGRESS NOTES
Pt joined group initially without phsyical discomfort yet only tolerated 15 of 30 min  session before leaving the room without excuse       08/19/21 1000   Activity/Group Checklist   Group Community meeting  (recovery signs/symptoms/bucket list opportunities )   Attendance Attended  (left without excuse)   Attendance Duration (min) 0-15   Interactions Other (Comment)  (presented as physcially sore and in discomfort )   Affect/Mood Other (Comment)  (pt  focus: neck pain displayed low ability to focus to topic)   Goals Achieved Other (Comment)  (Pt not engaging in group discussion on signs )

## 2021-08-19 NOTE — PROGRESS NOTES
Patient arrived to unit with the following    Pants-3    Snacks  Peanut butter crackers  mahendra butter cookies  Fruit snacks  Cheerios  Mini muffins  snack cake  doritos  Crackers  Boo

## 2021-08-19 NOTE — PROGRESS NOTES
Pt continues to experience panic attacks  Denies SI  Attended group  Pt was rocking back and forth due to anxiety  Was demanding IM Zyprexa  Reported that she did not sleep well       DC: TBD - dc date not yet determined      08/18/21 9750   Team Meeting   Meeting Type Daily Rounds   Team Members Present   Team Members Present Physician;Nurse;Occupational Therapist;   Physician Team Member Dr Bernice Copoer / Keegan Og Team Member [facility] Team Member Hailey Huff / Makenzie Brooks   OT Team Member Marlo   Patient/Family Present   Patient Present No   Patient's Family Present No

## 2021-08-19 NOTE — PLAN OF CARE
Problem: Risk for Self Injury/Neglect  Goal: Treatment Goal: Remain safe during length of stay, learn and adopt new coping skills, and be free of self-injurious ideation, impulses and acts at the time of discharge  Outcome: Progressing  Goal: Verbalize thoughts and feelings  Description: Interventions:  - Assess and re-assess patient's lethality and potential for self-injury  - Engage patient in 1:1 interactions, daily, for a minimum of 15 minutes  - Encourage patient to express feelings, fears, frustrations, hopes  - Establish rapport/trust with patient   Outcome: Progressing  Goal: Refrain from harming self  Description: Interventions:  - Monitor patient closely, per order  - Develop a trusting relationship  - Supervise medication ingestion, monitor effects and side effects   Outcome: Progressing  Goal: Attend and participate in unit activities, including therapeutic, recreational, and educational groups  Description: Interventions:  - Provide therapeutic and educational activities daily, encourage attendance and participation, and document same in the medical record  - Obtain collateral information, encourage visitation and family involvement in care   Outcome: Progressing  Goal: Recognize maladaptive responses and adopt new coping mechanisms  Outcome: Progressing  Goal: Complete daily ADLs, including personal hygiene independently, as able  Description: Interventions:  - Observe, teach, and assist patient with ADLS  - Monitor and promote a balance of rest/activity, with adequate nutrition and elimination  Outcome: Progressing     Problem: Depression  Goal: Treatment Goal: Demonstrate behavioral control of depressive symptoms, verbalize feelings of improved mood/affect, and adopt new coping skills prior to discharge  Outcome: Progressing  Goal: Verbalize thoughts and feelings  Description: Interventions:  - Assess and re-assess patient's level of risk   - Engage patient in 1:1 interactions, daily, for a minimum of 15 minutes   - Encourage patient to express feelings, fears, frustrations, hopes   Outcome: Progressing  Goal: Refrain from harming self  Description: Interventions:  - Monitor patient closely, per order   - Supervise medication ingestion, monitor effects and side effects   Outcome: Progressing  Goal: Refrain from isolation  Description: Interventions:  - Develop a trusting relationship   - Encourage socialization   Outcome: Progressing  Goal: Refrain from self-neglect  Outcome: Progressing  Goal: Attend and participate in unit activities, including therapeutic, recreational, and educational groups  Description: Interventions:  - Provide therapeutic and educational activities daily, encourage attendance and participation, and document same in the medical record   Outcome: Progressing  Goal: Complete daily ADLs, including personal hygiene independently, as able  Description: Interventions:  - Observe, teach, and assist patient with ADLS  -  Monitor and promote a balance of rest/activity, with adequate nutrition and elimination   Outcome: Progressing     Problem: Anxiety  Goal: Anxiety is at manageable level  Description: Interventions:  - Assess and monitor patient's anxiety level  - Monitor for signs and symptoms (heart palpitations, chest pain, shortness of breath, headaches, nausea, feeling jumpy, restlessness, irritable, apprehensive)  - Collaborate with interdisciplinary team and initiate plan and interventions as ordered    - [place] patient to unit/surroundings  - Explain treatment plan  - Encourage participation in care  - Encourage verbalization of concerns/fears  - Identify coping mechanisms  - Assist in developing anxiety-reducing skills  - Administer/offer alternative therapies  - Limit or eliminate stimulants  Outcome: Progressing

## 2021-08-19 NOTE — PROGRESS NOTES
Pt continues to have a poor appetite, not eating her meals  Tearful  Pt denies SI but continues to experience anxiety and panic  Gabapentin increased       DC: date not yet determined      08/19/21 0848   Team Meeting   Meeting Type Daily Rounds   Team Members Present   Team Members Present Physician;Nurse;Occupational Therapist;   Physician Team Member Dr Janice Jaeger / Rachael Hodges Team Member Brianna Management Team Member Fantasma   OT Team Member Rosa   Patient/Family Present   Patient Present No   Patient's Family Present No

## 2021-08-20 PROCEDURE — 99232 SBSQ HOSP IP/OBS MODERATE 35: CPT | Performed by: PHYSICIAN ASSISTANT

## 2021-08-20 RX ADMIN — GABAPENTIN 400 MG: 400 CAPSULE ORAL at 09:24

## 2021-08-20 RX ADMIN — GABAPENTIN 400 MG: 400 CAPSULE ORAL at 12:16

## 2021-08-20 RX ADMIN — GABAPENTIN 400 MG: 400 CAPSULE ORAL at 21:38

## 2021-08-20 RX ADMIN — CLONAZEPAM 1 MG: 1 TABLET ORAL at 17:13

## 2021-08-20 RX ADMIN — OLANZAPINE 5 MG: 5 TABLET, FILM COATED ORAL at 21:37

## 2021-08-20 RX ADMIN — GABAPENTIN 400 MG: 400 CAPSULE ORAL at 17:13

## 2021-08-20 RX ADMIN — ESCITALOPRAM OXALATE 20 MG: 20 TABLET ORAL at 09:24

## 2021-08-20 RX ADMIN — CLONAZEPAM 1 MG: 1 TABLET ORAL at 09:23

## 2021-08-20 NOTE — NURSING NOTE
Pt reports feeling better today with decreased anxiety  Cooperative and appropriate during interaction  Visible on unit and social with peers  Discussed sedating effect of Atarax and pt is interested in having dose decreased

## 2021-08-20 NOTE — PROGRESS NOTES
Progress Note - [address] 45 y o  female MRN: [id number]   Unit/Bed#: -01 Encounter: [id number]    Behavior over the last 24 hours: improving  Melody Is a 80-year-old female with a history of panic disorder with severe panic attacks who presents for psychiatric follow-up  Patient is pleasant, calm and cooperative upon approach  Staff reports that she was more visible and less anxious over the past 24 hours  She feels she is making good progress and that the medications are helping  Did require a p r n  dose of hydroxyzine 100 mg yesterday but feels this was too strong, states she experienced some dizziness, sedation and a little bit of disorientation after taking a nap following hydroxyzine administration  Feels she is making progress with her coping skills and is hopeful that she can effectively use these prior to requesting p r n  anxiolytics  Denies SI HI  No AH or VH      Sleep: normal  Appetite: improving  Medication side effects: No   ROS: all other systems are negative    Mental Status Evaluation:    Appearance:  age appropriate, casually dressed, adequate grooming   Behavior:  pleasant, cooperative, calm   Speech:  normal rate and volume   Mood:  less anxious, no longer dysphoric   Affect:  reactive, slightly brighter, less tearful   Thought Process:  organized, goal directed, linear   Associations: intact associations   Thought Content:  no overt delusions   Perceptual Disturbances: no auditory hallucinations, no visual hallucinations   Risk Potential: Suicidal ideation - None at present  Homicidal ideation - None at present  Potential for aggression - No   Sensorium:  oriented to person, place and time/date   Memory:  recent and remote memory grossly intact   Consciousness:  alert and awake   Attention/Concentration: attention span and concentration are age appropriate   Insight:  fair and improving   Judgment: fair   Gait/Station: normal gait/station   Motor Activity: no abnormal movements     Vital signs in last 24 hours:    Temp:  [98 9 °F (37 2 °C)-99 9 °F (37 7 °C)] 98 9 °F (37 2 °C)  HR:  [] 82  Resp:  [16-18] 16  BP: (126-140)/(61-80) 126/61    Laboratory results: I have personally reviewed all pertinent laboratory/tests results    Most Recent Labs:   Lab Results   Component Value Date    WBC 7 34 08/14/2021    RBC 4 77 08/14/2021    HGB 14 4 08/14/2021    HCT 40 6 08/14/2021     08/14/2021    RDW 12 1 08/14/2021    NEUTROABS 3 44 08/14/2021    SODIUM 141 08/16/2021    K 3 6 08/16/2021     08/16/2021    CO2 24 08/16/2021    BUN 14 08/16/2021    CREATININE 0 79 08/16/2021    GLUC 81 08/16/2021    CALCIUM 8 4 08/16/2021    AST 17 08/16/2021    ALT 33 08/16/2021    ALKPHOS 56 08/16/2021    TP 6 8 08/16/2021    ALB 3 7 08/16/2021    TBILI 2 30 (H) 08/16/2021    CHOLESTEROL 155 08/14/2021    HDL 44 08/14/2021    TRIG 113 08/14/2021    [address] 88 08/14/2021    [place] 111 08/14/2021    [id number] 2 110 08/14/2021    PREGUR NEGATIVE 08/12/2021    PREGSERUM Negative 08/14/2021    RPR Non-Reactive 08/14/2021       Progress Toward Goals: improving, attends groups, participates in milieu therapy, slightly less anxious, working on coping skills    Assessment/Plan   Principal Problem:    Panic disorder without agoraphobia with severe panic attacks  Active Problems:    Medical clearance for psychiatric admission    Mood disorder ([facility] [address] )      Recommended Treatment:     Planned medication and treatment changes: All current active medications have been reviewed  Encourage group therapy, milieu therapy and occupational therapy  Behavioral Health checks every 7 minutes    Patient making incremental progress over past 48 hours and appears to be responding well to Wednesday's medication adjustments  Continue current regimen  Recommended lower dose hydroxyzine (either 25 or 50mg PO) as PRN anxiolytic as the 100mg dose was too strong for her   Encouraged use of behavioral strategies prior to requesting PRN anxiolytics  Patient is interested in transitioning to Partial Hospitalization Program  Feel it would be reasonable to consider discharge early next week if she keeps progressing  Current Facility-Administered Medications   Medication Dose Route Frequency Provider Last Rate    acetaminophen  650 mg Oral Q6H PRN Winston Harper MD      acetaminophen  650 mg Oral Q4H PRN Winston Harper MD      acetaminophen  975 mg Oral Q6H PRN Winston Harper MD      aluminum-magnesium hydroxide-simethicone  30 mL Oral Q4H PRN Winston Harper MD      benztropine  1 mg Intramuscular Q4H PRN Max 6/day Winston Harper MD      clonazePAM  1 mg Oral BID Winston Harper MD      escitalopram  20 mg Oral Daily Vance Quick MD      gabapentin  400 mg Oral 4x Daily Kenny Torres PA-C      hydrOXYzine HCL  100 mg Oral Q6H PRN Max 4/day Winston Harper MD      Or    LORazepam  2 mg Intramuscular Q6H PRN Winston Harper MD      LORazepam  1 mg Oral Q4H PRN Winston Harper MD      OLANZapine  5 mg Oral Q4H PRN Max 3/day Winston Harper MD      Or    OLANZapine  2 5 mg Intramuscular Q4H PRN Max 3/day Winston Harper MD      OLANZapine  5 mg Oral Q3H PRN Max 3/day Winston Harper MD      Or    OLANZapine  5 mg Intramuscular Q3H PRN Max 3/day Winston Harper MD      OLANZapine  2 5 mg Oral Q4H PRN Max 6/day Winston Harper MD      OLANZapine  5 mg Oral HS LUISA BiggsC      polyethylene glycol  17 g Oral Daily PRN Winston Harper MD      promethazine  25 mg Oral Q6H PRN Gayathri Graves      traZODone  50 mg Oral HS PRN Winston Harper MD       Risks / Benefits of Treatment:    Risks, benefits, and possible side effects of medications explained to patient and patient verbalizes understanding and agreement for treatment  Counseling / Coordination of Care:    Patient's progress discussed with staff in treatment team meeting    Medications, treatment progress and treatment plan reviewed with patient      Rambo Joyner PA-C 08/20/21

## 2021-08-20 NOTE — CASE MANAGEMENT
CM met with pt to check in and discuss dc options  CM provided pt with [facility] (virtual) schedule and program information to read over to decide if she would like to be referred to partial or return to traditional outpatient after discharge  Pt reported that she may be interested in virtual, that she lives too far away from [facility] for In person and that she works full time  CM spoke to her also about rescheduling her intake appointment with [facility] clinic for a psychiatrist and potentially scheduling with a therapist there instead of continuing with her current psychologist Dr Corby Hunter  His schedule has not recently allowed for him to see pt as much as she may need  CM will meet with pt on 8/23/2021 to discuss her options and decision

## 2021-08-20 NOTE — PLAN OF CARE
Attended 2/2 AM groups      Problem: Ineffective Coping  Goal: Participates in unit activities  Description: Interventions:  - Provide therapeutic environment   - Provide required programming   - Redirect inappropriate behaviors   Outcome: Progressing

## 2021-08-21 PROCEDURE — 99232 SBSQ HOSP IP/OBS MODERATE 35: CPT | Performed by: PSYCHIATRY & NEUROLOGY

## 2021-08-21 RX ORDER — LORAZEPAM 2 MG/ML
2 INJECTION INTRAMUSCULAR EVERY 6 HOURS PRN
Status: DISCONTINUED | OUTPATIENT
Start: 2021-08-21 | End: 2021-08-24 | Stop reason: HOSPADM

## 2021-08-21 RX ORDER — HYDROXYZINE 50 MG/1
50 TABLET, FILM COATED ORAL
Status: DISCONTINUED | OUTPATIENT
Start: 2021-08-21 | End: 2021-08-24 | Stop reason: HOSPADM

## 2021-08-21 RX ADMIN — ESCITALOPRAM OXALATE 20 MG: 20 TABLET ORAL at 09:16

## 2021-08-21 RX ADMIN — OLANZAPINE 5 MG: 5 TABLET, FILM COATED ORAL at 21:37

## 2021-08-21 RX ADMIN — CLONAZEPAM 1 MG: 1 TABLET ORAL at 09:16

## 2021-08-21 RX ADMIN — GABAPENTIN 400 MG: 400 CAPSULE ORAL at 21:37

## 2021-08-21 RX ADMIN — GABAPENTIN 400 MG: 400 CAPSULE ORAL at 09:16

## 2021-08-21 RX ADMIN — CLONAZEPAM 1 MG: 1 TABLET ORAL at 17:34

## 2021-08-21 RX ADMIN — GABAPENTIN 400 MG: 400 CAPSULE ORAL at 17:34

## 2021-08-21 RX ADMIN — GABAPENTIN 400 MG: 400 CAPSULE ORAL at 11:29

## 2021-08-21 NOTE — PROGRESS NOTES
Progress Note - Behavioral Health   Elba Yusuf 45 y o  female MRN: [id number]  Unit/Bed#: [facility] 251-01 Encounter: @CSN        Assessment/Plan   Principal Problem:    Panic disorder without agoraphobia with severe panic attacks  Active Problems:    Medical clearance for psychiatric admission    Mood disorder ([address] )      Subjective: The patient was seen today for continuing care and reviewed with treatment team     Isaias Navarro  reports that she is returning to baseline slowly and feels more like herself  She reports her anxiety and panic attacks as a significant compared to when she came in the unit a week ago  She feels that she should be discharged  on Monday  Her  also has noticed the improvement when they had interacted  She has been more visible in the unit  Interacting with peers and participating in milieu  Overall she endorses improved mood  Patient has been compliant with medication and tolerating well  Denies any AH/VH  Patient denies any SI or HI  Patient is able to contract  for safety, verbally at this time  No management issues reported by staff overnight      Current Medications:  Current Facility-Administered Medications   Medication Dose Route Frequency Provider Last Rate    acetaminophen  650 mg Oral Q6H PRN Jessica Tabor MD      acetaminophen  650 mg Oral Q4H PRN Jessica Tabor MD      acetaminophen  975 mg Oral Q6H PRN Jessica Tabor MD      aluminum-magnesium hydroxide-simethicone  30 mL Oral Q4H PRN Jessica Tabor MD      benztropine  1 mg Intramuscular Q4H PRN Max 6/day Jessica Tabor MD      clonazePAM  1 mg Oral BID Jessica Tabor MD      escitalopram  20 mg Oral Daily Inderjit Young MD      gabapentin  400 mg Oral 4x Daily Annmarie Pacheco PA-C      hydrOXYzine HCL  50 mg Oral Q6H PRN Max 4/day Kirit Colón MD      Or    LORazepam  2 mg Intramuscular Q6H PRN Kirit Colón MD      LORazepam  1 mg Oral Q4H PRN Jessica Tabor MD      OLANZapine  5 mg Oral Q4H PRN Max 3/day Karson Montero MD      Or    OLANZapine  2 5 mg Intramuscular Q4H PRN Max 3/day Karson Montero MD      OLANZapine  5 mg Oral Q3H PRN Max 3/day Karson Montero MD      Or    OLANZapine  5 mg Intramuscular Q3H PRN Max 3/day Karson Montero MD      OLANZapine  2 5 mg Oral Q4H PRN Max 6/day Karson Montero MD      OLANZapine  5 mg Oral HS Jaylen Gonzalez PA-C      polyethylene glycol  17 g Oral Daily PRN Karson Montero MD      promethazine  25 mg Oral Q6H PRN Elliot Augustine      traZODone  50 mg Oral HS PRN Karson Montero MD         Behavioral Health Medications: all current active meds have been reviewed and continue current psychiatric medications  Vital signs in last 24 hours:  Temp:  [97 9 °F (36 6 °C)-99 1 °F (37 3 °C)] 99 1 °F (37 3 °C)  HR:  [72-99] 72  Resp:  [16-18] 18  BP: ()/(60-71) 98/60    Laboratory results:  I have personally reviewed all pertinent laboratory/tests results  Psychiatric Review of Systems:  Behavior over the last 24 hours:  improved  Sleep: normal  Appetite: normal  Medication side effects: No  ROS: no complaints and all other systems negative    Mental Status Evaluation:  Appearance:  age appropriate and casually dressed   Behavior:  normal    Speech:  normal pitch and normal volume   Mood:  better   Affect:  mood-incongruent   Thought Process:  goal directed and logical   Thought Content:  normal no delusions elicited   Perceptual Disturbances: None   Risk Potential: Suicidal Ideations none, Homicidal Ideations none and Potential for Aggression No   Sensorium:  person, place, time/date and situation   Consciousness:  alert    Insight:  fair    Judgment: fair    Gait/Station: normal gait/station   Motor Activity: no abnormal movements     Progress Toward Goals: progressing    Recommended Treatment: 1  Continue with group therapy, milieu therapy and occupational therapy     2 Continue following current medications:   Current Facility-Administered Medications   Medication Dose Route Frequency Provider Last Rate    acetaminophen  650 mg Oral Q6H PRN Lovestephanie Bray, MD      acetaminophen  650 mg Oral Q4H PRN [facility] Asa, MD      acetaminophen  975 mg Oral Q6H PRN Lovenia Living, MD      aluminum-magnesium hydroxide-simethicone  30 mL Oral Q4H PRN Lovenia Living, MD      benztropine  1 mg Intramuscular Q4H PRN Max 6/day [facility] Asa, MD      clonazePAM  1 mg Oral BID [facility]      escitalopram  20 mg Oral Daily Debbie Shaffer MD      gabapentin  400 mg Oral 4x Daily Gearold Rubinstein, PA-C      hydrOXYzine HCL  50 mg Oral Q6H PRN Max 4/day Jim Augustine MD      Or    LORazepam  2 mg Intramuscular Q6H PRN Jim Augustine MD      LORazepam  1 mg Oral Q4H PRN [facility], MD      OLANZapine  5 mg Oral Q4H PRN Max 3/day [facility], MD      Or    OLANZapine  2 5 mg Intramuscular Q4H PRN Max 3/day [facility] Living, MD      OLANZapine  5 mg Oral Q3H PRN Max 3/day [facility], MD      Or    OLANZapine  5 mg Intramuscular Q3H PRN Max 3/day Lovenia Living, MD      OLANZapine  2 5 mg Oral Q4H PRN Max 6/day [facility], MD      OLANZapine  5 mg Oral HS Gearold Rubinstein, PA-C      polyethylene glycol  17 g Oral Daily PRN [facility], MD      promethazine  25 mg Oral Q6H PRN Polly Yahir      traZODone  50 mg Oral HS PRN [facility], MD       Risks, benefits and possible side effects of Medications:   Risks, benefits, and possible side effects of medications explained to patient and patient verbalizes understanding  Risks of medications in pregnancy explained if female patient  Patient verbalizes understanding and agrees to notify her doctor if she becomes pregnant  This note has been constructed using a voice recognition system  Occasional wrong word or "sound a like" substitutions may have occurred due to the inherent limitations of voice recognition software  There may be translation, syntax,  or grammatical errors  If you have any questions, please contact the dictating provider      Akilah Cano MD  08/21/21

## 2021-08-21 NOTE — NURSING NOTE
Pt smiling and bright  Reports feeling a lot better  Pt denies SI/HI/AVH  Pt denies anxiety  Pt social and visible on the unit  Denies any questions or concerns

## 2021-08-21 NOTE — TREATMENT TEAM
08/21/21 0700   Team Meeting   Meeting Type Daily Rounds   Team Members Present   Team Members Present Physician;Nurse   Physician Team Member Dr Jillian Hinojosa   Nursing Team Member [facility]   Patient/Family Present   Patient Present No   Patient's Family Present No     Daily Rounds: Pt with improved symptoms, decreased anxiety  Appetite improved  Requested PRN Atarax 100mg dose decreased d/t sedating effect      Plan: Decreased Atarax dose to 50mg; D/C UA order

## 2021-08-21 NOTE — NURSING NOTE
Pt cooperative and pleasant during interaction  Continues to report improvement with decreased anxiety  Slept well overnight and able to eat breakfast this morning  Visible on unit, social with peers and attending groups

## 2021-08-21 NOTE — NURSING NOTE
Pt is visible in the milieu and is pleasant and cooperative in conversation with a bright affect  Pt denies both anxiety and depression as well as SI/HI/AVH  Pt reports to this writer "today I feel like Tiny Goncalves, I ate, I showered, I'm so happy  Pt denies having any questions or concerns at the present  Pt's  called the unit and was updated on patient's progress

## 2021-08-21 NOTE — PLAN OF CARE
Problem: Ineffective Coping  Goal: Identifies ineffective coping skills  Outcome: Progressing  Goal: Identifies healthy coping skills  Outcome: Progressing  Goal: Demonstrates healthy coping skills  Outcome: Progressing  Goal: Participates in unit activities  Description: Interventions:  - Provide therapeutic environment   - Provide required programming   - Redirect inappropriate behaviors   Outcome: Progressing  Goal: Patient/Family participate in treatment and DC plans  Description: Interventions:  - Provide therapeutic environment  Outcome: Progressing  Goal: Patient/Family verbalizes awareness of resources  Outcome: Progressing  Goal: Understands least restrictive measures  Description: Interventions:  - Utilize least restrictive behavior  Outcome: Progressing  Goal: Free from restraint events  Description: - Utilize least restrictive measures   - Provide behavioral interventions   - Redirect inappropriate behaviors   Outcome: Progressing     Problem: Risk for Self Injury/Neglect  Goal: Verbalize thoughts and feelings  Description: Interventions:  - Assess and re-assess patient's lethality and potential for self-injury  - Engage patient in 1:1 interactions, daily, for a minimum of 15 minutes  - Encourage patient to express feelings, fears, frustrations, hopes  - Establish rapport/trust with patient   Outcome: Progressing  Goal: Refrain from harming self  Description: Interventions:  - Monitor patient closely, per order  - Develop a trusting relationship  - Supervise medication ingestion, monitor effects and side effects   Outcome: Progressing  Goal: Attend and participate in unit activities, including therapeutic, recreational, and educational groups  Description: Interventions:  - Provide therapeutic and educational activities daily, encourage attendance and participation, and document same in the medical record  - Obtain collateral information, encourage visitation and family involvement in care   Outcome: Progressing  Goal: Complete daily ADLs, including personal hygiene independently, as able  Description: Interventions:  - Observe, teach, and assist patient with ADLS  - Monitor and promote a balance of rest/activity, with adequate nutrition and elimination  Outcome: Progressing     Problem: Depression  Goal: Verbalize thoughts and feelings  Description: Interventions:  - Assess and re-assess patient's level of risk   - Engage patient in 1:1 interactions, daily, for a minimum of 15 minutes   - Encourage patient to express feelings, fears, frustrations, hopes   Outcome: Progressing  Goal: Refrain from harming self  Description: Interventions:  - Monitor patient closely, per order   - Supervise medication ingestion, monitor effects and side effects   Outcome: Progressing  Goal: Refrain from isolation  Description: Interventions:  - Develop a trusting relationship   - Encourage socialization   Outcome: Progressing  Goal: Attend and participate in unit activities, including therapeutic, recreational, and educational groups  Description: Interventions:  - Provide therapeutic and educational activities daily, encourage attendance and participation, and document same in the medical record   Outcome: Progressing  Goal: Complete daily ADLs, including personal hygiene independently, as able  Description: Interventions:  - Observe, teach, and assist patient with ADLS  -  Monitor and promote a balance of rest/activity, with adequate nutrition and elimination   Outcome: Progressing     Problem: Anxiety  Goal: Anxiety is at manageable level  Description: Interventions:  - Assess and monitor patient's anxiety level  - Monitor for signs and symptoms (heart palpitations, chest pain, shortness of breath, headaches, nausea, feeling jumpy, restlessness, irritable, apprehensive)  - Collaborate with interdisciplinary team and initiate plan and interventions as ordered    - [place] patient to unit/surroundings  - Explain treatment plan  - Encourage participation in care  - Encourage verbalization of concerns/fears  - Identify coping mechanisms  - Assist in developing anxiety-reducing skills  - Administer/offer alternative therapies  - Limit or eliminate stimulants  Outcome: Progressing     Problem: Ineffective Coping  Goal: Participates in unit activities  Description: Interventions:  - Provide therapeutic environment   - Provide required programming   - Redirect inappropriate behaviors   Outcome: Progressing

## 2021-08-21 NOTE — PLAN OF CARE
Problem: Risk for Self Injury/Neglect  Goal: Treatment Goal: Remain safe during length of stay, learn and adopt new coping skills, and be free of self-injurious ideation, impulses and acts at the time of discharge  Outcome: Progressing  Goal: Verbalize thoughts and feelings  Description: Interventions:  - Assess and re-assess patient's lethality and potential for self-injury  - Engage patient in 1:1 interactions, daily, for a minimum of 15 minutes  - Encourage patient to express feelings, fears, frustrations, hopes  - Establish rapport/trust with patient   Outcome: Progressing  Goal: Refrain from harming self  Description: Interventions:  - Monitor patient closely, per order  - Develop a trusting relationship  - Supervise medication ingestion, monitor effects and side effects   Outcome: Progressing  Goal: Attend and participate in unit activities, including therapeutic, recreational, and educational groups  Description: Interventions:  - Provide therapeutic and educational activities daily, encourage attendance and participation, and document same in the medical record  - Obtain collateral information, encourage visitation and family involvement in care   Outcome: Progressing  Goal: Recognize maladaptive responses and adopt new coping mechanisms  Outcome: Progressing  Goal: Complete daily ADLs, including personal hygiene independently, as able  Description: Interventions:  - Observe, teach, and assist patient with ADLS  - Monitor and promote a balance of rest/activity, with adequate nutrition and elimination  Outcome: Progressing     Problem: Depression  Goal: Treatment Goal: Demonstrate behavioral control of depressive symptoms, verbalize feelings of improved mood/affect, and adopt new coping skills prior to discharge  Outcome: Progressing  Goal: Verbalize thoughts and feelings  Description: Interventions:  - Assess and re-assess patient's level of risk   - Engage patient in 1:1 interactions, daily, for a minimum of 15 minutes   - Encourage patient to express feelings, fears, frustrations, hopes   Outcome: Progressing  Goal: Refrain from harming self  Description: Interventions:  - Monitor patient closely, per order   - Supervise medication ingestion, monitor effects and side effects   Outcome: Progressing  Goal: Refrain from isolation  Description: Interventions:  - Develop a trusting relationship   - Encourage socialization   Outcome: Progressing  Goal: Refrain from self-neglect  Outcome: Progressing  Goal: Attend and participate in unit activities, including therapeutic, recreational, and educational groups  Description: Interventions:  - Provide therapeutic and educational activities daily, encourage attendance and participation, and document same in the medical record   Outcome: Progressing  Goal: Complete daily ADLs, including personal hygiene independently, as able  Description: Interventions:  - Observe, teach, and assist patient with ADLS  -  Monitor and promote a balance of rest/activity, with adequate nutrition and elimination   Outcome: Progressing     Problem: Anxiety  Goal: Anxiety is at manageable level  Description: Interventions:  - Assess and monitor patient's anxiety level  - Monitor for signs and symptoms (heart palpitations, chest pain, shortness of breath, headaches, nausea, feeling jumpy, restlessness, irritable, apprehensive)  - Collaborate with interdisciplinary team and initiate plan and interventions as ordered    - [place] patient to unit/surroundings  - Explain treatment plan  - Encourage participation in care  - Encourage verbalization of concerns/fears  - Identify coping mechanisms  - Assist in developing anxiety-reducing skills  - Administer/offer alternative therapies  - Limit or eliminate stimulants  Outcome: Progressing

## 2021-08-22 PROCEDURE — 99232 SBSQ HOSP IP/OBS MODERATE 35: CPT | Performed by: PSYCHIATRY & NEUROLOGY

## 2021-08-22 RX ADMIN — GABAPENTIN 400 MG: 400 CAPSULE ORAL at 21:50

## 2021-08-22 RX ADMIN — GABAPENTIN 400 MG: 400 CAPSULE ORAL at 09:19

## 2021-08-22 RX ADMIN — GABAPENTIN 400 MG: 400 CAPSULE ORAL at 17:11

## 2021-08-22 RX ADMIN — GABAPENTIN 400 MG: 400 CAPSULE ORAL at 12:22

## 2021-08-22 RX ADMIN — OLANZAPINE 5 MG: 5 TABLET, FILM COATED ORAL at 21:50

## 2021-08-22 RX ADMIN — CLONAZEPAM 1 MG: 1 TABLET ORAL at 09:19

## 2021-08-22 RX ADMIN — CLONAZEPAM 1 MG: 1 TABLET ORAL at 17:11

## 2021-08-22 RX ADMIN — ACETAMINOPHEN 650 MG: 325 TABLET, FILM COATED ORAL at 09:19

## 2021-08-22 RX ADMIN — ESCITALOPRAM OXALATE 20 MG: 20 TABLET ORAL at 09:19

## 2021-08-22 NOTE — PROGRESS NOTES
Progress Note - Behavioral Health   Marino You 45 y o  female MRN: [id number]  Unit/Bed#: [facility] 251-01 Encounter: @[facility]        Assessment/Plan   Principal Problem:    Panic disorder without agoraphobia with severe panic attacks  Active Problems:    Medical clearance for psychiatric admission    Mood disorder ([address] )      Subjective: The patient was seen today for continuing care and reviewed with treatment team     Carol Ann Crump  reports that she feels less depressed and anxious  She wanted to know if she can anticipate discharge tomorrow if she continues to feel better  She reports overall she feels hospital stay has been productive  She has been in touch with her  via the phone who is very supportive  Her panic attacks have decreased significantly  She the needs to still work on her coping skills when she is discharged  She slept well last night  Patient has been compliant with medication and tolerating well  Denies any AH/VH  Patient denies any SI or HI  Patient is able to contract  for safety, verbally at this time  No management issues reported by staff overnight      Current Medications:  Current Facility-Administered Medications   Medication Dose Route Frequency Provider Last Rate    acetaminophen  650 mg Oral Q6H PRN Behzad Gottlieb MD      acetaminophen  650 mg Oral Q4H PRN Behzad Gottlieb MD      acetaminophen  975 mg Oral Q6H PRN Behzad Gottlieb MD      aluminum-magnesium hydroxide-simethicone  30 mL Oral Q4H PRN Behzad Gottlieb MD      benztropine  1 mg Intramuscular Q4H PRN Max 6/day Behzad Gottlieb MD      clonazePAM  1 mg Oral BID Behzad Gottlieb MD      escitalopram  20 mg Oral Daily Giovanna Jacobs MD      gabapentin  400 mg Oral 4x Daily Crystal Green PA-C      hydrOXYzine HCL  50 mg Oral Q6H PRN Max 4/day Valerie Berumen MD      Or    LORazepam  2 mg Intramuscular Q6H PRN Valerie Berumen MD      LORazepam  1 mg Oral Q4H PRN Behzad Gottlieb MD      OLANZapine  5 mg Oral Q4H PRN Max 3/day Yuli Owens MD      Or    OLANZapine  2 5 mg Intramuscular Q4H PRN Max 3/day Yuli Owens MD      OLANZapine  5 mg Oral Q3H PRN Max 3/day Yuli Owens MD      Or    OLANZapine  5 mg Intramuscular Q3H PRN Max 3/day Yuli Owens MD      OLANZapine  2 5 mg Oral Q4H PRN Max 6/day Yuli Owens MD      OLANZapine  5 mg Oral HS Alexandra Glasgow PA-C      polyethylene glycol  17 g Oral Daily PRN Yuli Owens MD      promethazine  25 mg Oral Q6H PRN Chris Alexus      traZODone  50 mg Oral HS PRN Yuli Owens MD         Behavioral Health Medications: all current active meds have been reviewed and continue current psychiatric medications  Vital signs in last 24 hours:  Temp:  [97 3 °F (36 3 °C)-98 1 °F (36 7 °C)] 98 1 °F (36 7 °C)  HR:  [81-88] 81  Resp:  [16-17] 16  BP: (107-119)/(58-85) 107/58    Laboratory results:  I have personally reviewed all pertinent laboratory/tests results  Psychiatric Review of Systems:  Behavior over the last 24 hours:  improved  Sleep: normal  Appetite: normal  Medication side effects: No  ROS: no complaints and All other systems negative  Mental Status Evaluation:  Appearance:  age appropriate and casually dressed   Behavior:  normal    Speech:  normal pitch and normal volume   Mood:  better'   Affect:  mood-congruent   Thought Process:  goal directed and logical   Thought Content:  no delusions elicited   Perceptual Disturbances: None   Risk Potential: Suicidal Ideations none, Homicidal Ideations none and Potential for Aggression No   Sensorium:  person, place, time/date and situation   Consciousness:  alert and awake    Insight:  fair    Judgment: fair    Gait/Station: normal gait/station   Motor Activity: no abnormal movements       Progress Toward Goals:  Making progress   intervention for disposition tomorrow  Recommended Treatment: 1  Continue with group therapy, milieu therapy and occupational therapy     2 Continue following current medications:   Current Facility-Administered Medications   Medication Dose Route Frequency Provider Last Rate    acetaminophen  650 mg Oral Q6H PRN Amilcar Johnson MD      acetaminophen  650 mg Oral Q4H PRN Amilcar Johnson MD      acetaminophen  975 mg Oral Q6H PRN Amilcar Johnson MD      aluminum-magnesium hydroxide-simethicone  30 mL Oral Q4H PRN Amilcar Johnson MD      benztropine  1 mg Intramuscular Q4H PRN Max 6/day Amilcar Johnson MD      clonazePAM  1 mg Oral BID Amilcar Johnson MD      escitalopram  20 mg Oral Daily Apolonia Chavira MD      gabapentin  400 mg Oral 4x Daily Jimmie Berg PA-C      hydrOXYzine HCL  50 mg Oral Q6H PRN Max 4/day Beau Chen MD      Or    LORazepam  2 mg Intramuscular Q6H PRN Beau Chen MD      LORazepam  1 mg Oral Q4H PRN Amilcar Johnson MD      OLANZapine  5 mg Oral Q4H PRN Max 3/day Amilcar Johnson MD      Or    OLANZapine  2 5 mg Intramuscular Q4H PRN Max 3/day Amilcar Johnson MD      OLANZapine  5 mg Oral Q3H PRN Max 3/day Amilcar Johnson MD      Or    OLANZapine  5 mg Intramuscular Q3H PRN Max 3/day Amilcar Johnson MD      OLANZapine  2 5 mg Oral Q4H PRN Max 6/day Amilcar Johnson MD      OLANZapine  5 mg Oral HS Jimmie Berg PA-C      polyethylene glycol  17 g Oral Daily PRN Amilcar Johnson MD      promethazine  25 mg Oral Q6H PRN Raymond Solis      traZODone  50 mg Oral HS PRN Amilcar Johnson MD       Risks, benefits and possible side effects of Medications:   Risks, benefits, and possible side effects of medications explained to patient and patient verbalizes understanding  Risks of medications in pregnancy explained if female patient  Patient verbalizes understanding and agrees to notify her doctor if she becomes pregnant  This note has been constructed using a voice recognition system  Occasional wrong word or "sound a like" substitutions may have occurred due to the inherent limitations of voice recognition software  There may be translation, syntax,  or grammatical errors  If you have any questions, please contact the dictating provider      Raymond Holder MD  08/22/21

## 2021-08-22 NOTE — NURSING NOTE
Patient reports feeling improvement and overall ready to discharge  Pt denies any present anxiety and reports that all day long she has felt stable and hopes that this is directly related to the med changes  Pt reports missing her , otherwise denies all symptoms or distress  Pt continues to CFS and is encouraged to inform staff of any changes in her condition

## 2021-08-22 NOTE — NURSING NOTE
Pt continues to report improved mood with decreased anxiety  Reports sleeping well  Social with peers and attending groups

## 2021-08-22 NOTE — TREATMENT TEAM
08/22/21 0700   Team Meeting   Meeting Type Daily Rounds   Team Members Present   Team Members Present Physician;Nurse   Physician Team Member Dr Imtiaz Braun   Nursing Team Member [facility]   Patient/Family Present   Patient Present No   Patient's Family Present No     Daily Rounds: Pt stable on unit  Reports improved symptoms with decreased anxiety  Denies SI/HI  Slept overnight

## 2021-08-22 NOTE — PLAN OF CARE
Problem: Ineffective Coping  Goal: Identifies healthy coping skills  Outcome: Progressing  Goal: Participates in unit activities  Description: Interventions:  - Provide therapeutic environment   - Provide required programming   - Redirect inappropriate behaviors   Outcome: Progressing  Goal: Understands least restrictive measures  Description: Interventions:  - Utilize least restrictive behavior  Outcome: Progressing  Goal: Free from restraint events  Description: - Utilize least restrictive measures   - Provide behavioral interventions   - Redirect inappropriate behaviors   Outcome: Progressing     Problem: Risk for Self Injury/Neglect  Goal: Treatment Goal: Remain safe during length of stay, learn and adopt new coping skills, and be free of self-injurious ideation, impulses and acts at the time of discharge  Outcome: Progressing  Goal: Verbalize thoughts and feelings  Description: Interventions:  - Assess and re-assess patient's lethality and potential for self-injury  - Engage patient in 1:1 interactions, daily, for a minimum of 15 minutes  - Encourage patient to express feelings, fears, frustrations, hopes  - Establish rapport/trust with patient   Outcome: Progressing  Goal: Complete daily ADLs, including personal hygiene independently, as able  Description: Interventions:  - Observe, teach, and assist patient with ADLS  - Monitor and promote a balance of rest/activity, with adequate nutrition and elimination  Outcome: Progressing     Problem: Depression  Goal: Treatment Goal: Demonstrate behavioral control of depressive symptoms, verbalize feelings of improved mood/affect, and adopt new coping skills prior to discharge  Outcome: Progressing  Goal: Verbalize thoughts and feelings  Description: Interventions:  - Assess and re-assess patient's level of risk   - Engage patient in 1:1 interactions, daily, for a minimum of 15 minutes   - Encourage patient to express feelings, fears, frustrations, hopes   Outcome: Progressing  Goal: Attend and participate in unit activities, including therapeutic, recreational, and educational groups  Description: Interventions:  - Provide therapeutic and educational activities daily, encourage attendance and participation, and document same in the medical record   Outcome: Progressing     Problem: Anxiety  Goal: Anxiety is at manageable level  Description: Interventions:  - Assess and monitor patient's anxiety level  - Monitor for signs and symptoms (heart palpitations, chest pain, shortness of breath, headaches, nausea, feeling jumpy, restlessness, irritable, apprehensive)  - Collaborate with interdisciplinary team and initiate plan and interventions as ordered    - [place] patient to unit/surroundings  - Explain treatment plan  - Encourage participation in care  - Encourage verbalization of concerns/fears  - Identify coping mechanisms  - Assist in developing anxiety-reducing skills  - Administer/offer alternative therapies  - Limit or eliminate stimulants  Outcome: Progressing     Problem: Ineffective Coping  Goal: Participates in unit activities  Description: Interventions:  - Provide therapeutic environment   - Provide required programming   - Redirect inappropriate behaviors   Outcome: Progressing     Problem: DISCHARGE PLANNING  Goal: Discharge to home or other facility with appropriate resources  Description: INTERVENTIONS:  - Identify barriers to discharge w/patient and caregiver  - Arrange for needed discharge resources and transportation as appropriate  - Identify discharge learning needs (meds, wound care, etc )  - Arrange for interpretive services to assist at discharge as needed  - Refer to Case Management Department for coordinating discharge planning if the patient needs post-hospital services based on physician/advanced practitioner order or complex needs related to functional status, cognitive ability, or social support system  Outcome: Progressing

## 2021-08-23 PROBLEM — F39 MOOD DISORDER (HCC): Status: RESOLVED | Noted: 2021-08-14 | Resolved: 2021-08-23

## 2021-08-23 PROBLEM — Z00.8 MEDICAL CLEARANCE FOR PSYCHIATRIC ADMISSION: Status: RESOLVED | Noted: 2019-03-05 | Resolved: 2021-08-23

## 2021-08-23 PROCEDURE — 99232 SBSQ HOSP IP/OBS MODERATE 35: CPT | Performed by: PHYSICIAN ASSISTANT

## 2021-08-23 RX ADMIN — OLANZAPINE 5 MG: 5 TABLET, FILM COATED ORAL at 21:43

## 2021-08-23 RX ADMIN — CLONAZEPAM 1 MG: 1 TABLET ORAL at 08:46

## 2021-08-23 RX ADMIN — CLONAZEPAM 1 MG: 1 TABLET ORAL at 17:08

## 2021-08-23 RX ADMIN — GABAPENTIN 400 MG: 400 CAPSULE ORAL at 08:46

## 2021-08-23 RX ADMIN — GABAPENTIN 400 MG: 400 CAPSULE ORAL at 17:08

## 2021-08-23 RX ADMIN — GABAPENTIN 400 MG: 400 CAPSULE ORAL at 21:43

## 2021-08-23 RX ADMIN — GABAPENTIN 400 MG: 400 CAPSULE ORAL at 12:25

## 2021-08-23 RX ADMIN — ESCITALOPRAM OXALATE 20 MG: 20 TABLET ORAL at 08:46

## 2021-08-23 NOTE — BH TRANSITION RECORD
Contact Information: If you have any questions, concerns, pended studies, tests and/or procedures, or emergencies regarding your inpatient behavioral health visit  Please contact [place] [facility] unit [phone] and ask to speak to a , nurse or physician  A contact is available 24 hours/ 7 days a week at this number  Summary of Procedures Performed During your Stay:  Below is a list of major procedures performed during your hospital stay and a summary of results:  - Cardiac Procedures/Studies: ekg  Pending Studies (From admission, onward)    None        If studies are pending at discharge, follow up with your PCP and/or referring provider

## 2021-08-23 NOTE — CASE MANAGEMENT
CM called  Tete Malik ([phone]) to confirm pt dc appointment with [facility] and that pt PHONE intake was rescheduled to Wednesday 8/25/2021 at 2pm      CM informed  that pt is set for dc tomorrow and he can pick her up anytime after 10am  He reported that he will pick her up around 10am  CM informed him that pt will be discharged with prescriptions for her medications that she is currently on   verbalized understanding

## 2021-08-23 NOTE — PLAN OF CARE
Pt engaged in several assigned groups and initially stated that she was back to herself again  Pt  needed some redirection away from attempting to hug another peer who was under emotional distress in the afternoon session  Pt  then refused relaxation group stating that she was too upset for another peer  Pt displayed awareness that she is sensitive to others emotions    Problem: Ineffective Coping  Goal: Participates in unit activities  Description: Interventions:  - Provide therapeutic environment   - Provide required programming   - Redirect inappropriate behaviors   Outcome: Progressing

## 2021-08-23 NOTE — DISCHARGE SUMMARY
Discharge Summary - [address] [place], [place] [address]vard 45 y o  female MRN: [id number]  Unit/Bed#: Adon[facility] Garcia 251-01 Encounter: [id number]     Admission Date: 8/13/2021         Discharge Date: 8/24/21    Attending Psychiatrist: Jose Oh*    Reason for Admission/HPI:     Per initial H&P from Dr Katie Jarquin on 8/14/21:    "Per Admission Interview with Dr Katie Jarquin on 8/14/21:  45 y o  White female,  for 12 years, domiciled with , daughter (12 y/o) in [place], currently employed in [employer] for 13 years, [address] [facility] significant for h/o panic disorder, 2 past psychiatric hospitalizations (1st time at 26 y/o for panic disorder, 2nd hospitalization at [facility] about7 years ago for panic disorder), no past suicide attempts, no h/o self-injurious behaviors, no h/o physical aggression, PMH significant for migraine, substance abuse history significant for medical cannabis, social alcohol use, presents to [facility] unit transferred from [facility] ED for inpatient psychiatric hospitalization for debilitating anxiety, r/o psychosis, with [facility] reporting "I have been having panic attacks over the past 4 days  "     Per patient, patient reports that she has been doing well for several years up until about the end of July 2021  She reports that there has been a lot of work stressors reports feeling overwhelmed at work at times  She reports that she puts a lot of pressure on herself at work  She reports that she is currently in the process of moving to a new house in [place], reports closing is at the end of the month  She reports that her daughter will be starting  at the end of month  She reports some worries about her daughter on the bus, reports worrying that she will get motion sick on the bus  She reports that she takes Lexapro 10 mg daily and then takes Klonopin 0 5 mg up to bid prn anxiety    She reports that over the past week, she has been taking up to Klonopin 0 5 mg three times per day  She reports that it was helping for a bit but then when she gets more worked up it stops working  She reports that her psychiatric medications were being prescribed by her Ob/Gyn doctor  She reports that she was seeing a psychiatrist Dr Araseli Cronin for several year who retired about 5 years ago  She was pregnant with her daughter at the time so then Ob/Gyn started prescribing the medications after the pregnancy  She reports her [facility]/Gyn had maintained her on the Lexapro since that time  Patient reports that she has been psychologist for several years but hasn't seen him in about 3 years  She reports about 3 weeks ago, she started getting panic attacks again  She reports that she went to [facility] ED on 7/26 While in the ED, she received Lorazepam, Promethazine, and Olanzapine  She was discharged on Zofran prn  She reports that she subsequently was on vacation the first week on August   During that time, she was feeling pretty good, didn't have any panic attacks while she was there  When she returned home and went back to work, her anxiety came back  She started having panic attacks again and went to [facility] ED on 8/9/21  Per the ED note, she received Diazepam 10 mg and Cogentin 2 mg  She reports on the Diazepam she was having double vision  She was discharged on a script of Hydroxyzine 50 mg prn and continued to use her Klonopin  She reports that it would last for a little bit but then would have another panic spiral   She has an upcoming psychiatric evaluation scheduled at [facility] on Wednesday 8/18/21  She returned to the [facility] ED on 8/11/21 with panic attacks and was discharged on Ativan 1 mg prn for anxiety  She reports that following being discharged from [facility] ED, she quickly started feeling overwhelmed again and went to [facility] ED    When she arrived at [facility] ED, she was pretty agitated and received Haldol 5 mg IM  During the ED stay, she also received Ativan total dose of 4 mg and Olanzapine 10 mg  She was subsequently transferred to [facility] for inpatient psychiatric hospitalization      In terms of mood symptoms, patient describes her mood as "happy," denying overwhelming feelings of sadness or depression, denying anger or irritability  Patient reports that she is generally able to sleep at night with medication, sleeping about 8 hours per night  Patient reports that she hasn't been eating well, has no appetite  She reports trying to stay hydrated  She reports that she wasn't working over the past few weeks due to her anxiety  She denies any passive or active suicidal ideation, intent, or plan  In terms of anxiety symptoms, patient reports that she worries about having panic attacks, has been having them on a daily basis several times per day  She rates her anxiety 10/10 intensity over the past few weeks  She reports some social anxiety  She denies any PTSD symptoms  On psychiatric ROS, patient denies any perceptual disturbances  She denies any feelings of paranoia  She denies any h/o or current manic symptoms  Patient denies any h/o physical or sexual abuse  She reports taking the medication on daily basis "      Social History     Tobacco History     Smoking Status  Never Smoker    Smokeless Tobacco Use  Never Used          Alcohol History     Alcohol Use Status  Yes Comment  socially; 3-4 drinks/week (8/13)          Drug Use     Drug Use Status  Yes Types  Marijuana Comment  Pt has a medical marijuana card (8/13)          Sexual Activity     Sexually Active  Yes Partners  Male Birth Control/Protection  I U D  Activities of Daily Living    Not Asked               Additional Substance Use Detail     Questions Responses    Problems Due to Past Use of Alcohol? No    Problems Due to Past Use of Substances?  No    Alcohol Use Frequency 1 or 2 times/week    Cannabis frequency 3 or more times/week    Comment: 3 or more times/week on 8/14/2021     Heroin Frequency Denies use in past 12 months    Cannabis method     Comment: Various methods     Cocaine frequency Never used    Comment: Never used on 8/14/2021     Crack Cocaine Frequency Denies use in past 12 months    Methamphetamine Frequency Denies use in past 12 months    Narcotic Frequency Denies use in past 12 months    Benzodiazepine Frequency Denies use in past 12 months    Amphetamine frequency Denies use in past 12 months    Barbituate Frequency Denies use use in past 12 months    Inhalant frequency Never used    Comment: Never used on 8/14/2021     Hallucinogen frequency Never used    Comment: Never used on 8/14/2021     Ecstasy frequency Never used    Comment: Never used on 8/14/2021     Other drug frequency Never used    Comment: Never used on 8/14/2021     Opiate frequency Denies use in past 12 months    Last reviewed by Darrick Tristan on 8/14/2021          Past Medical History:   Diagnosis Date    Anxiety     Depression     Disruption of episiotomy wound in the puerperium     Last assessed 06/22/16    External hemorrhoids     Last assessed 02/05/16    Fibroid uterus     GERD (gastroesophageal reflux disease)     Headache     Leiomyoma of uterus     Migraine     Migraine     Polyhydramnios in third trimester, not applicable or unspecified fetus     Last assessed 01/25/16    Pregnancy headache in third trimester     Resolved 02/05/16    Protein in urine     Last assessed 01/25/16    Sciatica of right side     Third degree laceration of perineum, type 3b 1/26/2016    Varicella      Past Surgical History:   Procedure Laterality Date    CHOLECYSTECTOMY  2006    MYOMECTOMY      NM POST COLPORRHAPHY,RECTUM/VAGINA N/A 3/24/2016    Procedure: Junito Number ;  Surgeon: Kem Paige MD;  Location: [facility];  Service: Gynecology    WISDOM TOOTH EXTRACTION         Medications:     All current active medications have been reviewed  Medications prior to admission:    Prior to Admission Medications   Prescriptions Last Dose Informant Patient Reported? Taking? clonazePAM (KlonoPIN) 0 5 mg tablet   No No   Sig: Take 1 tablet (0 5 mg total) by mouth 3 (three) times a day   escitalopram (LEXAPRO) 10 mg tablet   No No   Sig: Take 1 tablet (10 mg total) by mouth daily   hydrOXYzine HCL (ATARAX) 50 mg tablet   No No   Sig: Take 1 tablet (50 mg total) by mouth every 8 (eight) hours as needed for itching for up to 10 days   levonorgestrel (MIRENA) 20 MCG/24HR IUD  Self Yes No   Si each by Intrauterine route once   ondansetron (ZOFRAN-ODT) 4 mg disintegrating tablet  Self No No   Sig: Take 1 tablet (4 mg total) by mouth every 8 (eight) hours as needed for nausea or vomiting   Patient not taking: Reported on 3/5/2019   ondansetron (ZOFRAN-ODT) 4 mg disintegrating tablet   No No   Sig: Take 1 tablet (4 mg total) by mouth every 6 (six) hours as needed for nausea      Facility-Administered Medications: None       Allergies: Allergies   Allergen Reactions    Demerol [Meperidine] Hyperactivity    Macrolides And Ketolides        Objective     Vital signs in last 24 hours:    Temp:  [98 6 °F (37 °C)-98 9 °F (37 2 °C)] 98 9 °F (37 2 °C)  HR:  [84-88] 84  Resp:  [16-17] 16  BP: (111-117)/(59-84) 111/59    No intake or output data in the 24 hours ending 21 Radha Wolf was admitted to the inpatient psychiatric unit and started on Behavioral Health checks every 7 minutes  During the hospitalization she was encouraged to attend individual therapy, group therapy, milieu therapy and occupational therapy  Psychiatric medications were adjusted over the hospital stay  To address depressive symptoms and anxiety symptoms, Melody was treated with antidepressant Lexapro, mood stabilizer Neurontin, antipsychotic medication Zyprexa and anxiolytic medication Klonopin   Medication doses were adjusted during the hospital course  Lexapro was added and titrated to 20mg daily  Neurontin was added and titrated to 400mg QID  Zyprexa was added at the dose of 5mg nightly  Klonopin was added and titrated to 1mg BID  Prior to beginning of treatment medications risks and benefits and possible side effects including risk of parkinsonian symptoms, Tardive Dyskinesia and metabolic syndrome related to treatment with antipsychotic medications, risk of suicidality and serotonin syndrome related to treatment with antidepressants and risks of misuse, abuse or dependence, sedation and respiratory depression related to treatment with benzodiazepine medications were reviewed with Melody  She verbalized understanding and agreement for treatment  Upon admission Melody was seen by medical service for medical clearance for inpatient treatment and medical follow up  Ronalds symptoms slowly improved over the hospital course  Initially after admission she was still feeling anxious and overwhelmed  With adjustment of medications and therapeutic milieu her symptoms gradually resolved  At the end of treatment Melody was doing much better  Her mood was significantly improved at the time of discharge  Melody denied suicidal ideation, intent or plan at the time of discharge and denied homicidal ideation, intent or plan at the time of discharge  Melody was participating appropriately in milieu at the time of discharge  Behavior was appropriate on the unit at the time of discharge  Sleep and appetite were improved  Melody was tolerating medications and was not reporting any significant side effects at the time of discharge  Since Melody was doing well at the end of the hospitalization, treatment team felt that she could be safely discharged to outpatient care      Mental Status at Time of Discharge:     Appearance:  age appropriate, casually dressed, adequate grooming   Behavior:  pleasant, cooperative, calm   Speech:  normal rate and volume, fluent, clear   Mood:  euthymic   Affect:  normal range and intensity   Thought Process:  organized, goal directed, linear   Associations: intact associations   Thought Content:  no overt delusions   Perceptual Disturbances: no auditory hallucinations, no visual hallucinations   Risk Potential: Suicidal ideation - None at present, contracts for safety on the unit  Homicidal ideation - None at present  Potential for aggression - No   Sensorium:  oriented to person, place and time/date   Memory:  recent and remote memory grossly intact   Consciousness:  alert and awake   Attention/Concentration: attention span and concentration are age appropriate   Insight:  improved and fair   Judgment: improved and fair   Gait/Station: normal gait/station   Motor Activity: no abnormal movements       Admission Diagnosis:    Principal Problem:    Panic disorder without agoraphobia with severe panic attacks      Discharge Diagnosis:     Principal Problem:    Panic disorder without agoraphobia with severe panic attacks  Resolved Problems:    Medical clearance for psychiatric admission    Mood disorder ([facility] [address] )      Lab Results:   I have personally reviewed all pertinent laboratory/tests results    Most Recent Labs:   Lab Results   Component Value Date    WBC 7 34 08/14/2021    RBC 4 77 08/14/2021    HGB 14 4 08/14/2021    HCT 40 6 08/14/2021     08/14/2021    RDW 12 1 08/14/2021    NEUTROABS 3 44 08/14/2021    SODIUM 141 08/16/2021    K 3 6 08/16/2021     08/16/2021    CO2 24 08/16/2021    BUN 14 08/16/2021    CREATININE 0 79 08/16/2021    GLUC 81 08/16/2021    GLUF 81 08/16/2021    CALCIUM 8 4 08/16/2021    AST 17 08/16/2021    ALT 33 08/16/2021    ALKPHOS 56 08/16/2021    TP 6 8 08/16/2021    ALB 3 7 08/16/2021    TBILI 2 30 (H) 08/16/2021    CHOLESTEROL 155 08/14/2021    HDL 44 08/14/2021    TRIG 113 08/14/2021    LDLCALC 88 08/14/2021    NONHDLC 111 08/14/2021    DDX7EBSDQVZS 2 110 08/14/2021    PREGUR NEGATIVE 08/12/2021    PREGSERUM Negative 08/14/2021    RPR Non-Reactive 08/14/2021       Discharge Medications:    See after visit summary for all reconciled discharge medications provided to patient and family  Discharge instructions/Information to patient and family:     See after visit summary for information provided to patient and family  Provisions for Follow-Up Care:    See after visit summary for information related to follow-up care and any pertinent home health orders  Discharge Statement:    I spent 35 minutes discharging the patient  This time was spent on the day of discharge  I had direct contact with the patient on the day of discharge  Additional documentation is required if more than 30 minutes were spent on discharge:    I reviewed with Melody importance of compliance with medications and outpatient treatment after discharge  I discussed the medication regimen and possible side effects of the medications with Melody prior to discharge  At the time of discharge she was tolerating psychiatric medications  I discussed outpatient follow up with Melody  I reviewed with Melody crisis plan and safety plan upon discharge    Miguelina Salazar has been filing controlled prescriptions on time as prescribed according to Daria Stafford 17     Discharge on Two Antipsychotic Medications: No    Ronnald Dandy, PA-C 08/24/21

## 2021-08-23 NOTE — NURSING NOTE
Pt smiling and bright  Pleasant and cooperative  Social and visible  Pt reported being discharged tomorrow and is happy to she her  and daughter  Pt denies SI/HI/AVH  Pt reported feeling improved  Pt denies any questions or concerns

## 2021-08-23 NOTE — PROGRESS NOTES
[facility] 45 [facility] MRN: [id number]   Unit/Bed#: U 251-01 Encounter: [id number]    Behavior over the last 24 hours: improving  Charlotte Salmon is a 44 y/o female with a history of panic disorder who presents for psychiatric follow up  Patient is pleasant, calm and cooperative upon approach  No longer feels anxious  No longer tearful  Affect is notably brighter and she is forward thinking and optimistic about the future  Feels the medication changes have "made me feel like the old Melody again " Med and meal compliant  No acute concerns      Sleep: improved  Appetite: improved  Medication side effects: No   ROS: all other systems are negative    Mental Status Evaluation:    Appearance:  age appropriate, casually dressed, adequate grooming   Behavior:  pleasant, cooperative, calm   Speech:  normal rate and volume, fluent, clear   Mood:  improved, no longer anxious   Affect:  brighter   Thought Process:  organized, goal directed, linear   Associations: intact associations   Thought Content:  no overt delusions   Perceptual Disturbances: no auditory hallucinations, no visual hallucinations, does not appear responding to internal stimuli   Risk Potential: Suicidal ideation - None at present  Homicidal ideation - None at present  Potential for aggression - No   Sensorium:  oriented to person, place and time/date   Memory:  recent and remote memory grossly intact   Consciousness:  alert and awake   Attention/Concentration: attention span and concentration are age appropriate   Insight:  improving   Judgment: improving   Gait/Station: normal gait/station   Motor Activity: no abnormal movements     Vital signs in last 24 hours:    Temp:  [98 2 °F (36 8 °C)-98 6 °F (37 °C)] 98 6 °F (37 °C)  HR:  [62-85] 62  Resp:  [16-17] 16  BP: (104-111)/(51-82) 104/51    Laboratory results: I have personally reviewed all pertinent laboratory/tests results    Most Recent Labs:   Lab Results Vivek Lawson PA-C      hydrOXYzine HCL  50 mg Oral Q6H PRN Max 4/day Ashley Uribe MD      Or   Eri Raymundo LORazepam  2 mg Intramuscular Q6H PRN Ashley Uribe MD      LORazepam  1 mg Oral Q4H PRN Lexii Leaf, MD      OLANZapine  5 mg Oral Q4H PRN Max 3/day Lexii Leaf, MD      Or    OLANZapine  2 5 mg Intramuscular Q4H PRN Max 3/day Lexii Leaf, MD      OLANZapine  5 mg Oral Q3H PRN Max 3/day Lexii Leaf, MD      Or    OLANZapine  5 mg Intramuscular Q3H PRN Max 3/day Lexii Leaf, MD      OLANZapine  2 5 mg Oral Q4H PRN Max 6/day Lexii Leaf, MD      OLANZapine  5 mg Oral HS Gissel Carpio PA-C      polyethylene glycol  17 g Oral Daily PRN Lexii [place], MD      promethazine  25 mg Oral Q6H PRN Brianna Meline      traZODone  50 mg Oral HS PRN Lexiichuy Harmon MD       Risks / Benefits of Treatment:    Risks, benefits, and possible side effects of medications explained to patient and patient verbalizes understanding and agreement for treatment  Counseling / Coordination of Care:    Patient's progress discussed with staff in treatment team meeting  Medications, treatment progress and treatment plan reviewed with patient      Gissel Carpio PA-C 08/23/21

## 2021-08-23 NOTE — PROGRESS NOTES
Pt reporting decreased anxiety, cooperative and ready for dc  Pt reported that she misses her   No PRNs       DC: Tuesday 08/23/21 0800   Team Meeting   Meeting Type Daily Rounds   Team Members Present   Team Members Present Physician;Nurse;;Occupational Therapist   Physician Team Member Dr Nola Agee / Lyle Taylor   Nursing Team Member Bora Valladares / Ponce Chapman Management Team Member Leobardo Dimas / Vamsi Espinosa   OT Team Member Jimi Echavarria   Patient/Family Present   Patient Present No   Patient's Family Present No

## 2021-08-23 NOTE — NURSING NOTE
Pt denies SI/HI, reports anxiety and depression have both improved significantly  Pt states feeling ready for discharge, states missing her family and hopeful for discharge within the next couple days  Pt denies any questions regarding scheduled medications and feels they are effective  Pt reports sleeping well overnight and appetite has been improving  Remains social with peers and attending groups throughout the day  Denies any concerns or questions at this time

## 2021-08-23 NOTE — CASE MANAGEMENT
CM met with pt to check in and discuss dc plans  Pt reported she is feeling much better and "I feel like Melody again "     Pt reported that she is looking forward to returning home  Pt is scheduled for dc on Tuesday 8/24/2021  CM spoke to pt about dc options  Pt reported that she does not feel that [facility] will work for her at this time due to various reasons (upcoming move, her daughter starting school, returning to full time work )     Pt is interested in Outpatient Psychiatry and is now open to seeing a therapist at [facility]  Pt had been scheduled for an intake at [facility] last week but missed it due to being in the hospital  Pt had been seeing Dr Pooja Stone for therapy but he has not been able to see him consistently due to his schedule  CM advised pt to try out therapist at [facility] and can always go back to Dr Pooja Stone in the future  Verbalized understanding  STARR spoke to pt about looking into IOP programs near her but CM was only able to locate programs in the [place] area  Pt requested a return to work note as she reported she feels she needs time to adjust to all of the changes before she returns to work  CM informed pt that she will be provided with a note but will have to speak to [facility] about extending her time off since she will be seeing them for Outpatient and she will no longer be in the care of the [facility]  Pt verbalized understanding       STARR informed pt that CM spoke to her  for an update and he will pick her up tomorrow around 10am

## 2021-08-23 NOTE — PLAN OF CARE
Problem: Ineffective Coping  Goal: Identifies healthy coping skills  Outcome: Progressing  Goal: Participates in unit activities  Description: Interventions:  - Provide therapeutic environment   - Provide required programming   - Redirect inappropriate behaviors   Outcome: Progressing  Goal: Understands least restrictive measures  Description: Interventions:  - Utilize least restrictive behavior  Outcome: Progressing  Goal: Free from restraint events  Description: - Utilize least restrictive measures   - Provide behavioral interventions   - Redirect inappropriate behaviors   Outcome: Progressing     Problem: Risk for Self Injury/Neglect  Goal: Verbalize thoughts and feelings  Description: Interventions:  - Assess and re-assess patient's lethality and potential for self-injury  - Engage patient in 1:1 interactions, daily, for a minimum of 15 minutes  - Encourage patient to express feelings, fears, frustrations, hopes  - Establish rapport/trust with patient   Outcome: Progressing  Goal: Complete daily ADLs, including personal hygiene independently, as able  Description: Interventions:  - Observe, teach, and assist patient with ADLS  - Monitor and promote a balance of rest/activity, with adequate nutrition and elimination  Outcome: Progressing     Problem: Depression  Goal: Verbalize thoughts and feelings  Description: Interventions:  - Assess and re-assess patient's level of risk   - Engage patient in 1:1 interactions, daily, for a minimum of 15 minutes   - Encourage patient to express feelings, fears, frustrations, hopes   Outcome: Progressing  Goal: Attend and participate in unit activities, including therapeutic, recreational, and educational groups  Description: Interventions:  - Provide therapeutic and educational activities daily, encourage attendance and participation, and document same in the medical record   Outcome: Progressing     Problem: Anxiety  Goal: Anxiety is at manageable level  Description: Interventions:  - Assess and monitor patient's anxiety level  - Monitor for signs and symptoms (heart palpitations, chest pain, shortness of breath, headaches, nausea, feeling jumpy, restlessness, irritable, apprehensive)  - Collaborate with interdisciplinary team and initiate plan and interventions as ordered    - [place] patient to unit/surroundings  - Explain treatment plan  - Encourage participation in care  - Encourage verbalization of concerns/fears  - Identify coping mechanisms  - Assist in developing anxiety-reducing skills  - Administer/offer alternative therapies  - Limit or eliminate stimulants  Outcome: Progressing     Problem: Ineffective Coping  Goal: Participates in unit activities  Description: Interventions:  - Provide therapeutic environment   - Provide required programming   - Redirect inappropriate behaviors   Outcome: Progressing

## 2021-08-23 NOTE — ED PROVIDER NOTES
History  Chief Complaint   Patient presents with    Panic Attack     pt reports she is on day 4 of a panic attack  pt takes PO ativan and Klonopin  pt would like to see crisis worker     Patient is a 60-year-old female with a past medical history significant for anxiety, depression, GERD, migraines who presents to the emergency department for evaluation of anxiety  This is patient's 3rd visit to an emergency department this week for the same complaint  Patient states that this episode of severe anxiety has been ongoing for the past 4 days  She has been to the hospital 2 other times and was given Ativan with temporary relief  Patient does take Klonopin as needed  She also takes Lexapro for anxiety  Patient has not been seeing her therapist or taking her medications as prescribed  Per patient's , she has been calling out for her  father  She has been very restless and irritable  Patient would like to sign a 201 and speak to crisis  She is not having any suicidal or homicidal ideations  She does state that she has been experiencing some chest pains and palpitations  She states that the Ativan and Klonopin is not working for her anymore  She is not having any other complaints at this time  Prior to Admission Medications   Prescriptions Last Dose Informant Patient Reported? Taking?    clonazePAM (KlonoPIN) 0 5 mg tablet   No No   Sig: Take 1 tablet (0 5 mg total) by mouth 3 (three) times a day   escitalopram (LEXAPRO) 10 mg tablet   No No   Sig: Take 1 tablet (10 mg total) by mouth daily   hydrOXYzine HCL (ATARAX) 50 mg tablet   No No   Sig: Take 1 tablet (50 mg total) by mouth every 8 (eight) hours as needed for itching for up to 10 days   levonorgestrel (MIRENA) 20 MCG/24HR IUD  Self Yes No   Si each by Intrauterine route once   ondansetron (ZOFRAN-ODT) 4 mg disintegrating tablet  Self No No   Sig: Take 1 tablet (4 mg total) by mouth every 8 (eight) hours as needed for nausea or vomiting   Patient not taking: Reported on 3/5/2019   ondansetron (ZOFRAN-ODT) 4 mg disintegrating tablet   No No   Sig: Take 1 tablet (4 mg total) by mouth every 6 (six) hours as needed for nausea      Facility-Administered Medications: None       Past Medical History:   Diagnosis Date    Anxiety     Depression     Disruption of episiotomy wound in the puerperium     Last assessed 06/22/16    External hemorrhoids     Last assessed 02/05/16    Fibroid uterus     GERD (gastroesophageal reflux disease)     Headache     Leiomyoma of uterus     Migraine     Migraine     Polyhydramnios in third trimester, not applicable or unspecified fetus     Last assessed 01/25/16    Pregnancy headache in third trimester     Resolved 02/05/16    Protein in urine     Last assessed 01/25/16    Sciatica of right side     Third degree laceration of perineum, type 3b 1/26/2016    Varicella        Past Surgical History:   Procedure Laterality Date    CHOLECYSTECTOMY  2006    MYOMECTOMY      MT POST COLPORRHAPHY,RECTUM/VAGINA N/A 3/24/2016    Procedure: Shin Manual ;  Surgeon: Tod Mccloud MD;  [facility];  Service: Gynecology    WISDOM TOOTH EXTRACTION         Family History   Problem Relation Age of Onset    Cancer Mother         Breast    Cancer Maternal Aunt         Breast     I have reviewed and agree with the history as documented  E-Cigarette/Vaping    E-Cigarette Use Never User      E-Cigarette/Vaping Substances     Social History     Tobacco Use    Smoking status: Never Smoker    Smokeless tobacco: Never Used   Vaping Use    Vaping Use: Never used   Substance Use Topics    Alcohol use: Yes     Comment: socially; 3-4 drinks/week (8/13)    Drug use: Yes     Types: Marijuana     Comment: Pt has a medical marijuana card (8/13)       Review of Systems   Constitutional: Negative for chills, diaphoresis and fever     HENT: Negative for congestion, facial swelling, nosebleeds, sore throat and voice change  Eyes: Negative for pain and redness  Respiratory: Negative for cough, choking, chest tightness, shortness of breath and stridor  Cardiovascular: Positive for chest pain and palpitations  Gastrointestinal: Negative for abdominal pain, diarrhea, nausea and vomiting  Genitourinary: Negative for difficulty urinating, dysuria, flank pain, hematuria, vaginal bleeding and vaginal discharge  Musculoskeletal: Negative for arthralgias, back pain, myalgias, neck pain and neck stiffness  Skin: Negative for color change and rash  Neurological: Negative for dizziness, syncope, facial asymmetry, weakness, light-headedness, numbness and headaches  Psychiatric/Behavioral: Negative for confusion and suicidal ideas  The patient is nervous/anxious and is hyperactive  All other systems reviewed and are negative  Physical Exam  Physical Exam  Vitals reviewed  Constitutional:       General: She is not in acute distress  Appearance: Normal appearance  She is not ill-appearing, toxic-appearing or diaphoretic  HENT:      Head: Normocephalic and atraumatic  Right Ear: External ear normal       Left Ear: External ear normal       Nose: Nose normal  No congestion or rhinorrhea  Mouth/Throat:      Mouth: Mucous membranes are moist       Pharynx: Oropharynx is clear  No oropharyngeal exudate or posterior oropharyngeal erythema  Eyes:      General: No scleral icterus  Right eye: No discharge  Left eye: No discharge  Extraocular Movements: Extraocular movements intact  Conjunctiva/sclera: Conjunctivae normal       Pupils: Pupils are equal, round, and reactive to light  Cardiovascular:      Rate and Rhythm: Normal rate and regular rhythm  Pulses: Normal pulses  Heart sounds: Normal heart sounds  No murmur heard  No friction rub  No gallop  Pulmonary:      Effort: Pulmonary effort is normal  No respiratory distress        Breath sounds: Normal breath sounds  No stridor  No wheezing, rhonchi or rales  Abdominal:      General: Abdomen is flat  Palpations: Abdomen is soft  Tenderness: There is no abdominal tenderness  There is no guarding or rebound  Musculoskeletal:      Cervical back: Normal range of motion and neck supple  Right lower leg: No edema  Left lower leg: No edema  Skin:     General: Skin is warm and dry  Capillary Refill: Capillary refill takes less than 2 seconds  Neurological:      General: No focal deficit present  Mental Status: She is alert and oriented to person, place, and time  Psychiatric:         Attention and Perception: She is inattentive  Mood and Affect: Mood is anxious  Affect is labile and tearful  Speech: Speech is rapid and pressured  Behavior: Behavior is uncooperative, agitated and hyperactive  Thought Content: Thought content is delusional (Patient calling out for her  father)  Thought content does not include homicidal or suicidal ideation  Thought content does not include homicidal or suicidal plan  Judgment: Judgment is impulsive           Vital Signs  ED Triage Vitals [21]   Temperature Pulse Respirations Blood Pressure SpO2   97 5 °F (36 4 °C) 72 18 151/85 100 %      Temp Source Heart Rate Source Patient Position - Orthostatic VS BP Location FiO2 (%)   Tympanic Monitor Sitting Left arm --      Pain Score       --           Vitals:    21 2222 21 0953 21 1618   BP: 151/85 110/76 132/82   Pulse: 72 86 72   Patient Position - Orthostatic VS: Sitting Sitting Lying         Visual Acuity      ED Medications  Medications   haloperidol lactate (HALDOL) injection 5 mg (5 mg Intramuscular Given 21)   ondansetron (ZOFRAN-ODT) dispersible tablet 4 mg (4 mg Oral Given 21)   potassium chloride (K-DUR,KLOR-CON) CR tablet 40 mEq (40 mEq Oral Given 21 0642)   LORazepam (ATIVAN) tablet 1 mg (1 mg Oral Given 8/13/21 1016)   LORazepam (ATIVAN) tablet 1 mg (1 mg Oral Given 8/13/21 1148)   OLANZapine (ZyPREXA ZYDIS) dispersible tablet 10 mg (10 mg Oral Given 8/13/21 1235)   LORazepam (ATIVAN) tablet 2 mg (2 mg Oral Given 8/13/21 1913)   ondansetron (ZOFRAN-ODT) dispersible tablet 4 mg (4 mg Oral Given 8/13/21 1641)       Diagnostic Studies  Results Reviewed     Procedure Component Value Units Date/Time    Potassium [[id number]]  (Normal) Collected: 08/13/21 1012    Lab Status: Final result Specimen: Blood from Arm, Right Updated: 08/13/21 1026     Potassium 3 7 mmol/L     Novel Coronavirus (Covid-19),PCR [facility] [[id number]]  (Normal) Collected: 08/12/21 2329    Lab Status: Final result Specimen: Nares from Nose Updated: 08/13/21 0037     SARS-CoV-2 Negative    Narrative: The specimen collection materials, transport medium, and/or testing methodology utilized in the production of these test results have been proven to be reliable in a limited validation with an abbreviated program under the Emergency Utilization Authorization provided by the FDA  Testing reported as "Presumptive positive" will be confirmed with secondary testing to ensure result accuracy  Clinical caution and judgement should be used with the interpretation of these results with consideration of the clinical impression and other laboratory testing  Testing reported as "Positive" or "Negative" has been proven to be accurate according to standard laboratory validation requirements  All testing is performed with control materials showing appropriate reactivity at standard intervals        Ethanol [[id number]]  (Normal) Collected: 08/12/21 2323    Lab Status: Final result Specimen: Blood from Arm, Right Updated: 08/13/21 0009     Ethanol Lvl <3 mg/dL     Rapid drug screen, urine [[id number]]  (Abnormal) Collected: 08/12/21 2331    Lab Status: Final result Specimen: Urine, Clean Catch Updated: 08/12/21 2349     Amph/Meth UR Negative     Barbiturate Ur Negative     Benzodiazepine Urine Positive     Cocaine Urine Negative     Methadone Urine Negative     Opiate Urine Negative     PCP Ur Negative     THC Urine Positive     Oxycodone Urine Negative    Narrative:      Presumptive report  If requested, specimen will be sent to reference lab for confirmation  FOR MEDICAL PURPOSES ONLY  IF CONFIRMATION NEEDED PLEASE CONTACT THE LAB WITHIN 5 DAYS      Drug Screen Cutoff Levels:  AMPHETAMINE/METHAMPHETAMINES  1000 ng/mL  BARBITURATES     200 ng/mL  BENZODIAZEPINES     200 ng/mL  COCAINE      300 ng/mL  METHADONE      300 ng/mL  OPIATES      300 ng/mL  PHENCYCLIDINE     25 ng/mL  THC       50 ng/mL  OXYCODONE      100 ng/mL    Troponin I [[id number]]  (Normal) Collected: 08/12/21 2323    Lab Status: Final result Specimen: Blood from Arm, Right Updated: 08/12/21 2348     Troponin I <0 02 ng/mL     POCT pregnancy, urine [[id number]]  (Normal) Resulted: 08/12/21 2344    Lab Status: Final result Updated: 08/12/21 2344     EXT PREG TEST UR (Ref: Negative) NEGATIVE     Control NEGATIVE    Basic metabolic panel [[id number]]  (Abnormal) Collected: 08/12/21 2323    Lab Status: Final result Specimen: Blood from Arm, Right Updated: 08/12/21 2339     Sodium 142 mmol/L      Potassium 3 1 mmol/L      Chloride 107 mmol/L      CO2 19 mmol/L      ANION GAP 16 mmol/L      BUN 15 mg/dL      Creatinine 0 84 mg/dL      Glucose 97 mg/dL      Calcium 8 8 mg/dL      eGFR 88 ml/min/1 73sq m     Narrative:      [place] guidelines for Chronic Kidney Disease (CKD):     Stage 1 with normal or high GFR (GFR > 90 mL/min/1 73 square meters)    Stage 2 Mild CKD (GFR = 60-89 mL/min/1 73 square meters)    Stage 3A Moderate CKD (GFR = 45-59 mL/min/1 73 square meters)    Stage 3B Moderate CKD (GFR = 30-44 mL/min/1 73 square meters)    Stage 4 Severe CKD (GFR = 15-29 mL/min/1 73 square meters)    Stage 5 End Stage CKD (GFR <15 mL/min/1 73 square meters)  Note: GFR calculation is accurate only with a steady state creatinine    CBC and differential [[id number]] Collected: 08/12/21 2323    Lab Status: Final result Specimen: Blood from Arm, Right Updated: 08/12/21 2330     WBC 7 85 Thousand/uL      RBC 5 02 Million/uL      Hemoglobin 14 9 g/dL      Hematocrit 41 4 %      MCV 83 fL      MCH 29 7 pg      MCHC 36 0 g/dL      RDW 11 8 %      MPV 9 4 fL      Platelets 455 Thousands/uL      nRBC 0 /100 WBCs      Neutrophils Relative 68 %      Immat GRANS % 0 %      Lymphocytes Relative 25 %      Monocytes Relative 6 %      Eosinophils Relative 0 %      Basophils Relative 1 %      Neutrophils Absolute 5 38 Thousands/µL      Immature Grans Absolute 0 02 Thousand/uL      Lymphocytes Absolute 1 95 Thousands/µL      Monocytes Absolute 0 45 Thousand/µL      Eosinophils Absolute 0 01 Thousand/µL      Basophils Absolute 0 04 Thousands/µL                  No orders to display              Procedures  Procedures         ED Course                             SBIRT 22yo+      Most Recent Value   SBIRT (22 yo +)   In order to provide better care to our patients, we are screening all of our patients for alcohol and drug use  Would it be okay to ask you these screening questions? Yes Filed at: 08/13/2021 2033   Initial Alcohol Screen: US AUDIT-C    1  How often do you have a drink containing alcohol?  0 Filed at: 08/13/2021 2033   2  How many drinks containing alcohol do you have on a typical day you are drinking? 0 Filed at: 08/13/2021 2033   3a  Male UNDER 65: How often do you have five or more drinks on one occasion? 0 Filed at: 08/13/2021 2033   3b  FEMALE Any Age, or MALE 65+: How often do you have 4 or more drinks on one occassion? 0 Filed at: 08/13/2021 2033   Audit-C Score  0 Filed at: 08/13/2021 2033   BULMARO: How many times in the past year have you    Used an illegal drug or used a prescription medication for non-medical reasons?   Never Filed at: 08/13/2021 2033                    MDM  Number of Diagnoses or Management Options  Anxiety  Diagnosis management comments: Patient is a 80-year-old female with a past medical history significant for anxiety, depression, GERD, migraines who presents to the emergency department for evaluation of anxiety  This is patient's 3rd visit to an emergency department this week for the same complaint  Patient states that this episode of severe anxiety has been ongoing for the past 4 days  She has been to the hospital 2 other times and was given Ativan with temporary relief  Patient does take Klonopin as needed  She also takes Lexapro for anxiety  Patient has not been seeing her therapist or taking her medications as prescribed  Per patient's , she has been calling out for her  father  She has been very restless and irritable  Patient would like to sign a 201 and speak to crisis  She is not having any suicidal or homicidal ideations  She does state that she has been experiencing some chest pains and palpitations  She states that the Ativan and Klonopin is not working for her anymore  She is not having any other complaints at this time  Patient is very restless and anxious in bed  She is pacing around the room  Her vital signs are normal   She is having rapid and pressured speech  She is uncooperative  She is stating that she is not going to speak with anybody until she gets medications  Patient informed that this is her 3rd visit this week for the same complaint and that I would be happy to medicate her after she signed a 201  Patient is agreeable to sign a 201  Patient's  is present and agreeable with this plan  He thinks that she would benefit from inpatient psychiatric help  She is not suicidal or homicidal   Patient lab work unremarkable  Troponin normal   EKG revealed normal sinus rhythm without any ST or T-wave changes  Urine drug screen positive for marijuana and benzodiazepines  Patient did sign a 201    Patient was given Zofran and Haldol in the emergency department with relief  Patient was also evaluated by crisis who found placement for her  Patient will be held in the emergency department until she is ready for transfer  Patient is stable at this time  Amount and/or Complexity of Data Reviewed  Clinical lab tests: ordered and reviewed    Patient Progress  Patient progress: stable      Disposition  Final diagnoses:   Anxiety     Time reflects when diagnosis was documented in both MDM as applicable and the Disposition within this note     Time User Action Codes Description Comment    8/13/2021  9:17 AM Mati Riveratead Add [F41 9] Anxiety     8/13/2021  9:42 AM Daniela Stallworthmikael Corteszulma Add [F32 9] Major depression       ED Disposition     ED Disposition Condition Date/Time Comment    Transfer to [facility] Aug 13, 2021  9:17 AM Kristen Gross should be transferred out to [facility] and has been medically cleared          MD Documentation      Most Recent Value   Patient Condition  The patient has been stabilized such that within reasonable medical probability, no material deterioration of the patient condition or the condition of the unborn child(dilan) is likely to result from the transfer   Reason for Transfer  Level of Care needed not available at this facility   Benefits of Transfer  Continuity of care   Risks of Transfer  Potential for delay in receiving treatment   Accepting Physician  [facility] Name, Dick Rivera U  [address]    (Name & Tel number)  [facility], [phone]   Transported by [facility] and Unit #)  CTS   Sending MD Tresa Crain   Provider Certification  General risk, such as traffic hazards, adverse weather conditions, rough terrain or turbulence, possible failure of equipment (including vehicle or aircraft), or consequences of actions of persons outside the control of the transport personnel, The patient is stable for psychiatric transfer because they are medically stable, and is protected from harming him/herself or others during transport      RN Documentation      [facility] Name, Dick Rivera U  [address] [place]    (Name & Tel number)  [facility] [place], [phone]   Transported by [facility] and Unit #)  CTS   Transfer Date  08/13/21   Transfer Time  1930      Follow-up Information    None         Discharge Medication List as of 8/13/2021  9:52 PM      CONTINUE these medications which have NOT CHANGED    Details   clonazePAM (KlonoPIN) 0 5 mg tablet Take 1 tablet (0 5 mg total) by mouth 3 (three) times a day, Starting Thu 8/12/2021, Normal      escitalopram (LEXAPRO) 10 mg tablet Take 1 tablet (10 mg total) by mouth daily, Starting Tue 3/23/2021, Normal      hydrOXYzine HCL (ATARAX) 50 mg tablet Take 1 tablet (50 mg total) by mouth every 8 (eight) hours as needed for itching for up to 10 days, Starting Mon 8/9/2021, Until Thu 8/19/2021 at 2359, Normal      levonorgestrel (MIRENA) 20 MCG/24HR IUD 1 each by Intrauterine route once, Historical Med      !! ondansetron (ZOFRAN-ODT) 4 mg disintegrating tablet Take 1 tablet (4 mg total) by mouth every 8 (eight) hours as needed for nausea or vomiting, Starting Wed 3/21/2018, Print      !! ondansetron (ZOFRAN-ODT) 4 mg disintegrating tablet Take 1 tablet (4 mg total) by mouth every 6 (six) hours as needed for nausea, Starting Mon 7/26/2021, Print       !! - Potential duplicate medications found  Please discuss with provider  No discharge procedures on file      PDMP Review       Value Time User    PDMP Reviewed  Yes 8/16/2021 11:48 AM Sharon Crabtree MD          ED Provider  Electronically Signed by           Alvaro Arboleda PA-C  08/23/21 2418

## 2021-08-23 NOTE — CASE MANAGEMENT
CM contacted [facility] to see if they have any openings for [facility] partial for this week as pt may be interested in attending after dc on Tuesday 8/24/2021  CM will follow up with pt and family once an answer is provided

## 2021-08-24 VITALS
DIASTOLIC BLOOD PRESSURE: 59 MMHG | WEIGHT: 126.76 LBS | TEMPERATURE: 98.9 F | SYSTOLIC BLOOD PRESSURE: 111 MMHG | BODY MASS INDEX: 22.46 KG/M2 | OXYGEN SATURATION: 99 % | RESPIRATION RATE: 16 BRPM | HEIGHT: 63 IN | HEART RATE: 84 BPM

## 2021-08-24 PROCEDURE — 99239 HOSP IP/OBS DSCHRG MGMT >30: CPT | Performed by: PHYSICIAN ASSISTANT

## 2021-08-24 RX ORDER — ESCITALOPRAM OXALATE 20 MG/1
20 TABLET ORAL DAILY
Qty: 30 TABLET | Refills: 1 | Status: SHIPPED | OUTPATIENT
Start: 2021-08-24 | End: 2022-04-05

## 2021-08-24 RX ORDER — OLANZAPINE 5 MG/1
5 TABLET ORAL
Qty: 30 TABLET | Refills: 1 | Status: SHIPPED | OUTPATIENT
Start: 2021-08-24 | End: 2022-04-05

## 2021-08-24 RX ORDER — GABAPENTIN 400 MG/1
400 CAPSULE ORAL 4 TIMES DAILY
Qty: 120 CAPSULE | Refills: 1 | Status: SHIPPED | OUTPATIENT
Start: 2021-08-24 | End: 2022-02-21 | Stop reason: ALTCHOICE

## 2021-08-24 RX ORDER — CLONAZEPAM 1 MG/1
1 TABLET ORAL 2 TIMES DAILY
Qty: 28 TABLET | Refills: 0 | Status: SHIPPED | OUTPATIENT
Start: 2021-08-24 | End: 2021-09-07

## 2021-08-24 RX ORDER — HYDROXYZINE 50 MG/1
50 TABLET, FILM COATED ORAL EVERY 6 HOURS PRN
Qty: 40 TABLET | Refills: 0 | Status: SHIPPED | OUTPATIENT
Start: 2021-08-24 | End: 2022-02-21 | Stop reason: ALTCHOICE

## 2021-08-24 RX ADMIN — CLONAZEPAM 1 MG: 1 TABLET ORAL at 09:01

## 2021-08-24 RX ADMIN — GABAPENTIN 400 MG: 400 CAPSULE ORAL at 09:01

## 2021-08-24 RX ADMIN — ESCITALOPRAM OXALATE 20 MG: 20 TABLET ORAL at 09:01

## 2021-08-24 NOTE — NURSING NOTE
Pt cooperative and appropriate on unit  Hiren Ac for discharge and anxious to see spouse and daughter  Reports readiness and expresses understanding of outpatient services  AVS reviewed and prescriptions given  Pt expresses understanding  Pt discharged from unit via spouse

## 2021-08-24 NOTE — PROGRESS NOTES
Pt tearful at times, reports anxiety and reported vomiting but it was not witnessed  Pt has cookies and snacks in side table  Staff assisted pt in Jens Rebolledo 94 video call with her  last evening and pt reported that helped her  Pt slept overnight       DC: Next week    08/20/21 0804   Team Meeting   Meeting Type Daily Rounds   Team Members Present   Team Members Present Physician;Nurse;   Physician Team Member Dr Gemini Galaviz / Ashanti Herman Team Member [place] Management Team Member Kathie Giles / ELIAS BILLINGSLEY  [facility]   OT Team Member  --    Patient/Family Present   Patient Present No   Patient's Family Present No

## 2021-08-24 NOTE — NURSING NOTE
Pt slept until 0215 when another patient entered the room and startled her awake  Pt fell back asleep and has been sleeping since

## 2021-08-24 NOTE — PROGRESS NOTES
Pt social and reported that she had a good day  Denies SI  Pt reported she is feeling for ready for dc today       DC: Today -  will  at 10am      08/24/21 6631   Team Meeting   Meeting Type Daily Rounds   Team Members Present   Team Members Present Physician;Nurse;Occupational Therapist;   Physician Team Member Dr Catarina Espino / Osorio Constantino Team Member Jose Angel Hogue / Spencer Julian Management Team Member Freddy Baron / Neli Oliva   OT Team Member Say Mishra   Patient/Family Present   Patient Present No   Patient's Family Present No

## 2021-08-24 NOTE — CASE MANAGEMENT
CM attempted again as pt initially declined to meet with CM for intake and asked CM to come back another time as she was struggling with anxiety and panic attacks  CM met with pt to complete intake and sign TAYLOR for dc planning  Pt was admitted on: 2021 from [facility] [address] ED     Reasons for admission/stressors:  ED Note 2021 "Patient is a 71-year-old female with a past medical history significant for anxiety, depression, GERD, migraines who presents to the emergency department for evaluation of anxiety  This is patient's 3rd visit to an emergency department this week for the same complaint  Patient states that this episode of severe anxiety has been ongoing for the past 4 days  She has been to the hospital 2 other times and was given Ativan with temporary relief  Patient does take Klonopin as needed  She also takes Lexapro for anxiety  Patient has not been seeing her therapist or taking her medications as prescribed  Per patient's , she has been calling out for her  father  She has been very restless and irritable  Patient would like to sign a 201 and speak to crisis  She is not having any suicidal or homicidal ideations  She does state that she has been experiencing some chest pains and palpitations  She states that the Ativan and Klonopin is not working for her anymore  She is not having any other complaints at this time "     Pt wishes to return to/with:  Pt lives with her  and 11year old daughter and can return  Outpatient Services: While pt was in the emergency room, pt  contacted [facility] in [place] to schedule pt an emergency appointment for psychiatrist and she was scheduled for an intake on 2021 but pt was hospitalized  Pt and  would like her to be rescheduled for that appointment with psychiatrist      Pt has been seeing Psychologist Dr Alberto Parker for many years for therapy and pt reported that she has found his sessions helpful  Recently, Dr Chintan Sierra schedule has not allowed for him to see pt on a consistent basis that pt requires at this time  Pt may be interested in seeing new therapist at [facility]URG[facility]\"" to allow for more intensive therapy  D&A Resources/Rehab?: N/A    UDS: Positive for Benzos and THC   BAT: Negative     PCP: N/A  ICM: N/A    Pharmacy: [place] [phone]    Prior IP Treatment: Pt was hospitalized in her early twenties  Medical Concerns: N/A    Insurance: Omn[employer] Issues: Denies    Access to firearms:  has firearms locked in the home     Income/Employment: Pt works full-time, has been working remotely due to [facility]  Emergency Contacts/Supports:  [facility] (- [phone])   Bucky Marley ([phone])     Transportation at DC:     will pick pt up on day of dc     TAYLOR signed for:   [facility] (- [phone])

## 2021-08-30 ENCOUNTER — TELEPHONE (OUTPATIENT)
Dept: PSYCHOLOGY | Facility: CLINIC | Age: 41
End: 2021-08-30

## 2021-08-30 NOTE — TELEPHONE ENCOUNTER
Called patient to offer PHP again but she stated that she got some other appointment already somewhere else thru her therapist and not looking for PHP at this time      Christopher Moon

## 2021-08-30 NOTE — CASE MANAGEMENT
8/30/2021 3:08pm    CM received follow up email from pt regarding her Return to work note and Outpatient appointments  Pt had called and emailed CM at the end of last week after dc on 8/24/2021 that she had her intake appointment with [facility] and they were able to schedule her with Psychiatrist on 9/15/2021 but they were unable to schedule her with a therapist due to having a waitlist      Pt reported to CM that she did find a therapist on her own, and has an appt on 9/7/2021 with Dr Raymond Prieto at the [address]  Pt then asked CM:   "I do need updated paperwork please, in regard to my return to work  With these appts being weeks out, can you please update and email the revised copy of my return to [facility] for stating Tuesday, September 21  Or it could also say to be determined since were not exactly sure what the therapist and medical physician will advise"     CM responded to pt and informed her that team is unable to extend her "return to work" date longer than 8/31/2021 as the team is no longer providing her care  CM recommended that pt contact her PCP or other medical doctor that has been following her, or she can contact her previous therapist Dr Vicente Romano to see if he is able to complete work forms for pt        CM also provided pt with the additional Therapist resources below to contact if she wishes to       [facility] (They currently have a waitlist, they advised to call at end of September to check in)  Shayna Nunez [address] [place] [address]   Phone: [phone]    [address] [place] [address]   Phone: [phone]   [address] [place] [address]   Phone: [phone]    [facility]   [address] [place] [address]  Phone: [phone]    [address] [place] [address]   Phone: [phone]

## 2022-01-17 ENCOUNTER — PROCEDURE VISIT (OUTPATIENT)
Dept: OBGYN CLINIC | Facility: CLINIC | Age: 42
End: 2022-01-17

## 2022-01-17 VITALS
WEIGHT: 158 LBS | SYSTOLIC BLOOD PRESSURE: 110 MMHG | HEIGHT: 63 IN | DIASTOLIC BLOOD PRESSURE: 68 MMHG | BODY MASS INDEX: 28 KG/M2

## 2022-01-17 DIAGNOSIS — Z30.432 ENCOUNTER FOR IUD REMOVAL: ICD-10-CM

## 2022-01-17 DIAGNOSIS — Z30.430 ENCOUNTER FOR INSERTION OF MIRENA IUD: Primary | ICD-10-CM

## 2022-01-17 DIAGNOSIS — Z32.02 NEGATIVE PREGNANCY TEST: ICD-10-CM

## 2022-01-17 PROCEDURE — 58300 INSERT INTRAUTERINE DEVICE: CPT | Performed by: OBSTETRICS & GYNECOLOGY

## 2022-01-17 PROCEDURE — 58301 REMOVE INTRAUTERINE DEVICE: CPT | Performed by: OBSTETRICS & GYNECOLOGY

## 2022-01-17 PROCEDURE — 81025 URINE PREGNANCY TEST: CPT | Performed by: OBSTETRICS & GYNECOLOGY

## 2022-01-17 RX ORDER — LORAZEPAM 1 MG/1
1 TABLET ORAL EVERY 6 HOURS PRN
COMMUNITY
Start: 2021-08-12 | End: 2022-02-08

## 2022-01-17 NOTE — PROGRESS NOTES
Assessment/Plan:         Diagnoses and all orders for this visit:    Encounter for insertion of mirena IUD    Negative pregnancy test    Other orders  -     LORazepam (ATIVAN) 1 mg tablet; Take 1 mg by mouth every 6 (six) hours as needed          Subjective:      Patient ID: Sharifa Schultz is a 37 y o  female  Patient presenting for the removal of her Mirena IUD and the reinsertion of the Mirena IUD  Urine pregnancy test negative  Iud removal    Date/Time: 1/17/2022 1:53 PM  Performed by: Ibis De La Rosa MD  Authorized by: Ibis De La Rosa MD   Universal Protocol:  Consent: Verbal consent obtained  Risks and benefits: risks, benefits and alternatives were discussed    Procedure:     Removed with no complications: yes    Iud insertions    Date/Time: 1/17/2022 1:54 PM  Performed by: Ibis De La Rosa MD  Authorized by: Ibis De La Rosa MD   Universal Protocol:  Consent: Verbal consent obtained    Risks and benefits: risks, benefits and alternatives were discussed  Consent given by: patient  Patient understanding: patient states understanding of the procedure being performed        Procedure:     Pelvic exam performed: yes      Negative urine pregnancy test: yes      Cervix cleaned and prepped: yes      Speculum placed in vagina: yes      Tenaculum applied to cervix: yes      Uterus sounded: yes      Uterus sound depth (cm):  8    IUD inserted with no complications: yes      IUD type:  Mirena    Strings trimmed: yes    Post-procedure:     Patient tolerated procedure well: yes            The following portions of the patient's history were reviewed and updated as appropriate:   She  has a past medical history of Anxiety, Depression, Disruption of episiotomy wound in the puerperium, External hemorrhoids, Fibroid uterus, GERD (gastroesophageal reflux disease), Headache, Leiomyoma of uterus, Migraine, Migraine, Polyhydramnios in third trimester, not applicable or unspecified fetus, Pregnancy headache in third trimester, Protein in urine, Sciatica of right side, Third degree laceration of perineum, type 3b (1/26/2016), and Varicella  She   Patient Active Problem List    Diagnosis Date Noted    Family history of breast cancer 03/05/2019    Panic disorder without agoraphobia with severe panic attacks 05/11/2016    Fibroid uterus 01/26/2016    GERD (gastroesophageal reflux disease) 01/26/2016    Third degree laceration of perineum, type 3b 01/26/2016     She  has a past surgical history that includes Myomectomy; [place] tooth extraction; pr post colporrhaphy,rectum/vagina (N/A, 3/24/2016); and Cholecystectomy (2006)  Her family history includes Cancer in her maternal aunt and mother  She  reports that she has never smoked  She has never used smokeless tobacco  She reports current alcohol use  She reports current drug use  Drug: Marijuana  Current Outpatient Medications   Medication Sig Dispense Refill    clonazePAM (KlonoPIN) 1 mg tablet Take 1 tablet (1 mg total) by mouth 2 (two) times a day for 8 days 16 tablet 0    escitalopram (LEXAPRO) 20 mg tablet Take 1 tablet (20 mg total) by mouth daily 30 tablet 1    levonorgestrel (MIRENA) 20 MCG/24HR IUD 1 each by Intrauterine route once      LORazepam (ATIVAN) 1 mg tablet Take 1 mg by mouth every 6 (six) hours as needed      OLANZapine (ZyPREXA) 5 mg tablet Take 1 tablet (5 mg total) by mouth daily at bedtime 30 tablet 1    gabapentin (NEURONTIN) 400 mg capsule Take 1 capsule (400 mg total) by mouth 4 (four) times a day (Patient not taking: Reported on 1/17/2022 ) 120 capsule 1    hydrOXYzine HCL (ATARAX) 50 mg tablet Take 1 tablet (50 mg total) by mouth every 6 (six) hours as needed for anxiety for up to 10 days (Patient not taking: Reported on 1/17/2022 ) 40 tablet 0     No current facility-administered medications for this visit       Current Outpatient Medications on File Prior to Visit   Medication Sig    clonazePAM (KlonoPIN) 1 mg tablet Take 1 tablet (1 mg total) by mouth 2 (two) times a day for 8 days    escitalopram (LEXAPRO) 20 mg tablet Take 1 tablet (20 mg total) by mouth daily    levonorgestrel (MIRENA) 20 MCG/24HR IUD 1 each by Intrauterine route once    LORazepam (ATIVAN) 1 mg tablet Take 1 mg by mouth every 6 (six) hours as needed    OLANZapine (ZyPREXA) 5 mg tablet Take 1 tablet (5 mg total) by mouth daily at bedtime    gabapentin (NEURONTIN) 400 mg capsule Take 1 capsule (400 mg total) by mouth 4 (four) times a day (Patient not taking: Reported on 1/17/2022 )    hydrOXYzine HCL (ATARAX) 50 mg tablet Take 1 tablet (50 mg total) by mouth every 6 (six) hours as needed for anxiety for up to 10 days (Patient not taking: Reported on 1/17/2022 )     No current facility-administered medications on file prior to visit  She is allergic to demerol [meperidine] and macrolides and ketolides       Review of Systems      Objective:      /68   Ht 5' 3" (1 6 m)   Wt 71 7 kg (158 lb)   BMI 27 99 kg/m²          Physical Exam

## 2022-01-18 ENCOUNTER — TELEPHONE (OUTPATIENT)
Dept: OBGYN CLINIC | Facility: CLINIC | Age: 42
End: 2022-01-18

## 2022-01-18 LAB — SL AMB POCT URINE HCG: NEGATIVE

## 2022-02-17 ENCOUNTER — TELEPHONE (OUTPATIENT)
Dept: OBGYN CLINIC | Facility: CLINIC | Age: 42
End: 2022-02-17

## 2022-02-17 DIAGNOSIS — R53.83 FATIGUE, UNSPECIFIED TYPE: ICD-10-CM

## 2022-02-17 DIAGNOSIS — Z13.220 SCREENING FOR LIPID DISORDERS: ICD-10-CM

## 2022-02-17 DIAGNOSIS — F33.2 SEVERE EPISODE OF RECURRENT MAJOR DEPRESSIVE DISORDER, WITHOUT PSYCHOTIC FEATURES (HCC): Primary | ICD-10-CM

## 2022-02-17 DIAGNOSIS — Z13.1 SCREENING FOR DIABETES MELLITUS: ICD-10-CM

## 2022-02-17 DIAGNOSIS — E55.9 VITAMIN D DEFICIENCY: ICD-10-CM

## 2022-02-17 NOTE — TELEPHONE ENCOUNTER
Pt sees a therapist and is having severe depression  Pt was crying on phone with me  Wants blood - hormone tests  Wants to speak with you   Thanks

## 2022-02-17 NOTE — TELEPHONE ENCOUNTER
Pt was seen 1/17/22  with [facility] for Mirena Removal & Insertion,  Pt is experiencing depression, loss of motivation, lack of appetite, don't leave house, No interaction with family, PLS CALL PT to discuss,  Thanks

## 2022-02-18 NOTE — TELEPHONE ENCOUNTER
Pt following up to see if BALJEET has gotten her message, I advised that her message was sent up to [facility] but that I do not know when she will be able to call her

## 2022-02-21 ENCOUNTER — APPOINTMENT (OUTPATIENT)
Dept: LAB | Facility: HOSPITAL | Age: 42
End: 2022-02-21
Payer: COMMERCIAL

## 2022-02-21 ENCOUNTER — OFFICE VISIT (OUTPATIENT)
Dept: OBGYN CLINIC | Facility: CLINIC | Age: 42
End: 2022-02-21
Payer: COMMERCIAL

## 2022-02-21 VITALS
SYSTOLIC BLOOD PRESSURE: 124 MMHG | HEIGHT: 63 IN | WEIGHT: 157 LBS | BODY MASS INDEX: 27.82 KG/M2 | DIASTOLIC BLOOD PRESSURE: 68 MMHG

## 2022-02-21 DIAGNOSIS — Z13.220 SCREENING FOR LIPID DISORDERS: ICD-10-CM

## 2022-02-21 DIAGNOSIS — F33.2 SEVERE EPISODE OF RECURRENT MAJOR DEPRESSIVE DISORDER, WITHOUT PSYCHOTIC FEATURES (HCC): ICD-10-CM

## 2022-02-21 DIAGNOSIS — Z13.1 SCREENING FOR DIABETES MELLITUS: ICD-10-CM

## 2022-02-21 DIAGNOSIS — Z30.431 SURVEILLANCE OF INTRAUTERINE CONTRACEPTION: Primary | ICD-10-CM

## 2022-02-21 DIAGNOSIS — R53.83 FATIGUE, UNSPECIFIED TYPE: ICD-10-CM

## 2022-02-21 DIAGNOSIS — E55.9 VITAMIN D DEFICIENCY: ICD-10-CM

## 2022-02-21 LAB
ALBUMIN SERPL BCP-MCNC: 4 G/DL (ref 3.5–5)
ALP SERPL-CCNC: 59 U/L (ref 46–116)
ALT SERPL W P-5'-P-CCNC: 20 U/L (ref 12–78)
ANION GAP SERPL CALCULATED.3IONS-SCNC: 9 MMOL/L (ref 4–13)
AST SERPL W P-5'-P-CCNC: 27 U/L (ref 5–45)
BASOPHILS # BLD AUTO: 0.05 THOUSANDS/ΜL (ref 0–0.1)
BASOPHILS NFR BLD AUTO: 1 % (ref 0–1)
BILIRUB SERPL-MCNC: 1.26 MG/DL (ref 0.2–1)
BUN SERPL-MCNC: 11 MG/DL (ref 5–25)
CALCIUM SERPL-MCNC: 8.8 MG/DL (ref 8.3–10.1)
CHLORIDE SERPL-SCNC: 107 MMOL/L (ref 100–108)
CHOLEST SERPL-MCNC: 169 MG/DL
CO2 SERPL-SCNC: 25 MMOL/L (ref 21–32)
CREAT SERPL-MCNC: 0.86 MG/DL (ref 0.6–1.3)
EOSINOPHIL # BLD AUTO: 0.12 THOUSAND/ΜL (ref 0–0.61)
EOSINOPHIL NFR BLD AUTO: 3 % (ref 0–6)
ERYTHROCYTE [DISTWIDTH] IN BLOOD BY AUTOMATED COUNT: 12.8 % (ref 11.6–15.1)
EST. AVERAGE GLUCOSE BLD GHB EST-MCNC: 88 MG/DL
GFR SERPL CREATININE-BSD FRML MDRD: 84 ML/MIN/1.73SQ M
GLUCOSE P FAST SERPL-MCNC: 90 MG/DL (ref 65–99)
HBA1C MFR BLD: 4.7 %
HCT VFR BLD AUTO: 42.1 % (ref 34.8–46.1)
HDLC SERPL-MCNC: 63 MG/DL
HGB BLD-MCNC: 14 G/DL (ref 11.5–15.4)
IMM GRANULOCYTES # BLD AUTO: 0.01 THOUSAND/UL (ref 0–0.2)
IMM GRANULOCYTES NFR BLD AUTO: 0 % (ref 0–2)
LDLC SERPL CALC-MCNC: 96 MG/DL (ref 0–100)
LYMPHOCYTES # BLD AUTO: 1.86 THOUSANDS/ΜL (ref 0.6–4.47)
LYMPHOCYTES NFR BLD AUTO: 39 % (ref 14–44)
MAGNESIUM SERPL-MCNC: 2.2 MG/DL (ref 1.6–2.6)
MCH RBC QN AUTO: 29.2 PG (ref 26.8–34.3)
MCHC RBC AUTO-ENTMCNC: 33.3 G/DL (ref 31.4–37.4)
MCV RBC AUTO: 88 FL (ref 82–98)
MONOCYTES # BLD AUTO: 0.24 THOUSAND/ΜL (ref 0.17–1.22)
MONOCYTES NFR BLD AUTO: 5 % (ref 4–12)
NEUTROPHILS # BLD AUTO: 2.55 THOUSANDS/ΜL (ref 1.85–7.62)
NEUTS SEG NFR BLD AUTO: 52 % (ref 43–75)
NONHDLC SERPL-MCNC: 106 MG/DL
NRBC BLD AUTO-RTO: 0 /100 WBCS
PLATELET # BLD AUTO: 208 THOUSANDS/UL (ref 149–390)
PMV BLD AUTO: 10.3 FL (ref 8.9–12.7)
POTASSIUM SERPL-SCNC: 4.5 MMOL/L (ref 3.5–5.3)
PROT SERPL-MCNC: 7.3 G/DL (ref 6.4–8.2)
RBC # BLD AUTO: 4.79 MILLION/UL (ref 3.81–5.12)
SODIUM SERPL-SCNC: 141 MMOL/L (ref 136–145)
TRIGL SERPL-MCNC: 48 MG/DL
TSH SERPL DL<=0.05 MIU/L-ACNC: 1.62 UIU/ML (ref 0.36–3.74)
VIT B12 SERPL-MCNC: 437 PG/ML (ref 100–900)
WBC # BLD AUTO: 4.83 THOUSAND/UL (ref 4.31–10.16)

## 2022-02-21 PROCEDURE — 80053 COMPREHEN METABOLIC PANEL: CPT

## 2022-02-21 PROCEDURE — 80061 LIPID PANEL: CPT

## 2022-02-21 PROCEDURE — 82607 VITAMIN B-12: CPT

## 2022-02-21 PROCEDURE — 85025 COMPLETE CBC W/AUTO DIFF WBC: CPT

## 2022-02-21 PROCEDURE — 82306 VITAMIN D 25 HYDROXY: CPT

## 2022-02-21 PROCEDURE — 36415 COLL VENOUS BLD VENIPUNCTURE: CPT

## 2022-02-21 PROCEDURE — 83735 ASSAY OF MAGNESIUM: CPT

## 2022-02-21 PROCEDURE — 99212 OFFICE O/P EST SF 10 MIN: CPT | Performed by: NURSE PRACTITIONER

## 2022-02-21 PROCEDURE — 83036 HEMOGLOBIN GLYCOSYLATED A1C: CPT

## 2022-02-21 PROCEDURE — 84443 ASSAY THYROID STIM HORMONE: CPT

## 2022-02-21 NOTE — PROGRESS NOTES
Diagnoses and all orders for this visit:    Surveillance of intrauterine contraception        Call if no symptom improvement, all questions answered, return for annual         Pleasant 37 y  o here for IUD string check  States she is not having issues with her menses, amenorrheic with her IUD  She has been amenorrheic since replacement  Denies pelvic pain  Denies vaginal symptoms  UTD on GYN exams  She is happy with her IUD but is currently going through depression and mood issues and just had a medication adjustment with her psychiatrist  She was advised to see her counselor regularly  Dr Jj Mcgraw has ordered labs to rule out any other sources for her issue which she will do soon  She may consider a ParaGard if symptoms worsen  Past Medical History:   Diagnosis Date    Anxiety     Depression     Disruption of episiotomy wound in the puerperium     Last assessed 06/22/16    External hemorrhoids     Last assessed 02/05/16    Fibroid uterus     GERD (gastroesophageal reflux disease)     Headache     Leiomyoma of uterus     Migraine     Migraine     Polyhydramnios in third trimester, not applicable or unspecified fetus     Last assessed 01/25/16    Pregnancy headache in third trimester     Resolved 02/05/16    Protein in urine     Last assessed 01/25/16    Sciatica of right side     Third degree laceration of perineum, type 3b 1/26/2016    Varicella      Past Surgical History:   Procedure Laterality Date    CHOLECYSTECTOMY  2006    MYOMECTOMY      AL POST COLPORRHAPHY,RECTUM/VAGINA N/A 3/24/2016    Procedure: Randine Book ;  Surgeon: Jj Mcgraw MD;  [facility]: [facility];  Service: Gynecology    WISDOM TOOTH EXTRACTION       Social History     Tobacco Use    Smoking status: Never Smoker    Smokeless tobacco: Never Used   Vaping Use    Vaping Use: Never used   Substance Use Topics    Alcohol use: Yes     Comment: socially; 3-4 drinks/week (8/13)    Drug use:  Yes Types: Marijuana     Comment: Pt has a medical marijuana card ()     Family History   Problem Relation Age of Onset    Cancer Mother         Breast    Cancer Maternal Aunt         Breast       Current Outpatient Medications:     clonazePAM (KlonoPIN) 1 mg tablet, Take 1 tablet (1 mg total) by mouth 2 (two) times a day for 8 days, Disp: 16 tablet, Rfl: 0    escitalopram (LEXAPRO) 20 mg tablet, Take 1 tablet (20 mg total) by mouth daily, Disp: 30 tablet, Rfl: 1    OLANZapine (ZyPREXA) 5 mg tablet, Take 1 tablet (5 mg total) by mouth daily at bedtime, Disp: 30 tablet, Rfl: 1    Allergies   Allergen Reactions    Demerol [Meperidine] Hyperactivity    Macrolides And Ketolides      OB History    Para Term  AB Living   1 1 1 0 0 1   SAB IAB Ectopic Multiple Live Births   0 0 0 0 1      # Outcome Date GA Lbr Michel/2nd Weight Sex Delivery Anes PTL Lv   1 Term 16 39w0d 05:40 / 03:20 3827 g (8 lb 7 oz) F Vag-Forceps EPI N MELISSA       Vitals:    22 0910   BP: 124/68   BP Location: Left arm   Patient Position: Sitting   Cuff Size: Standard   Weight: 71 2 kg (157 lb)   Height: 5' 3" (1 6 m)     Body mass index is 27 81 kg/m²  Review of Systems   Constitutional: Negative for chills, fatigue, fever and unexpected weight change  Respiratory: Negative for shortness of breath  Gastrointestinal: Negative for anal bleeding, blood in stool, constipation and diarrhea  Genitourinary: Negative for difficulty urinating, dysuria and hematuria  Physical Exam   Constitutional: She appears well-developed and well-nourished  No distress  Alert and oriented  HENT: atraumatic  Head: Normocephalic  Neck: Normal range of motion  Neck supple  Pulmonary: Effort normal   Abdominal: Soft  Pelvic exam was performed with patient supine  No labial fusion  There is no rash, tenderness, lesion or injury on the right labia  There is no rash, tenderness, lesion or injury on the left labia   Urethral meatus does not show any tenderness, inflammation or discharge  Palpation of midline bladder without pain or discomfort  Uterus is not deviated, not enlarged, not fixed and not tender  Cervix exhibits no motion tenderness, no discharge and no friability  Right adnexum displays no mass, no tenderness and no fullness  Left adnexum displays no mass, no tenderness and no fullness  No erythema or tenderness in the vagina  No foreign body in the vagina  No signs of injury around the vagina  Small amount of vaginal discharge noted-pt asymptomatic  Perineum and anus without areas of injury  No lesions noted or swelling  IUD strings seen from OS  Lymphadenopathy:        Right: No inguinal adenopathy present  Left: No inguinal adenopathy present

## 2022-02-21 NOTE — TELEPHONE ENCOUNTER
Spoke with Melody on 2/18  IUD is associated with worsening depression usually but the temporal circumstances for Melody are compelling  Plan to remove IUD and observe for next 3-6 months  Cautioned need for alternate form of contraception  Menses will return over the next 6-8 weeks possibly sooner  Request for labs for general well being and nutritional deficiency

## 2022-02-21 NOTE — PATIENT INSTRUCTIONS
Depression   WHAT YOU NEED TO KNOW:   What is depression? Depression is a medical condition that causes feelings of sadness or hopelessness that do not go away  Depression may cause you to lose interest in things you used to enjoy  These feelings may interfere with your daily life  What causes or increases my risk for depression? Depression may be caused by changes in brain chemicals that affect your mood  Your risk for depression may be higher if you have any of the following:  · Stressful events such as the death of a loved one, unemployment, or divorce    · A family history of depression    · A chronic medical condition, such as diabetes, heart disease, or cancer    · Drug or alcohol abuse    What are the signs and symptoms of depression? · Appetite changes, or weight gain or loss    · Trouble going to sleep or staying asleep, or sleeping too much    · Fatigue or lack of energy    · Feeling restless, irritable, or withdrawn    · Feeling worthless, hopeless, discouraged, or guilty    · Trouble concentrating, remembering things, doing daily tasks, or making decisions    · Thoughts about hurting or killing yourself    How is depression diagnosed? Your healthcare provider will ask about your symptoms and how long you have had them  He or she will ask if you have any family members with depression  Tell your healthcare provider about any stressful events in your life  He or she may ask about any other health conditions or medicines you take  How is depression treated? · Therapy  is often used together with medicine  Therapy is a way for you to talk about your feelings and anything that may be causing depression  Therapy can be done alone or in a group  It may also be done with family members or a significant other  · Antidepressant medicine  may be given to relieve depression  You may need to take this medicine for several weeks before you begin to feel better   Tell your healthcare provider about any side effects or problems you have with your medicine  The type or amount of medicine may need to be changed  How can I manage depression? · Get regular physical activity  Try to be active for 30 minutes, 3 to 5 days a week  Physical activity can help relieve depression  Work with your healthcare provider to develop a plan that you enjoy  It may help to ask someone to be active with you  · Create a regular sleep schedule  A routine can help you relax before bed  Listen to music, read, or do yoga  Try to go to bed and wake up at the same time every day  Sleep is important for emotional health  · Eat a variety of healthy foods  Healthy foods include fruits, vegetables, whole-grain breads, low-fat dairy products, lean meats, fish, and cooked beans  A healthy meal plan is low in fat, salt, and added sugar  · Do not drink alcohol or use drugs  Alcohol and drugs can make depression worse  Talk to your therapist or doctor if you need help quitting  The following resources are available at any time to help you, if needed:   · [address] [place] [address]: 1-633.845.7054 (4-248-330-JANW)     · Suicide Hotline: 3-953.213.9015 (2-896-WFCBNKL)     · For a list of international numbers: https://save org/find-help/international-resources/    Call your local emergency number (911 in the [address] [place] [address],[address]) if:   · You think about harming yourself or someone else  · You have done something on purpose to hurt yourself  When should I call my therapist or doctor? · Your symptoms do not improve  · You cannot make it to your next appointment  · You have new symptoms  · You have questions or concerns about your condition or care  CARE AGREEMENT:   You have the right to help plan your care  Learn about your health condition and how it may be treated  Discuss treatment options with your healthcare providers to decide what care you want to receive  You always have the right to refuse treatment   The above information is an  only  It is not intended as medical advice for individual conditions or treatments  Talk to your doctor, nurse or pharmacist before following any medical regimen to see if it is safe and effective for you  © Copyright [facility] 2021 Information is for End User's use only and may not be sold, redistributed or otherwise used for commercial purposes   All illustrations and images included in CareNotes® are the copyrighted property of [employer]  or [address] [place] [address]

## 2022-02-22 LAB — 25(OH)D3 SERPL-MCNC: 33.7 NG/ML (ref 30–100)

## 2022-04-05 ENCOUNTER — ANNUAL EXAM (OUTPATIENT)
Dept: OBGYN CLINIC | Facility: CLINIC | Age: 42
End: 2022-04-05
Payer: COMMERCIAL

## 2022-04-05 VITALS
SYSTOLIC BLOOD PRESSURE: 122 MMHG | BODY MASS INDEX: 28.14 KG/M2 | WEIGHT: 158.8 LBS | HEIGHT: 63 IN | DIASTOLIC BLOOD PRESSURE: 80 MMHG

## 2022-04-05 DIAGNOSIS — Z01.419 ENCOUNTER FOR ANNUAL ROUTINE GYNECOLOGICAL EXAMINATION: Primary | ICD-10-CM

## 2022-04-05 DIAGNOSIS — Z12.31 SCREENING MAMMOGRAM, ENCOUNTER FOR: ICD-10-CM

## 2022-04-05 PROCEDURE — S0612 ANNUAL GYNECOLOGICAL EXAMINA: HCPCS | Performed by: OBSTETRICS & GYNECOLOGY

## 2022-04-05 NOTE — ASSESSMENT & PLAN NOTE
Pap/HPV current  Mirena 2021    Encourage healthy diet, exercise, Calcium 1200mg per day and at least 800 iu Vitamin D daily

## 2022-04-05 NOTE — PROGRESS NOTES
Assessment/Plan:    Encounter for annual routine gynecological examination  Pap/HPV current  Mirena 2021    Encourage healthy diet, exercise, Calcium 1200mg per day and at least 800 iu Vitamin D daily  Diagnoses and all orders for this visit:    Encounter for annual routine gynecological examination    Screening mammogram, encounter for  -     Mammo screening bilateral w 3d & cad; Future          Subjective:      Patient ID: Will Napier is a 37 y o  female  Patient presents for a routine annual visit  Menarche- 16 Y/O  Last Pap Smear- 3/9/20 Neg-HPV  LMP-IUD  Birth Nicole Zhong  [id number]  Non smoker  Social drinker  Currently sexually active  Family history of breast cancer  No concerns/questions for today's visit    With Mirena in - no bleeding, no pain  Mood - has noted improvement with medication adjustment  Daughter is 6  , doing well  Gynecologic Exam  The patient's pertinent negatives include no genital itching, genital lesions, genital odor, genital rash, pelvic pain, vaginal bleeding or vaginal discharge  Pertinent negatives include no chills, constipation, diarrhea, dysuria, fever, frequency, headaches, nausea, painful intercourse, sore throat, urgency or vomiting  She is sexually active  She uses an IUD for contraception  The following portions of the patient's history were reviewed and updated as appropriate:   She  has a past medical history of Anxiety, Depression, Disruption of episiotomy wound in the puerperium, External hemorrhoids, Fibroid uterus, GERD (gastroesophageal reflux disease), Headache, Leiomyoma of uterus, Migraine, Migraine, Polyhydramnios in third trimester, not applicable or unspecified fetus, Pregnancy headache in third trimester, Protein in urine, Sciatica of right side, Third degree laceration of perineum, type 3b (1/26/2016), and Varicella    She   Patient Active Problem List    Diagnosis Date Noted    Family history of breast cancer 03/05/2019    Encounter for annual routine gynecological examination 02/27/2018    Panic disorder without agoraphobia with severe panic attacks 05/11/2016    Fibroid uterus 01/26/2016    GERD (gastroesophageal reflux disease) 01/26/2016     She  has a past surgical history that includes Myomectomy; [place] tooth extraction; pr post colporrhaphy,rectum/vagina (N/A, 3/24/2016); and Cholecystectomy (2006)  Her family history includes Cancer in her maternal aunt and mother  She  reports that she has never smoked  She has never used smokeless tobacco  She reports current alcohol use  She reports current drug use  Drug: Marijuana  Current Outpatient Medications   Medication Sig Dispense Refill    clonazePAM (KlonoPIN) 1 mg tablet Take 1 tablet (1 mg total) by mouth 2 (two) times a day for 8 days 16 tablet 0    escitalopram (LEXAPRO) 20 mg tablet Take 1 tablet (20 mg total) by mouth daily 30 tablet 1    OLANZapine (ZyPREXA) 5 mg tablet Take 1 tablet (5 mg total) by mouth daily at bedtime 30 tablet 1     No current facility-administered medications for this visit  Current Outpatient Medications on File Prior to Visit   Medication Sig    clonazePAM (KlonoPIN) 1 mg tablet Take 1 tablet (1 mg total) by mouth 2 (two) times a day for 8 days    escitalopram (LEXAPRO) 20 mg tablet Take 1 tablet (20 mg total) by mouth daily    OLANZapine (ZyPREXA) 5 mg tablet Take 1 tablet (5 mg total) by mouth daily at bedtime     No current facility-administered medications on file prior to visit  She is allergic to demerol [meperidine] and macrolides and ketolides       Review of Systems   Constitutional: Negative for activity change, appetite change, chills, fatigue and fever  HENT: Negative for rhinorrhea, sneezing and sore throat  Eyes: Negative for visual disturbance  Respiratory: Negative for cough, shortness of breath and wheezing      Cardiovascular: Negative for chest pain, palpitations and leg swelling  Gastrointestinal: Negative for abdominal distention, constipation, diarrhea, nausea and vomiting  Genitourinary: Negative for difficulty urinating, dysuria, frequency, pelvic pain, urgency and vaginal discharge  Neurological: Negative for syncope, light-headedness and headaches  Objective:      /80   Ht 5' 3" (1 6 m)   Wt 72 kg (158 lb 12 8 oz)   BMI 28 13 kg/m²          Physical Exam  Chest:   Breasts: Breasts are symmetrical       Right: No inverted nipple, mass, nipple discharge, skin change or tenderness  Left: No inverted nipple, mass, nipple discharge, skin change or tenderness  Genitourinary:     Labia:         Right: No rash, tenderness, lesion or injury  Left: No rash, tenderness, lesion or injury  Vagina: Normal  No vaginal discharge, tenderness or bleeding  Cervix: No cervical motion tenderness, discharge or friability  Uterus: Not deviated, not enlarged, not fixed and not tender  Adnexa:         Right: No mass, tenderness or fullness  Left: No mass, tenderness or fullness  Neurological:      Mental Status: She is alert and oriented to person, place, and time

## 2022-05-18 ENCOUNTER — TELEPHONE (OUTPATIENT)
Dept: PSYCHIATRY | Facility: CLINIC | Age: 42
End: 2022-05-18

## 2022-08-26 NOTE — CASE MANAGEMENT
[address] [place]     22  Anali Jamesons : 1967 Sex: female  Age: 48 y.o. Chief Complaint   Patient presents with    Hypertension     4 months       HPI  Is today for 4-month follow-up visit on medical problems. In general she has been doing well. Tells me blood pressures been under good control on her current medical regimen. She did run out of her amlodipine and held it for a period of time checking her blood pressures and numbers she wrote down for good. She has been off the medication and I told her it was okay to stay off that 1 for now as long she is continue to monitor. Sleep apnea has been stable and controlled on her CPAP. anxiety has been stable and controlled on her SSRI. We again discussed her elevated blood sugars in prediabetic state as well as her fatty liver status and acute continued need for weight loss attempts. I did suggest referral to weight management and she was very receptive to this. I will place the referral.  States she is up-to-date with GYN. Again did see gastroenterology. Review of Systems    Constitutional: Negative for activity change, appetite change, chills, diaphoresis, fatigue, fever and unexpected weight change. Overweight   HENT: Negative for congestion, ear pain, hearing loss, postnasal drip, rhinorrhea and sinus pain. Eyes: Negative. Respiratory: Negative for cough, shortness of breath and wheezing. Cardiovascular: Negative for chest pain, palpitations and leg swelling. Gastrointestinal: Negative for abdominal pain, blood in stool, constipation, diarrhea, nausea and vomiting. Endocrine: Negative. Genitourinary: Negative for difficulty urinating, dysuria, frequency, hematuria and urgency. Musculoskeletal: Negative for arthralgias, back pain, gait problem and myalgias. Skin: Negative. Allergic/Immunologic: Negative for environmental allergies and immunocompromised state.    Neurological: Negative for CM called [address] [facility]- [phone]) to see if pt can be rescheduled for her intake  CM also asked if pt can be scheduled with a therapist as she is now in agreement with that  Pt has been scheduled at [facility] for a PHONE intake appointment on Wednesday 8/25/2021 at 2pm  Pt will then be scheduled with a therapist and a psychiatrist      [employer] will update pt and   Cardiovascular: Normal rate, regular rhythm, normal heart sounds and intact distal pulses. Exam reveals no gallop and no friction rub. No murmur heard. Pulmonary/Chest: Effort normal and breath sounds normal. No respiratory distress. She has no wheezes. She has no rales. Abdominal: Soft. Bowel sounds are normal. She exhibits no distension and no mass. There is no tenderness. Musculoskeletal: Normal range of motion. Psychiatric: She has a normal mood and affect. Her behavior is normal. Judgment and thought content normal.   Nursing note and vitals reviewed. Assessment and Plan:  Flores Allen was seen today for hypertension. Diagnoses and all orders for this visit:    Essential hypertension  -     lisinopril (PRINIVIL;ZESTRIL) 10 MG tablet; Take 1 tablet by mouth daily  -     lisinopril-hydroCHLOROthiazide (PRINZIDE;ZESTORETIC) 20-25 MG per tablet; Take 1 tablet by mouth daily  -     CBC with Auto Differential; Future  -     Comprehensive Metabolic Panel; Future  -     Lipid Panel; Future    Hyperglycemia  -     Lipid Panel; Future  -     Hemoglobin A1C; Future    Fatty liver    Elevated liver transaminase level    KATARZYNA (obstructive sleep apnea)    Over weight  -     Shanti Azevedo MD, Bariatrics, [facility]    Other orders  -     sertraline (ZOLOFT) 50 MG tablet; 1/2 pill po daily x 1 week then 1 pill daily    Plan: We will set her up with new physician in the next 4 to 5 months. Prescription management performed after review of efficacy of medication on her current medical problems. Blood work today to monitor disease progression and medication use. Weight management referral.  Continue to monitor blood pressure closely. Notify us of problems    No follow-ups on file. Seen By:  Dionte Miller MD      *Document was created using voice recognition software. Note was reviewed however may contain grammatical errors.

## 2022-10-27 ENCOUNTER — HOSPITAL ENCOUNTER (OUTPATIENT)
Dept: MAMMOGRAPHY | Facility: CLINIC | Age: 42
End: 2022-10-27
Payer: COMMERCIAL

## 2022-10-27 VITALS — WEIGHT: 158 LBS | HEIGHT: 63 IN | BODY MASS INDEX: 28 KG/M2

## 2022-10-27 DIAGNOSIS — Z12.31 SCREENING MAMMOGRAM, ENCOUNTER FOR: ICD-10-CM

## 2022-10-27 PROCEDURE — 77063 BREAST TOMOSYNTHESIS BI: CPT

## 2022-10-27 PROCEDURE — 77067 SCR MAMMO BI INCL CAD: CPT

## 2022-10-28 ENCOUNTER — OFFICE VISIT (OUTPATIENT)
Dept: INTERNAL MEDICINE CLINIC | Facility: CLINIC | Age: 42
End: 2022-10-28
Payer: COMMERCIAL

## 2022-10-28 VITALS
BODY MASS INDEX: 26.93 KG/M2 | DIASTOLIC BLOOD PRESSURE: 78 MMHG | RESPIRATION RATE: 18 BRPM | WEIGHT: 152 LBS | OXYGEN SATURATION: 98 % | SYSTOLIC BLOOD PRESSURE: 110 MMHG | TEMPERATURE: 97.5 F | HEART RATE: 95 BPM | HEIGHT: 63 IN

## 2022-10-28 DIAGNOSIS — Z11.59 NEED FOR HEPATITIS C SCREENING TEST: ICD-10-CM

## 2022-10-28 DIAGNOSIS — Z00.00 ANNUAL PHYSICAL EXAM: Primary | ICD-10-CM

## 2022-10-28 DIAGNOSIS — F41.1 GAD (GENERALIZED ANXIETY DISORDER): ICD-10-CM

## 2022-10-28 DIAGNOSIS — F41.0 PANIC DISORDER WITHOUT AGORAPHOBIA WITH SEVERE PANIC ATTACKS: ICD-10-CM

## 2022-10-28 DIAGNOSIS — R25.1 LIMB TREMOR: ICD-10-CM

## 2022-10-28 DIAGNOSIS — Z23 ENCOUNTER FOR IMMUNIZATION: ICD-10-CM

## 2022-10-28 PROCEDURE — 90471 IMMUNIZATION ADMIN: CPT | Performed by: FAMILY MEDICINE

## 2022-10-28 PROCEDURE — 90686 IIV4 VACC NO PRSV 0.5 ML IM: CPT | Performed by: FAMILY MEDICINE

## 2022-10-28 PROCEDURE — 99386 PREV VISIT NEW AGE 40-64: CPT | Performed by: FAMILY MEDICINE

## 2022-10-28 RX ORDER — BENZTROPINE MESYLATE 0.5 MG/1
0.5 TABLET ORAL DAILY
COMMUNITY
Start: 2022-10-20

## 2022-10-28 RX ORDER — OFLOXACIN 3 MG/ML
1 SOLUTION/ DROPS OPHTHALMIC 4 TIMES DAILY
COMMUNITY
Start: 2022-10-26 | End: 2022-11-05

## 2022-10-28 RX ORDER — BUSPIRONE HYDROCHLORIDE 10 MG/1
10 TABLET ORAL 2 TIMES DAILY
COMMUNITY
Start: 2022-10-06

## 2022-10-28 NOTE — PATIENT INSTRUCTIONS

## 2022-10-28 NOTE — PROGRESS NOTES
ADULT ANNUAL PHYSICAL   [facility] [address]    NAME: Neyda Frias  AGE: 40 y o  SEX: female  : 1980     DATE: 10/28/2022     Assessment and Plan:     Problem List Items Addressed This Visit        Other    Panic disorder without agoraphobia with severe panic attacks    Relevant Medications    busPIRone (BUSPAR) 10 mg tablet    Limb tremor      Other Visit Diagnoses     Annual physical exam    -  Primary    Relevant Orders    Comprehensive metabolic panel    CBC and differential    Vitamin B12    Vitamin B6    Lipid Panel with Direct LDL reflex    TSH, 3rd generation with Free T4 reflex    ELOY (generalized anxiety disorder)        Relevant Medications    busPIRone (BUSPAR) 10 mg tablet    Need for hepatitis C screening test        Relevant Orders    Hepatitis C Antibody ([facility])    Encounter for immunization        Relevant Orders    influenza vaccine, quadrivalent, 0 5 mL, preservative-free, for adult and pediatric patients 6 mos+ (AFLURIA, FLUARIX, FLULAVAL, FLUZONE) (Completed)      differential for tremor includes medication adr, thyroid dyscrasia, cerebellar lesion, and anxiety  Pt is on olanzapine  Started on cogentin and had mild improvement  Will r/o secondary issues outside of medication with labs above  If labs unremarkable will get head imagine to r/o cerebellar lesion  For now pt to continue following with psychiatric care  Immunizations and preventive care screenings were discussed with patient today  Appropriate education was printed on patient's after visit summary  Chief Complaint:     Chief Complaint   Patient presents with   • Establish Care     Pt states that she is here to start care wants to discuss meds and other concerns      History of Present Illness:     Adult Annual Physical   Patient here for a comprehensive physical exam      Hx of severe anxiety and depression   Hx of hospitalization for this issue   Follows with an psychiatiry  On zyprexa  Started last April   C/o lower ext  Tremors  Onset a few months after started zyprexa  Was started on cogentin a few days ago  noticed mild improvement  Not hindering gait  Denies falls  Needs work up for other causes of tremors per psychiatric provider   Denies medical hx   Family hx of breast cancer (mom)   Denies dm2, htn, cad, cva in the family     Diet and Physical Activity  · Diet/Nutrition: well balanced diet  · Exercise: 1-2 times a week on average  Depression Screening  PHQ-2/9 Depression Screening    Little interest or pleasure in doing things: 1 - several days  Feeling down, depressed, or hopeless: 1 - several days  PHQ-2 Score: 2  PHQ-2 Interpretation: Negative depression screen       General Health  · Sleep: sleeps well  · Hearing: normal - bilateral   · Vision: most recent eye exam >1 year ago and wears glasses  · Dental: regular dental visits  /GYN Health  · Patient is: premenopausal         Last mammo- yesterday   · Contraceptive method: IUD placement  Review of Systems:     Review of Systems   HENT: Negative for congestion and sore throat  Respiratory: Negative for chest tightness and shortness of breath  Cardiovascular: Negative for chest pain  Gastrointestinal: Negative for constipation and diarrhea  Musculoskeletal: Negative for gait problem  Neurological: Positive for tremors (b/l legs )  Negative for dizziness, light-headedness and headaches        Past Medical History:     Past Medical History:   Diagnosis Date   • Anxiety    • Depression    • Disruption of episiotomy wound in the puerperium     Last assessed 06/22/16   • External hemorrhoids     Last assessed 02/05/16   • Fibroid uterus    • GERD (gastroesophageal reflux disease)    • Headache    • Leiomyoma of uterus    • Migraine    • Migraine    • Polyhydramnios in third trimester, not applicable or unspecified fetus     Last assessed 01/25/16   • Pregnancy headache in third trimester     Resolved 02/05/16   • Protein in urine     Last assessed 01/25/16   • Sciatica of right side    • Third degree laceration of perineum, type 3b 1/26/2016   • Varicella       Past Surgical History:     Past Surgical History:   Procedure Laterality Date   • CHOLECYSTECTOMY  2006   • MYOMECTOMY     • RI POST COLPORRHAPHY,RECTUM/VAGINA N/A 3/24/2016    Procedure: Paige Neil ;  Surgeon: Ana Maria Pop MD;  Location: [facility];  Service: Gynecology   • WISDOM TOOTH EXTRACTION        Social History:     Social History     Socioeconomic History   • Marital status: /Civil Union     Spouse name: None   • Number of children: None   • Years of education: None   • Highest education level: None   Occupational History   • None   Tobacco Use   • Smoking status: Never Smoker   • Smokeless tobacco: Never Used   Vaping Use   • Vaping Use: Never used   Substance and Sexual Activity   • Alcohol use: Yes     Comment: socially; 3-4 drinks/week (8/13)   • Drug use: Yes     Types: Marijuana     Comment: Pt has a medical marijuana card (8/13)   • Sexual activity: Yes     Partners: Male     Birth control/protection: I U D     Other Topics Concern   • None   Social History Narrative    H/O of pregnancy,first,obstetrical care, third trimester        Always uses seatbelt     Social Determinants of Health     Financial Resource Strain: Not on file   Food Insecurity: Not on file   Transportation Needs: Not on file   Physical Activity: Not on file   Stress: Not on file   Social Connections: Not on file   Intimate Partner Violence: Not on file   Housing Stability: Not on file      Family History:     Family History   Problem Relation Age of Onset   • Breast cancer Mother 62   • No Known Problems Father    • No Known Problems Daughter    • No Known Problems Maternal Grandmother    • No Known Problems Maternal Grandfather    • No Known Problems Paternal Grandmother    • No Known Problems Paternal Grandfather    • Breast cancer Maternal Aunt         unknown age      Current Medications:     Current Outpatient Medications   Medication Sig Dispense Refill   • benztropine (COGENTIN) 0 5 mg tablet Take 0 5 mg by mouth daily     • busPIRone (BUSPAR) 10 mg tablet Take 10 mg by mouth 2 (two) times a day     • clonazePAM (KlonoPIN) 1 mg tablet Take 1 tablet (1 mg total) by mouth 2 (two) times a day for 8 days 16 tablet 0   • ofloxacin (OCUFLOX) 0 3 % ophthalmic solution Apply 1 drop to eye 4 (four) times a day     • OLANZapine (ZyPREXA) 5 mg tablet Take 1 tablet (5 mg total) by mouth daily at bedtime 30 tablet 1     No current facility-administered medications for this visit  Allergies: Allergies   Allergen Reactions   • Demerol [Meperidine] Hyperactivity   • Macrolides And Ketolides       Physical Exam:     /78 (BP Location: Left arm, Patient Position: Sitting, Cuff Size: Standard)   Pulse 95   Temp 97 5 °F (36 4 °C) (Temporal)   Resp 18   Ht 5' 3" (1 6 m)   Wt 68 9 kg (152 lb)   SpO2 98%   BMI 26 93 kg/m²     Physical Exam  HENT:      Head: Normocephalic and atraumatic  Right Ear: Tympanic membrane and external ear normal       Left Ear: Tympanic membrane and external ear normal       Mouth/Throat:      Pharynx: Oropharynx is clear  Eyes:      Extraocular Movements: Extraocular movements intact  Conjunctiva/sclera: Conjunctivae normal       Pupils: Pupils are equal, round, and reactive to light  Cardiovascular:      Rate and Rhythm: Normal rate and regular rhythm  Heart sounds: No murmur heard  Pulmonary:      Effort: Pulmonary effort is normal       Breath sounds: Normal breath sounds  Abdominal:      General: Abdomen is flat  Bowel sounds are normal       Palpations: Abdomen is soft  Musculoskeletal:      Right lower leg: No edema  Left lower leg: No edema  Skin:     Capillary Refill: Capillary refill takes less than 2 seconds     Neurological:      Mental Status: She is alert and oriented to person, place, and time  Motor: Tremor present  No atrophy, abnormal muscle tone or pronator drift  Coordination: Romberg sign negative  Gait: Gait normal       Deep Tendon Reflexes:      Reflex Scores:       Bicep reflexes are 3+ on the right side and 3+ on the left side  Patellar reflexes are 2+ on the right side and 2+ on the left side  Achilles reflexes are 2+ on the right side and 2+ on the left side    Psychiatric:         Attention and Perception: Attention normal            Jade Mancia,   MEDICAL [id number]

## 2022-10-29 ENCOUNTER — APPOINTMENT (OUTPATIENT)
Dept: LAB | Facility: HOSPITAL | Age: 42
End: 2022-10-29

## 2022-10-29 DIAGNOSIS — Z11.59 NEED FOR HEPATITIS C SCREENING TEST: ICD-10-CM

## 2022-10-29 DIAGNOSIS — Z00.00 ANNUAL PHYSICAL EXAM: ICD-10-CM

## 2022-10-29 LAB
ALBUMIN SERPL BCP-MCNC: 3.9 G/DL (ref 3.5–5)
ALP SERPL-CCNC: 71 U/L (ref 46–116)
ALT SERPL W P-5'-P-CCNC: 19 U/L (ref 12–78)
ANION GAP SERPL CALCULATED.3IONS-SCNC: 5 MMOL/L (ref 4–13)
AST SERPL W P-5'-P-CCNC: 9 U/L (ref 5–45)
BASOPHILS # BLD AUTO: 0.04 THOUSANDS/ÂΜL (ref 0–0.1)
BASOPHILS NFR BLD AUTO: 1 % (ref 0–1)
BILIRUB SERPL-MCNC: 1.6 MG/DL (ref 0.2–1)
BUN SERPL-MCNC: 14 MG/DL (ref 5–25)
CALCIUM SERPL-MCNC: 9.1 MG/DL (ref 8.3–10.1)
CHLORIDE SERPL-SCNC: 114 MMOL/L (ref 96–108)
CHOLEST SERPL-MCNC: 196 MG/DL
CO2 SERPL-SCNC: 21 MMOL/L (ref 21–32)
CREAT SERPL-MCNC: 0.94 MG/DL (ref 0.6–1.3)
EOSINOPHIL # BLD AUTO: 0.14 THOUSAND/ÂΜL (ref 0–0.61)
EOSINOPHIL NFR BLD AUTO: 3 % (ref 0–6)
ERYTHROCYTE [DISTWIDTH] IN BLOOD BY AUTOMATED COUNT: 12.8 % (ref 11.6–15.1)
GFR SERPL CREATININE-BSD FRML MDRD: 75 ML/MIN/1.73SQ M
GLUCOSE P FAST SERPL-MCNC: 110 MG/DL (ref 65–99)
HCT VFR BLD AUTO: 41.4 % (ref 34.8–46.1)
HCV AB SER QL: NORMAL
HDLC SERPL-MCNC: 49 MG/DL
HGB BLD-MCNC: 14.4 G/DL (ref 11.5–15.4)
IMM GRANULOCYTES # BLD AUTO: 0.01 THOUSAND/UL (ref 0–0.2)
IMM GRANULOCYTES NFR BLD AUTO: 0 % (ref 0–2)
LDLC SERPL CALC-MCNC: 132 MG/DL (ref 0–100)
LYMPHOCYTES # BLD AUTO: 1.65 THOUSANDS/ÂΜL (ref 0.6–4.47)
LYMPHOCYTES NFR BLD AUTO: 32 % (ref 14–44)
MCH RBC QN AUTO: 29.8 PG (ref 26.8–34.3)
MCHC RBC AUTO-ENTMCNC: 34.8 G/DL (ref 31.4–37.4)
MCV RBC AUTO: 86 FL (ref 82–98)
MONOCYTES # BLD AUTO: 0.24 THOUSAND/ÂΜL (ref 0.17–1.22)
MONOCYTES NFR BLD AUTO: 5 % (ref 4–12)
NEUTROPHILS # BLD AUTO: 3.15 THOUSANDS/ÂΜL (ref 1.85–7.62)
NEUTS SEG NFR BLD AUTO: 59 % (ref 43–75)
NRBC BLD AUTO-RTO: 0 /100 WBCS
PLATELET # BLD AUTO: 239 THOUSANDS/UL (ref 149–390)
PMV BLD AUTO: 9 FL (ref 8.9–12.7)
POTASSIUM SERPL-SCNC: 4.2 MMOL/L (ref 3.5–5.3)
PROT SERPL-MCNC: 7.7 G/DL (ref 6.4–8.4)
RBC # BLD AUTO: 4.84 MILLION/UL (ref 3.81–5.12)
SODIUM SERPL-SCNC: 140 MMOL/L (ref 135–147)
TRIGL SERPL-MCNC: 73 MG/DL
TSH SERPL DL<=0.05 MIU/L-ACNC: 1.65 UIU/ML (ref 0.45–4.5)
VIT B12 SERPL-MCNC: 388 PG/ML (ref 100–900)
WBC # BLD AUTO: 5.23 THOUSAND/UL (ref 4.31–10.16)

## 2022-11-01 LAB — VIT B6 SERPL-MCNC: 9.8 UG/L (ref 3.4–65.2)

## 2022-11-02 ENCOUNTER — TELEPHONE (OUTPATIENT)
Dept: INTERNAL MEDICINE CLINIC | Facility: CLINIC | Age: 42
End: 2022-11-02

## 2022-11-02 DIAGNOSIS — R73.01 ELEVATED FASTING GLUCOSE: Primary | ICD-10-CM

## 2022-11-02 NOTE — TELEPHONE ENCOUNTER
----- Message from Yanet Riley DO sent at 11/2/2022 10:23 AM EDT -----  Thyroid fuciton is normal  B6 and b12 level is normal    Bilirubin has been consisently elevated but stable  The bad cholesterol LDL is elevated and the blood sugar is elevated  I recommend a follow up a1c to look for pre diabetes  Also making dietary changes such as decreasing dairy, red meat, added oil in diet to decrease blood cholesterol levels

## 2023-04-26 ENCOUNTER — OFFICE VISIT (OUTPATIENT)
Age: 43
End: 2023-04-26

## 2023-04-26 ENCOUNTER — APPOINTMENT (OUTPATIENT)
Age: 43
End: 2023-04-26

## 2023-04-26 VITALS
TEMPERATURE: 96.4 F | HEART RATE: 73 BPM | WEIGHT: 136 LBS | BODY MASS INDEX: 24.1 KG/M2 | DIASTOLIC BLOOD PRESSURE: 64 MMHG | RESPIRATION RATE: 16 BRPM | OXYGEN SATURATION: 99 % | HEIGHT: 63 IN | SYSTOLIC BLOOD PRESSURE: 112 MMHG

## 2023-04-26 DIAGNOSIS — R73.01 ELEVATED FASTING GLUCOSE: ICD-10-CM

## 2023-04-26 DIAGNOSIS — F41.0 PANIC DISORDER WITHOUT AGORAPHOBIA WITH SEVERE PANIC ATTACKS: ICD-10-CM

## 2023-04-26 DIAGNOSIS — R25.1 LIMB TREMOR: Primary | ICD-10-CM

## 2023-04-26 RX ORDER — BUSPIRONE HYDROCHLORIDE 15 MG/1
TABLET ORAL
COMMUNITY
Start: 2023-04-20

## 2023-04-30 LAB
EST. AVERAGE GLUCOSE BLD GHB EST-MCNC: 94 MG/DL
HBA1C MFR BLD: 4.9 %

## 2023-04-30 NOTE — PROGRESS NOTES
"Name: Madi Hale      : 1980      MRN: [id number]  Encounter Provider: Yarelis Cross DO  Encounter Date: 2023   Encounter department: [address] [place] [address]  Limb tremor- laboratory studies were unremarkable  No longer zyrexa therapy  Is on a different antipsychotic  However doesn't seem like the quality of tardive dyskinesia  Pt admits termor improves a little when taking the benzodiazepine  Will r/o intracranial pathology  If unremarkable likely tremor is psychogenic  -     MRI brain wo contrast; Future; Expected date: 2023    2  Panic disorder without agoraphobia with severe panic attacks- improving with klonopin use  Pt to continue klonopin 1mg bid prn  Subjective      Presents for f/u concerning tremor of the legs  Has been off of Zyprexa for a few weeks now  Still on cogentin  Tremor seemed to get better but has not resolved  definitely worsens when she is anxious  Denies falls or gait instability  Exercises often  This includes runs  She denies any difficutly with exercise due to termor of the legs  Review of Systems   Musculoskeletal: Negative for gait problem  Neurological: Positive for tremors  Psychiatric/Behavioral: The patient is nervous/anxious          Current Outpatient Medications on File Prior to Visit   Medication Sig    benztropine (COGENTIN) 0 5 mg tablet Take 0 5 mg by mouth daily    busPIRone (BUSPAR) 15 mg tablet TAKE 1 TABLET BY MOUTH 3 TIMES A DAY -IN THE MORNING, AFTERNOON AND EVENING    Vraylar 4 5 MG capsule Take 4 5 mg by mouth daily    clonazePAM (KlonoPIN) 1 mg tablet Take 1 tablet (1 mg total) by mouth 2 (two) times a day for 8 days       Objective     /64 (BP Location: Left arm, Patient Position: Sitting, Cuff Size: Standard)   Pulse 73   Temp (!) 96 4 °F (35 8 °C) (Tympanic)   Resp 16   Ht 5' 3\" (1 6 m)   Wt 61 7 kg (136 lb)   LMP  (LMP Unknown) Comment: iud  SpO2 99%   " BMI 24 09 kg/m²     Physical Exam  HENT:      Head: Normocephalic  Eyes:      Conjunctiva/sclera: Conjunctivae normal    Cardiovascular:      Rate and Rhythm: Normal rate and regular rhythm  Heart sounds: No murmur heard  Pulmonary:      Effort: Pulmonary effort is normal       Breath sounds: Normal breath sounds  Neurological:      Mental Status: She is alert and oriented to person, place, and time  Motor: Tremor (pedal, varying amplitude  ) present        Gait: Gait normal        Jeannette Hew, DO DWAYNE/CARA/Nichelle

## 2023-05-01 ENCOUNTER — TELEPHONE (OUTPATIENT)
Age: 43
End: 2023-05-01

## 2023-05-01 NOTE — TELEPHONE ENCOUNTER
----- Message from Arron Elizalde DO sent at 5/1/2023  9:08 AM EDT -----  A1c is normal at 4 9  no diabetes

## 2023-05-11 ENCOUNTER — HOSPITAL ENCOUNTER (OUTPATIENT)
Dept: MRI IMAGING | Facility: CLINIC | Age: 43
Discharge: HOME | End: 2023-05-11

## 2023-05-11 DIAGNOSIS — R25.1 LIMB TREMOR: ICD-10-CM

## 2023-05-16 ENCOUNTER — TELEPHONE (OUTPATIENT)
Age: 43
End: 2023-05-16

## 2023-05-16 NOTE — TELEPHONE ENCOUNTER
----- Message from Brittny Thompson DO sent at 5/16/2023  9:28 AM EDT -----  MRI of the brain is normal

## 2023-07-11 NOTE — CASE MANAGEMENT
CM received voicemail from  Bridget Stratton wanting to discuss pt and dc plans  CM called  Bridget Stratton ([phone]) back to confirm dc for Tuesday 8/24/2021  He will be able to pick pt up at 10am      CM informed him of pt improvement and he also feels that pt has improved enough to return home   reported that he spoke to pt about dc plan options (partial vs  Outpatient treatment at [facility]) He reported that pt said that she is unsure that she will be able to attend partial for the length of time that it requires and that she prefers traditional OP at [facility]   and pt were hoping that there were other IOP options that were two to three days per week  CM informed him of [facility]' but their locations are over 1 hour from pt home  CM will contact [facility] clinic to have pt rescheduled for her intake appointment and to have her scheduled for a therapist as well as psychiatrist  [place] will provide pt and  with an update  The patient is Stable - Low risk of patient condition declining or worsening    Shift Goals  Clinical Goals: OOB activity; discharge  Patient Goals: discharge  Family Goals: pain control    Progress made toward(s) clinical / shift goals:       Patient is not progressing towards the following goals:

## 2023-09-21 ENCOUNTER — TELEPHONE (OUTPATIENT)
Dept: NEUROLOGY | Facility: CLINIC | Age: 43
End: 2023-09-21

## 2023-09-21 NOTE — TELEPHONE ENCOUNTER
Placed call to patient, I left a detailed message to confirm her upcoming appointment. Advised to call if she needs to cancel or reschedule visit.

## 2023-09-25 ENCOUNTER — OFFICE VISIT (OUTPATIENT)
Dept: NEUROLOGY | Facility: CLINIC | Age: 43
End: 2023-09-25
Payer: COMMERCIAL

## 2023-09-25 VITALS
WEIGHT: 132.5 LBS | RESPIRATION RATE: 20 BRPM | SYSTOLIC BLOOD PRESSURE: 92 MMHG | DIASTOLIC BLOOD PRESSURE: 70 MMHG | TEMPERATURE: 98 F | HEART RATE: 82 BPM | OXYGEN SATURATION: 98 % | BODY MASS INDEX: 23.48 KG/M2 | HEIGHT: 63 IN

## 2023-09-25 DIAGNOSIS — Z87.898 HISTORY OF EXTRAPYRAMIDAL SYMPTOMS: ICD-10-CM

## 2023-09-25 DIAGNOSIS — G25.71 AKATHISIA: Primary | ICD-10-CM

## 2023-09-25 DIAGNOSIS — F41.0 PANIC DISORDER WITHOUT AGORAPHOBIA WITH SEVERE PANIC ATTACKS: ICD-10-CM

## 2023-09-25 DIAGNOSIS — G25.9 EXTRAPYRAMIDAL AND MOVEMENT DISORDER: ICD-10-CM

## 2023-09-25 PROCEDURE — 99205 OFFICE O/P NEW HI 60 MIN: CPT | Performed by: PSYCHIATRY & NEUROLOGY

## 2023-09-25 RX ORDER — FLUOXETINE HYDROCHLORIDE 20 MG/1
20 CAPSULE ORAL DAILY
COMMUNITY

## 2023-09-25 RX ORDER — PROPRANOLOL HYDROCHLORIDE 10 MG/1
10 TABLET ORAL 3 TIMES DAILY
Qty: 90 TABLET | Refills: 3 | Status: SHIPPED | OUTPATIENT
Start: 2023-09-25

## 2023-09-25 NOTE — ASSESSMENT & PLAN NOTE
Patient is a very pleasant 50-year-old female with history of anxiety, panic disorder and mood disorder who presents for evaluation of tremor. She reports onset of BLE tremor after starting zyprexa, worsened after switched to vraylar for her mood disorder. 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 On physical exam patient is very tearful with clear signs of extrapyramidal sx with akathisia, muscle rigidity and parkinsonism      It is clear that patients symptoms are a result of EPS from [facility] [address]. I reached out to our psychiatry department to try and prioritize evaluation as she is so young and these symptoms have been impacting her ability to work and function. In the meantime, I will try her on low dose propanolol to try and reduce tremor intensity. He BP was on the softer side today, however in other visits in the past has been slightly higher. I counseled her on side effects to be aware off. If medication becomes too difficult to tolerate, she should stop it altogether. I would like her to see someone from our movement team at least once for additional advice which would be greatly appreciated. If not available, she is to follow with me in 1-2 months. She was advised to call my office if any side effects.      Start propranolol 10 mg tid

## 2023-09-25 NOTE — PROGRESS NOTES
Patient ID: Aspen Wisdom is a 40 y.o. female. Assessment/Plan:    Extrapyramidal and movement disorder  Patient is a very pleasant 78-year-old female with history of anxiety, panic disorder and mood disorder who presents for evaluation of tremor. She reports onset of BLE tremor after starting zyprexa, worsened after switched to vraylar for her mood disorder. 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 On physical exam patient is very tearful with clear signs of extrapyramidal sx with akathisia, muscle rigidity and parkinsonism      It is clear that patients symptoms are a result of EPS from [address] [place] [address] [facility] [address]. I reached out to our psychiatry department to try and prioritize evaluation as she is so young and these symptoms have been impacting her ability to work and function. In the meantime, I will try her on low dose propanolol to try and reduce tremor intensity. He BP was on the softer side today, however in other visits in the past has been slightly higher. I counseled her on side effects to be aware off. If medication becomes too difficult to tolerate, she should stop it altogether. I would like her to see someone from our movement team at least once for additional advice which would be greatly appreciated. If not available, she is to follow with me in 1-2 months. She was advised to call my office if any side effects. Start propranolol 10 mg tid        Diagnoses and all orders for this visit:    Akathisia  -     propranolol (INDERAL) 10 mg tablet; Take 1 tablet (10 mg total) by mouth 3 (three) times a day  -     Ambulatory referral to [employer]; Future    Panic disorder without agoraphobia with severe panic attacks  -     Ambulatory referral to [employer]; Future    History of extrapyramidal symptoms    Extrapyramidal and movement disorder    Other orders  -     FLUoxetine (PROzac) 20 mg capsule;  Take 20 mg by mouth daily         Subjective:    HPI Patient is a very pleasant 50-year-old female with history of anxiety, panic disorder and mood disorder who presents for evaluation of tremor. Most of the history is provided by patient's . Patient presents with concerns of bilateral lower extremity tremors resembling restless legs. Tremor is continuous throughout the day mostly at rest.  Tremor is dampened by action such as walking. Tremor does not impact her ability to walk. She feels like her history of OCD makes the tremors worse. Tremors do not interfere with her ability to fall asleep. Patient also has developed a dystonic posturing of bilateral upper extremities, walking with arms flexed at the elbows and stiff. She has also developed a blunted affect. Patient with a significant history of anxiety. She was initially started on Lexapro by her gynecologist however she had extreme anxiety. Eventually she developed symptoms of depression. She was hospitalized in August 2021 at [facility] behavioral health unit. At this time she was started on a mood stabilizer. Zyprexa was started in August 2021 her leg tremors started in May 2022. She stopped working in June 2022. The months leading up to November her tremors were bearable enough where patient was willing to go to work despite her leg shaking. In November 2022 Zyprexa was stopped and she was started on Eldonna Camarena, which she continues to  this day. Highest dose of Zyprexa was 15 mg. Currently on 4.5 mg of Vraylar. Patient was tried on medications to help with her EPS. Klonopin she is currently on 1 mg twice a day. It mildly helps her stop focusing on leg shaking however it does not stop the shaking itself. In the past tried Cogentin which helped slightly more it seems. The highest dose he tried was 1 mg. Unclear why this was stopped. Patient follows at an outpatient psychiatry clinic, [address] [place] [address] by an psych LPN.   She reports never been seen by a psychiatrist outpatient. Has been having difficulty establishing care with our team.       The following portions of the patient's history were reviewed and updated as appropriate: allergies, current medications, past family history, past medical history, past social history, past surgical history and problem list and ros. Objective:    Blood pressure 92/70, pulse 82, temperature 98 °F (36.7 °C), temperature source Temporal, resp. rate 20, height 5' 3" (1.6 m), weight 60.1 kg (132 lb 8 oz), SpO2 98 %, not currently breastfeeding. Physical Exam  Eyes:      General: Lids are normal.      Extraocular Movements: Extraocular movements intact. Pupils: Pupils are equal, round, and reactive to light. Neurological:      Motor: Motor strength is normal.     Deep Tendon Reflexes:      Reflex Scores:       Bicep reflexes are 2+ on the right side and 2+ on the left side. Brachioradialis reflexes are 2+ on the right side and 2+ on the left side. Patellar reflexes are 2+ on the right side and 2+ on the left side. Neurological Exam  Mental Status  Awake, alert and oriented to person, place and time. Blunted affect   tearful. Cranial Nerves  CN II: Visual fields full to confrontation. CN III, IV, VI: Extraocular movements intact bilaterally. Normal lids and orbits bilaterally. Pupils equal round and reactive to light bilaterally. CN V: Facial sensation is normal.  CN VII: Full and symmetric facial movement. CN VIII: Hearing is normal.  CN IX, X: Palate elevates symmetrically  CN XI: Shoulder shrug strength is normal.  CN XII: Tongue midline without atrophy or fasciculations. Hypomimia . Motor   Strength is 5/5 throughout all four extremities. Normal tone   Restless leg tremor in bilateral lower extremities- akathisia   Tremor dampens with walking and heel to shin .     Reflexes                                            Right                      Left  Brachioradialis 2+                         2+  Biceps                                 2+                         2+  Patellar                                2+                         2+    Coordination  Right: Finger-to-nose normal. Rapid alternating movement normal. Heel-to-shin normal.Left: Finger-to-nose normal. Rapid alternating movement normal. Heel-to-shin normal.    Gait  Casual gait is normal including stance, stride, and arm swing. Able to rise from chair without using arms. Muscle rigidity in upper extremities, flexed at the elbow, reduced arm swing . ROS:    Review of Systems    Review of Systems   Constitutional: Negative for appetite change, fatigue and fever. HENT: Negative. Negative for hearing loss, tinnitus, trouble swallowing and voice change. Eyes: Negative. Negative for photophobia, pain and visual disturbance. Respiratory: Negative. Negative for shortness of breath. Cardiovascular: Negative. Negative for palpitations. Gastrointestinal: Negative. Negative for nausea and vomiting. Endocrine: Negative. Negative for cold intolerance. Genitourinary: Negative. Negative for dysuria, frequency and urgency. Musculoskeletal: Negative for back pain, gait problem, myalgias and neck pain. Skin: Negative. Negative for rash. Allergic/Immunologic: Negative. Neurological: Positive for tremors (legs). Negative for dizziness, seizures, syncope, facial asymmetry, speech difficulty, weakness, light-headedness, numbness and headaches. Arms get contracted   Hematological: Negative. Does not bruise/bleed easily. Psychiatric/Behavioral: Negative. Negative for confusion, hallucinations and sleep disturbance.

## 2023-09-25 NOTE — PROGRESS NOTES
Review of Systems   Constitutional: Negative for appetite change, fatigue and fever. HENT: Negative. Negative for hearing loss, tinnitus, trouble swallowing and voice change. Eyes: Negative. Negative for photophobia, pain and visual disturbance. Respiratory: Negative. Negative for shortness of breath. Cardiovascular: Negative. Negative for palpitations. Gastrointestinal: Negative. Negative for nausea and vomiting. Endocrine: Negative. Negative for cold intolerance. Genitourinary: Negative. Negative for dysuria, frequency and urgency. Musculoskeletal: Negative for back pain, gait problem, myalgias and neck pain. Skin: Negative. Negative for rash. Allergic/Immunologic: Negative. Neurological: Positive for tremors (legs). Negative for dizziness, seizures, syncope, facial asymmetry, speech difficulty, weakness, light-headedness, numbness and headaches. Arms get contracted   Hematological: Negative. Does not bruise/bleed easily. Psychiatric/Behavioral: Negative. Negative for confusion, hallucinations and sleep disturbance.

## 2023-09-26 ENCOUNTER — TELEPHONE (OUTPATIENT)
Dept: PSYCHIATRY | Facility: CLINIC | Age: 43
End: 2023-09-26

## 2023-09-29 NOTE — TELEPHONE ENCOUNTER
Behavioral Health Outpatient Intake Questions    Referred By   : Stephon Lozoya      Please advise interviewee that they need to answer all questions truthfully to allow for best care, and any misrepresentations of information may affect their ability to be seen at this clinic   => Was this discussed? Yes     If Minor Child (under age 25)    Who is/are the legal guardian(s) of the child? Is there a custody agreement? No     • If "YES"- Custody orders must be obtained prior to scheduling the first appointment  • In addition, Consent to Treatment must be signed by all legal guardians prior to scheduling the first appointment    • If "NO"- Consent to Treatment must be signed by all legal guardians prior to scheduling the first appointment    [address] [place] [address] Outpatient Intake History -     Presenting Problem (in patient's own words): tremor, akathisia's lower extremity as well as some dystonic posturing in the upper extremities and some parkinsonism. Aspen ZELAYA         Are there any communication barriers for this patient? No                                               If yes, please describe barriers: none  • If there is a unique situation, please refer to [address] for final determination. Are you taking any psychiatric medications? Yes   •   If "YES" -What are they vraylor, buspirone, clonazepan   •   If "YES" -Who prescribes? Has the Patient previously received outpatient Talk Therapy or Medication Management from [facility]     •    If "YES"- When, Where and with Whom? •    If "NO" -Has Patient received these services elsewhere? •   If "YES" -When, Where, and with Whom? Has the Patient abused alcohol or other substances in the last 6 months ? No  No concerns of substance abuse are reported. •  If "YES" -What substance, How much, How often? •  If illegal substance: Refer to [employer] [facility] (for JOSE ENRIQUE) or [facility].   •  If Alcohol in excess of 10 drinks per week:  Refer to [employer] (for JOSE ENRIQUE) or [facility] History-     Is this treatment court ordered? No   If "yes "send to :  • Talk Therapy : Send to [address] [place] [address] for final determination   • Med Management: Send to Dr Mahogany Anand for final determination     Has the Patient been convicted of a felony? No   If "Yes" send to -When, What? • Talk Therapy : Send to [address] [place] [address] for final determination   • Med Management: Send to Dr Mahogany Anand for final determination     ACCEPTED as a patient Yes  • If "Yes" Appointment Date: 10/23 at 130 with Spoleti    Referred Elsewhere? No  • If “Yes” - (Where? Ex: [facility], [address] [place] [address], etc.)       Name of Insurance Co:[employer]  Insurance FZ#[id number]  Insurance Phone #[phone]  If ins is primary or secondary? Primary  If patient is a minor, parents information such as Name, D. O.B of guarantor.

## 2023-10-06 ENCOUNTER — OFFICE VISIT (OUTPATIENT)
Dept: NEUROLOGY | Facility: CLINIC | Age: 43
End: 2023-10-06
Payer: COMMERCIAL

## 2023-10-06 ENCOUNTER — APPOINTMENT (OUTPATIENT)
Dept: LAB | Facility: HOSPITAL | Age: 43
End: 2023-10-06
Payer: COMMERCIAL

## 2023-10-06 VITALS
HEIGHT: 63 IN | DIASTOLIC BLOOD PRESSURE: 78 MMHG | WEIGHT: 129.5 LBS | OXYGEN SATURATION: 98 % | SYSTOLIC BLOOD PRESSURE: 124 MMHG | TEMPERATURE: 98 F | BODY MASS INDEX: 22.95 KG/M2 | HEART RATE: 64 BPM

## 2023-10-06 DIAGNOSIS — G25.71 AKATHISIA: Primary | ICD-10-CM

## 2023-10-06 DIAGNOSIS — G25.71 AKATHISIA: ICD-10-CM

## 2023-10-06 DIAGNOSIS — G25.9 EXTRAPYRAMIDAL AND MOVEMENT DISORDER: ICD-10-CM

## 2023-10-06 LAB
FERRITIN SERPL-MCNC: 119 NG/ML (ref 11–307)
IRON SATN MFR SERPL: 45 % (ref 15–50)
IRON SERPL-MCNC: 142 UG/DL (ref 50–212)
TIBC SERPL-MCNC: 318 UG/DL (ref 250–450)
UIBC SERPL-MCNC: 176 UG/DL (ref 155–355)

## 2023-10-06 PROCEDURE — 82728 ASSAY OF FERRITIN: CPT

## 2023-10-06 PROCEDURE — 99214 OFFICE O/P EST MOD 30 MIN: CPT | Performed by: NURSE PRACTITIONER

## 2023-10-06 PROCEDURE — 36415 COLL VENOUS BLD VENIPUNCTURE: CPT

## 2023-10-06 PROCEDURE — 83550 IRON BINDING TEST: CPT

## 2023-10-06 PROCEDURE — 83540 ASSAY OF IRON: CPT

## 2023-10-06 NOTE — PROGRESS NOTES
Patient ID: Charity Sims is a 40 y.o. female. Assessment/Plan:    Extrapyramidal and movement disorder  Patient with mild parkinsonian features, masked face, mild symmetric bradykinesia, reduced arm swing noted on exam.  These are likely in the setting of olanzapine and Vraylar use. She is no longer on olanzapine. Recommend her to discuss further reductions of Vraylar with psych. Currently the patient would like to treat for symptoms of akathisia first.    Akathisia  Patient reports internal restless type sensation of the lower extremities, this started after she has been started on olanzapine. She was later switched to [place] Sinks in which symptoms persisted. Agree this symptom is likely related to her medication and should discuss further reductions of [place] with her psychiatrist.  She does have upcoming appointment with [facility] in the next few weeks. Would recommend for her to obtain updated iron panel to assess ferritin level to ensure this is not contributing to any of her symptoms. She was started on low-dose propanolol, blood pressure and heart rate have been tolerating. She remains on klonopin for treatment of her anxiety. She was on cogentin in the past with no improvement in symptoms. Can consider increasing propanolol 10 mg to 1.5 tabs 3 times daily to see if any further improvement in her symptoms. If she does not tolerate further increases or is ineffective could consider discontinuing medication and starting anticholinergic such as trihexyphenidyl. Plan for follow up in 2 months as scheduled. To contact the office sooner with any concerns or worsening symptoms. Diagnoses and all orders for this visit:    Akathisia  -     Iron Panel (Includes Ferritin, Iron Sat%, Iron, and TIBC);  Future    Extrapyramidal and movement disorder           Subjective:    HPI       Patient is a very pleasant 31-year-old female with history of anxiety, panic disorder and mood disorder who presents for follow-up for tremor. She was previously seen by Dr. Sloan Stephenson and was asked to be evaluated by movement team.  Per chart review she notes bilateral lower extremity tremor as well as posturing of the bilateral upper extremities. She was hospitalized in August 2021 by inpatient psych and was started on Zyprexa (up to 15 mg), tremors did start in May 2022. In November 2022 Zyprexa was stopped and she was started on Vraylar. In the past she has tried medications for her tremors including Klonopin 0.5 mg QID-prescribed for anxiety , Cogentin 1 mg daily-did not help.      last office visit 9/25/23 in which she was started on propanolol. Current meds:  Propanolol 10 mg TID    prozac 20 mg daily, vryalar 4.5 mg daily, klonopin 0.5 mg QID    Interval History:  Since last visit she did contact her psychiatrist ([address] [place] [address] in [facility]) she is going to have appt with [facility] at the end of oct. She did get her vrylar reduced last week. She denies any side effects from propanolol. She feels this sensation in her legs that she needs to move constantly. She does not necessarily feel a relief with movement. When she stands in place she needs to march in place. She does feel leg symptoms started around may of 2022. She did not note a change in this with switch from olanzapine to vraylar. She feels stiffness in the arms, she feels these started after he leg symptoms but cannot pin point a specific time. Does find if her anxiety is higher symptoms can be worse. She will hold her elbows in a flexed position at times over the past 2 years. No specific trigger. She denies any hand tremor. No issues with walking or balance, no recent falls.          The following portions of the patient's history were reviewed and updated as appropriate: allergies, current medications, past family history, past medical history, past social history, past surgical history and problem list. Objective:    Blood pressure 124/78, pulse 64, temperature 98 °F (36.7 °C), temperature source Temporal, height 5' 3" (1.6 m), weight 58.7 kg (129 lb 8 oz), SpO2 98 %, not currently breastfeeding. Physical Exam  Constitutional:       General: She is awake. HENT:      Right Ear: Hearing normal.      Left Ear: Hearing normal.   Eyes:      General: Lids are normal.      Extraocular Movements: Extraocular movements intact. Pupils: Pupils are equal, round, and reactive to light. Neurological:      Mental Status: She is alert. Motor: Motor strength is normal.     Deep Tendon Reflexes:      Reflex Scores:       Bicep reflexes are 2+ on the right side and 2+ on the left side. Brachioradialis reflexes are 2+ on the right side and 2+ on the left side. Patellar reflexes are 2+ on the right side and 2+ on the left side. Achilles reflexes are 2+ on the right side and 2+ on the left side. Neurological Exam  Mental Status  Awake and alert. Speech: hypophonia. Language is fluent with no aphasia. Masked face. Cranial Nerves  CN III, IV, VI: Extraocular movements intact bilaterally. Normal lids and orbits bilaterally. Pupils equal round and reactive to light bilaterally. CN V:  Right: Facial sensation is normal.  Left: Facial sensation is normal on the left. CN VII:  Right: There is no facial weakness. Left: There is no facial weakness. CN VIII:  Right: Hearing is normal.  Left: Hearing is normal.  CN XI:  Right: Trapezius strength is normal.  Left: Trapezius strength is normal.  CN XII: Tongue midline without atrophy or fasciculations. Motor   Strength is 5/5 throughout all four extremities. Strength: Strength 5/5 upper and lower extremities. Sensory  Light touch is normal in upper and lower extremities. Temperature is normal in upper and lower extremities.      Reflexes                                            Right                      Left  Brachioradialis 2+                         2+  Biceps                                 2+                         2+  Patellar                                2+                         2+  Achilles                                2+                         2+    Coordination  Right: Finger-to-nose normal. Rapid alternating movement abnormality:Left: Finger-to-nose normal. Rapid alternating movement abnormality:  Mild bradykinesia with decrement that is symmetric, increased tone in the b/l UE  Continuous movement of the lower extremities b/l, more of a tapping/shaking of the leg then tremor. Gait  Casual gait:  Slightly reduced stride, minimal arm swing b/l. I have personally reviewed the ROS performed by the MA.    ROS:    Review of Systems   Constitutional: Negative for appetite change, fatigue and fever. HENT: Negative. Negative for hearing loss, tinnitus, trouble swallowing and voice change. Eyes: Negative. Negative for photophobia, pain and visual disturbance. Respiratory: Negative. Negative for shortness of breath. Cardiovascular: Negative. Negative for palpitations. Gastrointestinal: Negative. Negative for nausea and vomiting. Endocrine: Negative. Negative for cold intolerance. Genitourinary: Negative. Negative for dysuria, frequency and urgency. Musculoskeletal: Negative for back pain, gait problem, myalgias and neck pain. Skin: Negative. Negative for rash. Allergic/Immunologic: Negative. Neurological: Positive for tremors. Negative for dizziness, seizures, syncope, facial asymmetry, speech difficulty, weakness, light-headedness, numbness and headaches. Hematological: Negative. Does not bruise/bleed easily. Psychiatric/Behavioral: Negative. Negative for confusion, hallucinations and sleep disturbance. All other systems reviewed and are negative.

## 2023-10-06 NOTE — ASSESSMENT & PLAN NOTE
Patient with mild parkinsonian features, masked face, mild symmetric bradykinesia, reduced arm swing noted on exam.  These are likely in the setting of olanzapine and Vraylar use. She is no longer on olanzapine. Recommend her to discuss further reductions of Vraylar with psych.   Currently the patient would like to treat for symptoms of akathisia first.

## 2023-10-06 NOTE — PATIENT INSTRUCTIONS
<90/60  HR <55    Increase the propanolol to 1.5 tabs 3x/day for at least 1 week, if blood pressure and heart are ok and no improvement in leg symptoms would recommend further increase to 2 tabs 3x/day. If you have any dizziness, low blood pressure or heart rate please contact office. If no improvement at highest dose would recommend to wean off and try anticholinergic such as trihexyphenidyl     Agree with continue to reduce vrylar-discuss with psych.      Blood work to check iron level

## 2023-10-06 NOTE — ASSESSMENT & PLAN NOTE
Patient reports internal restless type sensation of the lower extremities, this started after she has been started on olanzapine. She was later switched to Kristen Alpha in which symptoms persisted. Agree this symptom is likely related to her medication and should discuss further reductions of Kristen Alpha with her psychiatrist.  She does have upcoming appointment with [facility] in the next few weeks. Would recommend for her to obtain updated iron panel to assess ferritin level to ensure this is not contributing to any of her symptoms. She was started on low-dose propanolol, blood pressure and heart rate have been tolerating. She remains on klonopin for treatment of her anxiety. She was on cogentin in the past with no improvement in symptoms. Can consider increasing propanolol 10 mg to 1.5 tabs 3 times daily to see if any further improvement in her symptoms. If she does not tolerate further increases or is ineffective could consider discontinuing medication and starting anticholinergic such as trihexyphenidyl. Plan for follow up in 2 months as scheduled. To contact the office sooner with any concerns or worsening symptoms.

## 2023-10-11 ENCOUNTER — APPOINTMENT (OUTPATIENT)
Age: 43
End: 2023-10-11
Payer: COMMERCIAL

## 2023-10-11 ENCOUNTER — OFFICE VISIT (OUTPATIENT)
Age: 43
End: 2023-10-11
Payer: COMMERCIAL

## 2023-10-11 VITALS
TEMPERATURE: 96 F | HEART RATE: 67 BPM | HEIGHT: 63 IN | OXYGEN SATURATION: 97 % | WEIGHT: 127.4 LBS | BODY MASS INDEX: 22.57 KG/M2 | SYSTOLIC BLOOD PRESSURE: 90 MMHG | RESPIRATION RATE: 16 BRPM | DIASTOLIC BLOOD PRESSURE: 60 MMHG

## 2023-10-11 DIAGNOSIS — G25.71 AKATHISIA: ICD-10-CM

## 2023-10-11 DIAGNOSIS — G25.71 AKATHISIA: Primary | ICD-10-CM

## 2023-10-11 DIAGNOSIS — Z13.9 SCREENING DUE: ICD-10-CM

## 2023-10-11 DIAGNOSIS — K21.9 GASTROESOPHAGEAL REFLUX DISEASE, UNSPECIFIED WHETHER ESOPHAGITIS PRESENT: ICD-10-CM

## 2023-10-11 DIAGNOSIS — F41.0 PANIC DISORDER WITHOUT AGORAPHOBIA WITH SEVERE PANIC ATTACKS: ICD-10-CM

## 2023-10-11 PROBLEM — Z01.419 ENCOUNTER FOR ANNUAL ROUTINE GYNECOLOGICAL EXAMINATION: Status: RESOLVED | Noted: 2018-02-27 | Resolved: 2023-10-11

## 2023-10-11 LAB
ALBUMIN SERPL BCP-MCNC: 4.6 G/DL (ref 3.5–5)
ALP SERPL-CCNC: 53 U/L (ref 34–104)
ALT SERPL W P-5'-P-CCNC: 15 U/L (ref 7–52)
ANION GAP SERPL CALCULATED.3IONS-SCNC: 6 MMOL/L
AST SERPL W P-5'-P-CCNC: 13 U/L (ref 13–39)
BILIRUB SERPL-MCNC: 1.78 MG/DL (ref 0.2–1)
BUN SERPL-MCNC: 20 MG/DL (ref 5–25)
CALCIUM SERPL-MCNC: 9.7 MG/DL (ref 8.4–10.2)
CHLORIDE SERPL-SCNC: 107 MMOL/L (ref 96–108)
CHOLEST SERPL-MCNC: 185 MG/DL
CO2 SERPL-SCNC: 27 MMOL/L (ref 21–32)
CREAT SERPL-MCNC: 0.96 MG/DL (ref 0.6–1.3)
GFR SERPL CREATININE-BSD FRML MDRD: 73 ML/MIN/1.73SQ M
GLUCOSE P FAST SERPL-MCNC: 90 MG/DL (ref 65–99)
HDLC SERPL-MCNC: 55 MG/DL
LDLC SERPL CALC-MCNC: 120 MG/DL (ref 0–100)
POTASSIUM SERPL-SCNC: 4.7 MMOL/L (ref 3.5–5.3)
PROT SERPL-MCNC: 7.3 G/DL (ref 6.4–8.4)
SODIUM SERPL-SCNC: 140 MMOL/L (ref 135–147)
TRIGL SERPL-MCNC: 48 MG/DL
TSH SERPL DL<=0.05 MIU/L-ACNC: 1.62 UIU/ML (ref 0.45–4.5)

## 2023-10-11 PROCEDURE — 80053 COMPREHEN METABOLIC PANEL: CPT

## 2023-10-11 PROCEDURE — 84443 ASSAY THYROID STIM HORMONE: CPT

## 2023-10-11 PROCEDURE — 99214 OFFICE O/P EST MOD 30 MIN: CPT | Performed by: FAMILY MEDICINE

## 2023-10-11 PROCEDURE — 80061 LIPID PANEL: CPT

## 2023-10-11 PROCEDURE — 36415 COLL VENOUS BLD VENIPUNCTURE: CPT

## 2023-10-11 NOTE — PROGRESS NOTES
Name: David Zavala      : 1980      MRN: [id number]  Encounter Provider: Gustavo Suero DO  Encounter Date: 10/11/2023   Encounter department: [address] [facility]     1. Akathisia-persistent issue. Following with neurology. Thought to be secondary to antipsychotics. She will be discussing alternative therapies with psychiatrist soon.  -     TSH, 3rd generation with Free T4 reflex; Future    2. Gastroesophageal reflux disease, unspecified whether esophagitis present-resolved    3. Panic disorder without agoraphobia with severe panic attacks-stable with Prozac 20 mg use. No longer on Klonopin. 4. Screening due  -     Comprehensive metabolic panel; Future  -     Lipid Panel with Direct LDL reflex; Future           Subjective      Presents for 6-month follow-up. No concerns. Discussed visits with neurology. Review of Systems   Respiratory:  Negative for shortness of breath. Cardiovascular:  Negative for chest pain. Gastrointestinal:  Negative for abdominal pain, constipation and diarrhea. Neurological:  Negative for dizziness, light-headedness and headaches. Psychiatric/Behavioral:  The patient is nervous/anxious.         Current Outpatient Medications on File Prior to Visit   Medication Sig    FLUoxetine (PROzac) 20 mg capsule Take 20 mg by mouth daily (Patient not taking: Reported on 10/6/2023)    propranolol (INDERAL) 10 mg tablet Take 1 tablet (10 mg total) by mouth 3 (three) times a day    Vraylar 4.5 MG capsule Take 6 mg by mouth daily    [DISCONTINUED] benztropine (COGENTIN) 0.5 mg tablet Take 0.5 mg by mouth daily (Patient not taking: Reported on 2023)    [DISCONTINUED] busPIRone (BUSPAR) 15 mg tablet TAKE 1 TABLET BY MOUTH 3 TIMES A DAY -IN THE MORNING, AFTERNOON AND EVENING (Patient not taking: Reported on 2023)    [DISCONTINUED] clonazePAM (KlonoPIN) 1 mg tablet Take 1 tablet (1 mg total) by mouth 2 (two) times a day for 8 days       Objective     BP 90/60 (BP Location: Left arm, Patient Position: Sitting, Cuff Size: Standard)   Pulse 67   Temp (!) 96 °F (35.6 °C) (Tympanic)   Resp 16   Ht 5' 3" (1.6 m)   Wt 57.8 kg (127 lb 6.4 oz)   SpO2 97%   BMI 22.57 kg/m²     Physical Exam  HENT:      Head: Normocephalic and atraumatic. Right Ear: External ear normal.      Left Ear: External ear normal.   Eyes:      Conjunctiva/sclera: Conjunctivae normal.      Pupils: Pupils are equal, round, and reactive to light. Cardiovascular:      Rate and Rhythm: Normal rate. Pulmonary:      Effort: Pulmonary effort is normal.   Abdominal:      General: Bowel sounds are normal.      Palpations: Abdomen is soft. Tenderness: There is no abdominal tenderness. Neurological:      Mental Status: She is alert and oriented to person, place, and time. Motor: Tremor (Lower extremities bilaterally high frequency) present. Gait: Gait normal.   Psychiatric:         Attention and Perception: Attention normal.         Mood and Affect: Mood normal. Affect is flat. Behavior: Behavior is cooperative.        Casey Paulino,

## 2023-10-17 DIAGNOSIS — G25.71 AKATHISIA: ICD-10-CM

## 2023-10-17 RX ORDER — PROPRANOLOL HYDROCHLORIDE 10 MG/1
20 TABLET ORAL 3 TIMES DAILY
Qty: 540 TABLET | Refills: 0 | Status: SHIPPED | OUTPATIENT
Start: 2023-10-17

## 2023-10-17 NOTE — TELEPHONE ENCOUNTER
VM received 10/16/23 at 10:23 am:  Hi, I am calling because I need a refill of my propranolol. The reason I need it is because last time I was there the dosage was increased. When I went to the pharmacy to get it filled, my insurance won't cover it for me. I can be reached at [phone].   _______________________________________________________    Herman Keith with pt to make her aware the message was received. Pt reports that she takes Propanolol 2 tabs TID. Last OV was 10/6/23.  Routed to provider to review refill request.

## 2023-10-17 NOTE — TELEPHONE ENCOUNTER
Pt left another VM regarding the propranolol. Called pt back and made her aware that a new script was sent to the pharmacy today, with the correct dosage.

## 2023-10-17 NOTE — PSYCH
Psychiatric Evaluation - Behavioral Health   MRN: [id number]    Assessment/Plan   Steven Cardoza is a 40 y.o. 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[employer]), with prior psychiatric diagnoses of panic disorder, anxiety, mood disorder - depression, and OCD, with past medical history significant for akithisia, with 3 prior psychiatric admissions (first at age 26 y/o, most recent in 2021), with suicide risk factors including access to lethal means (locked and secured firearm), history of traumatic experiences, chronic mental illness, and anxiety, and medical history including GERD, cholecystectomy in 2006, perineorraphy and colporrhaphy in 2016, leiomyoma of uterus who is presenting today to establish care and for psychiatric evaluation. Patient presents with her , Sherley Noriega, who provides portions of history patient is unable to. Patient has a history of chronic anxiety with 3 prior psychiatric admissions. Patient and her  endorse anxiety and mood were improved for period of time following most recent admission in 2021, and April/May 2022 patient began to have worsening anxiety and mood related symptoms, with involuntary movements of her lower extremity. The patient was also experiencing dystonic related symptoms of rigidity, and stiffness in setting of Zyprexa use. During this time, patient has been following at [facility] for psychiatric care received psychotherapy weekly through [facility] for integrated psychotherapy, and patient was recently evaluated by neurology who suspected EPS symptoms related to antipsychotic medication-patient was started on Vraylar after Zyprexa was discontinued.   Patient presents today after additional medication adjustments, including propranolol 10 mg 3 times daily started by neurology, discontinuation of Vraylar-has been off medication for 1 week, and increase in Prozac to 40 mg daily. Patient continues with Klonopin 0.5 mg QID PRN for severe anxiety. Patient has been utilizing medication 4 times daily recently in setting of increased anxiety over the past 1-2 weeks. On evaluation and physical exam today, patient does not present with cogwheel rigidity, stiffness or rigidity in upper extremities, patient has restlessness and shaking of her bilateral lower extremities which is distractible and is absent on heel-to-shin testing and during walking. Observed today, patient has reduced arm swing bilaterally. Patient has been off second-generation antipsychotics for 1 week at this time, with continued anxiety symptoms, worsening depressive symptoms, improvement in dystonic/rigidity related symptoms - patient's lower extremity restlessness may be secondary to psychiatric diagnoses at this time. Symptoms may also be related to akathisia from psychotropic medication. Prior dystonic symptoms likely secondary to antipsychotic medication. Patient agreeable to transition Klonopin to 1 mg BID for severe anxiety, and decrease Prozac back to 10 mg for slower titration to minimize adverse effect of increased anxiety on increase. At this time, patient plans to transition all psychiatric care to [address] [place] [address], patient and  were notified patient would not be able to follow both [facility] and [address] [place] [address] for medication management, patient and  agreeable at this time to continue with [address] [place] [address]. 1. Anxiety disorder, unspecified, differential includes Panic disorder, Generalized anxiety disorder, Obsessive-compulsive disorder  2. Continuous movements of lower extremities, differential includes Akathisia, secondary to Anxiety disorder  Patient has been off of antipsychotic medication x 1 week. Rigidity of UE previously documented does not appear on evaluation and exam today.  Reduced arm swing is present bilaterally during walking. Shaking/restlessness of bilateral legs which is distractable, does not impede walking, and is absent on heel-to-shin test present today. 3. Mood disorder, unspecified, differential includes Adjustment disorder with anxious distress    Treatment Recommendations/Precautions:  Decrease Prozac to 10 mg daily for anxiety and mood  Patient recently increased from 20 mg to 40 mg - patient with increased anxiety in setting of titration. Given likely primary anxiety disorder, increase in anxiety may be adverse effect to titration of SSRI. Will decrease Prozac to 10 mg with plan for slow titration. Patient had GeneSight testing completed - Prozac in intermediate range  PARQ completed including serotonin syndrome, SIADH, worsening depression, suicidality, induction of ghada, GI upset, headaches, activation, sexual side effects, sedation, potential drug interactions, and others. Transition Klonopin to 1 mg BID PRN for anxiety  Discussed with patient the risks of sedation, respiratory depression, impairment in judgment and motor function. Depression, mood changes, GI changes, and others discussed. Patient also informed of risks of being on or starting opioid medications due to drug interactions and potential for serious respiratory depression and death. PDMP reviewed, last filled 10/15/23 - 30 day supply of 0.5 mg QID PRN  Discussed options for increased levels of outpatient care including Partial Hospitalization Program, provided information and education regarding program.  Patient does not meet criteria for inpatient psychiatric admission at this time in setting of no SI, HI, intact ability to care for self and supports present.   Most recent lab work from 10/11/23 and prior checked, TSH, Iron, HgbA1c, CMP with exception of Tbili WNL, Lipid with elevated LDL to 120  Mg2+ previously WNL - patient takes supplements  Medication management follow-up in 1 week  Continue to follow with neurology as recommended  Continue psychotherapy with own therapist  Aware of need to follow up with family physician for medical issues  Aware of 24 hour and weekend coverage for urgent situations accessed by calling [facility] practice number    Medications Risks/Benefits:      Risks, Benefits And Possible Side Effects Of Medications:    Risks, benefits, and possible side effects of medications explained to Melody and she verbalizes understanding and agreement for treatment. Controlled Medication Discussion:     Shanekaawlt Maritzadiana has been filling controlled prescriptions on time as prescribed according to [address] [facility] [address] Program  Discussed with Nato Sandoval the risks of sedation, respiratory depression, impairment of ability to drive and potential for abuse and addiction related to treatment with benzodiazepine medications. She understands risk of treatment with benzodiazepine medications, agrees to not drive if feels impaired and agrees to take medications as prescribed  Discussed with [facility] warning on concurrent use of benzodiazepines and opioid medications including sedation, respiratory depression, coma and death. She understands the risk of treatment with benzodiazepines in addition to opioids and wants to continue taking those medications  Discussed with Melody increased risk of impairment related to concurrent use of benzodiazepines and hypnotic medications including excessive sedation, psychomotor impairment and respiratory depression. She understands the risk of treatment with benzodiazepines in addition to hypnotic medications and wants to continue taking those medications    Treatment Plan:  The Treatment Plan was completed but not signed due to social distancing. Verbal consent was provided by the patient/caregiver during the visit. If done today, the next plan will be due April 14, 2024. ------------------------------------------------------------  Chief Complaint: "I feel so bad."    History of Present Illness     Ioana Wen is a 40 y.o. 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), with prior psychiatric diagnoses of panic disorder, anxiety, mood disorder - depression, and OCD, with past medical history significant for akithisia, with 3 prior psychiatric admissions (first at age 26 y/o, most recent in 2021), with suicide risk factors including access to lethal means (locked and secured firearm), history of traumatic experiences, chronic mental illness, and anxiety, and medical history including GERD, cholecystectomy in 2006, perineorraphy and colporrhaphy in 2016, leiomyoma of uterus who is presenting today to establish care and for psychiatric evaluation which includes a full psychiatric intake assessment including evaluation for medication adjustments and continued psychotherapy. Patient presents with her , Jt Hyatt, who provides portions of history patient is unable to. Melody reports depressed mood, with low motivation, low energy, "I don't want to get out of bed,"  some difficulty remembering events, anhedonia with it feeling hard to concentrate or feel engaged for the past 1 month. Patient and  endorse this has happened intermittently over the years, with recent worsening. Patient endorses psychomotor slowing and "feeling blank," at times because she is physically moving slowly. Patient endorses feelings of guilt and hopelessness. She adamantly denies suicidal ideation, passive death wishes, thoughts to harm self or others, and homicidal ideation. She endorses protective factors of her , daughter, and mother. In regards to anxiety and related symptoms, patient notes she has been an excessive worry for majority of her life. Patient endorses excessive worry, "everything what ifs," which she thinks of all day. She endorses negative and ruminating thinking. She denies intrusive thoughts, denies compulsive behaviors in relation to anxious thoughts. She does endorse obsessive thoughts - notably feeling the urge to move. Patient endorses a panic attack history "feel like my heart is bursting out of chest, I have a stomach ache, all I want to do is throw up." Patient endorses chest pain or palpitations, "If I feel anything in my chest, its always associated with anxiety," and patient fears panic attacks. She is fearful of being alone in public and prefers her  to accompany her places. Patient denies disordered eating history, though notes she was previously diagnosed with an eating disorder because of purging behaviors in early 25s (was thought to have bulimia and was treated). She endorses she would purge when she was having an anxiety attack, because "I thought if I threw up the anxiety would go away," and denies behaviors were due to negative body image or image of self. Patient endorse purging behaviors in 2021 before being hospitalized. Patient denies restrictive eating behaviors, compensatory behaviors. Patient does not endorse trauma related symptoms - does become tearful discussing sudden loss of her father at age 15 y/o. Patient notes the last time she felt "no anxiety, can live a normal daily life," was when he daughter was 33 years old (5 years ago). Patient endorses leg shaking since May 2022/2023. There is no known trigger. Patient was experiencing it at night and it would resolve before bed time, then increased to all day. Patient also experienced muscle stiffness and rigidity in upper extremities. Patient has also had reduced facial movement. Zyprexa was stopped and transitioned to Chacho Durie, which patient has been off for 1 week.  Neurology assessed patient and started Propranolol for akathisia and felt symptoms were likely EPS secondary to psychotropic medication. Patient and her  discuss prior to hospitalization in 2021, patient began to have "hypervigilant," symptoms in relation to work and hearing her computer ding in setting of new job and stressors of daughter starting school, selling/buying a new home, and  getting a new job. The patient denies history of or current manic related symptoms including decreased need for sleep, increased energy and mood, irritability, distractibility, increased goal directed behaviors, rapid speech or thoughts, grandiosity, and impulsivity. The patient denies a history or and current auditory or visual hallucinations, paranoid ideations, referential thinking, and does not demonstrate symptoms consistent with negative symptoms of psychosis at time of evaluation.  notes history of aggressive behaviors in setting of hospitalization,  notes agitation, she had called him 'Dad' before and the patient does not know where she is during those episodes/panic attacks. On chart review, patient had episode of agitation in ED prior to 2021 admission.  --------------------------------------  Psychiatric Review Of Systems:  Melody reports Symptoms as described in HPI. Melody denies Current suicidal thoughts, plan, or intent, Current thoughts of self-harm, or Current homicidal thoughts, plan, or intent.     Carrion Akathisia Rating Scale (BARS) -  Today's score: 8 and Global Clinical Assessment score:3  Objective  0 Normal, occasional fidgety movements of the limbs  1 Presence of characteristic restless movements: shuffling or tramping movements of the legs/feet, or swinging of one leg while sitting, and/or rocking from foot to foot or “walking on the spot” when standing, but movements present for less than half the time observed  2 Observed phenomena, as described in (1) above, which are present for at least half the observation period  3 Patient is constantly engaged in characteristic restless movements, and/or has the inability to remain seated or standing without walking or pacing, during the time observed  Subjective  0 Absence of inner restlessness  1 Non-specific sense of inner restlessness  2 The patient is aware of an inability to keep the legs still, or a desire to move the legs, and/or complains of inner restlessness aggravated specifically by being required to stand still  3 Awareness of intense compulsion to move most of the time and/or reports strong desire to walk or pace most of the time  Distress related to restlessness  0 No distress  1 Mild  2 Moderate  3 Severe  Global Clinical Assessment of Akathisia  0 Absent. No evidence of awareness of restlessness. Observation of characteristic movements of akathisia in the absence of a subjective report of inner restlessness or compulsive desire to move the legs should be classified as pseudoakathisia  1 Questionable. Non-specific inner tension and fidgety movements  2 Mild akathisia. Awareness of restlessness in the legs and/or inner restlessness worse when required to stand still. Fidgety movements present, but characteristic restless movements of akathisia not necessarily observed. Condition causes little or no distress. 3 Moderate akathisia. Awareness of restlessness as described for mild akathisia above, combined with characteristic restless movements such as rocking from foot to foot when standing. Patient finds the condition distressing. 4 Marked akathisia. Subjective experience of restlessness includes a compulsive desire to walk or pace. However, the patient is able to remain seated for at least five minutes. The condition is obviously distressing. 5 Severe akathisia. The patient reports a strong compulsion to pace up and down most of the time. Unable to sit or lie down for more than a few minutes. Constant restlessness which is associated with intense distress and insomnia.     Medical Review Of Systems:  Restless leg sensation, Complete review of systems is negative except as noted above.     Visit Vitals  OB Status Implant   Smoking Status Never      Wt Readings from Last 6 Encounters:   10/11/23 57.8 kg (127 lb 6.4 oz)   10/06/23 58.7 kg (129 lb 8 oz)   09/25/23 60.1 kg (132 lb 8 oz)   05/11/23 61.7 kg (136 lb)   04/26/23 61.7 kg (136 lb)   04/17/23 60.8 kg (134 lb)      Mental Status Evaluation:    Appearance age appropriate, casually dressed, dressed appropriately, looks stated age   Behavior cooperative, appears anxious, restless   Speech normal rate, normal volume, normal pitch, clear, coherent   Mood anxious   Affect Constricted - bordering on blunted, anxious, tearful and reactive at times   Thought Processes organized, logical, coherent, goal directed, increased rate of thoughts, negative thinking   Associations intact associations   Thought Content no overt delusions, negative thoughts, ruminating thoughts   Perceptual Disturbances: Denies auditory or visual hallucinations and Does not appear to be responding to internal stimuli   Abnormal Thoughts  Risk Potential Denies suicidal or homicidal ideation, plan, or intent   Orientation oriented to person, place, time/date, and situation   Memory recent and remote memory grossly intact   Consciousness alert and awake   Attention Span Concentration Span attention span and concentration are age appropriate   Intellect appears to be of average intelligence   Insight fair   Judgement fair   Muscle Strength and  Gait normal muscle strength and normal muscle tone, normal gait except reduced arm swing   Motor Activity abnormal movement noted: continuous lower extremity movement b/l   Language Appears grossly intact   Fund of Knowledge Appears within normal limits     Neurological Evaluation:  Cranial nerves II through XI intact, no involuntary tongue movements, finger-to-nose intact, heel-to-shin test intact, no pronator drift, no cogwheel rigidity in UE b/l  Able to raise from chair without use of arms  No difficulty with walking/gait, reduced arm swing visible b/l, arms fully extended at sides  Restlessness/shaking of LE b/l, distractable and absent on heel-to-shin testing    Psychiatric History:   Prior psychiatric diagnoses: anxiety, panic disorder, mood disorder, OCD  Inpatient hospitalizations: inpatient hospitalizations - first hospitalization 2001 - [place] [facility], 2008 - [facility], 2021 - panic disorder, mood symptoms  Emergency room visits often in-between for severe panic attacks - 2016  Patient and  report no psychiatric care   Suicide attempts/self-harm: patient denies, patient denies self harm behaviors  Violent/aggressive behavior: patient denies   Patient did have some aggressive outbursts towards  and providers  Outpatient psychiatric providers: currently has outpatient psychiatric provider - [facility]   Was without a psychiatrist from [phone] (Lexapro from [facility] during that time), prior psychiatrist, Dr. Young Costa, retired  Past/current psychotherapy: patient has a therapist weekly, Dr. Stephanie Bautista for Integrated Psychotherapy  Other Services: patient denies  Psychiatric medication trial:   Multiple medication trials - including below,  Antidepressants  Prozac (40 mg), Zoloft (short period of time), Lexapro (multiple years), Luvox  Antipsychotics  Vraylar, Zyprexa, Seroquel, Haldol (in ED)  Sedative hypnotics  Ativan, Klonopin (0.5 mg QID PRN)  Others  Hydroxyzine, Gabapentin, BuSpar (60 mg total daily)     Substance Abuse History:  I have assessed this patient for substance use within the past 12 months. Patient reports a few cigarettes per day, quit 2007/2008. Patient reports history of daily marijuana use - had medical marijuana card - few months without smoking. Denies history of other recreational use including opioids, methamphetamines/amphetamines, cocaine use.  Denies past legal actions or arrests secondary to substance intoxication. The patient denies prior DWIs/DUIs. Melody does not exhibit objective evidence of substance withdrawal during today's examination nor does eMlody appear under the influence of any psychoactive substance. Family Psychiatric History: Mother: anxiety (on antidepressant - Lexapro)  No family history of substance use disorder or suicide attempts or completions. Social History  Early life/developmental: Denies a history of milestone/developmental delay. Denies a history of in-utero exposure to toxins/illicit substances. Denies ADHD history. No history of IEP/504, denies special supports in school. Marital history: /Civil Union   Children: yes, 1 daughter (10 y/o - 2nd grade)  1 older brother   Living arrangement: Lives in a home with  and daughter (family lost their dog in May).   Support system: identifies  as the biggest source of support  Mother also a big support  Education: college graduate - communications [employer])  Had to withdraw from college twice due to anxiety  Occupational History: unemployed since January 2023, applying for permanent disability - previously in [employer] in 10-15 years  Other Pertinent History: no reported  or legal history  Access to firearms:  has firearm, locked and secured     Traumatic History:   Abuse:  patient denied  Other Traumatic Events:  medical trauma  , father passed when she was 13 y/o    Meds/Allergies    Allergies   Allergen Reactions    Demerol [Meperidine] Hyperactivity    Macrolides And Ketolides      Current Outpatient Medications   Medication Instructions    FLUoxetine (PROZAC) 20 mg, Daily    propranolol (INDERAL) 10 mg, Oral, 3 times daily    Vraylar 6 mg, Oral, Daily      Past Medical History:   Diagnosis Date    Anxiety     Depression     Disruption of episiotomy wound in the puerperium     Last assessed 06/22/16    External hemorrhoids     Last assessed 02/05/16 Fibroid uterus     GERD (gastroesophageal reflux disease)     Headache     Leiomyoma of uterus     Migraine     Migraine     Polyhydramnios in third trimester, not applicable or unspecified fetus     Last assessed 01/25/16    Pregnancy headache in third trimester     Resolved 02/05/16    Protein in urine     Last assessed 01/25/16    Sciatica of right side     Third degree laceration of perineum, type 3b 1/26/2016    Varicella       Past Surgical History:   Procedure Laterality Date    CHOLECYSTECTOMY  2006    MYOMECTOMY      DE POST COLPORRHAPHY RECTOCELE W/WO PERINEORRHAPHY N/A 3/24/2016    Procedure: Debi Bauer ;  Surgeon: Zen Rios MD;  Location: [facility];  Service: Gynecology    WISDOM TOOTH EXTRACTION       Family History   Problem Relation Age of Onset    Breast cancer Mother     Cancer Mother     No Known Problems Father     No Known Problems Daughter     No Known Problems Maternal Grandmother     No Known Problems Maternal Grandfather     No Known Problems Paternal Grandmother     No Known Problems Paternal Grandfather     Breast cancer Maternal Aunt         unknown age       The following portions of the patient's history were reviewed and updated as appropriate: allergies, current medications, past family history, past medical history, past social history, past surgical history, and problem list.  Labs & Imaging:  I have personally reviewed all pertinent laboratory tests and imaging results.    Appointment on 10/11/2023   Component Date Value Ref Range Status    Sodium 10/11/2023 140  135 - 147 mmol/L Final    Potassium 10/11/2023 4.7  3.5 - 5.3 mmol/L Final    Chloride 10/11/2023 107  96 - 108 mmol/L Final    CO2 10/11/2023 27  21 - 32 mmol/L Final    ANION GAP 10/11/2023 6  mmol/L Final    BUN 10/11/2023 20  5 - 25 mg/dL Final    Creatinine 10/11/2023 0.96  0.60 - 1.30 mg/dL Final    Standardized to IDMS reference method    Glucose, Fasting 10/11/2023 90  65 - 99 mg/dL Final    Calcium 10/11/2023 9.7  8.4 - 10.2 mg/dL Final    AST 10/11/2023 13  13 - 39 U/L Final    ALT 10/11/2023 15  7 - 52 U/L Final    Specimen collection should occur prior to Sulfasalazine administration due to the potential for falsely depressed results. Alkaline Phosphatase 10/11/2023 53  34 - 104 U/L Final    Total Protein 10/11/2023 7.3  6.4 - 8.4 g/dL Final    Albumin 10/11/2023 4.6  3.5 - 5.0 g/dL Final    Total Bilirubin 10/11/2023 1.78 (H)  0.20 - 1.00 mg/dL Final    Use of this assay is not recommended for patients undergoing treatment with eltrombopag due to the potential for falsely elevated results. N-acetyl-p-benzoquinone imine (metabolite of Acetaminophen) will generate erroneously low results in samples for patients that have taken an overdose of Acetaminophen. eGFR 10/11/2023 73  ml/min/1.73sq m Final    Cholesterol 10/11/2023 185  See Comment mg/dL Final    Cholesterol:         Pediatric <18 Years        Desirable          <170 mg/dL      Borderline High    170-199 mg/dL      High               >=200 mg/dL        Adult >=18 Years            Desirable         <200 mg/dL      Borderline High   200-239 mg/dL      High              >239 mg/dL      Triglycerides 10/11/2023 48  See Comment mg/dL Final    Triglyceride:     0-9Y            <75mg/dL     10Y-17Y         <90 mg/dL       >=18Y     Normal          <150 mg/dL     Borderline High 150-199 mg/dL     High            200-499 mg/dL        Very High       >499 mg/dL    Specimen collection should occur prior to Metamizole administration due to the potential for falsely depressed results. HDL, Direct 10/11/2023 55  >=50 mg/dL Final    LDL Calculated 10/11/2023 120 (H)  0 - 100 mg/dL Final    LDL Cholesterol:     Optimal           <100 mg/dl     Near Optimal      100-129 mg/dl     Above Optimal       Borderline High 130-159 mg/dl       High            160-189 mg/dl       Very High       >189 mg/dl         This screening LDL is a calculated result.    It does not have the accuracy of the Direct Measured LDL in the monitoring of patients with hyperlipidemia and/or statin therapy. Direct Measure LDL (DGQ182) must be ordered separately in these patients. TSH 3RD GENERATON 10/11/2023 1.620  0.450 - 4.500 uIU/mL Final    The recommended reference ranges for TSH during pregnancy are as follows:   First trimester 0.100 to 2.500 uIU/mL   Second trimester  0.200 to 3.000 uIU/mL   Third trimester 0.300 to 3.000 uIU/m    Note: Normal ranges may not apply to patients who are transgender, non-binary, or whose legal sex, sex at birth, and gender identity differ. Adult TSH (3rd generation) reference range follows the recommended guidelines of the [employer] Association, January, 2020. Appointment on 10/06/2023   Component Date Value Ref Range Status    Iron Saturation 10/06/2023 45  15 - 50 % Final    TIBC 10/06/2023 318  250 - 450 ug/dL Final    Iron 10/06/2023 142  50 - 212 ug/dL Final    Patients treated with metal-binding drugs (ie. Deferoxamine) may have depressed iron values. UIBC 10/06/2023 176  155 - 355 ug/dL Final    Ferritin 10/06/2023 119  11 - 307 ng/mL Final   Appointment on 04/26/2023   Component Date Value Ref Range Status    Hemoglobin A1C 04/26/2023 4.9  Normal 3.8-5.6%; PreDiabetic 5.7-6.4%;  Diabetic >=6.5%; Glycemic control for adults with diabetes <7.0% % Final    EAG 04/26/2023 94  mg/dl Final       Suicide/Homicide Risk Assessment:    Risk of Harm to Self:  The following ratings are based on assessment at the time of the interview  Demographic risk factors include:   Historical Risk Factors include: history of depression, chronic anxiety symptoms, history of mood disorder, history of substance use, history of traumatic experiences  Recent Specific Risk Factors include: diagnosis of mood disorder, current depressive symptoms, current anxiety symptoms, feelings of guilt or self blame, hopelessness, unemployed, medication adverse effects  Protective Factors: no current suicidal ideation, access to mental health treatment, being a parent, being , compliant with medications, compliant with mental health treatment, connection to own children, having a desire to be alive, having a sense of purpose or meaning in life, opportunities to participate in community, responsibilities and duties to others, stable living environment, supportive family  Weapons: gun. The following steps have been taken to ensure weapons are properly secured: locked, secured, confirmed with   Based on today's Elvie Parra presents the following risk of harm to self: low    Risk of Harm to Others: The following ratings are based on assessment at the time of the interview  Demographic Risk Factors include: unemployed. Historical Risk Factors include: history of substance use. Recent Specific Risk Factors include: weapons or other means available, concomitant mood disorder, multiple stressors. Protective Factors: no current homicidal ideation, access to mental health treatment, being a parent, being , compliant with medications, compliant with mental health treatment, opportunities to participate in community, resilience, responsibilities and duties to others, safe and stable living environment, supportive family  Weapons: gun. The following steps have been taken to ensure weapons are properly secured: locked, secured, confirmed by   Based on today's Elvie Parra presents the following risk of harm to others: low      Medical Decision Making / Counseling / Coordination of Care: The following interventions are recommended: continue psychotherapy and return in 1 week . Although patient's acute lethality risk is LOW, long-term/chronic lethality risk is mildly elevated given the risk factors listed above.  However, at the current moment, Rom Knight is future-oriented, forward-thinking, and demonstrates ability to act in a self-preserving manner as evidenced by volitionally seeking psychiatric evaluation and treatment today. [address] contracts for safety and is not an imminent risk of harm to self or others. Outpatient level of care is deemed appropriate at this current time. Melody understands that if they can no longer contract for safety, they need to call the office or report to their nearest Emergency Room for immediate evaluation. At this juncture, inpatient hospitalization is not currently warranted. To mitigate future risk, patient should adhere to treatment recommendations, avoid alcohol/illicit substance use, utilize community-based resources and familiar support, and prioritize mental health treatment. The diagnosis and treatment plan were reviewed with the patient. Risks, benefits, and alternatives to treatment were discussed. The importance of medication and treatment compliance was reviewed with the patient.      This note was not shared with the patient due to reasonable likelihood of causing patient harm     Bita Gomez,   Psychiatry Resident, PGY-III

## 2023-10-17 NOTE — BH TREATMENT PLAN
TREATMENT PLAN (Medication Management Only)        [address] [facility]    Name and Date of Birth:  Lisa Beavers 44 y.o. 1980  Date of Treatment Plan: October 23, 2023  Diagnosis/Diagnoses:    1. Anxiety disorder, unspecified type    2. Unspecified mood (affective) disorder ([address])    3. Akathisia      Strengths/Personal Resources for Self-Care: supportive family, taking medications as prescribed, ability to communicate needs, ability to communicate well, ability to listen, ability to reason, ability to understand psychiatric illness, average or above intelligence, family ties, general fund of knowledge, motivation for treatment. Area/Areas of need (in own words): anxiety symptoms, depressive symptoms, lack of motivation, continuous movements  1. Long Term Goal: improve control of anxiety and mood symptoms  Target Date:6 months - 4/23/2024  Person/Persons responsible for completion of goal: Melody/debbie and provider  2. Short Term Objective (s) - How will we reach this goal?:   A. Provider new recommended medication/dosage changes and/or continue medication(s): continue current medications as prescribed Prozac, Klonopin. B. Attend psychotherapy regularly. C. Increase time with family/friends on activities which are relaxing (like game nights, etc.) . Target Date:6 months - 4/23/2024  Person/Persons Responsible for Completion of Goal: Melody/self  Progress Towards Goals: starting treatment, progressing  Treatment Modality: medication management every 1-4 weeks, continue psychotherapy with own therapist  Review due 180 days from date of this plan: 6 months - 4/23/2024  Expected length of service: ongoing treatment    My Physician and I have developed this plan together and I agree to work on the goals and objectives. I understand the treatment goals that were developed for my treatment.     Harlan Khanna, DO  Psychiatry Resident, PGY-III

## 2023-10-23 ENCOUNTER — OFFICE VISIT (OUTPATIENT)
Dept: PSYCHIATRY | Facility: CLINIC | Age: 43
End: 2023-10-23
Payer: COMMERCIAL

## 2023-10-23 DIAGNOSIS — G25.71 AKATHISIA: ICD-10-CM

## 2023-10-23 DIAGNOSIS — F39 UNSPECIFIED MOOD (AFFECTIVE) DISORDER (HCC): ICD-10-CM

## 2023-10-23 DIAGNOSIS — F41.9 ANXIETY DISORDER, UNSPECIFIED TYPE: Primary | ICD-10-CM

## 2023-10-23 PROCEDURE — 90792 PSYCH DIAG EVAL W/MED SRVCS: CPT

## 2023-10-23 RX ORDER — FLUOXETINE 10 MG/1
10 CAPSULE ORAL DAILY
Qty: 30 CAPSULE | Refills: 1 | Status: SHIPPED | OUTPATIENT
Start: 2023-10-23 | End: 2023-12-22

## 2023-10-23 RX ORDER — CLONAZEPAM 1 MG/1
1 TABLET ORAL 2 TIMES DAILY PRN
Refills: 0
Start: 2023-10-23 | End: 2023-10-27 | Stop reason: SDUPTHER

## 2023-10-23 NOTE — PATIENT INSTRUCTIONS
Transition Klonopin to 1 mg twice daily as needed for anxiety  Decrease Prozac to 10 mg daily     Continue psychotherapy weekly    Please call the office nursing staff for medication issues including refills, problems obtaining medications, side effects, etc at [phone]. Please return for a follow up appointment as discussed. If you are running late or are unable to attend your appointment, please call our [facility] office at [phone]. If you are in psychological crisis including not limited to suicidal/homicidal thoughts or thoughts of self-injury, do not hesitate to call/contact your [facility] hotline (see below), call 911, call 65 (mental health crisis), or go to the nearest emergency department.   [facility] Crisis: [address] Crisis: [phone]  [address] Crisis: [address] Crisis: [address] Crisis: [address] Crisis: [address] Crisis: [phone]  National Suicide Prevention Hotline: 6-673.571.5671

## 2023-10-26 ENCOUNTER — TELEPHONE (OUTPATIENT)
Dept: PSYCHIATRY | Facility: CLINIC | Age: 43
End: 2023-10-26

## 2023-10-26 ENCOUNTER — TELEPHONE (OUTPATIENT)
Age: 43
End: 2023-10-26

## 2023-10-26 DIAGNOSIS — F41.9 ANXIETY DISORDER, UNSPECIFIED TYPE: ICD-10-CM

## 2023-10-26 NOTE — TELEPHONE ENCOUNTER
Pt called to report that since starting the medication regimen that you prescribed, she's been feeling more anxious.

## 2023-10-26 NOTE — TELEPHONE ENCOUNTER
----- Message from Lexus Booker DO sent at 10/26/2023  1:36 PM EDT -----  Overall blood work is okay. Sugar levels normal.  Kidney level is normal.  Cholesterol has improved. Thyroid is normal.  Bilirubin is still elevated but not significantly worsening. Again, the small elevation in the bilirubin is likely due to medications provided by the psychiatrist.  We will continue to monitor.

## 2023-10-26 NOTE — TELEPHONE ENCOUNTER
Jess Ames and/or Patient requested a call back to discuss her medication and how its making her feel. She stated in the voicemail she was instructed to call in. .    They can be reached at P# [phone]. Thank you.

## 2023-10-27 RX ORDER — CLONAZEPAM 0.5 MG/1
0.5 TABLET ORAL DAILY PRN
Qty: 30 TABLET | Refills: 0 | Status: SHIPPED | OUTPATIENT
Start: 2023-10-27 | End: 2023-11-26

## 2023-10-27 NOTE — TELEPHONE ENCOUNTER
Patient returned phone call and stated that she has been having increased in anxiety more then she ever has and not sure what to do. Patient should be available for the rest of the day.

## 2023-10-30 ENCOUNTER — HOSPITAL ENCOUNTER (OUTPATIENT)
Dept: MAMMOGRAPHY | Facility: CLINIC | Age: 43
Discharge: HOME | End: 2023-10-30
Payer: COMMERCIAL

## 2023-10-30 ENCOUNTER — TELEMEDICINE (OUTPATIENT)
Dept: PSYCHIATRY | Facility: CLINIC | Age: 43
End: 2023-10-30
Payer: COMMERCIAL

## 2023-10-30 DIAGNOSIS — F39 UNSPECIFIED MOOD (AFFECTIVE) DISORDER (HCC): ICD-10-CM

## 2023-10-30 DIAGNOSIS — G25.71 AKATHISIA: ICD-10-CM

## 2023-10-30 DIAGNOSIS — Z12.31 VISIT FOR SCREENING MAMMOGRAM: ICD-10-CM

## 2023-10-30 DIAGNOSIS — F41.9 ANXIETY DISORDER, UNSPECIFIED TYPE: Primary | ICD-10-CM

## 2023-10-30 PROCEDURE — 99214 OFFICE O/P EST MOD 30 MIN: CPT

## 2023-10-30 PROCEDURE — 77067 SCR MAMMO BI INCL CAD: CPT

## 2023-10-30 PROCEDURE — 77063 BREAST TOMOSYNTHESIS BI: CPT

## 2023-10-30 NOTE — PATIENT INSTRUCTIONS
Continue Prozac 10 mg daily  Continue Klonopin 1 mg twice daily in the morning and evening as needed for anxiety, can take additional 0.5 mg as needed in the afternoon for severe anxiety  Follow up in 2 weeks    Please call the office nursing staff for medication issues including refills, problems obtaining medications, side effects, etc at [phone]. Please return for a follow up appointment as discussed. If you are running late or are unable to attend your appointment, please call our [facility] office at [phone]. If you are in psychological crisis including not limited to suicidal/homicidal thoughts or thoughts of self-injury, do not hesitate to call/contact your [facility] hotline (see below), call 911, call [phone] (mental health crisis), or go to the nearest emergency department.   [facility] Crisis: [address] Crisis: [phone]  [address] Crisis: [address] Crisis: [address] Crisis: [address] Crisis: [address] [facility] Crisis: [phone]  National Suicide Prevention Hotline: 2-376.490.6722

## 2023-10-30 NOTE — PSYCH
Psychiatric Follow Up Visit - [address] [place] [address]  MRN: [id number]      REQUIRED DOCUMENTATION:     1. This service was provided via Telemedicine. 2. Provider located at [address] [place] [address]. 3. TeleMed provider: Stephenie Salter DO.  4. Identify all parties in room with patient during tele consult: patient and patient's   5. Patient confirmed they are located in [place] where this provider holds an active license. 6. Patient was then informed that this was a Telemedicine visit and that the exam was being conducted confidentially over secure lines. My office door was closed. The patient was notified the following individuals were present in the room, Dr. Harika Reza. Patient acknowledged consent and understanding of privacy and security of the Telemedicine visit. I informed the patient that I have reviewed their record in Epic and presented the opportunity for them to ask any questions regarding the visit today. Patient aware this is a billable service. The patient agreed to participate. Assessment/Plan:   Melody Valentin is a 40 y.o. female, /Civil Union and presently living with her  and daughter (10 y/o), currently unemployed - worked for 15 years in [employer] (mainly with a [employer]), with prior psychiatric diagnoses of panic disorder, anxiety, mood disorder - depression, and OCD, with past medical history significant for akithisia, with 3 prior psychiatric admissions (first at age 26 y/o, most recent in 2021), with suicide risk factors including access to lethal means (locked and secured firearm), history of traumatic experiences, chronic mental illness, and anxiety, and medical history including GERD, cholecystectomy in 2006, perineorraphy and colporrhaphy in 2016, leiomyoma of uterus who is presenting today virtually for follow up. Patient presents with her . Patient was seen for initial psychiatric evaluation on 10/23/23. At that time, patient was agreeable to decrease Prozac to 10 mg and transition Klonopin to 1 mg BID in setting of remaining akathisia and EPS related adverse effects (recently discontinued Vraylar), and increased anxiety in setting of Prozac titration. On 10/27/23, spoke to patient in regard to increased anxiety and discussed coping strategies and adding Klonopin 0.5 mg PRN to regimen to bridge for severe anxiety during medication adjustments. Patient was agreeable and reports she did not need to utilize additional dose since conversation. Patient endorses continued anxiety today, with restlessness and physical symptoms of anxiety - nausea, abdominal pain, heart racing sensation. She reports continued leg movements and arm stiffness, though she and  note one improvement is her handwriting and ability to hold a writing utensil. Patient and  report she terminated care with [facility] and will continue with psychiatric care through [facility]. Patient continues to follow for therapy with [facility]. Patient agreeable to continue medications at current doses and to follow up in 2 weeks in person for reassessment. Diagnoses:  Anxiety disorder, unspecified, differential includes Panic disorder, Generalized anxiety disorder, Obsessive-compulsive disorder  Continuous movements of lower extremities, differential includes Akathisia, secondary to Anxiety disorder  Shaking/restlessness of bilateral lower extremities continues, patient and  endorse reduced arm swing remains present, decreased facial movement present, patient endorses improvement holding writing utensils/in handwriting.   Mood disorder, unspecified, differential includes Adjustment disorder with anxious distress, Major depressive disorder    Treatment Recommendations/Precautions:  Continue Prozac 10 mg daily for anxiety and mood  Continue with plan for slow titration of medication, will reassess on follow up in 2 weeks  PARQ completed including serotonin syndrome, SIADH, worsening depression, suicidality, induction of ghada, GI upset, headaches, activation, sexual side effects, sedation, potential drug interactions, and others. Continue Klonopin 1 mg BID QMorning and QEvening, can take 0.5 mg QAfternoon for severe anxiety resistant to coping strategies  0.5 mg QAfternoon PRN added to patient's medication on phone call 10/27/23 as documented below and in separate encounter  Discussed with patient the risks of sedation, respiratory depression, impairment in judgment and motor function. Depression, mood changes, GI changes, and others discussed. Patient aware of risks of being on or starting opioid medications due to drug interactions and potential for serious respiratory depression and death. PDMP reviewed, last filled 10/15/23 (prior prescription and prescriber)  Lab work from 10/11/23 previously reviewed. Medication management in 2 weeks  Continue psychotherapy with own therapist  Aware of need to follow up with family physician for medical issues  Aware of 24 hour and weekend coverage for urgent situations accessed by calling [facility] main practice number  Recommend continued follow up with neurology    Medication Risks/Benefits      Risks, Benefits And Possible Side Effects Of Medications:    Risks, benefits, and possible side effects of medications explained to Melody and she verbalizes understanding and agreement for treatment. Controlled Medication Discussion:     Bruce Shirley has been filling controlled prescriptions on time as prescribed according to [address] Program  Discussed with Bruce Shirley the risks of sedation, respiratory depression, impairment of ability to drive and potential for abuse and addiction related to treatment with benzodiazepine medications.  She understands risk of treatment with benzodiazepine medications, agrees to not drive if feels impaired and agrees to take medications as prescribed  Discussed with [facility] warning on concurrent use of benzodiazepines and opioid medications including sedation, respiratory depression, coma and death. She understands the risk of treatment with benzodiazepines in addition to opioids and wants to continue taking those medications  Discussed with Melody increased risk of impairment related to concurrent use of benzodiazepines and hypnotic medications including excessive sedation, psychomotor impairment and respiratory depression. She understands the risk of treatment with benzodiazepines in addition to hypnotic medications and wants to continue taking those medications  ------------------------------------  History of Present Illness   Melody KIMBERLY VazEmilypuma Lowry is presenting to follow up for anxiety and depression related symptoms, and akathisia and EPS related adverse effects . Jesus Garza presents virtually today with her , Kami Gastelum. Melody endorses anxiety persists, is increased in the mornings and afternoons - is improved in the evening. She endorses Klonopin does improve anxiety related symptoms. She endorses physical symptoms of anxiety including heart racing sensation, nausea, abdominal pain, and restlessness - she endorses movement of her legs continues though has not worsened compared to prior. In regards to EPS related adverse effects, patient remains off of Lakeisha Kiera and has decreased Prozac to 10 mg daily, she continues with Propranolol 20 mg TID per neurology. Patient endorses improvement in ability to hold writing utensil and  notes improvement in hand writing. She continues to have decreased facial movements and stiffness in her arms. Patient and  deny worsening of or new EPS related symptoms. Patient has continued negative thinking and worry.  Patient discusses therapy went well on Friday and they reviewed coping strategies for increased anxiety, this coupled with having company and distraction over the weekend helped manage her anxiety symptoms and she did not need additional 0.5 mg PRN of Klonopin. Provided support and discussed how patient views this additional dose as a "safety net," which should be used if anxiety is refractory to her coping skills. Patient endorses improvement in depressive symptoms, denied depressed mood, she and  deny tearful episodes in the last 4-5 days. Patient endorses improvement in energy, and motivation - spent time with family and friends over the weekend and enjoyed such. Patient endorses appetite is decreased when anxiety is increased, notes dinner is her biggest meal at this time. Patient denies changes to sleep since prior evaluation. Patient has continued psychomotor slowing. Patient denies SI, HI, passive death wishes, or thoughts to harm self or others. Patient does not endorse or demonstrate symptoms consistent with ghada/hypomania at time of evaluation. Patient does not demonstrate symptoms consistent with negative or positive symptoms of psychosis at time of evaluation. Per phone conversation on 10/27/23: "Returned call to patient, Cortez Stevenson ([phone]), to discuss symptoms, patient endorses worsening anxiety symptoms in setting of medication adjustments. Patient endorses increased anxiety with heart racing, leg shaking, stomach ache, with negative thoughts and anticipatory anxiety. Case previously reviewed with attending physician regarding possible need to optimize Klonopin in setting of medication adjustments and as bridge while increasing Prozac to therapeutic dose. Will increase Klonopin to 1 mg Qmorning, 0.5 mg Qafternoon, 1 mg Qevening PRN for anxiety. Instructed patient to continue Prozac 10 mg daily.   Support provided and discussed stressors and coping strategies,  available for portion of call and also reinforced these strategies to patient. Patient has therapy appointment for this afternoon"    Psychiatric Review Of Systems:  Melody reports Symptoms as described in HPI. Melody denies Current suicidal thoughts, plan, or intent, Current thoughts of self-harm, or Current homicidal thoughts, plan, or intent. Medical Review Of Systems:  Increased movement of bilateral L/E, Complete review of systems is negative except as noted above. [place] Akathisia Rating Scale (BARS) -  10/23/23 score: 8 and Global Clinical Assessment score:3  Objective  0 Normal, occasional fidgety movements of the limbs  1 Presence of characteristic restless movements: shuffling or tramping movements of the legs/feet, or swinging of one leg while sitting, and/or rocking from foot to foot or “walking on the spot” when standing, but movements present for less than half the time observed  2 Observed phenomena, as described in (1) above, which are present for at least half the observation period  3 Patient is constantly engaged in characteristic restless movements, and/or has the inability to remain seated or standing without walking or pacing, during the time observed  Subjective  0 Absence of inner restlessness  1 Non-specific sense of inner restlessness  2 The patient is aware of an inability to keep the legs still, or a desire to move the legs, and/or complains of inner restlessness aggravated specifically by being required to stand still  3 Awareness of intense compulsion to move most of the time and/or reports strong desire to walk or pace most of the time  Distress related to restlessness  0 No distress  1 Mild  2 Moderate  3 Severe  Global Clinical Assessment of Akathisia  0 Absent. No evidence of awareness of restlessness. Observation of characteristic movements of akathisia in the absence of a subjective report of inner restlessness or compulsive desire to move the legs should be classified as pseudoakathisia  1 Questionable.  Non-specific inner tension and fidgety movements  2 Mild akathisia. Awareness of restlessness in the legs and/or inner restlessness worse when required to stand still. Fidgety movements present, but characteristic restless movements of akathisia not necessarily observed. Condition causes little or no distress. 3 Moderate akathisia. Awareness of restlessness as described for mild akathisia above, combined with characteristic restless movements such as rocking from foot to foot when standing. Patient finds the condition distressing. 4 Marked akathisia. Subjective experience of restlessness includes a compulsive desire to walk or pace. However, the patient is able to remain seated for at least five minutes. The condition is obviously distressing. 5 Severe akathisia. The patient reports a strong compulsion to pace up and down most of the time. Unable to sit or lie down for more than a few minutes. Constant restlessness which is associated with intense distress and insomnia.  ------------------------------------  All italicized information has been copied from previous psychiatric evaluation. Information has been reviewed with the patient.      Past Psychiatric History:   Prior psychiatric diagnoses: anxiety, panic disorder, mood disorder, OCD  Inpatient hospitalizations: inpatient hospitalizations - first hospitalization 2001 - [facility], 2008 - [facility], 2021 - panic disorder, mood symptoms  Emergency room visits often in-between for severe panic attacks - 2016  Patient and  report no psychiatric care   Suicide attempts/self-harm: patient denies, patient denies self harm behaviors  Violent/aggressive behavior: patient denies   Patient did have some aggressive outbursts towards  and providers  Outpatient psychiatric providers: currently has outpatient psychiatric provider - [facility]   Was without a psychiatrist from [phone] (Lexapro from [facility] during that time), prior psychiatrist,  [facility]rad, retired  Past/current psychotherapy: patient has a therapist weekly, Dr. Kristine [facility]  Other Services: patient denies  Psychiatric medication trial:   Multiple medication trials - including below,  Antidepressants  Prozac (40 mg), Zoloft (short period of time), Lexapro (multiple years), Luvox  Antipsychotics  Vraylar, Zyprexa, Seroquel, Haldol (in ED)  Sedative hypnotics  Ativan, Klonopin (0.5 mg QID PRN)  Others  Hydroxyzine, Gabapentin, BuSpar (60 mg total daily)      Substance Abuse History:  I have assessed this patient for substance use within the past 12 months. Patient reports a few cigarettes per day, quit 2007/2008. Patient reports history of daily marijuana use - had medical marijuana card - few months without smoking. Denies history of other recreational use including opioids, methamphetamines/amphetamines, cocaine use. Denies past legal actions or arrests secondary to substance intoxication. The patient denies prior DWIs/DUIs. Melody does not exhibit objective evidence of substance withdrawal during today's examination nor does Melody appear under the influence of any psychoactive substance. Family Psychiatric History: Mother: anxiety (on antidepressant - Lexapro)  No family history of substance use disorder or suicide attempts or completions. Social History  Early life/developmental: Denies a history of milestone/developmental delay. Denies a history of in-utero exposure to toxins/illicit substances. Denies ADHD history. No history of IEP/504, denies special supports in school. Marital history: /Civil Union   Children: yes, 1 daughter (10 y/o - 2nd grade)  1 older brother   Living arrangement: Lives in a home with  and daughter (family lost their dog in May).   Support system: identifies  as the biggest source of support  Mother also a big support  Education: college graduate - communications [employer])  Had to withdraw from college twice due to anxiety  Occupational History: unemployed since January 2023, applying for permanent disability - previously in advertising in 10-15 years  Other Pertinent History: no reported  or legal history  Access to firearms:  has firearm, locked and secured      Traumatic History:   Abuse:  patient denied  Other Traumatic Events:  medical trauma, father passed when she was 13 y/o    History Review:  The following portions of the patient's history were reviewed and updated as appropriate: allergies, current medications, past family history, past medical history, past social history, past surgical history, and problem list.    Visit Vitals  OB Status Implant   Smoking Status Never      Wt Readings from Last 6 Encounters:   10/11/23 57.8 kg (127 lb 6.4 oz)   10/06/23 58.7 kg (129 lb 8 oz)   09/25/23 60.1 kg (132 lb 8 oz)   05/11/23 61.7 kg (136 lb)   04/26/23 61.7 kg (136 lb)   04/17/23 60.8 kg (134 lb)        Mental Status Exam:  Appearance:  alert, good eye contact, appears stated age, casually dressed, appropriate grooming and hygiene, and hypomimia   Behavior:  calm, cooperative, and sitting comfortably   Motor: Unable to assess due to virtual visit   Speech:  clear, normal rate, not pressured, normal volume, not hyperverbal, and coherent   Mood:  anxious   Affect:  constricted and anxious   Thought Process:  Organized, logical, goal-directed, negative thinking   Thought Content: no verbalized delusions or overt paranoia, ruminating thoughts, negative thoughts   Perceptual disturbances: no reported hallucinations and does not appear to be responding to internal stimuli at this time   Risk Potential: No active or passive suicidal or homicidal ideation was verbalized during interview   Cognition: oriented to person, place, time, and situation, memory grossly intact, appears to be of average intelligence, and age-appropriate attention span and concentration   Insight:  Good   Judgment: Good Meds/Allergies    Allergies   Allergen Reactions    Demerol [Meperidine] Hyperactivity    Macrolides And Ketolides      Current Outpatient Medications   Medication Instructions    clonazePAM (KLONOPIN) 0.5 mg, Oral, Daily PRN, Take in afternoon as needed for anxiety. FLUoxetine (PROZAC) 10 mg, Oral, Daily    propranolol (INDERAL) 20 mg, Oral, 3 times daily        Labs & Imaging:  I have personally reviewed all pertinent laboratory tests and imaging results. Appointment on 10/11/2023   Component Date Value Ref Range Status    Sodium 10/11/2023 140  135 - 147 mmol/L Final    Potassium 10/11/2023 4.7  3.5 - 5.3 mmol/L Final    Chloride 10/11/2023 107  96 - 108 mmol/L Final    CO2 10/11/2023 27  21 - 32 mmol/L Final    ANION GAP 10/11/2023 6  mmol/L Final    BUN 10/11/2023 20  5 - 25 mg/dL Final    Creatinine 10/11/2023 0.96  0.60 - 1.30 mg/dL Final    Standardized to IDMS reference method    Glucose, Fasting 10/11/2023 90  65 - 99 mg/dL Final    Calcium 10/11/2023 9.7  8.4 - 10.2 mg/dL Final    AST 10/11/2023 13  13 - 39 U/L Final    ALT 10/11/2023 15  7 - 52 U/L Final    Specimen collection should occur prior to Sulfasalazine administration due to the potential for falsely depressed results. Alkaline Phosphatase 10/11/2023 53  34 - 104 U/L Final    Total Protein 10/11/2023 7.3  6.4 - 8.4 g/dL Final    Albumin 10/11/2023 4.6  3.5 - 5.0 g/dL Final    Total Bilirubin 10/11/2023 1.78 (H)  0.20 - 1.00 mg/dL Final    Use of this assay is not recommended for patients undergoing treatment with eltrombopag due to the potential for falsely elevated results. N-acetyl-p-benzoquinone imine (metabolite of Acetaminophen) will generate erroneously low results in samples for patients that have taken an overdose of Acetaminophen.     eGFR 10/11/2023 73  ml/min/1.73sq m Final    Cholesterol 10/11/2023 185  See Comment mg/dL Final    Cholesterol:         Pediatric <18 Years        Desirable          <170 mg/dL Borderline High    170-199 mg/dL      High               >=200 mg/dL        Adult >=18 Years            Desirable         <200 mg/dL      Borderline High   200-239 mg/dL      High              >239 mg/dL      Triglycerides 10/11/2023 48  See Comment mg/dL Final    Triglyceride:     0-9Y            <75mg/dL     10Y-17Y         <90 mg/dL       >=18Y     Normal          <150 mg/dL     Borderline High 150-199 mg/dL     High            200-499 mg/dL        Very High       >499 mg/dL    Specimen collection should occur prior to Metamizole administration due to the potential for falsely depressed results. HDL, Direct 10/11/2023 55  >=50 mg/dL Final    LDL Calculated 10/11/2023 120 (H)  0 - 100 mg/dL Final    LDL Cholesterol:     Optimal           <100 mg/dl     Near Optimal      100-129 mg/dl     Above Optimal       Borderline High 130-159 mg/dl       High            160-189 mg/dl       Very High       >189 mg/dl         This screening LDL is a calculated result. It does not have the accuracy of the Direct Measured LDL in the monitoring of patients with hyperlipidemia and/or statin therapy. Direct Measure LDL (NAE986) must be ordered separately in these patients. TSH 3RD GENERATON 10/11/2023 1.620  0.450 - 4.500 uIU/mL Final    The recommended reference ranges for TSH during pregnancy are as follows:   First trimester 0.100 to 2.500 uIU/mL   Second trimester  0.200 to 3.000 uIU/mL   Third trimester 0.300 to 3.000 uIU/m    Note: Normal ranges may not apply to patients who are transgender, non-binary, or whose legal sex, sex at birth, and gender identity differ. Adult TSH (3rd generation) reference range follows the recommended guidelines of the American Thyroid Association, January, 2020.    Appointment on 10/06/2023   Component Date Value Ref Range Status    Iron Saturation 10/06/2023 45  15 - 50 % Final    TIBC 10/06/2023 318  250 - 450 ug/dL Final    Iron 10/06/2023 142  50 - 212 ug/dL Final    Patients treated with metal-binding drugs (ie. Deferoxamine) may have depressed iron values. UIBC 10/06/2023 176  155 - 355 ug/dL Final    Ferritin 10/06/2023 119  11 - 307 ng/mL Final     ---------------------------------------    Although patient's acute lethality risk is low, long-term/chronic lethality risk is mildly elevated in the presence of anxiety, history of depression, history of traumatic experiences, history of substance use, adverse effects of medication causing decreased level of functioning. At the current moment, Harlan Fitzgerald is future-oriented, forward-thinking, and demonstrates ability to act in a self-preserving manner as evidenced by volitionally presenting to the clinic today, seeking treatment. At this juncture, inpatient hospitalization is not currently warranted. To mitigate future risk, patient should adhere to the recommendations of this writer, avoid alcohol/illicit substance use, utilize community-based resources and familiar support and prioritize mental health treatment. Based on today's assessment and clinical criteria, Melody KIMBERLY Jones contracts for safety and is not an imminent risk of harm to self or others. Outpatient level of care is deemed appropriate at this present time. Melody understands that if they are no longer able to contract for safety, they need to call/contact the outpatient office including this writer, call/contact crisis and/orattend to the nearest Emergency Department for immediate evaluation.     Risk of Harm to Self:  The following ratings are based on assessment at the time of the interview  Demographic risk factors include:   Historical Risk Factors include: history of depression, chronic anxiety symptoms, history of mood disorder, history of substance use, history of traumatic experiences  Recent Specific Risk Factors include: diagnosis of mood disorder, current depressive symptoms, current anxiety symptoms, feelings of guilt or self blame, hopelessness, unemployed, medication adverse effects  Protective Factors: no current suicidal ideation, access to mental health treatment, being a parent, being , compliant with medications, compliant with mental health treatment, connection to own children, having a desire to be alive, having a sense of purpose or meaning in life, opportunities to participate in community, responsibilities and duties to others, stable living environment, supportive family  Weapons: gun. The following steps have been taken to ensure weapons are properly secured: locked, secured, confirmed with   Based on today's Loy Coon presents the following risk of harm to self: low     Risk of Harm to Others: The following ratings are based on assessment at the time of the interview  Demographic Risk Factors include: unemployed. Historical Risk Factors include: history of substance use. Recent Specific Risk Factors include: weapons or other means available, concomitant mood disorder, multiple stressors. Protective Factors: no current homicidal ideation, access to mental health treatment, being a parent, being , compliant with medications, compliant with mental health treatment, opportunities to participate in community, resilience, responsibilities and duties to others, safe and stable living environment, supportive family  Weapons: gun. The following steps have been taken to ensure weapons are properly secured: locked, secured, confirmed by   Based on today's Loy Coon presents the following risk of harm to others: low    Psychotherapy Provided:     Individual psychotherapy provided: Yes  Counseling was provided during the session today for 15 minutes. Medications, treatment progress and treatment plan reviewed with Melody. Supportive therapy provided. Crisis/safety plan discussed with Melody.     Treatment Plan:    Completed and signed during the session: Not applicable - Treatment Plan not due at this session    This note was not shared with the patient due to reasonable likelihood of causing patient harm. Dagmar Orta,   Psychiatry Resident, PGY-III    This note has been constructed using a voice recognition system. There may be translation, syntax, or grammatical errors. If you have any questions, please contact the dictating provider.

## 2023-11-07 ENCOUNTER — TELEPHONE (OUTPATIENT)
Dept: PSYCHIATRY | Facility: CLINIC | Age: 43
End: 2023-11-07

## 2023-11-07 NOTE — TELEPHONE ENCOUNTER
Patient called and stated that she would like to talk with provider. Patient is stating that she is very depressed and feeling very overwhelmed and nothing is working. At this time patient start crying over the phone. Writer asked if she feels like she should go to the ED and patient said no it is not to that point. She just wants someone to talk with. Writer told patient that provider is out of the office for a conference and will not return until Monday. Writer will forward the message to the covering provider and the nursing line for more assistance.

## 2023-11-13 NOTE — PSYCH
Psychiatric Follow Up Visit - [facility]  MRN: [id number]  Assessment/Plan:   Melody Horner is a 40 y.o. 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 Patient presents with her . On initial evaluation, patient presented with symptoms consistent with EPS and akathisia related adverse effects of psychotropic medications. Possible offending agents were previously discontinued (Yaneth Galeas) with adjustments to Prozac and Klonopin dosing. Today, patient endorses anxiety symptoms remain present with physical symptoms of anxiety, inner tension, and negative thinking. She denies panic attacks since last evaluation. Patient endorses increase in depressive related symptoms including low motivation, anhedonia, low energy, and depressed mood. Patient's akathisia and EPS related symptoms are improved compared to prior - arm swing is increased, facial movements are increased though evidence of hypomimia remains present. Patient agreeable to increase in Prozac to 20 mg daily, continuation of Klonopin at current doses, and psychoeducation regarding medication and coping strategies provided.      Diagnoses:  Anxiety disorder, unspecified, differential includes Panic disorder, Generalized anxiety disorder, Obsessive-compulsive disorder  Mood disorder, unspecified, differential includes Major depressive disorder, Adjustment disorder with anxious distress  Continuous movements of lower extremities, differential includes Akathisia, secondary to anxiety disorder  Shaking/restlessness of bilateral lower extremities visible - unchanged compared to prior. Arm swing improved, decreased facial movement present though improved compared to prior, patient endorses improvement in finger/hand movements. Treatment Recommendations/Precautions:  Increase Prozac to 20 mg daily for anxiety and mood  PARQ completed including serotonin syndrome, SIADH, worsening depression, suicidality, induction of ghada, GI upset, headaches, activation, sexual side effects, sedation, potential drug interactions, and others. Continue Klonopin 1 mg BID PRN QMorning and QEvening, and 0.5 mg Qafternoon PRN for breakthrough anxiety  PARQ completed including the risks of sedation, respiratory depression, impairment in judgment and motor function, depression, mood changes, GI changes, and others discussed. Patient aware of risks of being on or starting opioid medications due to drug interactions and potential for serious respiratory depression and death. PDMP reviewed  Patient prescribed propranolol 20 mg TID for akathisia by neurology, may be providing additional anxiolytic effect   Most recent lab work previously reviewed. Recommend continued follow up with neurology.   Medication management every 3-4 weeks  Continue psychotherapy with own therapist  Aware of need to follow up with family physician for medical issues  Aware of 24 hour and weekend coverage for urgent situations accessed by calling [facility] main practice number    Medication Risks/Benefits      Risks, Benefits And Possible Side Effects Of Medications:    Risks, benefits, and possible side effects of medications explained to Melody and she verbalizes understanding and agreement for treatment. Controlled Medication Discussion:     Jesus Garza has been filling controlled prescriptions on time as prescribed according to [address] [facility] [address] [facility]  Discussed with Jesus Garza the risks of sedation, respiratory depression, impairment of ability to drive and potential for abuse and addiction related to treatment with benzodiazepine medications. She understands risk of treatment with benzodiazepine medications, agrees to not drive if feels impaired and agrees to take medications as prescribed  Discussed with [employer] warning on concurrent use of benzodiazepines and opioid medications including sedation, respiratory depression, coma and death. She understands the risk of treatment with benzodiazepines in addition to opioids and wants to continue taking those medications  Discussed with Melody increased risk of impairment related to concurrent use of benzodiazepines and hypnotic medications including excessive sedation, psychomotor impairment and respiratory depression. She understands the risk of treatment with benzodiazepines in addition to hypnotic medications and wants to continue taking those medications  ------------------------------------  History of Present Illness   Melody Lowry is presenting to follow up for anxiety, depression, and akathisia and EPS related symptoms . Jesus Garza presents with her . Melody endorses increased depressive symptoms since last evaluation, she reports feeling low motivation, "wants to lay around all day," feeling difficult leaving the house. The patient has had increase in tearful episodes. She endorses low energy and anhedonia, she endorses intermittent feelings of hopelessness. Sleep has been unchanged, has been having vivid dreams which have not been "happy or sad or scary," though vivid.  Patient is able to fall back asleep after dream, she has difficulty falling back asleep after her  leaves for work. Patient denies suicidal ideation, thoughts to harm self or others, passive death wishes, or homicidal ideations at time of evaluation. Melody report the anxiety has persisted with increased rate of thoughts, negative thinking, inner tension, and restlessness. She continues to move her legs frequently. Patient denies panic attacks since last evaluation, she and her  note the physical symptoms of anxiety (stomach discomfort, chest discomfort) has reduced. Patient has only needed afternoon 0.5 mg on Klonopin x1 since prior evaluation. Patient does not endorse or demonstrate symptoms consistent with ghada/hypomania, or negative or positive symptoms of psychosis at time of evaluation. In regards to EPS related adverse effects, patient remains off of Vrayler (approximately 1 month), and has continued with Prozac 10 mg, Propranolol 20 mg TID, and Klonopin 1 mg BID PRN and 0.5 mg PRN Qafternoon for breakthrough anxiety. The patient is observed with improved arm swing, improved movement of UE and endorses improvement in her hand stiffness/difficulty holding items. She notes her hands "feel better." The patient has improved facial movements/expression, observed smiling spontaneously and more frequently. The patient continues to have decreased overall facial expressions. Patient and  deny new or worsening EPS related adverse effects.     [place] Akathisia Rating Scale (BARS) -  Prior score on 10/23/23: 8 and Global Clinical Assessment score:3  Score 11/14/23: 7 and Global Clinical Assessment score: 3 due to distress to patient, motor movements in range of 2  Objective  0 Normal, occasional fidgety movements of the limbs  1 Presence of characteristic restless movements: shuffling or tramping movements of the legs/feet, or swinging of one leg while sitting, and/or rocking from foot to foot or “walking on the spot” when standing, but movements present for less than half the time observed  2 Observed phenomena, as described in (1) above, which are present for at least half the observation period  3 Patient is constantly engaged in characteristic restless movements, and/or has the inability to remain seated or standing without walking or pacing, during the time observed  Subjective  0 Absence of inner restlessness  1 Non-specific sense of inner restlessness  2 The patient is aware of an inability to keep the legs still, or a desire to move the legs, and/or complains of inner restlessness aggravated specifically by being required to stand still  3 Awareness of intense compulsion to move most of the time and/or reports strong desire to walk or pace most of the time  Distress related to restlessness  0 No distress  1 Mild  2 Moderate  3 Severe  Global Clinical Assessment of Akathisia  0 Absent. No evidence of awareness of restlessness. Observation of characteristic movements of akathisia in the absence of a subjective report of inner restlessness or compulsive desire to move the legs should be classified as pseudoakathisia  1 Questionable. Non-specific inner tension and fidgety movements  2 Mild akathisia. Awareness of restlessness in the legs and/or inner restlessness worse when required to stand still. Fidgety movements present, but characteristic restless movements of akathisia not necessarily observed. Condition causes little or no distress. 3 Moderate akathisia. Awareness of restlessness as described for mild akathisia above, combined with characteristic restless movements such as rocking from foot to foot when standing. Patient finds the condition distressing. 4 Marked akathisia. Subjective experience of restlessness includes a compulsive desire to walk or pace. However, the patient is able to remain seated for at least five minutes. The condition is obviously distressing. 5 Severe akathisia.  The patient reports a strong compulsion to pace up and down most of the time. Unable to sit or lie down for more than a few minutes. Constant restlessness which is associated with intense distress and insomnia. Psychiatric Review Of Systems:  Melody reports Symptoms as described in HPI. Melody denies Current suicidal thoughts, plan, or intent, Current thoughts of self-harm, or Current homicidal thoughts, plan, or intent. Medical Review Of Systems:  Complete review of systems is negative except as noted above.    ------------------------------------  All italicized information has been copied from previous psychiatric evaluation. Information has been reviewed with the patient.      Past Psychiatric History:   Prior psychiatric diagnoses: anxiety, panic disorder, mood disorder, OCD  Inpatient hospitalizations: inpatient hospitalizations - first hospitalization 2001 - [facility], 2008 - [facility], 2021 - panic disorder, mood symptoms  Emergency room visits often in-between for severe panic attacks - 2016  Patient and  report no psychiatric care   Suicide attempts/self-harm: patient denies, patient denies self harm behaviors  Violent/aggressive behavior: patient denies   Patient did have some aggressive outbursts towards  and providers  Outpatient psychiatric providers: currently has outpatient psychiatric provider - [facility]   Was without a psychiatrist from [phone] ([facility]apro [facility] during that time), prior psychiatrist, Dr. Vannessa Youssef, retired  Past/current psychotherapy: patient has a therapist weekly, Dr. Gay Flores [facility]  Other Services: patient denies  Psychiatric medication trial:   Multiple medication trials - including below,  Antidepressants  Prozac (40 mg), Zoloft (short period of time), Lexapro (multiple years), Luvox  Antipsychotics  Vraylar, Zyprexa, Seroquel, Haldol (in ED)  Sedative hypnotics  Ativan, Klonopin (0.5 mg QID PRN)  Others  Hydroxyzine, Gabapentin, BuSpar (60 mg total daily)      Substance Abuse History:  I have assessed this patient for substance use within the past 12 months. Patient reports a few cigarettes per day, quit 2007/2008. Patient reports history of daily marijuana use - had medical marijuana card - few months without smoking. Denies history of other recreational use including opioids, methamphetamines/amphetamines, cocaine use. Denies past legal actions or arrests secondary to substance intoxication. The patient denies prior DWIs/DUIs. Melody does not exhibit objective evidence of substance withdrawal during today's examination nor does Melody appear under the influence of any psychoactive substance. Family Psychiatric History: Mother: anxiety (on antidepressant - Lexapro)  No family history of substance use disorder or suicide attempts or completions. Social History  Early life/developmental: Denies a history of milestone/developmental delay. Denies a history of in-utero exposure to toxins/illicit substances. Denies ADHD history. No history of IEP/504, denies special supports in school. Marital history: /Civil Union   Children: yes, 1 daughter (10 y/o - 2nd grade)  1 older brother   Living arrangement: Lives in a home with  and daughter (family lost their dog in May). Support system: identifies  as the biggest source of support  Mother also a big support  Education: college graduate - communications [employer])  Had to withdraw from college twice due to anxiety  Occupational History: unemployed since January 2023, applying for permanent disability - previously in advertising in 10-15 years  Other Pertinent History: no reported  or legal history  Access to firearms:  has firearm, locked and secured      Traumatic History:   Abuse:  patient denied  Other Traumatic Events:  medical trauma, father passed when she was 13 y/o    History Review:  The following portions of the patient's history were reviewed and updated as appropriate: allergies, current medications, past family history, past medical history, past social history, past surgical history, and problem list.    Visit Vitals  OB Status Implant   Smoking Status Never      Wt Readings from Last 6 Encounters:   10/11/23 57.8 kg (127 lb 6.4 oz)   10/06/23 58.7 kg (129 lb 8 oz)   09/25/23 60.1 kg (132 lb 8 oz)   05/11/23 61.7 kg (136 lb)   04/26/23 61.7 kg (136 lb)   04/17/23 60.8 kg (134 lb)          Mental Status Exam:  Appearance:  alert, good eye contact, appears stated age, casually dressed, and appropriate grooming and hygiene   Behavior:  calm and cooperative   Motor: No difficulty with walking/gait, arm swing improved compared to prior - reduced overall b/l, restlessness/shaking of LE b/l present and distractable, no abnormal tongue movements, hypomimia - improved compared to prior - patient smiling spontaneously and able to hold    Speech:  spontaneous, clear, normal rate, not pressured, normal volume, not hyperverbal, and coherent   Mood:  "No motivation"   Affect:  constricted, dysphoric, anxious, and tearful at times   Thought Process:  Organized, logical, goal-directed, increased rate of thoughts   Thought Content: no verbalized delusions or overt paranoia, negative thoughts   Perceptual disturbances: no reported hallucinations and does not appear to be responding to internal stimuli at this time   Risk Potential: No active or passive suicidal or homicidal ideation was verbalized during interview   Cognition: oriented to person, place, time, and situation, memory grossly intact, appears to be of average intelligence, and age-appropriate attention span and concentration   Insight:  Good   Judgment: Good     Meds/Allergies    Allergies   Allergen Reactions    Demerol [Meperidine] Hyperactivity    Macrolides And Ketolides      Current Outpatient Medications   Medication Instructions    clonazePAM (KLONOPIN) 0.5 mg, Oral, Daily PRN, Take in afternoon as needed for anxiety. FLUoxetine (PROZAC) 10 mg, Oral, Daily    propranolol (INDERAL) 20 mg, Oral, 3 times daily        Labs & Imaging:  I have personally reviewed all pertinent laboratory tests and imaging results. Appointment on 10/11/2023   Component Date Value Ref Range Status    Sodium 10/11/2023 140  135 - 147 mmol/L Final    Potassium 10/11/2023 4.7  3.5 - 5.3 mmol/L Final    Chloride 10/11/2023 107  96 - 108 mmol/L Final    CO2 10/11/2023 27  21 - 32 mmol/L Final    ANION GAP 10/11/2023 6  mmol/L Final    BUN 10/11/2023 20  5 - 25 mg/dL Final    Creatinine 10/11/2023 0.96  0.60 - 1.30 mg/dL Final    Standardized to IDMS reference method    Glucose, Fasting 10/11/2023 90  65 - 99 mg/dL Final    Calcium 10/11/2023 9.7  8.4 - 10.2 mg/dL Final    AST 10/11/2023 13  13 - 39 U/L Final    ALT 10/11/2023 15  7 - 52 U/L Final    Specimen collection should occur prior to Sulfasalazine administration due to the potential for falsely depressed results. Alkaline Phosphatase 10/11/2023 53  34 - 104 U/L Final    Total Protein 10/11/2023 7.3  6.4 - 8.4 g/dL Final    Albumin 10/11/2023 4.6  3.5 - 5.0 g/dL Final    Total Bilirubin 10/11/2023 1.78 (H)  0.20 - 1.00 mg/dL Final    Use of this assay is not recommended for patients undergoing treatment with eltrombopag due to the potential for falsely elevated results. N-acetyl-p-benzoquinone imine (metabolite of Acetaminophen) will generate erroneously low results in samples for patients that have taken an overdose of Acetaminophen.     eGFR 10/11/2023 73  ml/min/1.73sq m Final    Cholesterol 10/11/2023 185  See Comment mg/dL Final    Cholesterol:         Pediatric <18 Years        Desirable          <170 mg/dL      Borderline High    170-199 mg/dL      High               >=200 mg/dL        Adult >=18 Years            Desirable         <200 mg/dL      Borderline High   200-239 mg/dL      High              >239 mg/dL      Triglycerides 10/11/2023 48  See Comment mg/dL Final Triglyceride:     0-9Y            <75mg/dL     10Y-17Y         <90 mg/dL       >=18Y     Normal          <150 mg/dL     Borderline High 150-199 mg/dL     High            200-499 mg/dL        Very High       >499 mg/dL    Specimen collection should occur prior to Metamizole administration due to the potential for falsely depressed results. HDL, Direct 10/11/2023 55  >=50 mg/dL Final    LDL Calculated 10/11/2023 120 (H)  0 - 100 mg/dL Final    LDL Cholesterol:     Optimal           <100 mg/dl     Near Optimal      100-129 mg/dl     Above Optimal       Borderline High 130-159 mg/dl       High            160-189 mg/dl       Very High       >189 mg/dl         This screening LDL is a calculated result. It does not have the accuracy of the Direct Measured LDL in the monitoring of patients with hyperlipidemia and/or statin therapy. Direct Measure LDL (IYT569) must be ordered separately in these patients. TSH 3RD GENERATON 10/11/2023 1.620  0.450 - 4.500 uIU/mL Final    The recommended reference ranges for TSH during pregnancy are as follows:   First trimester 0.100 to 2.500 uIU/mL   Second trimester  0.200 to 3.000 uIU/mL   Third trimester 0.300 to 3.000 uIU/m    Note: Normal ranges may not apply to patients who are transgender, non-binary, or whose legal sex, sex at birth, and gender identity differ. Adult TSH (3rd generation) reference range follows the recommended guidelines of the American Thyroid Association, January, 2020. Appointment on 10/06/2023   Component Date Value Ref Range Status    Iron Saturation 10/06/2023 45  15 - 50 % Final    TIBC 10/06/2023 318  250 - 450 ug/dL Final    Iron 10/06/2023 142  50 - 212 ug/dL Final    Patients treated with metal-binding drugs (ie. Deferoxamine) may have depressed iron values.     UIBC 10/06/2023 176  155 - 355 ug/dL Final    Ferritin 10/06/2023 119  11 - 307 ng/mL Final     ---------------------------------------    Although patient's acute lethality risk is low, long-term/chronic lethality risk is mildly elevated in the presence of anxiety, depression, history of traumatic experiences, history of substance use, access to firearms in home. At the current moment, Jesus Garza is future-oriented, forward-thinking, and demonstrates ability to act in a self-preserving manner as evidenced by volitionally presenting to the clinic today, seeking treatment. At this juncture, inpatient hospitalization is not currently warranted. To mitigate future risk, patient should adhere to the recommendations of this writer, avoid alcohol/illicit substance use, utilize community-based resources and familiar support and prioritize mental health treatment. Based on today's assessment and clinical criteria, Melody Lowry contracts for safety and is not an imminent risk of harm to self or others. Outpatient level of care is deemed appropriate at this present time. Melody understands that if they are no longer able to contract for safety, they need to call/contact the outpatient office including this writer, call/contact crisis and/orattend to the nearest Emergency Department for immediate evaluation.     Risk of Harm to Self:  The following ratings are based on assessment at the time of the interview  Demographic risk factors include:   Historical Risk Factors include: history of depression, chronic anxiety symptoms, history of mood disorder, history of substance use, history of traumatic experiences  Recent Specific Risk Factors include: diagnosis of mood disorder, current depressive symptoms, current anxiety symptoms, feelings of guilt or self blame, hopelessness, unemployed, medication adverse effects  Protective Factors: no current suicidal ideation, access to mental health treatment, being a parent, being , compliant with medications, compliant with mental health treatment, connection to own children, having a desire to be alive, having a sense of purpose or meaning in life, opportunities to participate in community, responsibilities and duties to others, stable living environment, supportive family  Weapons: gun. The following steps have been taken to ensure weapons are properly secured: locked, secured, confirmed with   Based on today's Camila Reaves presents the following risk of harm to self: low     Risk of Harm to Others: The following ratings are based on assessment at the time of the interview  Demographic Risk Factors include: unemployed. Historical Risk Factors include: history of substance use. Recent Specific Risk Factors include: weapons or other means available, concomitant mood disorder, multiple stressors. Protective Factors: no current homicidal ideation, access to mental health treatment, being a parent, being , compliant with medications, compliant with mental health treatment, opportunities to participate in community, resilience, responsibilities and duties to others, safe and stable living environment, supportive family  Weapons: gun. The following steps have been taken to ensure weapons are properly secured: locked, secured, confirmed by   Based on today's Camila Reaves presents the following risk of harm to others: low    Psychotherapy Provided:     Individual psychotherapy provided: Yes  Counseling was provided during the session today for 20 minutes. Medications, treatment progress and treatment plan reviewed with Melody. Coping strategies reviewed with Melody. Supportive therapy provided. Crisis/safety plan discussed with Melody. Treatment Plan:    Completed and signed during the session: Not applicable - Treatment Plan not due at this session    This note was shared with patient. Visit Time:  Start Time: 1883  Stop Time: 8791  Total Visit Duration:  40 minutes    Germania Gatica DO  Psychiatry Resident, PGY-III    This note has been constructed using a voice recognition system.  There may be translation, syntax, or grammatical errors. If you have any questions, please contact the dictating provider.

## 2023-11-14 ENCOUNTER — OFFICE VISIT (OUTPATIENT)
Dept: PSYCHIATRY | Facility: CLINIC | Age: 43
End: 2023-11-14
Payer: COMMERCIAL

## 2023-11-14 DIAGNOSIS — G25.71 AKATHISIA: ICD-10-CM

## 2023-11-14 DIAGNOSIS — F41.9 ANXIETY DISORDER, UNSPECIFIED TYPE: Primary | ICD-10-CM

## 2023-11-14 DIAGNOSIS — F39 UNSPECIFIED MOOD (AFFECTIVE) DISORDER (HCC): ICD-10-CM

## 2023-11-14 PROCEDURE — 99214 OFFICE O/P EST MOD 30 MIN: CPT

## 2023-11-14 RX ORDER — FLUOXETINE HYDROCHLORIDE 20 MG/1
20 CAPSULE ORAL DAILY
Qty: 30 CAPSULE | Refills: 1 | Status: SHIPPED | OUTPATIENT
Start: 2023-11-14 | End: 2024-01-13

## 2023-11-14 RX ORDER — CLONAZEPAM 0.5 MG/1
.5-1 TABLET ORAL 3 TIMES DAILY PRN
Qty: 150 TABLET | Refills: 0 | Status: SHIPPED | OUTPATIENT
Start: 2023-11-14 | End: 2023-12-14

## 2023-11-14 NOTE — PATIENT INSTRUCTIONS
Increase Prozac to 20 mg daily  Continue Klonopin 1 mg twice daily in the morning and evening as needed for anxiety and 0.5 mg daily in the afternoon as needed for anxiety    Please call the office nursing staff for medication issues including refills, problems obtaining medications, side effects, etc at [phone]. Please return for a follow up appointment as discussed. If you are running late or are unable to attend your appointment, please call our [facility] office at [phone]. If you are in psychological crisis including not limited to suicidal/homicidal thoughts or thoughts of self-injury, do not hesitate to call/contact your [facility] hotline (see below), call 911, call [phone] (mental health crisis), or go to the nearest emergency department.   [facility] Crisis: [address] Crisis: [phone]  [address] Crisis: [address] [facility] [address] [address] Crisis: [address] Crisis: [address] Crisis: [address] [facility] Crisis: [phone]  National Suicide Prevention Hotline: 8-182.924.7978

## 2023-11-28 NOTE — PSYCH
Psychiatric Follow Up Visit - [address] [place] [address]  MRN: [id number]      TELEMEDICINE CONSENT:     1. This service was provided via Telemedicine. 2. Provider located at [facility] office  3. TeleMed provider: Josefina Bruno DO.  4. Identify all parties in room with patient during tele consult: patient and patient's   5. The patient confirmed they are located in [facility] where this provider holds an active license. 6. Patient was then informed that this was a Telemedicine visit and that the exam was being conducted confidentially over secure lines through the [employer]. My office door was closed. The patient was notified the following individuals were present in the room, Dr. Claudine Hill for conclusion of evaluation. I informed the patient that I have reviewed their record in Epic and presented the opportunity for them to ask any questions regarding the visit today. Patient is aware this is a billable service. The patient agreed to participate and understands they can discontinue the visit at any time. Assessment/Plan:   Melody Jones is a 40 y.o. 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  Patient presents with her  virtually. For review, on initial evaluation, patient presented with symptoms consistent with EPS and akathisia related adverse effects of psychotropic medications. Possible offending agents were previously discontinued (Tea Villatoro) with adjustments to Prozac and Klonopin dosing. Today, the patient endorses improvement in anxiety related symptoms - rating them a 5/10. She endorses continued episodes of inner tension, and physical symptoms of anxiety including heart racing sensation and chest tightness. Patient denies a depressed mood at time of evaluation, endorses improvement in motivation, anhedonia, and energy, though notes motivation and energy levels continue to be decreased overall. Patient and her  report akathisia and EPS related symptoms have improved compared to prior - notably less leg movement, improved facial movement which is observed through virtual format today. Patient agreeable to increase in Prozac to 30 mg daily, continuation of Klonopin at current doses, and continuation of psychotherapies. Diagnoses:  Anxiety disorder, unspecified, differential includes Panic disorder, Generalized anxiety disorder, Obsessive-compulsive disorder  Mood disorder, unspecified, differential includes Major depressive disorder, Adjustment disorder with anxious distress  Continuous movements of lower extremities, differential includes Akathisia, Secondary to anxiety disorder  Decreased facial movement present, though improved compared to prior, improvement in finger/hand movements compared to prior  Patient endorses improvement in leg movements - unable to assess due to virtual visit    Treatment Recommendations/Precautions:  Increase Prozac to 30 mg daily for mood and anxiety  PARQ completed including serotonin syndrome, SIADH, worsening depression, suicidality, induction of ghada, GI upset, headaches, activation, sexual side effects, sedation, potential drug interactions, and others.   Discussed potential activating effects of Prozac and reviewed symptoms to monitor for with patient and , who expressed understanding. Continue Klonopin 1 mg BID (morning and evening) PRN, and 0.5 mg QAfternoon PRN for breakthrough anxiety  PARQ completed including the risks of sedation, respiratory depression, impairment in judgment and motor function, depression, mood changes, GI changes, and others discussed. Patient informed of risks of being on or starting opioid medications due to drug interactions and potential for serious respiratory depression and death. PDMP reviewed, last filled 11/14/23  Patient has not required additional afternoon PRN dose since last evaluation  Patient prescribed Propranolol 20 mg TID for akathisia by neurology, may be providing additional anxiolytic effect  Most recent lab work previously reviewed. Recommend continued follow up with neurology, next follow up scheduled for 12/18/23  Medication management every 4-6 weeks  Continue psychotherapy with own therapist  Aware of need to follow up with family physician for medical issues  Aware of 24 hour and weekend coverage for urgent situations accessed by calling [facility] main practice number    Medication Risks/Benefits      Risks, Benefits And Possible Side Effects Of Medications:    Risks, benefits, and possible side effects of medications explained to Melody and she verbalizes understanding and agreement for treatment. Controlled Medication Discussion:     Nato Sandoval has been filling controlled prescriptions on time as prescribed according to [address] [facility] [address] [facility]  Discussed with Nato Sandoval the risks of sedation, respiratory depression, impairment of ability to drive and potential for abuse and addiction related to treatment with benzodiazepine medications.  She understands risk of treatment with benzodiazepine medications, agrees to not drive if feels impaired and agrees to take medications as prescribed  Discussed with [facility] warning on concurrent use of benzodiazepines and opioid medications including sedation, respiratory depression, coma and death. She understands the risk of treatment with benzodiazepines in addition to opioids and wants to continue taking those medications  Discussed with Melody increased risk of impairment related to concurrent use of benzodiazepines and hypnotic medications including excessive sedation, psychomotor impairment and respiratory depression. She understands the risk of treatment with benzodiazepines in addition to hypnotic medications and wants to continue taking those medications  ------------------------------------  History of Present Illness   Melody Mimschirag Hargrove is presenting to follow up for anxiety, depression, and akathisia and EPS related symptoms . Naomi Keyes presents with her  today. Melody endorses she feels "better," compared to prior, notes motivation has improved though motivation and energy are still decreased overall. She notes improvement in down, depressed mood and anhedonia - noting she wanted to and was able to decorate for [employer] and involved her daughter. Her  notes, "there are good days, and bad days," noting days the patient had difficulty getting up and off the couch due to avolition. The patient notes desire to return to gym, though has been unable to find motivation to do so. Today, the patient denies feelings of hopelessness and helplessness. She endorses appetite is stable and sleep is good, she does not continuation of vivid dreams which are not distressing. The patient denies SI, HI, thoughts to harm self or others, or passive death wishes at time of evaluation. The patient endorses her anxiety has improved, with improvement in negative thinking, increased rate of thoughts, and restlessness.  She does still continue to have intermittent heart racing sensation and tightness/heaviness in her chest associated with anxiety. She notes her anxiety has been a 5/10 on most days over the last 2 weeks, she has not required the Klonopin 0.5 mg PRN in the afternoon and has not needed to utilize the evening 1 mg PRN dose daily. Patient does not endorse or demonstrate symptoms consistent with ghada/hypomania, or negative or positive symptoms of psychosis at time of evaluation. In regards to EPS related adverse effects, patient has subjective improvement in gait and hand movements, she notes improvement in restlessness/shaking of LE, and observed with improvement in changing and maintaining facial expression on evaluation today. Patient and her  deny new or worsening EPS related symptoms.      [place] Akathisia Rating Scale (BARS) -  Prior score on 10/23/23: 8 and Global Clinical Assessment score:3, 11/14/23: 7 and Global Clinical Assessment score of 2  12/04/23: Global Clinical Assessment score: 3 due to distress to patient  Objective Unable to complete observed portion today, 12.04.23, due to virtual format.  0 Normal, occasional fidgety movements of the limbs  1 Presence of characteristic restless movements: shuffling or tramping movements of the legs/feet, or swinging of one leg while sitting, and/or rocking from foot to foot or “walking on the spot” when standing, but movements present for less than half the time observed  2 Observed phenomena, as described in (1) above, which are present for at least half the observation period  3 Patient is constantly engaged in characteristic restless movements, and/or has the inability to remain seated or standing without walking or pacing, during the time observed  Subjective  0 Absence of inner restlessness  1 Non-specific sense of inner restlessness  2 The patient is aware of an inability to keep the legs still, or a desire to move the legs, and/or complains of inner restlessness aggravated specifically by being required to stand still  3 Awareness of intense compulsion to move most of the time and/or reports strong desire to walk or pace most of the time  Distress related to restlessness  0 No distress  1 Mild  2 Moderate  3 Severe  Global Clinical Assessment of Akathisia  0 Absent. No evidence of awareness of restlessness. Observation of characteristic movements of akathisia in the absence of a subjective report of inner restlessness or compulsive desire to move the legs should be classified as pseudoakathisia  1 Questionable. Non-specific inner tension and fidgety movements  2 Mild akathisia. Awareness of restlessness in the legs and/or inner restlessness worse when required to stand still. Fidgety movements present, but characteristic restless movements of akathisia not necessarily observed. Condition causes little or no distress. 3 Moderate akathisia. Awareness of restlessness as described for mild akathisia above, combined with characteristic restless movements such as rocking from foot to foot when standing. Patient finds the condition distressing. 4 Marked akathisia. Subjective experience of restlessness includes a compulsive desire to walk or pace. However, the patient is able to remain seated for at least five minutes. The condition is obviously distressing. 5 Severe akathisia. The patient reports a strong compulsion to pace up and down most of the time. Unable to sit or lie down for more than a few minutes. Constant restlessness which is associated with intense distress and insomnia. Psychiatric Review Of Systems:  Melody reports Symptoms as described in HPI. Melody denies Current suicidal thoughts, plan, or intent, Current thoughts of self-harm, or Current homicidal thoughts, plan, or intent. Medical Review Of Systems:  Complete review of systems is negative except as noted above.    ------------------------------------  All italicized information has been copied from previous psychiatric evaluation.  Information has been reviewed with the patient. Past Psychiatric History:   Prior psychiatric diagnoses: anxiety, panic disorder, mood disorder, OCD  Inpatient hospitalizations: inpatient hospitalizations - first hospitalization 2001 - [facility], 2008 - [facility], 2021 - panic disorder, mood symptoms  Emergency room visits often in-between for severe panic attacks - 2016  Patient and  report no psychiatric care   Suicide attempts/self-harm: patient denies, patient denies self harm behaviors  Violent/aggressive behavior: patient denies   Patient did have some aggressive outbursts towards  and providers  Outpatient psychiatric providers: currently has outpatient psychiatric provider - [facility]   Was without a psychiatrist from [phone] (Lexapro from [facility] during that time), prior psychiatrist, Dr. Guillermina Suh, retired  Past/current psychotherapy: patient has a therapist weekly, Dr. Jason Neely for [facility]  Other Services: patient denies  Psychiatric medication trial:   Multiple medication trials - including below,  Antidepressants  Prozac (up to 40 mg), Zoloft (short period of time), Lexapro (multiple years), Luvox  Antipsychotics  Vraylar, Zyprexa, Seroquel, Haldol (in ED)  Sedative hypnotics  Ativan, Klonopin (0.5 mg QID PRN)  Others  Hydroxyzine, Gabapentin, BuSpar (60 mg total daily)      Substance Abuse History:  I have assessed this patient for substance use within the past 12 months. Patient reports a few cigarettes per day, quit 2007/2008. Patient reports history of daily marijuana use - had medical marijuana card - few months without smoking. Denies history of other recreational use including opioids, methamphetamines/amphetamines, cocaine use. Denies past legal actions or arrests secondary to substance intoxication. The patient denies prior DWIs/DUIs.  Melody does not exhibit objective evidence of substance withdrawal during today's examination nor does Melody appear under the influence of any psychoactive substance. Family Psychiatric History: Mother: anxiety (on antidepressant - Lexapro)  No family history of substance use disorder or suicide attempts or completions. Social History  Early life/developmental: Denies a history of milestone/developmental delay. Denies a history of in-utero exposure to toxins/illicit substances. Denies ADHD history. No history of IEP/504, denies special supports in school. Marital history: /Civil Union   Children: yes, 1 daughter (10 y/o - 2nd grade)  1 older brother   Living arrangement: Lives in a home with  and daughter (family lost their dog in May). Support system: identifies  as the biggest source of support  Mother also a big support  Education: college graduate - communications [employer])  Had to withdraw from college twice due to anxiety  Occupational History: unemployed since January 2023, applying for permanent disability - previously in advertising in 10-15 years  Other Pertinent History: no reported  or legal history  Access to firearms:  has firearm, locked and secured      Traumatic History:   Abuse:  patient denied  Other Traumatic Events:  medical trauma, father passed when she was 13 y/o    History Review:  The following portions of the patient's history were reviewed and updated as appropriate: allergies, current medications, past family history, past medical history, past social history, past surgical history, and problem list.    Visit Vitals  OB Status Implant   Smoking Status Never      Wt Readings from Last 6 Encounters:   10/11/23 57.8 kg (127 lb 6.4 oz)   10/06/23 58.7 kg (129 lb 8 oz)   09/25/23 60.1 kg (132 lb 8 oz)   05/11/23 61.7 kg (136 lb)   04/26/23 61.7 kg (136 lb)   04/17/23 60.8 kg (134 lb)      Mental Status Exam:  Appearance:  alert, good eye contact, appears stated age, casually dressed, and appropriate grooming and hygiene   Behavior:  calm, cooperative, and sitting comfortably   Motor: Unable to assess gait and balance due to virtual format, no abnormal arm or hand movements noted, hypomimia improved compared to prior - patient smiling more frequently and moving facial muscles overall more frequently compared to prior   Speech:  spontaneous, clear, normal rate, not pressured, normal volume, not hyperverbal, and coherent   Mood:  "better"   Affect:  constricted, anxious, and brighter than previous   Thought Process:  Organized, logical, goal-directed, intermittent increased rate of thoughts   Thought Content: no verbalized delusions or overt paranoia, negative thoughts   Perceptual disturbances: no reported hallucinations and does not appear to be responding to internal stimuli at this time   Risk Potential: No active or passive suicidal or homicidal ideation was verbalized during interview   Cognition: oriented to person, place, time, and situation, memory grossly intact, appears to be of average intelligence, and age-appropriate attention span and concentration   Insight:  Good   Judgment: Good     Meds/Allergies    Allergies   Allergen Reactions    Demerol [Meperidine] Hyperactivity    Macrolides And Ketolides      Current Outpatient Medications   Medication Instructions    clonazePAM (KLONOPIN) 0.5-1 mg, Oral, 3 times daily PRN    FLUoxetine (PROZAC) 20 mg, Oral, Daily    propranolol (INDERAL) 20 mg, Oral, 3 times daily        Labs & Imaging:  I have personally reviewed all pertinent laboratory tests and imaging results.    Appointment on 10/11/2023   Component Date Value Ref Range Status    Sodium 10/11/2023 140  135 - 147 mmol/L Final    Potassium 10/11/2023 4.7  3.5 - 5.3 mmol/L Final    Chloride 10/11/2023 107  96 - 108 mmol/L Final    CO2 10/11/2023 27  21 - 32 mmol/L Final    ANION GAP 10/11/2023 6  mmol/L Final    BUN 10/11/2023 20  5 - 25 mg/dL Final    Creatinine 10/11/2023 0.96  0.60 - 1.30 mg/dL Final    Standardized to IDMS reference method    Glucose, Fasting 10/11/2023 90  65 - 99 mg/dL Final    Calcium 10/11/2023 9.7  8.4 - 10.2 mg/dL Final    AST 10/11/2023 13  13 - 39 U/L Final    ALT 10/11/2023 15  7 - 52 U/L Final    Specimen collection should occur prior to Sulfasalazine administration due to the potential for falsely depressed results. Alkaline Phosphatase 10/11/2023 53  34 - 104 U/L Final    Total Protein 10/11/2023 7.3  6.4 - 8.4 g/dL Final    Albumin 10/11/2023 4.6  3.5 - 5.0 g/dL Final    Total Bilirubin 10/11/2023 1.78 (H)  0.20 - 1.00 mg/dL Final    Use of this assay is not recommended for patients undergoing treatment with eltrombopag due to the potential for falsely elevated results. N-acetyl-p-benzoquinone imine (metabolite of Acetaminophen) will generate erroneously low results in samples for patients that have taken an overdose of Acetaminophen. eGFR 10/11/2023 73  ml/min/1.73sq m Final    Cholesterol 10/11/2023 185  See Comment mg/dL Final    Cholesterol:         Pediatric <18 Years        Desirable          <170 mg/dL      Borderline High    170-199 mg/dL      High               >=200 mg/dL        Adult >=18 Years            Desirable         <200 mg/dL      Borderline High   200-239 mg/dL      High              >239 mg/dL      Triglycerides 10/11/2023 48  See Comment mg/dL Final    Triglyceride:     0-9Y            <75mg/dL     10Y-17Y         <90 mg/dL       >=18Y     Normal          <150 mg/dL     Borderline High 150-199 mg/dL     High            200-499 mg/dL        Very High       >499 mg/dL    Specimen collection should occur prior to Metamizole administration due to the potential for falsely depressed results.     HDL, Direct 10/11/2023 55  >=50 mg/dL Final    LDL Calculated 10/11/2023 120 (H)  0 - 100 mg/dL Final    LDL Cholesterol:     Optimal           <100 mg/dl     Near Optimal      100-129 mg/dl     Above Optimal       Borderline High 130-159 mg/dl       High            160-189 mg/dl       Very High >189 mg/dl         This screening LDL is a calculated result. It does not have the accuracy of the Direct Measured LDL in the monitoring of patients with hyperlipidemia and/or statin therapy. Direct Measure LDL (CMD691) must be ordered separately in these patients. TSH 3RD GENERATON 10/11/2023 1.620  0.450 - 4.500 uIU/mL Final    The recommended reference ranges for TSH during pregnancy are as follows:   First trimester 0.100 to 2.500 uIU/mL   Second trimester  0.200 to 3.000 uIU/mL   Third trimester 0.300 to 3.000 uIU/m    Note: Normal ranges may not apply to patients who are transgender, non-binary, or whose legal sex, sex at birth, and gender identity differ. Adult TSH (3rd generation) reference range follows the recommended guidelines of the American Thyroid Association, January, 2020. Appointment on 10/06/2023   Component Date Value Ref Range Status    Iron Saturation 10/06/2023 45  15 - 50 % Final    TIBC 10/06/2023 318  250 - 450 ug/dL Final    Iron 10/06/2023 142  50 - 212 ug/dL Final    Patients treated with metal-binding drugs (ie. Deferoxamine) may have depressed iron values. UIBC 10/06/2023 176  155 - 355 ug/dL Final    Ferritin 10/06/2023 119  11 - 307 ng/mL Final     ---------------------------------------    Although patient's acute lethality risk is low, long-term/chronic lethality risk is mildly elevated in the presence of anxiety, depression, history of traumatic experiences, history of substance use, access to firearms in home, movement related symptoms. At the current moment, Anya De is future-oriented, forward-thinking, and demonstrates ability to act in a self-preserving manner as evidenced by volitionally presenting to the clinic today, seeking treatment. At this juncture, inpatient hospitalization is not currently warranted.  To mitigate future risk, patient should adhere to the recommendations of this writer, avoid alcohol/illicit substance use, utilize community-based resources and familiar support and prioritize mental health treatment. Based on today's assessment and clinical criteria, Melody Valentin contracts for safety and is not an imminent risk of harm to self or others. Outpatient level of care is deemed appropriate at this present time. Melody understands that if they are no longer able to contract for safety, they need to call/contact the outpatient office including this writer, call/contact crisis and/orattend to the nearest Emergency Department for immediate evaluation. Risk of Harm to Self:  The following ratings are based on assessment at the time of the interview  Demographic risk factors include:   Historical Risk Factors include: history of depression, chronic anxiety symptoms, history of mood disorder, history of substance use, history of traumatic experiences  Recent Specific Risk Factors include: diagnosis of mood disorder, current depressive symptoms, current anxiety symptoms, feelings of guilt or self blame, hopelessness, unemployed, medication adverse effects  Protective Factors: no current suicidal ideation, access to mental health treatment, being a parent, being , compliant with medications, compliant with mental health treatment, connection to own children, having a desire to be alive, having a sense of purpose or meaning in life, opportunities to participate in community, responsibilities and duties to others, stable living environment, supportive family  Weapons: gun. The following steps have been taken to ensure weapons are properly secured: locked, secured, confirmed with   Based on today's Jaye Das presents the following risk of harm to self: low     Risk of Harm to Others: The following ratings are based on assessment at the time of the interview  Demographic Risk Factors include: unemployed. Historical Risk Factors include: history of substance use.   Recent Specific Risk Factors include: weapons or other means available, concomitant mood disorder, multiple stressors. Protective Factors: no current homicidal ideation, access to mental health treatment, being a parent, being , compliant with medications, compliant with mental health treatment, opportunities to participate in community, resilience, responsibilities and duties to others, safe and stable living environment, supportive family  Weapons: gun. The following steps have been taken to ensure weapons are properly secured: locked, secured, confirmed by   Based on today's Destiny Soo presents the following risk of harm to others: low    Psychotherapy Provided:     Individual psychotherapy provided: Yes  Counseling was provided during the session today for 10 minutes. Medications, treatment progress and treatment plan reviewed with Melody. Supportive therapy provided. Treatment Plan:    Completed and signed during the session: Not applicable - Treatment Plan not due at this session    This note was shared with patient. Visit Time:  Start Time: 7362  Stop Time: 6905  Total Visit Duration:  30 minutes    Nai Hwang DO  Psychiatry Resident, PGY-III    This note has been constructed using a voice recognition system. There may be translation, syntax, or grammatical errors. If you have any questions, please contact the dictating provider.

## 2023-12-04 ENCOUNTER — TELEMEDICINE (OUTPATIENT)
Dept: PSYCHIATRY | Facility: CLINIC | Age: 43
End: 2023-12-04
Payer: COMMERCIAL

## 2023-12-04 DIAGNOSIS — F39 UNSPECIFIED MOOD (AFFECTIVE) DISORDER (HCC): ICD-10-CM

## 2023-12-04 DIAGNOSIS — F41.9 ANXIETY DISORDER, UNSPECIFIED TYPE: Primary | ICD-10-CM

## 2023-12-04 DIAGNOSIS — G25.71 AKATHISIA: ICD-10-CM

## 2023-12-04 PROCEDURE — 99214 OFFICE O/P EST MOD 30 MIN: CPT

## 2023-12-04 RX ORDER — CLONAZEPAM 0.5 MG/1
.5-1 TABLET ORAL 3 TIMES DAILY PRN
Qty: 150 TABLET | Refills: 0 | Status: SHIPPED | OUTPATIENT
Start: 2023-12-11 | End: 2024-01-10

## 2023-12-04 RX ORDER — FLUOXETINE 10 MG/1
10 CAPSULE ORAL DAILY
Qty: 30 CAPSULE | Refills: 2 | Status: SHIPPED | OUTPATIENT
Start: 2023-12-04 | End: 2024-03-03

## 2023-12-04 RX ORDER — FLUOXETINE HYDROCHLORIDE 20 MG/1
20 CAPSULE ORAL DAILY
Qty: 30 CAPSULE | Refills: 2 | Status: SHIPPED | OUTPATIENT
Start: 2023-12-04 | End: 2024-03-03

## 2023-12-04 NOTE — PATIENT INSTRUCTIONS
Increase Prozac to 30 mg daily  Continue Klonopin 1 mg twice daily in the morning and evening as needed, can utilize 0.5 mg daily in the afternoon as needed for breakthrough anxiety  Continue propranolol as prescribed by neurology    Please call the office nursing staff for medication issues including refills, problems obtaining medications, side effects, etc at [phone]. Please return for a follow up appointment as discussed. If you are running late or are unable to attend your appointment, please call our [place] office at [phone]. If you are in psychological crisis including not limited to suicidal/homicidal thoughts or thoughts of self-injury, do not hesitate to call/contact your [facility] hotline (see below), call 911, call [phone] (mental health crisis), or go to the nearest emergency department.   [facility] Crisis: [address] Crisis: [phone]  [address] [place] Crisis: [address] Crisis: [address] Crisis: [address] Crisis: [address] [facility] Crisis: [phone]  National Suicide Prevention Hotline: 4-738.879.4566

## 2023-12-11 ENCOUNTER — TELEPHONE (OUTPATIENT)
Dept: NEUROLOGY | Facility: CLINIC | Age: 43
End: 2023-12-11

## 2023-12-18 ENCOUNTER — OFFICE VISIT (OUTPATIENT)
Dept: NEUROLOGY | Facility: CLINIC | Age: 43
End: 2023-12-18
Payer: COMMERCIAL

## 2023-12-18 VITALS
WEIGHT: 130.7 LBS | TEMPERATURE: 97.6 F | RESPIRATION RATE: 18 BRPM | OXYGEN SATURATION: 98 % | BODY MASS INDEX: 23.16 KG/M2 | SYSTOLIC BLOOD PRESSURE: 104 MMHG | HEIGHT: 63 IN | DIASTOLIC BLOOD PRESSURE: 74 MMHG | HEART RATE: 59 BPM

## 2023-12-18 DIAGNOSIS — G25.71 AKATHISIA: Primary | ICD-10-CM

## 2023-12-18 PROCEDURE — 99213 OFFICE O/P EST LOW 20 MIN: CPT | Performed by: PSYCHIATRY & NEUROLOGY

## 2023-12-18 NOTE — ASSESSMENT & PLAN NOTE
Patient is a very pleasant 43-year-old female with history of anxiety, panic disorder and mood disorder who presents for movement follow up.. I last saw patient on 9/25/23.  At the time patient with extraparametal symptoms of akathisia, muscle rigidity and parkinsonism as a result of therapy with Vraylar.  Thankfully, she was able to be seen by our psychiatry department. I appreciate Dr. Carr recommendations.   Vraylar was discontinued on 10/23/2023.  Prozac was increased to 30 mg.  She was transition to Klonopin 0.5 mg up to 4 times a day.  She has been taking this twice a day.  Propranolol was increased to 20 mg 3 times daily.  She has been tolerating it well without any lightheadedness.  Akathisia resolved.  On physical exam patient greatly improve since prior with no evidence of abnormal movements.  Today she has no questions or concerns.  I will see her in 6 months to ensure stability.  Can likely discharge afterwards if she continues improvement.

## 2023-12-18 NOTE — PROGRESS NOTES
"Patient ID: Melody Huynh is a 43 y.o. female.    Assessment/Plan:    Akathisia  Patient is a very pleasant 43-year-old female with history of anxiety, panic disorder and mood disorder who presents for movement follow up.. I last saw patient on 9/25/23.  At the time patient with extraparametal symptoms of akathisia, muscle rigidity and parkinsonism as a result of therapy with Vraylar.  Thankfully, she was able to be seen by our psychiatry department. I appreciate Dr. Carr recommendations.   Vraylar was discontinued on 10/23/2023.  Prozac was increased to 30 mg.  She was transition to Klonopin 0.5 mg up to 4 times a day.  She has been taking this twice a day.  Propranolol was increased to 20 mg 3 times daily.  She has been tolerating it well without any lightheadedness.  Akathisia resolved.  On physical exam patient greatly improve since prior with no evidence of abnormal movements.  Today she has no questions or concerns.  I will see her in 6 months to ensure stability.  Can likely discharge afterwards if she continues improvement.         Diagnoses and all orders for this visit:    Akathisia       Subjective:    HPI  Patient is a very pleasant 43-year-old female with history of anxiety, panic disorder and mood disorder who presents for movement follow up.. I last saw patient on 9/25/23.     To review initial visit: \"She reports onset of BLE tremor after starting zyprexa, worsened after switched to vraylar for her mood disorder. 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 On physical exam patient is very tearful with clear signs of extrapyramidal sx with akathisia, muscle rigidity and parkinsonism       It is clear that patients symptoms are a result of EPS from Vraylar. I reached out to our psychiatry department to try and prioritize evaluation as she is so young and these symptoms have been impacting her ability to work and function. In the meantime, I will try " "her on low dose propanolol to try and reduce tremor intensity. He BP was on the softer side today, however in other visits in the past has been slightly higher. I counseled her on side effects to be aware off. If medication becomes too difficult to tolerate, she should stop it altogether. I would like her to see someone from our movement team at least once for additional advice which would be greatly appreciated. If not available, she is to follow with me in 1-2 months. She was advised to call my office if any side effects. \" Start propranolol 10 mg tid       Interval history:  Seen by Jyothi Inman on 10/6.23: increased propranolol to 1.5 tab ti. \"  If she does not tolerate further increases or is ineffective could consider discontinuing medication and starting anticholinergic such as trihexyphenidyl. \"    Seen by Dr. Cohen with Psychiatry   Vraylar discontinued on 10/23/23  Prozac increased to 30 mg  Transitioned to Klonopin  0.5 mg up to 4 times a day, has been taking it two times a day   Propranolol 20 mg tid.  Tolerating it well wo lightheadedness,   Akathisia resolved   She has no questions or concerns today         The following portions of the patient's history were reviewed and updated as appropriate: allergies, current medications, past family history, past medical history, past social history, past surgical history, and problem list and ros.         Objective:    Blood pressure 104/74, pulse 59, temperature 97.6 °F (36.4 °C), temperature source Temporal, resp. rate 18, height 5' 3\" (1.6 m), weight 59.3 kg (130 lb 11.2 oz), SpO2 98%, not currently breastfeeding.    Physical Exam  Eyes:      General: Lids are normal.      Extraocular Movements: Extraocular movements intact.      Pupils: Pupils are equal, round, and reactive to light.   Neurological:      Motor: Motor strength is normal.     Deep Tendon Reflexes:      Reflex Scores:       Bicep reflexes are 2+ on the left side.        Neurological " Exam  Mental Status  Awake, alert and oriented to person, place and time.  Blunted affect   tearful.    Cranial Nerves  CN II: Visual fields full to confrontation.  CN III, IV, VI: Extraocular movements intact bilaterally. Normal lids and orbits bilaterally. Pupils equal round and reactive to light bilaterally.  CN V: Facial sensation is normal.  CN VII: Full and symmetric facial movement.  CN VIII: Hearing is normal.  CN IX, X: Palate elevates symmetrically  CN XI: Shoulder shrug strength is normal.  CN XII: Tongue midline without atrophy or fasciculations.  Hypomimia .    Motor   Strength is 5/5 throughout all four extremities.  Normal tone   No evidence of tremor at rest or with action.    Reflexes                                            Right                      Left  Biceps                                                         2+    Coordination    Normal coordination.    Gait  Casual gait is normal including stance, stride, and arm swing.        ROS:    Review of Systems      Review of Systems   Constitutional:  Negative for appetite change, fatigue and fever.   HENT: Negative.  Negative for hearing loss, tinnitus, trouble swallowing and voice change.    Eyes: Negative.  Negative for photophobia, pain and visual disturbance.   Respiratory: Negative.  Negative for shortness of breath.    Cardiovascular: Negative.  Negative for palpitations.   Gastrointestinal: Negative.  Negative for nausea and vomiting.   Endocrine: Negative.  Negative for cold intolerance.   Genitourinary: Negative.  Negative for dysuria, frequency and urgency.   Musculoskeletal:  Negative for back pain, gait problem, myalgias and neck pain.   Skin: Negative.  Negative for rash.   Allergic/Immunologic: Negative.    Neurological: Negative.  Negative for dizziness, tremors, seizures, syncope, facial asymmetry, speech difficulty, weakness, light-headedness, numbness and headaches.   Hematological: Negative.  Does not bruise/bleed easily.    Psychiatric/Behavioral: Negative.  Negative for confusion, hallucinations and sleep disturbance.    All other systems reviewed and are negative.

## 2023-12-18 NOTE — PROGRESS NOTES
Patient ID: Melody Huynh is a 43 y.o. female.    Assessment/Plan:    No problem-specific Assessment & Plan notes found for this encounter.       {Assess/PlanSmartLinks:78825}       Subjective:    HPI    {[facility] Neurology HPI texts:63713}    {Common ambulatory SmartLinks:90854}         Objective:    Weight 59.3 kg (130 lb 11.2 oz), not currently breastfeeding.    Physical Exam    Neurological Exam      ROS:    Review of Systems   Constitutional:  Negative for appetite change, fatigue and fever.   HENT: Negative.  Negative for hearing loss, tinnitus, trouble swallowing and voice change.    Eyes: Negative.  Negative for photophobia, pain and visual disturbance.   Respiratory: Negative.  Negative for shortness of breath.    Cardiovascular: Negative.  Negative for palpitations.   Gastrointestinal: Negative.  Negative for nausea and vomiting.   Endocrine: Negative.  Negative for cold intolerance.   Genitourinary: Negative.  Negative for dysuria, frequency and urgency.   Musculoskeletal:  Negative for back pain, gait problem, myalgias and neck pain.   Skin: Negative.  Negative for rash.   Allergic/Immunologic: Negative.    Neurological: Negative.  Negative for dizziness, tremors, seizures, syncope, facial asymmetry, speech difficulty, weakness, light-headedness, numbness and headaches.   Hematological: Negative.  Does not bruise/bleed easily.   Psychiatric/Behavioral: Negative.  Negative for confusion, hallucinations and sleep disturbance.    All other systems reviewed and are negative.

## 2023-12-27 DIAGNOSIS — F39 UNSPECIFIED MOOD (AFFECTIVE) DISORDER (HCC): ICD-10-CM

## 2023-12-27 DIAGNOSIS — F41.9 ANXIETY DISORDER, UNSPECIFIED TYPE: ICD-10-CM

## 2023-12-27 RX ORDER — FLUOXETINE HYDROCHLORIDE 20 MG/1
20 CAPSULE ORAL DAILY
Qty: 90 CAPSULE | Refills: 1 | OUTPATIENT
Start: 2023-12-27 | End: 2024-03-26

## 2023-12-27 RX ORDER — FLUOXETINE 10 MG/1
CAPSULE ORAL
Qty: 90 CAPSULE | Refills: 1 | OUTPATIENT
Start: 2023-12-27

## 2023-12-28 ENCOUNTER — TELEPHONE (OUTPATIENT)
Dept: PSYCHIATRY | Facility: CLINIC | Age: 43
End: 2023-12-28

## 2023-12-28 NOTE — TELEPHONE ENCOUNTER
Medical record request was received from [facility] for the time frame of 10/23/2023 to Present. Was placed in Deysi Cohen's mailbox and sent to [facility] on 12/21/2023

## 2024-01-08 ENCOUNTER — TELEMEDICINE (OUTPATIENT)
Dept: PSYCHIATRY | Facility: CLINIC | Age: 44
End: 2024-01-08
Payer: COMMERCIAL

## 2024-01-08 DIAGNOSIS — F41.9 ANXIETY DISORDER, UNSPECIFIED TYPE: Primary | ICD-10-CM

## 2024-01-08 DIAGNOSIS — F39 UNSPECIFIED MOOD (AFFECTIVE) DISORDER (HCC): ICD-10-CM

## 2024-01-08 PROCEDURE — 99213 OFFICE O/P EST LOW 20 MIN: CPT

## 2024-01-08 NOTE — PATIENT INSTRUCTIONS
Continue Prozac 30 mg daily  Continue Klonopin 1 mg morning and evening as needed for anxiety, and 0.5 mg every afternoon as needed for breakthrough anxiety    Please call the office nursing staff for medication issues including refills, problems obtaining medications, side effects, etc at [phone].   Please return for a follow up appointment as discussed. If you are running late or are unable to attend your appointment, please call our [place] office at [phone].    If you are in psychological crisis including not limited to suicidal/homicidal thoughts or thoughts of self-injury, do not hesitate to call/contact your [facility] Crisis hotline (see below), call [phone], call 217 (mental health crisis), or go to the nearest emergency department.  [facility] Crisis: [phone]  [facility] Crisis: [phone]  [facility] Crisis: [phone]  [facility] Crisis: [phone]  [facility] Crisis: [phone]  [facility] Crisis: [phone]  [facility] Crisis: [phone]  National Suicide Prevention Hotline: 1-867.100.1341

## 2024-01-08 NOTE — PSYCH
Psychiatric Follow Up Visit - Behavioral Health       [facility]  MRN: [id number]      TELEMEDICINE CONSENT:     1. This service was provided via Telemedicine.  2. Provider located at [facility] office  3. TeleMed provider: Deysi Cohen DO.  4. Identify all parties in room with patient during tele consult: patient  5. The patient confirmed they are located in [place] where this provider holds an active license.  6. Patient was then informed that this was a Telemedicine visit and that the exam was being conducted confidentially over secure lines through the Epic Embedded Platform. My office door was closed. The patient was notified the following individuals were present in the room, Dr. Carrera for conclusion of evaluation.  I informed the patient that I have reviewed their record in Epic and presented the opportunity for them to ask any questions regarding the visit today. Patient is aware this is a billable service. The patient agreed to participate and understands they can discontinue the visit at any time.     Assessment/Plan:   Melody Huynh is a 42 y.o. 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 Patient presents with her  for portion of  visit.  For review, on initial evaluation, patient presented with symptoms consistent with EPS and akathisia related adverse effects of psychotropic medications. Possible offending agents were previously discontinued (Vraylar, Zyprexa) with adjustments to Prozac and Klonopin dosing.  Today, the patient endorses continued improvement in movement related symptoms - she denies feeling urge to move extremities and denies shaking/fidgeting of lower extremities. The patient notes improvement in facial movement - observed with marked improved today compared to prior evaluations.   The patient notes improvement in anxiety related symptoms - has been averaging Klonopin x1 daily PRN. Patient reports depressive symptoms remaining including intermittent low motivation and anhedonia, she denies low mood and  denies SI, HI, thoughts to harm self or others, or passive death wishes.- rating them a 5/10. She endorses continued episodes of inner tension, and physical symptoms of anxiety including heart racing sensation and chest tightness.   Patient agreeable to continue current medications of Prozac and Klonopin PRN   Patient agreeable to increase in Prozac to 30 mg daily, continuation of Klonopin at current doses, and continuation of psychotherapies.      Diagnoses:  Anxiety disorder, unspecified, differential includes Panic disorder, Generalized anxiety disorder, Obsessive-compulsive disorder  Mood disorder, unspecified, differential includes Major depressive disorder, Adjustment disorder with anxious distress  Continuous movements of lower extremities, differential includes Akathisia, Secondary to anxiety disorder - near goal  Decreased facial movement not observed on today's evaluation  Patinet denies leg movement - unable to fully assess due to virtual visit    Treatment Recommendations/Precautions:  Continue Prozac 30 mg daily for mood and anxiety  PARQ completed including serotonin syndrome, SIADH, worsening depression,  suicidality, induction of ghada, GI upset, headaches, activation, sexual side effects, sedation, potential drug interactions, and others.  Continue Klonopin 1 mg BID (morning and evening) PRN, and 0.5 mg QAfternoon PRN for breakthrough anxiety  PARQ completed including the risks of sedation, respiratory depression, impairment in judgment and motor function. Depression, mood changes, GI changes, and others discussed. Patient informed of risks of being on or starting opioid medications due to drug interactions and potential for serious respiratory depression and death.   PDMP reviewed, last filled 11/14/23  Patient is averaging 1x/daily use over last week. Consideration for decrease on follow up.  Patient prescribed Propranolol 20 mg TID for akathisia by neurology, may be providing additional anxiolytic effect  Most recent lab work previously reviewed  Most recent neurology appointment documentation reviewed, appreciate recommendations.   Continue psychotherapy with own therapist weekly.  Medication management every 4-6 weeks  Aware of need to follow up with family physician for medical issues  Aware of 24 hour and weekend coverage for urgent situations accessed by calling [facility] main practice number      Medication Risks/Benefits      Risks, Benefits And Possible Side Effects Of Medications:    Risks, benefits, and possible side effects of medications explained to Melody and she verbalizes understanding and agreement for treatment.    Controlled Medication Discussion:     Melody has been filling controlled prescriptions on time as prescribed according to [place] Prescription Drug Monitoring Program  Discussed with Melody the risks of sedation, respiratory depression, impairment of ability to drive and potential for abuse and addiction related to treatment with benzodiazepine medications. She understands risk of treatment with benzodiazepine medications, agrees to not drive if  "feels impaired and agrees to take medications as prescribed  Discussed with Melody [employer] warning on concurrent use of benzodiazepines and opioid medications including sedation, respiratory depression, coma and death. She understands the risk of treatment with benzodiazepines in addition to opioids and wants to continue taking those medications  Discussed with Melody increased risk of impairment related to concurrent use of benzodiazepines and hypnotic medications including excessive sedation, psychomotor impairment and respiratory depression. She understands the risk of treatment with benzodiazepines in addition to hypnotic medications and wants to continue taking those medications  ------------------------------------  History of Present Illness   Melody Hyunh is presenting to follow up for anxiety, depression, and akathisia-related and EPS related symptoms . Melody endorses her mood is \"better,\" she states, \"I'm doing much better, I'm feeling much better.\" The patient endorses improvement in movement related symptoms - she denies urge to move her legs and notes no involuntary extremity movement. Patient notes improved hand control as well as improvement in facial movements, which is observed today on evaluation. Patient is frequently smiling and appears more expressive. The patient and her  deny worsening in movement related symptoms or new movement symptoms.  Patient notes improvement in down, depressed mood, she and her  report intermittent low motivation and anhedonia. She denies feelings of hopelessness or helplessness. Patient denies nightmares, notes she continues to experience vivid dreams. She reports they are nightly, she denies them being distressing. She notes overall she continues to sleep well. She reports appetite is good. The patient denies SI, HI, thoughts to harm self or others, or passive death wishes at time of evaluation.    The patient endorses continued improvement " in anxiety. She is averaging Klonopin PRN use at 1x/daily for the last week. She did have episode of increased anxiety with restlessness over the holidays when traveling, this was managed with Klonopin PRN and coping strategies. Encouraged continued therapy and use of coping strategies with patient who engaged in conversation.    The patient does not endorse or demonstrate symptoms consistent with ghada/hypomania, or negative or positive symptoms of psychosis at time of evaluation.    Psychiatric Review Of Systems:  Melody reports Symptoms as described in HPI.  Melody denies Current suicidal thoughts, plan, or intent, Current thoughts of self-harm, or Current homicidal thoughts, plan, or intent.    Medical Review Of Systems:  Complete review of systems is negative except as noted above.       Carrion Akathisia Rating Scale (BARS) -  Prior score on 10/23/23: 8 and Global Clinical Assessment score:3, 11/14/23: 7 and Global Clinical Assessment score of 2  12/04/23: Global Clinical Assessment score: 3 due to distress to patient  01/08/24: 0 and Global Clinical Assessment score of 0  Objective Unable to complete full observed portion today, 01/08/24, due to virtual format.  0 Normal, occasional fidgety movements of the limbs  1 Presence of characteristic restless movements: shuffling or tramping movements of the legs/feet, or swinging of one leg while sitting, and/or rocking from foot to foot or “walking on the spot” when standing, but movements present for less than half the time observed  2 Observed phenomena, as described in (1) above, which are present for at least half the observation period  3 Patient is constantly engaged in characteristic restless movements, and/or has the inability to remain seated or standing without walking or pacing, during the time observed  Subjective  0 Absence of inner restlessness  1 Non-specific sense of inner restlessness  2 The patient is aware of an inability to keep the legs  still, or a desire to move the legs, and/or complains of inner restlessness aggravated specifically by being required to stand still  3 Awareness of intense compulsion to move most of the time and/or reports strong desire to walk or pace most of the time  Distress related to restlessness  0 No distress  1 Mild  2 Moderate  3 Severe  Global Clinical Assessment of Akathisia  0 Absent. No evidence of awareness of restlessness. Observation of characteristic movements of akathisia in the absence of a subjective report of inner restlessness or compulsive desire to move the legs should be classified as pseudoakathisia  1 Questionable. Non-specific inner tension and fidgety movements  2 Mild akathisia. Awareness of restlessness in the legs and/or inner restlessness worse when required to stand still. Fidgety movements present, but characteristic restless movements of akathisia not necessarily observed. Condition causes little or no distress.  3 Moderate akathisia. Awareness of restlessness as described for mild akathisia above, combined with characteristic restless movements such as rocking from foot to foot when standing. Patient finds the condition distressing.  4 Marked akathisia. Subjective experience of restlessness includes a compulsive desire to walk or pace. However, the patient is able to remain seated for at least five minutes. The condition is obviously distressing.  5 Severe akathisia. The patient reports a strong compulsion to pace up and down most of the time. Unable to sit or lie down for more than a few minutes. Constant restlessness which is associated with intense distress and insomnia.  ------------------------------------  All italicized information has been copied from previous psychiatric evaluation. Information has been reviewed with the patient.     Past Psychiatric History:   Prior psychiatric diagnoses: anxiety, panic disorder, mood disorder, OCD  Inpatient hospitalizations: inpatient  hospitalizations - first hospitalization 2001 - [facility], 2008 - [facility] - [place], 2021 - panic disorder, mood symptoms  Emergency room visits often in-between for severe panic attacks - 2016  Patient and  report no psychiatric care   Suicide attempts/self-harm: patient denies, patient denies self harm behaviors  Violent/aggressive behavior: patient denies   Patient did have some aggressive outbursts towards  and providers  Outpatient psychiatric providers: currently has outpatient psychiatric provider - [facility]   Was without a psychiatrist from [phone] (Lexapro from [facility] during that time), prior psychiatrist, Dr. Martines, retired  Past/current psychotherapy: patient has a therapist weekly, Dr. Foster, [facility]  Other Services: patient denies  Psychiatric medication trial:   Multiple medication trials - including below,  Antidepressants  Prozac (up to 40 mg), Zoloft (short period of time), Lexapro (multiple years), Luvox  Antipsychotics  Vraylar, Zyprexa, Seroquel, Haldol (in ED)  Sedative hypnotics  Ativan, Klonopin (0.5 mg QID PRN)  Others  Hydroxyzine, Gabapentin, BuSpar (60 mg total daily)      Substance Abuse History:  I have assessed this patient for substance use within the past 12 months.  Patient reports a few cigarettes per day, quit 2007/2008. Patient reports history of daily marijuana use - had medical marijuana card - few months without smoking.   Denies history of other recreational use including opioids, methamphetamines/amphetamines, cocaine use. Denies past legal actions or arrests secondary to substance intoxication. The patient denies prior DWIs/DUIs. Melody does not exhibit objective evidence of substance withdrawal during today's examination nor does Melody appear under the influence of any psychoactive substance.       Family Psychiatric History:   Mother: anxiety (on antidepressant - Lexapro)  No family  history of substance use disorder or suicide attempts or completions.     Social History  Early life/developmental: Denies a history of milestone/developmental delay. Denies a history of in-utero exposure to toxins/illicit substances. Denies ADHD history. No history of IEP/504, denies special supports in school.  Marital history: /Civil Union   Children: yes, 1 daughter (6 y/o - 2nd grade)  1 older brother   Living arrangement: Lives in a home with  and daughter (family lost their dog in May).  Support system: identifies  as the biggest source of support  Mother also a big support  Education: college graduate - communications ([employer])  Had to withdraw from college twice due to anxiety  Occupational History: unemployed since January 2023, applying for permanent disability - previously in advertising in 10-15 years  Other Pertinent History: no reported  or legal history  Access to firearms:  has firearm, locked and secured      Traumatic History:   Abuse:  patient denied  Other Traumatic Events:  medical trauma, father passed when she was 16 y/o    History Review: The following portions of the patient's history were reviewed and updated as appropriate: allergies, current medications, past family history, past medical history, past social history, past surgical history, and problem list.    Visit Vitals  OB Status Implant   Smoking Status Never      Wt Readings from Last 6 Encounters:   12/18/23 59.3 kg (130 lb 11.2 oz)   10/11/23 57.8 kg (127 lb 6.4 oz)   10/06/23 58.7 kg (129 lb 8 oz)   09/25/23 60.1 kg (132 lb 8 oz)   05/11/23 61.7 kg (136 lb)   04/26/23 61.7 kg (136 lb)      Mental Status Exam:  Appearance:  alert, good eye contact, appears stated age, casually dressed, appropriate grooming and hygiene, and smiling   Behavior:  calm, cooperative, and sitting comfortably   Motor: Unable to assess due to virtual visit   Speech:  spontaneous, clear, normal rate, not pressured,  "normal volume, not hyperverbal, and coherent   Mood:  \"better\"   Affect:  mood-congruent and brighter than previous   Thought Process:  Organized, logical, goal-directed   Thought Content: no verbalized delusions or overt paranoia   Perceptual disturbances: no reported hallucinations and does not appear to be responding to internal stimuli at this time   Risk Potential: No active or passive suicidal or homicidal ideation was verbalized during interview   Cognition: oriented to person, place, time, and situation, memory grossly intact, appears to be of average intelligence, and age-appropriate attention span and concentration   Insight:  Good   Judgment: Good       Meds/Allergies    Allergies   Allergen Reactions    Demerol [Meperidine] Hyperactivity    Macrolides And Ketolides      Current Outpatient Medications   Medication Instructions    clonazePAM (KLONOPIN) 0.5-1 mg, Oral, 3 times daily PRN    FLUoxetine (PROZAC) 20 mg, Oral, Daily, Take with 10 mg capsule for total of 30 mg daily    FLUoxetine (PROZAC) 10 mg, Oral, Daily, Take with 20 mg capsule for total of 30 mg daily    propranolol (INDERAL) 20 mg, Oral, 3 times daily        Labs & Imaging:  I have personally reviewed all pertinent laboratory tests and imaging results.   No visits with results within 2 Month(s) from this visit.   Latest known visit with results is:   Appointment on 10/11/2023   Component Date Value Ref Range Status    Sodium 10/11/2023 140  135 - 147 mmol/L Final    Potassium 10/11/2023 4.7  3.5 - 5.3 mmol/L Final    Chloride 10/11/2023 107  96 - 108 mmol/L Final    CO2 10/11/2023 27  21 - 32 mmol/L Final    ANION GAP 10/11/2023 6  mmol/L Final    BUN 10/11/2023 20  5 - 25 mg/dL Final    Creatinine 10/11/2023 0.96  0.60 - 1.30 mg/dL Final    Standardized to IDMS reference method    Glucose, Fasting 10/11/2023 90  65 - 99 mg/dL Final    Calcium 10/11/2023 9.7  8.4 - 10.2 mg/dL Final    AST 10/11/2023 13  13 - 39 U/L Final    ALT 10/11/2023 " 15  7 - 52 U/L Final    Specimen collection should occur prior to Sulfasalazine administration due to the potential for falsely depressed results.     Alkaline Phosphatase 10/11/2023 53  34 - 104 U/L Final    Total Protein 10/11/2023 7.3  6.4 - 8.4 g/dL Final    Albumin 10/11/2023 4.6  3.5 - 5.0 g/dL Final    Total Bilirubin 10/11/2023 1.78 (H)  0.20 - 1.00 mg/dL Final    Use of this assay is not recommended for patients undergoing treatment with eltrombopag due to the potential for falsely elevated results.  N-acetyl-p-benzoquinone imine (metabolite of Acetaminophen) will generate erroneously low results in samples for patients that have taken an overdose of Acetaminophen.    eGFR 10/11/2023 73  ml/min/1.73sq m Final    Cholesterol 10/11/2023 185  See Comment mg/dL Final    Cholesterol:         Pediatric <18 Years        Desirable          <170 mg/dL      Borderline High    170-199 mg/dL      High               >=200 mg/dL        Adult >=18 Years            Desirable         <200 mg/dL      Borderline High   200-239 mg/dL      High              >239 mg/dL      Triglycerides 10/11/2023 48  See Comment mg/dL Final    Triglyceride:     0-9Y            <75mg/dL     10Y-17Y         <90 mg/dL       >=18Y     Normal          <150 mg/dL     Borderline High 150-199 mg/dL     High            200-499 mg/dL        Very High       >499 mg/dL    Specimen collection should occur prior to Metamizole administration due to the potential for falsely depressed results.    HDL, Direct 10/11/2023 55  >=50 mg/dL Final    LDL Calculated 10/11/2023 120 (H)  0 - 100 mg/dL Final    LDL Cholesterol:     Optimal           <100 mg/dl     Near Optimal      100-129 mg/dl     Above Optimal       Borderline High 130-159 mg/dl       High            160-189 mg/dl       Very High       >189 mg/dl         This screening LDL is a calculated result.   It does not have the accuracy of the Direct Measured LDL in the monitoring of patients with  hyperlipidemia and/or statin therapy.   Direct Measure LDL (SIR079) must be ordered separately in these patients.    TSH 3RD GENERATON 10/11/2023 1.620  0.450 - 4.500 uIU/mL Final    The recommended reference ranges for TSH during pregnancy are as follows:   First trimester 0.100 to 2.500 uIU/mL   Second trimester  0.200 to 3.000 uIU/mL   Third trimester 0.300 to 3.000 uIU/m    Note: Normal ranges may not apply to patients who are transgender, non-binary, or whose legal sex, sex at birth, and gender identity differ.  Adult TSH (3rd generation) reference range follows the recommended guidelines of the American Thyroid Association, January, 2020.     ---------------------------------------    Although patient's acute lethality risk is low, long-term/chronic lethality risk is mildly elevated in the presence of anxiety, history of depression, history of traumatic experienced, history of substance use, access to firearms in home, and recent movement related symptoms. At the current moment, Melody is future-oriented, forward-thinking, and demonstrates ability to act in a self-preserving manner as evidenced by volitionally presenting to the clinic today, seeking treatment. At this juncture, inpatient hospitalization is not currently warranted. To mitigate future risk, patient should adhere to the recommendations of this writer, avoid alcohol/illicit substance use, utilize community-based resources and familiar support and prioritize mental health treatment.     Based on today's assessment and clinical criteria, Melody Huynh contracts for safety and is not an imminent risk of harm to self or others. Outpatient level of care is deemed appropriate at this present time. Melody understands that if they are no longer able to contract for safety, they need to call/contact the outpatient office including this writer, call/contact crisis and/orattend to the nearest Emergency Department for immediate evaluation.     Risk of  Harm to Self:  The following ratings are based on assessment at the time of the interview  Demographic risk factors include:   Historical Risk Factors include: history of depression, chronic anxiety symptoms, history of mood disorder, history of substance use, history of traumatic experiences  Recent Specific Risk Factors include: diagnosis of mood disorder, current depressive symptoms, current anxiety symptoms, feelings of guilt or self blame, hopelessness, unemployed, medication adverse effects  Protective Factors: no current suicidal ideation, access to mental health treatment, being a parent, being , compliant with medications, compliant with mental health treatment, connection to own children, having a desire to be alive, having a sense of purpose or meaning in life, opportunities to participate in community, responsibilities and duties to others, stable living environment, supportive family  Weapons: gun. The following steps have been taken to ensure weapons are properly secured: locked, secured, confirmed with   Based on today's assessment, Melody presents the following risk of harm to self: low     Risk of Harm to Others:  The following ratings are based on assessment at the time of the interview  Demographic Risk Factors include: unemployed.  Historical Risk Factors include: history of substance use.  Recent Specific Risk Factors include: weapons or other means available, concomitant mood disorder, multiple stressors.  Protective Factors: no current homicidal ideation, access to mental health treatment, being a parent, being , compliant with medications, compliant with mental health treatment, opportunities to participate in community, resilience, responsibilities and duties to others, safe and stable living environment, supportive family  Weapons: gun. The following steps have been taken to ensure weapons are properly secured: locked, secured, confirmed by   Based on  today's assessment, Melody presents the following risk of harm to others: low    Psychotherapy Provided:     Individual psychotherapy provided: Yes  Counseling was provided during the session today for 15 minutes.  Medications, treatment progress and treatment plan reviewed with Melody.  Supportive therapy provided.     Treatment Plan:    Completed and signed during the session: Not applicable - Treatment Plan not due at this session    This note was shared with patient.    Visit Time:  Start Time: 1545  Stop Time: 1615  Total Visit Duration:  30 minutes    Deysi Cohen,   Psychiatry Resident, PGY-III    This note has been constructed using a voice recognition system. There may be translation, syntax, or grammatical errors. If you have any questions, please contact the dictating provider.

## 2024-01-09 RX ORDER — CLONAZEPAM 0.5 MG/1
.5-1 TABLET ORAL 3 TIMES DAILY PRN
Qty: 150 TABLET | Refills: 0 | Status: SHIPPED | OUTPATIENT
Start: 2024-01-09 | End: 2024-02-08

## 2024-01-12 DIAGNOSIS — G25.71 AKATHISIA: ICD-10-CM

## 2024-01-12 RX ORDER — PROPRANOLOL HYDROCHLORIDE 10 MG/1
20 TABLET ORAL 3 TIMES DAILY
Qty: 540 TABLET | Refills: 0 | Status: SHIPPED | OUTPATIENT
Start: 2024-01-12

## 2024-02-25 DIAGNOSIS — F39 UNSPECIFIED MOOD (AFFECTIVE) DISORDER (HCC): ICD-10-CM

## 2024-02-25 DIAGNOSIS — F41.9 ANXIETY DISORDER, UNSPECIFIED TYPE: ICD-10-CM

## 2024-02-29 ENCOUNTER — OFFICE VISIT (OUTPATIENT)
Dept: PSYCHIATRY | Facility: CLINIC | Age: 44
End: 2024-02-29
Payer: COMMERCIAL

## 2024-02-29 DIAGNOSIS — F41.9 ANXIETY DISORDER, UNSPECIFIED TYPE: Primary | ICD-10-CM

## 2024-02-29 DIAGNOSIS — G25.71 AKATHISIA: ICD-10-CM

## 2024-02-29 DIAGNOSIS — F39 UNSPECIFIED MOOD (AFFECTIVE) DISORDER (HCC): ICD-10-CM

## 2024-02-29 PROCEDURE — 99214 OFFICE O/P EST MOD 30 MIN: CPT

## 2024-02-29 RX ORDER — CLONAZEPAM 0.5 MG/1
1 TABLET ORAL 2 TIMES DAILY PRN
Qty: 90 TABLET | Refills: 0 | Status: SHIPPED | OUTPATIENT
Start: 2024-02-29 | End: 2024-03-30

## 2024-02-29 RX ORDER — FLUOXETINE HYDROCHLORIDE 20 MG/1
20 CAPSULE ORAL DAILY
Qty: 30 CAPSULE | Refills: 2 | OUTPATIENT
Start: 2024-02-29

## 2024-02-29 RX ORDER — FLUOXETINE HYDROCHLORIDE 20 MG/1
20 CAPSULE ORAL DAILY
Qty: 30 CAPSULE | Refills: 2 | Status: SHIPPED | OUTPATIENT
Start: 2024-02-29 | End: 2024-05-29

## 2024-02-29 RX ORDER — FLUOXETINE 10 MG/1
10 CAPSULE ORAL DAILY
Qty: 30 CAPSULE | Refills: 2 | Status: SHIPPED | OUTPATIENT
Start: 2024-02-29 | End: 2024-05-29

## 2024-02-29 RX ORDER — PROPRANOLOL HYDROCHLORIDE 10 MG/1
TABLET ORAL
Start: 2024-02-29

## 2024-02-29 RX ORDER — FLUOXETINE 10 MG/1
CAPSULE ORAL
Qty: 30 CAPSULE | Refills: 2 | OUTPATIENT
Start: 2024-02-29

## 2024-02-29 NOTE — PSYCH
Psychiatric Follow Up Visit - Behavioral Health       [facility]  MRN: [id number]    Assessment/Plan:   Melody Huynh is a 42 y.o. 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 Patient presents with her .  For review, on initial evaluation, patient presented with symptoms consistent with EPS and akathisia related adverse effects of psychotropic medications. Possible offending agents were previously discontinued (Vraylar, Zyprexa) with adjustments to Prozac and Klonopin dosing.  Today, the patient endorses continued improvement in movement related symptoms - with observed improvement in gait and arm swing, no hypomimia observed.  The patient endorses intermittent anxiety related symptoms with increased rate of thoughts, negative thinking, inner tension, and restlessness. Patient continues to utilize medications as prescribed, has not needed afternoon PRN of Klonopin, and has been attending psychotherapy.  Patient endorses improvement in anhedonia and motivation compared to prior evaluation. Discussed with the patient medication and treatment goals, patient in agreement with continuing Prozac and Klonopin at current doses and decreasing Propranolol to 10 mg BID x2 weeks then to 5 mg BID.    Diagnoses:  Anxiety  disorder, unspecified, differential includes Panic disorder, Generalized anxiety disorder, Obsessive-compulsive disorder  Mood disorder, unspecified, differential includes Major depressive disorder, Adjustment disorder with anxious distress  Akathisia and related symptoms  Decreased facial movement not observed on today's evaluation  Lower extremity involuntary movement not observed on today's evaluation    Treatment Recommendations/Precautions:  Continue Prozac 30 mg daily for mood and anxiety  PARQ completed including serotonin syndrome, SIADH, worsening depression, suicidality, induction of ghada, GI upset, headaches, activation, sexual side effects, sedation, potential drug interactions, and others.  Continue Klonopin 1 mg BID PRN, discontinue 0.5 mg QAfternoon PRN for breakthrough anxiety  PDMP reviewed, last filled 01/09/2024  Discussed with patient the risks of sedation, respiratory depression, impairment in judgment and motor function. Depression, mood changes, GI changes, and others discussed. Patient  informed of risks of being on or starting opioid medications due to drug interactions and potential for serious respiratory depression and death.   Decrease Propranolol to 10 mg BID for 2 weeks, then decrease to 5 mg BID for akathisia related symptoms, may be augmenting for anxiety  Patient had been taking 20 mg BID   Previously prescribed by neurology, patient with preference to reduce dose. Communication sent to neurology to update on prescription adjustments.  PARQ completed including dizziness, sedation, headache, blood pressure, depression  Patient provides paperwork from disability offices and  for completion. Patient previously signed TAYLOR for [employer] and other documentation is requested to be given directly to patient.  Most recent lab work previously reviewed.  Most recent neurology appointment documentation reviewed, appreciate recommendations.  Continue psychotherapy with own therapist  "weekly.  Medication management every 4-8 weeks  Aware of need to follow up with family physician for medical issues  Aware of 24 hour and weekend coverage for urgent situations accessed by calling [facility] main practice number    Medication Risks/Benefits      Risks, Benefits And Possible Side Effects Of Medications:    Risks, benefits, and possible side effects of medications explained to Melody and she verbalizes understanding and agreement for treatment.    Controlled Medication Discussion:     Melody has been filling controlled prescriptions on time as prescribed according to [place] Prescription Drug Monitoring Program  Discussed with Melody the risks of sedation, respiratory depression, impairment of ability to drive and potential for abuse and addiction related to treatment with benzodiazepine medications. She understands risk of treatment with benzodiazepine medications, agrees to not drive if feels impaired and agrees to take medications as prescribed  Discussed with Melody Black Box warning on concurrent use of benzodiazepines and opioid medications including sedation, respiratory depression, coma and death. She understands the risk of treatment with benzodiazepines in addition to opioids and wants to continue taking those medications  Discussed with Melody increased risk of impairment related to concurrent use of benzodiazepines and hypnotic medications including excessive sedation, psychomotor impairment and respiratory depression. She understands the risk of treatment with benzodiazepines in addition to hypnotic medications and wants to continue taking those medications  ------------------------------------  History of Present Illness   Melody Hunyh is presenting to follow up for anxiety, depression, and akathisia and EPS related symptoms . Melody presents with her  today. She endorses feeling, \"better, calmer,\" since last evaluation. The patient " notes intermittent sleep disturbance with vivid dreams, they have continued since prior evaluations- they remain non distressing and patient denies nightmares. Patient's  endorses she is talking in her sleep at times. Discussed this may be a medication adverse effect. Patient and  deny patient acting out dreams or moving during sleep. Discussed signs/symptoms of REM sleep disorders and parasomnias to monitor for, patient and  expressed understanding.    The patient endorses feeling more motivated overall, she returned to the gym last week which was a goal for herself.   The patient denies feelings of hopelessness or helplessness. She denies SI, passive death wishes, thoughts to harm self or others, or HI at time of evaluation.    Patient and  endorse intermittent anxiety related symptoms, with increased rate of thoughts and negative thinking. Patient and her  note improvement in movement and restlessness (notably of legs) compared to prior,  notes this still occurs when patient has increases in stress and anxiety. She does not endorse urge to move at time of evaluation. The patient and her  deny worsening or new movement related symptoms.    The patient does not endorse or demonstrate symptoms consistent with ghada/hypomania, or negative or positive symptoms of psychosis at time of evaluation.    Discussed continuation of Prozac and Klonopin at current doses. Patient with questions regarding decreasing propranolol as movement related symptoms have improved - discussed slow reduction of medication to which patient is agreeable to.     Psychiatric Review Of Systems:  Melody reports Symptoms as described in HPI.  Melody denies Current suicidal thoughts, plan, or intent, Current thoughts of self-harm, or Current homicidal thoughts, plan, or intent.      [place] Akathisia Rating Scale (BARS) -  Prior score on 10/23/23: 8 and Global Clinical Assessment score:3, 11/14/23:  7 and Global Clinical Assessment score of 2  12/04/23: Global Clinical Assessment score: 3 due to distress to patient  02/29/24: 0 and Global Clinical Assessment score of 0  Objective   0 Normal, occasional fidgety movements of the limbs  1 Presence of characteristic restless movements: shuffling or tramping movements of the legs/feet, or swinging of one leg while sitting, and/or rocking from foot to foot or “walking on the spot” when standing, but movements present for less than half the time observed  2 Observed phenomena, as described in (1) above, which are present for at least half the observation period  3 Patient is constantly engaged in characteristic restless movements, and/or has the inability to remain seated or standing without walking or pacing, during the time observed  Subjective  0 Absence of inner restlessness  1 Non-specific sense of inner restlessness  2 The patient is aware of an inability to keep the legs still, or a desire to move the legs, and/or complains of inner restlessness aggravated specifically by being required to stand still  3 Awareness of intense compulsion to move most of the time and/or reports strong desire to walk or pace most of the time  Distress related to restlessness  0 No distress  1 Mild  2 Moderate  3 Severe  Global Clinical Assessment of Akathisia  0 Absent. No evidence of awareness of restlessness. Observation of characteristic movements of akathisia in the absence of a subjective report of inner restlessness or compulsive desire to move the legs should be classified as pseudoakathisia  1 Questionable. Non-specific inner tension and fidgety movements  2 Mild akathisia. Awareness of restlessness in the legs and/or inner restlessness worse when required to stand still. Fidgety movements present, but characteristic restless movements of akathisia not necessarily observed. Condition causes little or no distress.  3 Moderate akathisia. Awareness of restlessness as described  for mild akathisia above, combined with characteristic restless movements such as rocking from foot to foot when standing. Patient finds the condition distressing.  4 Marked akathisia. Subjective experience of restlessness includes a compulsive desire to walk or pace. However, the patient is able to remain seated for at least five minutes. The condition is obviously distressing.  5 Severe akathisia. The patient reports a strong compulsion to pace up and down most of the time. Unable to sit or lie down for more than a few minutes. Constant restlessness which is associated with intense distress and insomnia.    Medical Review Of Systems:  Complete review of systems is negative except as noted above.    ------------------------------------  All italicized information has been copied from previous psychiatric evaluation. Information has been reviewed with the patient.     Past Psychiatric History:   Prior psychiatric diagnoses: anxiety, panic disorder, mood disorder, OCD  Inpatient hospitalizations: inpatient hospitalizations - first hospitalization 2001 - [facility], 2008 - [facility], 2021 - panic disorder, mood symptoms  Emergency room visits often in-between for severe panic attacks - 2016  Patient and  report no psychiatric care   Suicide attempts/self-harm: patient denies, patient denies self harm behaviors  Violent/aggressive behavior: patient denies   Patient did have some aggressive outbursts towards  and providers  Outpatient psychiatric providers: currently has outpatient psychiatric provider - [facility]   Was without a psychiatrist from [phone] (Lexapro from [facility] during that time), prior psychiatrist, Dr. Martines, retired  Past/current psychotherapy: patient has a therapist weekly, Dr. Foster, [facility]  Other Services: patient denies  Psychiatric medication trial:   Multiple medication trials - including  below,  Antidepressants  Prozac (up to 40 mg), Zoloft (short period of time), Lexapro (multiple years), Luvox  Antipsychotics  Vraylar, Zyprexa, Seroquel, Haldol (in ED)  Sedative hypnotics  Ativan, Klonopin (0.5 mg QID PRN)  Others  Hydroxyzine, Gabapentin, BuSpar (60 mg total daily)      Substance Abuse History:  I have assessed this patient for substance use within the past 12 months.  Patient reports a few cigarettes per day, quit 2007/2008. Patient reports history of daily marijuana use - had medical marijuana card - few months without smoking.   Denies history of other recreational use including opioids, methamphetamines/amphetamines, cocaine use. Denies past legal actions or arrests secondary to substance intoxication. The patient denies prior DWIs/DUIs. Melody does not exhibit objective evidence of substance withdrawal during today's examination nor does Melody appear under the influence of any psychoactive substance.       Family Psychiatric History:   Mother: anxiety (on antidepressant - Lexapro)  No family history of substance use disorder or suicide attempts or completions.     Social History  Early life/developmental: Denies a history of milestone/developmental delay. Denies a history of in-utero exposure to toxins/illicit substances. Denies ADHD history. No history of IEP/504, denies special supports in school.  Marital history: /Civil Union   Children: yes, 1 daughter (7 y/o - 2nd grade)  1 older brother   Living arrangement: Lives in a home with  and daughter (family lost their dog in May).  Support system: identifies  as the biggest source of support  Mother also a big support  Education: college graduate - communications ([employer])  Had to withdraw from college twice due to anxiety  Occupational History: unemployed since January 2023, applying for permanent disability - previously in advertising in 10-15 years  Other Pertinent History: no reported  or legal  "history  Access to firearms:  has firearm, locked and secured      Traumatic History:   Abuse:  patient denied  Other Traumatic Events:  medical trauma, father passed when she was 16 y/o    History Review: The following portions of the patient's history were reviewed and updated as appropriate: allergies, current medications, past family history, past medical history, past social history, past surgical history, and problem list.    Visit Vitals  OB Status Implant   Smoking Status Never      Wt Readings from Last 6 Encounters:   12/18/23 59.3 kg (130 lb 11.2 oz)   10/11/23 57.8 kg (127 lb 6.4 oz)   10/06/23 58.7 kg (129 lb 8 oz)   09/25/23 60.1 kg (132 lb 8 oz)   05/11/23 61.7 kg (136 lb)   04/26/23 61.7 kg (136 lb)        Mental Status Exam:  Appearance:  alert, good eye contact, appears stated age, casually dressed, and appropriate grooming and hygiene   Behavior:  calm, cooperative, and sitting comfortably   Motor: no abnormal movements and improved gait and arm swing, no hypomimia appreciated on exam   Speech:  spontaneous, clear, normal rate, not pressured, normal volume, not hyperverbal, and coherent   Mood:  \"better\"   Affect:  constricted and reactive at times   Thought Process:  Organized, logical, goal-directed, increased rate of thoughts   Thought Content: no verbalized delusions or overt paranoia   Perceptual disturbances: no reported hallucinations and does not appear to be responding to internal stimuli at this time   Risk Potential: No active or passive suicidal or homicidal ideation was verbalized during interview   Cognition: oriented to person, place, time, and situation, memory grossly intact, appears to be of average intelligence, and age-appropriate attention span and concentration   Insight:  Good   Judgment: Good       Meds/Allergies    Allergies   Allergen Reactions    Demerol [Meperidine] Hyperactivity    Macrolides And Ketolides      Current Outpatient Medications   Medication " Instructions    clonazePAM (KLONOPIN) 0.5-1 mg, Oral, 3 times daily PRN    FLUoxetine (PROZAC) 20 mg, Oral, Daily, Take with 10 mg capsule for total of 30 mg daily    FLUoxetine (PROZAC) 10 mg, Oral, Daily, Take with 20 mg capsule for total of 30 mg daily    propranolol (INDERAL) 20 mg, Oral, 3 times daily        Labs & Imaging:  I have personally reviewed all pertinent laboratory tests and imaging results.   No visits with results within 2 Month(s) from this visit.   Latest known visit with results is:   Appointment on 10/11/2023   Component Date Value Ref Range Status    Sodium 10/11/2023 140  135 - 147 mmol/L Final    Potassium 10/11/2023 4.7  3.5 - 5.3 mmol/L Final    Chloride 10/11/2023 107  96 - 108 mmol/L Final    CO2 10/11/2023 27  21 - 32 mmol/L Final    ANION GAP 10/11/2023 6  mmol/L Final    BUN 10/11/2023 20  5 - 25 mg/dL Final    Creatinine 10/11/2023 0.96  0.60 - 1.30 mg/dL Final    Standardized to IDMS reference method    Glucose, Fasting 10/11/2023 90  65 - 99 mg/dL Final    Calcium 10/11/2023 9.7  8.4 - 10.2 mg/dL Final    AST 10/11/2023 13  13 - 39 U/L Final    ALT 10/11/2023 15  7 - 52 U/L Final    Specimen collection should occur prior to Sulfasalazine administration due to the potential for falsely depressed results.     Alkaline Phosphatase 10/11/2023 53  34 - 104 U/L Final    Total Protein 10/11/2023 7.3  6.4 - 8.4 g/dL Final    Albumin 10/11/2023 4.6  3.5 - 5.0 g/dL Final    Total Bilirubin 10/11/2023 1.78 (H)  0.20 - 1.00 mg/dL Final    Use of this assay is not recommended for patients undergoing treatment with eltrombopag due to the potential for falsely elevated results.  N-acetyl-p-benzoquinone imine (metabolite of Acetaminophen) will generate erroneously low results in samples for patients that have taken an overdose of Acetaminophen.    eGFR 10/11/2023 73  ml/min/1.73sq m Final    Cholesterol 10/11/2023 185  See Comment mg/dL Final    Cholesterol:         Pediatric <18 Years         Desirable          <170 mg/dL      Borderline High    170-199 mg/dL      High               >=200 mg/dL        Adult >=18 Years            Desirable         <200 mg/dL      Borderline High   200-239 mg/dL      High              >239 mg/dL      Triglycerides 10/11/2023 48  See Comment mg/dL Final    Triglyceride:     0-9Y            <75mg/dL     10Y-17Y         <90 mg/dL       >=18Y     Normal          <150 mg/dL     Borderline High 150-199 mg/dL     High            200-499 mg/dL        Very High       >499 mg/dL    Specimen collection should occur prior to Metamizole administration due to the potential for falsely depressed results.    HDL, Direct 10/11/2023 55  >=50 mg/dL Final    LDL Calculated 10/11/2023 120 (H)  0 - 100 mg/dL Final    LDL Cholesterol:     Optimal           <100 mg/dl     Near Optimal      100-129 mg/dl     Above Optimal       Borderline High 130-159 mg/dl       High            160-189 mg/dl       Very High       >189 mg/dl         This screening LDL is a calculated result.   It does not have the accuracy of the Direct Measured LDL in the monitoring of patients with hyperlipidemia and/or statin therapy.   Direct Measure LDL (SEM624) must be ordered separately in these patients.    TSH 3RD GENERATON 10/11/2023 1.620  0.450 - 4.500 uIU/mL Final    The recommended reference ranges for TSH during pregnancy are as follows:   First trimester 0.100 to 2.500 uIU/mL   Second trimester  0.200 to 3.000 uIU/mL   Third trimester 0.300 to 3.000 uIU/m    Note: Normal ranges may not apply to patients who are transgender, non-binary, or whose legal sex, sex at birth, and gender identity differ.  Adult TSH (3rd generation) reference range follows the recommended guidelines of the American Thyroid Association, January, 2020.     ---------------------------------------    Although patient's acute lethality risk is low, long-term/chronic lethality risk is mildly elevated in the presence of anxiety, history of  depression, history of traumatic experiences, history of substance use, access to firearms in home, and recent EPS/movement related symptoms. At the current moment, Melody is future-oriented, forward-thinking, and demonstrates ability to act in a self-preserving manner as evidenced by volitionally presenting to the clinic today, seeking treatment. At this juncture, inpatient hospitalization is not currently warranted. To mitigate future risk, patient should adhere to the recommendations of this writer, avoid alcohol/illicit substance use, utilize community-based resources and familiar support and prioritize mental health treatment.     Based on today's assessment and clinical criteria, Melody Huynh contracts for safety and is not an imminent risk of harm to self or others. Outpatient level of care is deemed appropriate at this present time. Melody understands that if they are no longer able to contract for safety, they need to call/contact the outpatient office including this writer, call/contact crisis and/orattend to the nearest Emergency Department for immediate evaluation.    Risk of Harm to Self:  The following ratings are based on assessment at the time of the interview  Demographic risk factors include:   Historical Risk Factors include: history of depression, chronic anxiety symptoms, history of mood disorder, history of substance use, history of traumatic experiences  Recent Specific Risk Factors include: diagnosis of mood disorder, current depressive symptoms, current anxiety symptoms, feelings of guilt or self blame, hopelessness, unemployed, medication adverse effects  Protective Factors: no current suicidal ideation, access to mental health treatment, being a parent, being , compliant with medications, compliant with mental health treatment, connection to own children, having a desire to be alive, having a sense of purpose or meaning in life, opportunities to participate in  community, responsibilities and duties to others, stable living environment, supportive family  Weapons: gun. The following steps have been taken to ensure weapons are properly secured: locked, secured, confirmed with   Based on today's assessment, Melody presents the following risk of harm to self: low     Risk of Harm to Others:  The following ratings are based on assessment at the time of the interview  Demographic Risk Factors include: unemployed.  Historical Risk Factors include: history of substance use.  Recent Specific Risk Factors include: weapons or other means available, concomitant mood disorder, multiple stressors.  Protective Factors: no current homicidal ideation, access to mental health treatment, being a parent, being , compliant with medications, compliant with mental health treatment, opportunities to participate in community, resilience, responsibilities and duties to others, safe and stable living environment, supportive family  Weapons: gun. The following steps have been taken to ensure weapons are properly secured: locked, secured, confirmed by   Based on today's assessment, Melody presents the following risk of harm to others: low    Psychotherapy Provided:     Individual psychotherapy provided: Yes  Counseling was provided during the session today for 15 minutes.  Medications, treatment progress and treatment plan reviewed with Melody.  Supportive therapy provided.     Treatment Plan:    Completed and signed during the session: Not applicable - Treatment Plan not due at this session    This note was shared with patient.    Visit Time:  Start Time: 1520  Stop Time: 1615  Total Visit Duration:  55 minutes    Deysi Cohen DO  Psychiatry Resident, PGY-III    This note has been constructed using a voice recognition system. There may be translation, syntax, or grammatical errors. If you have any questions, please contact the dictating provider.

## 2024-02-29 NOTE — PATIENT INSTRUCTIONS
Continue Prozac 30 mg daily  Continue Klonopin 1 mg morning and evening as needed for anxiety  Decrease Propranolol to 10 mg twice daily for 2 weeks, then decrease to 5 mg twice daily    Please call the office nursing staff for medication issues including refills, problems obtaining medications, side effects, etc at [phone].   Please return for a follow up appointment as discussed. If you are running late or are unable to attend your appointment, please call our [place] office at [phone].    If you are in psychological crisis including not limited to suicidal/homicidal thoughts or thoughts of self-injury, do not hesitate to call/contact your [facility] Crisis hotline (see below), call [phone], call [phone] (mental health crisis), or go to the nearest emergency department.  [facility] Crisis: [phone]  [facility] Crisis: [phone]  [facility] Crisis: [phone]  [facility] Crisis: [phone]  [facility] Crisis: [phone]  [facility] Crisis: [phone]  [facility] Crisis: [phone]  National Suicide Prevention Hotline: 1-692.291.8706

## 2024-03-04 ENCOUNTER — DOCUMENTATION (OUTPATIENT)
Dept: PSYCHIATRY | Facility: CLINIC | Age: 44
End: 2024-03-04

## 2024-04-16 ENCOUNTER — APPOINTMENT (OUTPATIENT)
Age: 44
End: 2024-04-16
Payer: COMMERCIAL

## 2024-04-16 ENCOUNTER — OFFICE VISIT (OUTPATIENT)
Age: 44
End: 2024-04-16
Payer: COMMERCIAL

## 2024-04-16 VITALS
HEART RATE: 78 BPM | HEIGHT: 63 IN | DIASTOLIC BLOOD PRESSURE: 68 MMHG | OXYGEN SATURATION: 99 % | RESPIRATION RATE: 17 BRPM | BODY MASS INDEX: 25.69 KG/M2 | TEMPERATURE: 97.5 F | SYSTOLIC BLOOD PRESSURE: 110 MMHG | WEIGHT: 145 LBS

## 2024-04-16 DIAGNOSIS — Z80.3 FAMILY HISTORY OF BREAST CANCER: ICD-10-CM

## 2024-04-16 DIAGNOSIS — Z12.31 BREAST CANCER SCREENING BY MAMMOGRAM: ICD-10-CM

## 2024-04-16 DIAGNOSIS — F41.0 PANIC DISORDER WITHOUT AGORAPHOBIA WITH SEVERE PANIC ATTACKS: ICD-10-CM

## 2024-04-16 DIAGNOSIS — Z00.00 ANNUAL PHYSICAL EXAM: Primary | ICD-10-CM

## 2024-04-16 DIAGNOSIS — R25.1 LIMB TREMOR: ICD-10-CM

## 2024-04-16 DIAGNOSIS — Z00.00 ANNUAL PHYSICAL EXAM: ICD-10-CM

## 2024-04-16 PROBLEM — G25.9 EXTRAPYRAMIDAL AND MOVEMENT DISORDER: Status: RESOLVED | Noted: 2023-09-25 | Resolved: 2024-04-16

## 2024-04-16 PROBLEM — G25.71 AKATHISIA: Status: RESOLVED | Noted: 2023-10-06 | Resolved: 2024-04-16

## 2024-04-16 LAB
ALBUMIN SERPL BCP-MCNC: 4.5 G/DL (ref 3.5–5)
ALP SERPL-CCNC: 54 U/L (ref 34–104)
ALT SERPL W P-5'-P-CCNC: 17 U/L (ref 7–52)
ANION GAP SERPL CALCULATED.3IONS-SCNC: 7 MMOL/L (ref 4–13)
AST SERPL W P-5'-P-CCNC: 17 U/L (ref 13–39)
BILIRUB SERPL-MCNC: 1.4 MG/DL (ref 0.2–1)
BUN SERPL-MCNC: 14 MG/DL (ref 5–25)
CALCIUM SERPL-MCNC: 9.3 MG/DL (ref 8.4–10.2)
CHLORIDE SERPL-SCNC: 104 MMOL/L (ref 96–108)
CO2 SERPL-SCNC: 26 MMOL/L (ref 21–32)
CREAT SERPL-MCNC: 0.82 MG/DL (ref 0.6–1.3)
GFR SERPL CREATININE-BSD FRML MDRD: 88 ML/MIN/1.73SQ M
GLUCOSE P FAST SERPL-MCNC: 75 MG/DL (ref 65–99)
POTASSIUM SERPL-SCNC: 4.3 MMOL/L (ref 3.5–5.3)
PROT SERPL-MCNC: 7.1 G/DL (ref 6.4–8.4)
SODIUM SERPL-SCNC: 137 MMOL/L (ref 135–147)

## 2024-04-16 PROCEDURE — 99396 PREV VISIT EST AGE 40-64: CPT | Performed by: FAMILY MEDICINE

## 2024-04-16 PROCEDURE — 99214 OFFICE O/P EST MOD 30 MIN: CPT | Performed by: FAMILY MEDICINE

## 2024-04-16 PROCEDURE — 80053 COMPREHEN METABOLIC PANEL: CPT

## 2024-04-16 PROCEDURE — 36415 COLL VENOUS BLD VENIPUNCTURE: CPT

## 2024-04-16 NOTE — PROGRESS NOTES
ADULT ANNUAL PHYSICAL  [facility] PRIMARY CARE [place]    NAME: Melody Huynh  AGE: 43 y.o. SEX: female  : 1980     DATE: 2024     Assessment and Plan:     Problem List Items Addressed This Visit       Panic disorder without agoraphobia with severe panic attacks-much improved with the change in medical therapy.  Patient only on SSRI and Klonopin with as needed use of propranolol.  Patient to continue to follow-up with psychiatric provider.    Family history of breast cancer-mother and maternal aunt with history of breast cancer.  Patient her annual mammograms regularly.    RESOLVED: Limb tremor     Other Visit Diagnoses       Annual physical exam    -  Primary    Relevant Orders    Comprehensive metabolic panel    Breast cancer screening by mammogram        Relevant Orders    Mammo screening bilateral w 3d & cad              Immunizations and preventive care screenings were discussed with patient today. Appropriate education was printed on patient's after visit summary.           Return in about 1 year (around 2025) for Annual physical.     Chief Complaint:     Chief Complaint   Patient presents with    Follow-up      History of Present Illness:     Adult Annual Physical   Patient here for a comprehensive physical exam.     Diet and Physical Activity  Diet/Nutrition: well balanced diet.   Exercise: 1-2 times a week on average.      Depression Screening  PHQ-2/9 Depression Screening    Little interest or pleasure in doing things: 0 - not at all  Feeling down, depressed, or hopeless: 0 - not at all  PHQ-2 Score: 0  PHQ-2 Interpretation: Negative depression screen       General Health  Sleep: gets 7-8 hours of sleep on average.   Hearing: normal - bilateral.  Vision: goes for regular eye exams and wears contacts.   Dental: regular dental visits.       /GYN Health  Follows with gynecology? yes   Patient is: premenopausal  Last menstrual period: Absent with  IUD  Contraceptive method: IUD placement.  Last pap-2020         Review of Systems:     Review of Systems   Respiratory:  Negative for shortness of breath.    Cardiovascular:  Negative for chest pain.   Gastrointestinal:  Negative for constipation and diarrhea.   Neurological:  Negative for headaches.   Psychiatric/Behavioral:  Negative for sleep disturbance.       Past Medical History:     Past Medical History:   Diagnosis Date    Anxiety     Depression     Disruption of episiotomy wound in the puerperium     Last assessed 06/22/16    External hemorrhoids     Last assessed 02/05/16    Fibroid uterus     GERD (gastroesophageal reflux disease)     Headache     Leiomyoma of uterus     Migraine     Migraine     Polyhydramnios in third trimester, not applicable or unspecified fetus     Last assessed 01/25/16    Pregnancy headache in third trimester     Resolved 02/05/16    Protein in urine     Last assessed 01/25/16    Sciatica of right side     Third degree laceration of perineum, type 3b 1/26/2016    Varicella       Past Surgical History:     Past Surgical History:   Procedure Laterality Date    CHOLECYSTECTOMY  2006    MYOMECTOMY      LA POST COLPORRHAPHY RECTOCELE W/WO PERINEORRHAPHY N/A 3/24/2016    Procedure: PERINEORRAPHY ;  Surgeon: Sallie Flower MD;  Location: [facility];  Service: Gynecology    WISDOM TOOTH EXTRACTION        Social History:     Social History     Socioeconomic History    Marital status: /Civil Union     Spouse name: None    Number of children: None    Years of education: None    Highest education level: None   Occupational History    None   Tobacco Use    Smoking status: Never    Smokeless tobacco: Never   Vaping Use    Vaping status: Never Used   Substance and Sexual Activity    Alcohol use: Yes     Alcohol/week: 1.0 standard drink of alcohol     Types: 1 Glasses of wine per week     Comment: socially; 3-4 drinks/week (8/13)    Drug use: Yes     Types: Marijuana     Comment: Pt  has a medical marijuana card (8/13)    Sexual activity: Yes     Partners: Male     Birth control/protection: I.U.D.   Other Topics Concern    None   Social History Narrative    H/O of pregnancy,first,obstetrical care, third trimester        Always uses seatbelt     Social Determinants of Health     Financial Resource Strain: Not on file   Food Insecurity: Not on file   Transportation Needs: Not on file   Physical Activity: Sufficiently Active (4/26/2023)    Exercise Vital Sign     Days of Exercise per Week: 5 days     Minutes of Exercise per Session: 60 min   Stress: Not on file   Social Connections: Not on file   Intimate Partner Violence: Not on file   Housing Stability: Not on file      Family History:     Family History   Problem Relation Age of Onset    Breast cancer Mother 58    Cancer Mother     No Known Problems Father     No Known Problems Daughter     No Known Problems Maternal Grandmother     No Known Problems Maternal Grandfather     No Known Problems Paternal Grandmother     No Known Problems Paternal Grandfather     Breast cancer Maternal Aunt         unknown age      Current Medications:     Current Outpatient Medications   Medication Sig Dispense Refill    clonazePAM (KlonoPIN) 0.5 mg tablet Take 2 tablets (1 mg total) by mouth 2 (two) times a day as needed for anxiety 90 tablet 0    FLUoxetine (PROzac) 10 mg capsule Take 1 capsule (10 mg total) by mouth daily Take with 20 mg capsule for total of 30 mg daily 30 capsule 2    FLUoxetine (PROzac) 20 mg capsule Take 1 capsule (20 mg total) by mouth daily Take with 10 mg capsule for total of 30 mg daily 30 capsule 2    propranolol (INDERAL) 10 mg tablet Take 1 tablet (10 mg total) by mouth 2 (two) times a day for 2 weeks, then decrease to 0.5 tablet (5 mg total) by mouth 2 (two) times a day       No current facility-administered medications for this visit.      Allergies:     Allergies   Allergen Reactions    Demerol [Meperidine] Hyperactivity     "Macrolides And Ketolides       Physical Exam:     /68 (BP Location: Left arm, Patient Position: Sitting, Cuff Size: Standard)   Pulse 78   Temp 97.5 °F (36.4 °C) (Tympanic)   Resp 17   Ht 5' 3\" (1.6 m)   Wt 65.8 kg (145 lb) Comment: with shoes on  SpO2 99%   BMI 25.69 kg/m²     Physical Exam  Constitutional:       Appearance: Normal appearance.   HENT:      Head: Normocephalic.      Right Ear: Tympanic membrane and external ear normal.      Left Ear: Tympanic membrane and external ear normal.      Mouth/Throat:      Mouth: Mucous membranes are moist.   Eyes:      Conjunctiva/sclera: Conjunctivae normal.      Pupils: Pupils are equal, round, and reactive to light.   Cardiovascular:      Rate and Rhythm: Normal rate and regular rhythm.      Heart sounds: No murmur heard.  Pulmonary:      Effort: Pulmonary effort is normal.      Breath sounds: Normal breath sounds.   Abdominal:      General: Bowel sounds are normal.      Palpations: Abdomen is soft.   Neurological:      Mental Status: She is alert and oriented to person, place, and time.      Coordination: Coordination normal.      Gait: Gait normal.   Psychiatric:         Mood and Affect: Mood normal.         Behavior: Behavior normal.          Shelley Poe DO  [facility] PRIMARY CARE [facility]"

## 2024-04-17 ENCOUNTER — TELEPHONE (OUTPATIENT)
Age: 44
End: 2024-04-17

## 2024-04-17 NOTE — TELEPHONE ENCOUNTER
----- Message from Shelley Poe DO sent at 4/17/2024  8:06 AM EDT -----  Blood work looks good. Livers enzymes have decreased to nearly normal. Will continue to monitor

## 2024-04-22 ENCOUNTER — OFFICE VISIT (OUTPATIENT)
Dept: PSYCHIATRY | Facility: CLINIC | Age: 44
End: 2024-04-22
Payer: COMMERCIAL

## 2024-04-22 DIAGNOSIS — F41.9 ANXIETY DISORDER, UNSPECIFIED TYPE: Primary | ICD-10-CM

## 2024-04-22 DIAGNOSIS — G25.71 AKATHISIA: ICD-10-CM

## 2024-04-22 DIAGNOSIS — F39 UNSPECIFIED MOOD (AFFECTIVE) DISORDER (HCC): ICD-10-CM

## 2024-04-22 PROCEDURE — 99213 OFFICE O/P EST LOW 20 MIN: CPT

## 2024-04-22 RX ORDER — PROPRANOLOL HYDROCHLORIDE 10 MG/1
TABLET ORAL
Start: 2024-04-22

## 2024-04-22 RX ORDER — CLONAZEPAM 0.5 MG/1
1 TABLET ORAL 2 TIMES DAILY PRN
Qty: 90 TABLET | Refills: 0 | Status: SHIPPED | OUTPATIENT
Start: 2024-04-22 | End: 2024-05-22

## 2024-04-22 NOTE — BH TREATMENT PLAN
TREATMENT PLAN (Medication Management Only)        [facility]    Name and Date of Birth:  Melody Huynh 43 y.o. 1980  Date of Treatment Plan: April 22, 2024  Diagnosis/Diagnoses:    1. Anxiety disorder, unspecified type    2. Unspecified mood (affective) disorder (HCC)    3. Akathisia      Strengths/Personal Resources for Self-Care: supportive family, supportive friends, taking medications as prescribed, ability to communicate needs, ability to communicate well, ability to listen, ability to reason, ability to understand psychiatric illness, average or above intelligence, family ties, financial means, general fund of knowledge, good physical health, motivation for treatment, ability to negotiate basic needs, willingness to work on problems.  Area/Areas of need (in own words): anxiety symptoms, depressive symptoms, EPS related symptoms  1. Long Term Goal: continue improvement in anxiety, maintain improvement in depressive and EPS related symptoms  Target Date:6 months - 10/22/2024  Person/Persons responsible for completion of goal: Melody/debbie and provider  2.  Short Term Objective (s) - How will we reach this goal?:   A. Provider new recommended medication/dosage changes and/or continue medication(s): continue current medications as prescribed Prozac, Klonopin, Propranolol.  B. Attend medication management appointments regularly.  C. Attend psychotherapy regularly.  D. Spend time with supportive people.  E. Continue positive daily habits for movement, diet, and sleep.  Target Date:6 months - 10/22/2024  Person/Persons Responsible for Completion of Goal: Melody/self  Progress Towards Goals: continuing treatment, improving  Treatment Modality: medication management every 8 weeks, continue psychotherapy with own therapist  Review due 180 days from date of this plan: 6 months - 10/22/2024  Expected length of service: ongoing treatment    My Physician and I  have developed this plan together and I agree to work on the goals and objectives. I understand the treatment goals that were developed for my treatment.  Deysi Cohen, DO  Psychiatry Resident, PGY-III

## 2024-04-22 NOTE — PATIENT INSTRUCTIONS
Continue Prozac 30 mg daily  Continue Klonopin 0.5 - 1 mg twice daily as needed     Please call the office nursing staff for medication issues including refills, problems obtaining medications, side effects, etc at [phone].   Please return for a follow up appointment as discussed. If you are running late or are unable to attend your appointment, please call our [place] office at [phone].    If you are in psychological crisis including not limited to suicidal/homicidal thoughts or thoughts of self-injury, do not hesitate to call/contact your [facility] Crisis hotline (see below), call [phone], call [phone] (mental health crisis), or go to the nearest emergency department.  [facility] Crisis: [phone]  [facility] Crisis: [phone]  [facility] Crisis: [phone]  [facility] Crisis: [phone]  [facility] Crisis: [phone]  [facility] Crisis: [phone]  [facility] Crisis: [phone]  [place] Suicide Prevention Hotline: 1-252.104.4680

## 2024-04-22 NOTE — PSYCH
Psychiatric Follow Up Visit - Behavioral Health       [facility]  MRN: [id number]    Assessment/Plan:   Melody Huynh is a 42 y.o. 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 Patient presents individually today.  For review, on initial evaluation, patient presented with symptoms consistent with EPS and akathisia related adverse effects of psychotropic medications. Possible offending agents were previously discontinued (Vraylar, Zyprexa) with adjustments to Prozac and Klonopin dosing.  Today, the patient endorses continued improvement in anxiety and depression related symptoms.  Patient endorses intermittent increased rate of thoughts and negative symptoms, notably in the morning.  Patient denies recurrence of down, depressed mood, changes to energy, changes to sleep, or changes to appetite.  Patient endorses EPS related/akathisia related symptoms have continued to improve, on exam the symptoms appear to have resolved.  Discussed with patient recommendation to continue psychiatric medications at current doses and to continue with psychotherapy.  Patient in agreement with plan.    Diagnoses:  Anxiety disorder, unspecified, differential includes Panic disorder, Generalized anxiety disorder, Obsessive-compulsive  disorder  Mood disorder, unspecified, differential includes Major depressive disorder, Adjustment disorder with anxious distress  Akathisia and related symptoms - resolved    Treatment Recommendations/Precautions:  Continue Prozac 30 mg daily for mood and anxiety  PARQ completed including serotonin syndrome, SIADH, worsening depression, suicidality, induction of ghada, GI upset, headaches, activation, sexual side effects, sedation, potential drug interactions, and others.  Continue Klonopin 1 mg twice daily as needed for anxiety  PDMP reviewed  Discussed with patient the risks of sedation, respiratory depression, impairment in judgment and motor function. Depression, mood changes, GI changes, and others discussed. Patient informed of risks of being on or starting opioid medications due to drug interactions and potential for serious respiratory depression and death.   Continue Propranolol 5 mg BID for akathisia related symptoms, may be augmenting for anxiety  PARQ completed including dizziness, sedation, headache, blood pressure, depression  Patient tapered down from 20 mg BID from last evaluation  Most recent lab work previously reviewed.  Continue psychotherapy with own therapist weekly.  Medication management every 8 weeks  Aware of need to follow up with family physician for medical issues  Aware of 24 hour and weekend coverage for urgent situations accessed by calling [facility] main practice number  Monitor CBC w/diff and CMP in setting of SSRI prescription.    Medication Risks/Benefits      Risks, Benefits And Possible Side Effects Of Medications:    Risks, benefits, and possible side effects of medications explained to Melody and she verbalizes understanding and agreement for treatment.    Controlled Medication Discussion:     Melody has been filling controlled prescriptions on time as prescribed according to [place] Prescription Drug Monitoring Program  Discussed with Melody  "the risks of sedation, respiratory depression, impairment of ability to drive and potential for abuse and addiction related to treatment with benzodiazepine medications. She understands risk of treatment with benzodiazepine medications, agrees to not drive if feels impaired and agrees to take medications as prescribed  Discussed with Melody Black Box warning on concurrent use of benzodiazepines and opioid medications including sedation, respiratory depression, coma and death. She understands the risk of treatment with benzodiazepines in addition to opioids and wants to continue taking those medications  Discussed with Melody increased risk of impairment related to concurrent use of benzodiazepines and hypnotic medications including excessive sedation, psychomotor impairment and respiratory depression. She understands the risk of treatment with benzodiazepines in addition to hypnotic medications and wants to continue taking those medications  ------------------------------------  History of Present Illness   Melody Huynh is presenting to follow up for anxiety, depression, and akathisia and EPS related symptoms . Melody reports her mood has been, \"good.\"  Patient denies a down, depressed mood and she endorses continued improvement of depression and anxiety related symptoms.  She endorses motivation has been improving, she is going back to the gym each weekday and has increased socialization with friends.  The patient is spending more time with family and feeling good about helping to care for daughter.   The patient endorses vivid dreams continue, denies the dreams are distressing in nature and denies nightmares.  Patient notes energy levels can be, \" okay,\" and lower on some days in setting of increased anxiety during the morning.  Patient endorses anxiety causes inner tension and restlessness.  It is accompanied with physical symptoms of anxiety. This has worsened over the last 2 weeks.  Discussed possible " triggers with patient, patient notes she was denied for continued long-term disability in and around this timeframe, and this may be the cause for increase in negative thinking, inner tension, and other anxiety related symptoms in the mornings.  The patient denies feelings of hopelessness or helplessness.  The patient denies SI, passive death wishes, thoughts to harm self or others, or HI at time of evaluation.    The patient denies and does not demonstrate symptoms consistent with ghada at time of evaluation, including decreased need for sleep, increased energy, elevated mood or irritability, rapid speech rate of thoughts, and she does not make grandiose statements or demonstrate impulsive behaviors at time of evaluation.  The patient does not endorse or demonstrate symptoms consistent with negative or positive symptoms of psychosis at time of evaluation.    The patient has been adherent with psychiatric medications, taking Prozac daily as prescribed, propranolol twice daily as prescribed, and requiring on average 1 tablet of Klonopin daily PRN for anxiety.  Discussed continuation of psychiatric medication at current doses, patient is agreeable to plan.    Psychiatric Review Of Systems:  Melody reports Symptoms as described in HPI.  Melody denies Current suicidal thoughts, plan, or intent, Current thoughts of self-harm, or Current homicidal thoughts, plan, or intent.    Medical Review Of Systems:  Complete review of systems is negative except as noted above.    [place] Akathisia Rating Scale (BARS) -  Prior score on 10/23/23: 8 and Global Clinical Assessment score:3, 11/14/23: 7 and Global Clinical Assessment score of 2  12/04/23: Global Clinical Assessment score: 3 due to distress to patient  02/29/24: 0 and Global Clinical Assessment score of 0  04/22/24: 0 and Global Clinical Assessment score of 0  Objective   0 Normal, occasional fidgety movements of the limbs  1 Presence of characteristic restless  movements: shuffling or tramping movements of the legs/feet, or swinging of one leg while sitting, and/or rocking from foot to foot or “walking on the spot” when standing, but movements present for less than half the time observed  2 Observed phenomena, as described in (1) above, which are present for at least half the observation period  3 Patient is constantly engaged in characteristic restless movements, and/or has the inability to remain seated or standing without walking or pacing, during the time observed  Subjective  0 Absence of inner restlessness  1 Non-specific sense of inner restlessness  2 The patient is aware of an inability to keep the legs still, or a desire to move the legs, and/or complains of inner restlessness aggravated specifically by being required to stand still  3 Awareness of intense compulsion to move most of the time and/or reports strong desire to walk or pace most of the time  Distress related to restlessness  0 No distress  1 Mild  2 Moderate  3 Severe  Global Clinical Assessment of Akathisia  0 Absent. No evidence of awareness of restlessness. Observation of characteristic movements of akathisia in the absence of a subjective report of inner restlessness or compulsive desire to move the legs should be classified as pseudoakathisia  1 Questionable. Non-specific inner tension and fidgety movements  2 Mild akathisia. Awareness of restlessness in the legs and/or inner restlessness worse when required to stand still. Fidgety movements present, but characteristic restless movements of akathisia not necessarily observed. Condition causes little or no distress.  3 Moderate akathisia. Awareness of restlessness as described for mild akathisia above, combined with characteristic restless movements such as rocking from foot to foot when standing. Patient finds the condition distressing.  4 Marked akathisia. Subjective experience of restlessness includes a compulsive desire to walk or pace.  However, the patient is able to remain seated for at least five minutes. The condition is obviously distressing.  5 Severe akathisia. The patient reports a strong compulsion to pace up and down most of the time. Unable to sit or lie down for more than a few minutes. Constant restlessness which is associated with intense distress and insomnia.  ------------------------------------  All italicized information has been copied from previous psychiatric evaluation. Information has been reviewed with the patient.     Past Psychiatric History:   Prior psychiatric diagnoses: anxiety, panic disorder, mood disorder, OCD  Inpatient hospitalizations: inpatient hospitalizations - first hospitalization 2001 - [facility], 2008 - [facility] [place], 2021 - panic disorder, mood symptoms  Emergency room visits often in-between for severe panic attacks - 2016  Patient and  report no psychiatric care   Suicide attempts/self-harm: patient denies, patient denies self harm behaviors  Violent/aggressive behavior: patient denies   Patient did have some aggressive outbursts towards  and providers  Outpatient psychiatric providers: currently has outpatient psychiatric provider - [facility]   Was without a psychiatrist from [phone] (Lexapro from [facility] during that time), prior psychiatrist, Dr. Martines, retired  Past/current psychotherapy: patient has a therapist weekly, Dr. Foster, [facility]  Other Services: patient denies  Psychiatric medication trial:   Multiple medication trials - including below,  Antidepressants  Prozac (up to 40 mg), Zoloft (short period of time), Lexapro (multiple years), Luvox  Antipsychotics  Vraylar, Zyprexa, Seroquel, Haldol (in ED)  Sedative hypnotics  Ativan, Klonopin (0.5 mg QID PRN)  Others  Hydroxyzine, Gabapentin, BuSpar (60 mg total daily)      Substance Abuse History:  I have assessed this patient for substance use within the past 12  months.  Patient reports a few cigarettes per day, quit 2007/2008. Patient reports history of daily marijuana use - had medical marijuana card - few months without smoking.   Denies history of other recreational use including opioids, methamphetamines/amphetamines, cocaine use. Denies past legal actions or arrests secondary to substance intoxication. The patient denies prior DWIs/DUIs. Melody does not exhibit objective evidence of substance withdrawal during today's examination nor does Melody appear under the influence of any psychoactive substance.       Family Psychiatric History:   Mother: anxiety (on antidepressant - Lexapro)  No family history of substance use disorder or suicide attempts or completions.    Social History  Early life/developmental: Denies a history of milestone/developmental delay. Denies a history of in-utero exposure to toxins/illicit substances. Denies ADHD history. No history of IEP/504, denies special supports in school.  Marital history: /Civil Union   Children: yes, 1 daughter (7 y/o - 2nd grade)  1 older brother   Living arrangement: Lives in a home with  and daughter (family lost their dog in May).  Support system: identifies  as the biggest source of support  Mother also a big support  Education: college graduate - communications ([employer])  Had to withdraw from college twice due to anxiety  Occupational History: unemployed since January 2023, applying for permanent disability - previously in advertising in 10-15 years  Other Pertinent History: no reported  or legal history  Access to firearms:  has firearm, locked and secured      Traumatic History:   Abuse:  patient denied  Other Traumatic Events:  medical trauma, father passed when she was 16 y/o    History Review: The following portions of the patient's history were reviewed and updated as appropriate: allergies, current medications, past family history, past medical history, past social  history, past surgical history, and problem list.    Visit Vitals  OB Status Implant   Smoking Status Never      Wt Readings from Last 6 Encounters:   24 65.8 kg (145 lb)   23 59.3 kg (130 lb 11.2 oz)   10/11/23 57.8 kg (127 lb 6.4 oz)   10/06/23 58.7 kg (129 lb 8 oz)   23 60.1 kg (132 lb 8 oz)   23 61.7 kg (136 lb)        Rating Scales  PHQ-2/9 Depression Screening    Little interest or pleasure in doing things: 0 - not at all  Feeling down, depressed, or hopeless: 0 - not at all  Trouble falling or staying asleep, or sleeping too much: 0 - not at all  Feeling tired or having little energy: 0 - not at all  Poor appetite or overeatin - not at all  Feeling bad about yourself - or that you are a failure or have let yourself or your family down: 0 - not at all  Trouble concentrating on things, such as reading the newspaper or watching television: 0 - not at all  Moving or speaking so slowly that other people could have noticed. Or the opposite - being so fidgety or restless that you have been moving around a lot more than usual: 0 - not at all  Thoughts that you would be better off dead, or of hurting yourself in some way: 0 - not at all  PHQ-9 Score: 0  PHQ-9 Interpretation: No or Minimal depression         ELOY-7 Flowsheet Screening      Flowsheet Row Most Recent Value   Over the last 2 weeks, how often have you been bothered by any of the following problems?    Feeling nervous, anxious, or on edge 1   Not being able to stop or control worrying 0   Worrying too much about different things 0   Trouble relaxing 0   Being so restless that it is hard to sit still 0   Becoming easily annoyed or irritable 0   Feeling afraid as if something awful might happen 0   ELOY-7 Total Score 1            Mental Status Exam:  Appearance:  alert, good eye contact, appears stated age, casually dressed, and appropriate grooming and hygiene   Behavior:  calm, cooperative, and sitting comfortably   Motor: no  "abnormal movements and normal gait and balance, no hypomimia appreciated, no tremors appreciated, arm swing and gait appear grossly intact   Speech:  spontaneous, clear, normal rate, not pressured, normal volume, not hyperverbal, and coherent   Mood:  \"good\"   Affect:  mood-congruent, appropriate range, and brighter than previous   Thought Process:  Organized, logical, goal-directed, intermittent increased rate of thoughts   Thought Content: no verbalized delusions or overt paranoia, intermittent negative thinking   Perceptual disturbances: no reported hallucinations and does not appear to be responding to internal stimuli at this time   Risk Potential: No active or passive suicidal or homicidal ideation was verbalized during interview   Cognition: oriented to person, place, time, and situation, memory grossly intact, appears to be of average intelligence, and age-appropriate attention span and concentration   Insight:  Good   Judgment: Good       Meds/Allergies    Allergies   Allergen Reactions    Demerol [Meperidine] Hyperactivity    Macrolides And Ketolides      Current Outpatient Medications   Medication Instructions    clonazePAM (KLONOPIN) 1 mg, Oral, 2 times daily PRN    FLUoxetine (PROZAC) 10 mg, Oral, Daily, Take with 20 mg capsule for total of 30 mg daily    FLUoxetine (PROZAC) 20 mg, Oral, Daily, Take with 10 mg capsule for total of 30 mg daily    propranolol (INDERAL) 10 mg tablet Take 0.5 tablet (5 mg total) by mouth 2 (two) times a day        Labs & Imaging:  I have personally reviewed all pertinent laboratory tests and imaging results.   Appointment on 04/16/2024   Component Date Value Ref Range Status    Sodium 04/16/2024 137  135 - 147 mmol/L Final    Potassium 04/16/2024 4.3  3.5 - 5.3 mmol/L Final    Chloride 04/16/2024 104  96 - 108 mmol/L Final    CO2 04/16/2024 26  21 - 32 mmol/L Final    ANION GAP 04/16/2024 7  4 - 13 mmol/L Final    BUN 04/16/2024 14  5 - 25 mg/dL Final    Creatinine " 04/16/2024 0.82  0.60 - 1.30 mg/dL Final    Standardized to IDMS reference method    Glucose, Fasting 04/16/2024 75  65 - 99 mg/dL Final    Calcium 04/16/2024 9.3  8.4 - 10.2 mg/dL Final    AST 04/16/2024 17  13 - 39 U/L Final    ALT 04/16/2024 17  7 - 52 U/L Final    Specimen collection should occur prior to Sulfasalazine administration due to the potential for falsely depressed results.     Alkaline Phosphatase 04/16/2024 54  34 - 104 U/L Final    Total Protein 04/16/2024 7.1  6.4 - 8.4 g/dL Final    Albumin 04/16/2024 4.5  3.5 - 5.0 g/dL Final    Total Bilirubin 04/16/2024 1.40 (H)  0.20 - 1.00 mg/dL Final    Use of this assay is not recommended for patients undergoing treatment with eltrombopag due to the potential for falsely elevated results.  N-acetyl-p-benzoquinone imine (metabolite of Acetaminophen) will generate erroneously low results in samples for patients that have taken an overdose of Acetaminophen.    eGFR 04/16/2024 88  ml/min/1.73sq m Final     ---------------------------------------    Although patient's acute lethality risk is low, long-term/chronic lethality risk is mildly elevated in the presence of anxiety, history of depression, history of traumatic experiences, history of substance use, access to firearms in home, and recent EPS/movement related symptoms. At the current moment, Melody is future-oriented, forward-thinking, and demonstrates ability to act in a self-preserving manner as evidenced by volitionally presenting to the clinic today, seeking treatment. At this juncture, inpatient hospitalization is not currently warranted. To mitigate future risk, patient should adhere to the recommendations of this writer, avoid alcohol/illicit substance use, utilize community-based resources and familiar support and prioritize mental health treatment.     Based on today's assessment and clinical criteria, Melody Huynh contracts for safety and is not an imminent risk of harm to self or others.  Outpatient level of care is deemed appropriate at this present time. Melody understands that if they are no longer able to contract for safety, they need to call/contact the outpatient office including this writer, call/contact crisis and/orattend to the nearest Emergency Department for immediate evaluation.    Risk of Harm to Self:  The following ratings are based on assessment at the time of the interview  Demographic risk factors include:   Historical Risk Factors include: history of depression, chronic anxiety symptoms, history of mood disorder, history of substance use, history of traumatic experiences  Recent Specific Risk Factors include: diagnosis of mood disorder, current depressive symptoms, current anxiety symptoms, feelings of guilt or self blame, hopelessness, unemployed, medication adverse effects  Protective Factors: no current suicidal ideation, access to mental health treatment, being a parent, being , compliant with medications, compliant with mental health treatment, connection to own children, having a desire to be alive, having a sense of purpose or meaning in life, opportunities to participate in community, responsibilities and duties to others, stable living environment, supportive family  Weapons: gun. The following steps have been taken to ensure weapons are properly secured: locked, secured, confirmed with   Based on today's assessment, Melody presents the following risk of harm to self: low     Risk of Harm to Others:  The following ratings are based on assessment at the time of the interview  Demographic Risk Factors include: unemployed.  Historical Risk Factors include: history of substance use.  Recent Specific Risk Factors include: weapons or other means available, concomitant mood disorder, multiple stressors.  Protective Factors: no current homicidal ideation, access to mental health treatment, being a parent, being , compliant with medications,  compliant with mental health treatment, opportunities to participate in community, resilience, responsibilities and duties to others, safe and stable living environment, supportive family  Weapons: gun. The following steps have been taken to ensure weapons are properly secured: locked, secured, confirmed by   Based on today's assessment, Melody presents the following risk of harm to others: low    Psychotherapy Provided:     Individual psychotherapy provided: Yes  Counseling was provided during the session today for 15 minutes.  Medications, treatment progress and treatment plan reviewed with Melody.    Treatment Plan:    Completed and signed during the session: Yes - with Melody, treatment plan sent for electronic signature in [employer].    This note was shared with patient.    Visit Time:  Start Time: 1545  Stop Time: 1630  Total Visit Duration:  45 minutes    Deysi Cohen DO  Psychiatry Resident, PGY-III    This note has been constructed using a voice recognition system. There may be translation, syntax, or grammatical errors. If you have any questions, please contact the dictating provider.

## 2024-05-30 DIAGNOSIS — F39 UNSPECIFIED MOOD (AFFECTIVE) DISORDER (HCC): ICD-10-CM

## 2024-05-30 DIAGNOSIS — F41.9 ANXIETY DISORDER, UNSPECIFIED TYPE: ICD-10-CM

## 2024-05-30 RX ORDER — FLUOXETINE HYDROCHLORIDE 20 MG/1
20 CAPSULE ORAL DAILY
Qty: 90 CAPSULE | Refills: 0 | Status: SHIPPED | OUTPATIENT
Start: 2024-05-30 | End: 2024-08-28

## 2024-05-30 RX ORDER — FLUOXETINE 10 MG/1
10 CAPSULE ORAL DAILY
Qty: 90 CAPSULE | Refills: 0 | Status: SHIPPED | OUTPATIENT
Start: 2024-05-30 | End: 2024-08-28

## 2024-06-17 ENCOUNTER — TELEPHONE (OUTPATIENT)
Dept: NEUROLOGY | Facility: CLINIC | Age: 44
End: 2024-06-17

## 2024-06-17 DIAGNOSIS — F41.9 ANXIETY DISORDER, UNSPECIFIED TYPE: ICD-10-CM

## 2024-06-17 RX ORDER — CLONAZEPAM 0.5 MG/1
1 TABLET ORAL 2 TIMES DAILY PRN
Qty: 90 TABLET | Refills: 0 | Status: SHIPPED | OUTPATIENT
Start: 2024-06-17 | End: 2024-07-17

## 2024-06-17 NOTE — TELEPHONE ENCOUNTER
Patient called in to cancel their appt for 6/18/24 at 10 am with Dr. Anoop Gonzalez office. I offered to reschedule her appt. She said that she will call back to reschedule appt.

## 2024-06-17 NOTE — TELEPHONE ENCOUNTER
Refill request received and reviewed. PDMP reviewed, last filled 04/22/24, no discrepancies or concerns noted. Prescription sent to preferred pharmacy.  Patient has follow up scheduled for 06/25/24.    Thank you,  Deysi Cohen, DO

## 2024-06-21 ENCOUNTER — TELEPHONE (OUTPATIENT)
Dept: PSYCHIATRY | Facility: CLINIC | Age: 44
End: 2024-06-21

## 2024-06-21 NOTE — TELEPHONE ENCOUNTER
Patient called office requesting to change appointment on Tuesday 6/25/24 at 1:45pm. Writer stated MR is currently out of the office today and will return back on Monday 6/24/24 and return her call to r/s. Patient wanted to keep appointment on 6/25/24 at 1:45pm until changed by

## 2024-08-05 DIAGNOSIS — F41.9 ANXIETY DISORDER, UNSPECIFIED TYPE: ICD-10-CM

## 2024-08-05 NOTE — TELEPHONE ENCOUNTER
Patient requesting a refill on :    Reason for call:   [x] Refill   [] Prior Auth  [] Other:     Office:   [] PCP/Provider -   [x] Specialty/Provider - Psychiatry    Medication: clonazePAM (KlonoPIN)     Dose/Frequency:  0.5 mg tablet  Take 2 tablets (1 mg total) by mouth 2 (two) times a day as needed for anxiety,     Quantity: 90 tablet    Pharmacy: [address] [facility] [facility] [phone]    Does the patient have enough for 3 days?   [x] Yes   [] No - Send as HP to POD

## 2024-08-06 RX ORDER — CLONAZEPAM 0.5 MG/1
1 TABLET ORAL 2 TIMES DAILY PRN
Qty: 90 TABLET | Refills: 0 | Status: SHIPPED | OUTPATIENT
Start: 2024-08-06 | End: 2024-09-05

## 2024-08-06 NOTE — TELEPHONE ENCOUNTER
Currently covering for Dr. Cohen while she is out of office    Notified patient requesting the following refill:  Requested Prescriptions     Pending Prescriptions Disp Refills    clonazePAM (KlonoPIN) 0.5 mg tablet 90 tablet 0     Sig: Take 2 tablets (1 mg total) by mouth 2 (two) times a day as needed for anxiety       Melody has been filling controlled prescriptions on time as prescribed according to [place] Prescription Drug Monitoring Program

## 2024-08-24 DIAGNOSIS — F41.9 ANXIETY DISORDER, UNSPECIFIED TYPE: ICD-10-CM

## 2024-08-27 DIAGNOSIS — F41.9 ANXIETY DISORDER, UNSPECIFIED TYPE: ICD-10-CM

## 2024-08-27 DIAGNOSIS — F39 UNSPECIFIED MOOD (AFFECTIVE) DISORDER (HCC): ICD-10-CM

## 2024-08-27 RX ORDER — FLUOXETINE 10 MG/1
10 CAPSULE ORAL DAILY
Qty: 90 CAPSULE | Refills: 0 | Status: SHIPPED | OUTPATIENT
Start: 2024-08-27 | End: 2024-11-25

## 2024-09-19 DIAGNOSIS — F41.9 ANXIETY DISORDER, UNSPECIFIED TYPE: ICD-10-CM

## 2024-09-19 RX ORDER — CLONAZEPAM 0.5 MG/1
1 TABLET ORAL 2 TIMES DAILY PRN
Qty: 90 TABLET | Refills: 0 | Status: ON HOLD | OUTPATIENT
Start: 2024-09-19 | End: 2024-10-19

## 2024-09-19 NOTE — TELEPHONE ENCOUNTER
Currently covering for Dr. Cohen while out of office.    Notified patient requesting the following refill:  Requested Prescriptions     Signed Prescriptions Disp Refills    clonazePAM (KlonoPIN) 0.5 mg tablet 90 tablet 0     Sig: Take 2 tablets (1 mg total) by mouth 2 (two) times a day as needed for anxiety     Authorizing Provider: KAITLYN DE LEON has been filling controlled prescriptions on time as prescribed according to [place] Prescription Drug Monitoring Program    Refill request approved, sent 90 tablets to pharmacy on file

## 2024-09-19 NOTE — TELEPHONE ENCOUNTER
Patient stated she does not have enough medication until she has her next appointment on the 25th.    Reason for call:   [x] Refill   [] Prior Auth  [] Other:     Office:   [x] PCP/Provider -   [] Specialty/Provider -     Medication: clonazePAM (KlonoPIN) 0.5 mg tablet    Dose/Frequency: Take 2 tablets (1 mg total) by mouth 2 (two) times a day as needed for anxiety     Quantity: 90 tablet     Pharmacy: [address] [facility] - [address]     Does the patient have enough for 3 days?   [] Yes   [x] No - Send as HP to POD

## 2024-09-19 NOTE — TELEPHONE ENCOUNTER
Medication:  PDMP 08/06/2024 08/06/2024 clonazePAM (Tablet) 90.0 22 0.5 MG NA KAITLYN DE LEON [facility] Medicaid 0 / 0 [place]   1 [id number] 06/17/2024 06/17/2024 clonazePAM (Tablet) 90.0 22 0.5 MG NA CLAUDIA CARCAMO [facility] [place] 0 / 0 [place]   1 [id number] 04/22/2024 04/22/2024 clonazePAM (Tablet) 90.0 22 0.5 MG NA CLAUDIA CARCAMO [facility]  Active agreement on file -

## 2024-09-22 ENCOUNTER — HOSPITAL ENCOUNTER (EMERGENCY)
Facility: HOSPITAL | Age: 44
Discharge: HOME | End: 2024-09-22
Attending: EMERGENCY MEDICINE
Payer: COMMERCIAL

## 2024-09-22 ENCOUNTER — APPOINTMENT (EMERGENCY)
Dept: CT IMAGING | Facility: HOSPITAL | Age: 44
End: 2024-09-22
Payer: COMMERCIAL

## 2024-09-22 VITALS
RESPIRATION RATE: 18 BRPM | TEMPERATURE: 98.7 F | OXYGEN SATURATION: 98 % | DIASTOLIC BLOOD PRESSURE: 81 MMHG | HEART RATE: 92 BPM | SYSTOLIC BLOOD PRESSURE: 162 MMHG

## 2024-09-22 DIAGNOSIS — F41.9 ANXIETY: ICD-10-CM

## 2024-09-22 DIAGNOSIS — R10.9 ABDOMINAL PAIN: Primary | ICD-10-CM

## 2024-09-22 DIAGNOSIS — R11.0 NAUSEA: ICD-10-CM

## 2024-09-22 LAB
ALBUMIN SERPL BCG-MCNC: 4.9 G/DL (ref 3.5–5)
ALP SERPL-CCNC: 61 U/L (ref 34–104)
ALT SERPL W P-5'-P-CCNC: 19 U/L (ref 7–52)
ANION GAP SERPL CALCULATED.3IONS-SCNC: 19 MMOL/L (ref 4–13)
AST SERPL W P-5'-P-CCNC: 17 U/L (ref 13–39)
ATRIAL RATE: 75 BPM
BASOPHILS # BLD AUTO: 0.05 THOUSANDS/ΜL (ref 0–0.1)
BASOPHILS NFR BLD AUTO: 0 % (ref 0–1)
BILIRUB SERPL-MCNC: 2.7 MG/DL (ref 0.2–1)
BUN SERPL-MCNC: 22 MG/DL (ref 5–25)
CALCIUM SERPL-MCNC: 12.3 MG/DL (ref 8.4–10.2)
CHLORIDE SERPL-SCNC: 105 MMOL/L (ref 96–108)
CO2 SERPL-SCNC: 17 MMOL/L (ref 21–32)
CREAT SERPL-MCNC: 1.23 MG/DL (ref 0.6–1.3)
EOSINOPHIL # BLD AUTO: 0.06 THOUSAND/ΜL (ref 0–0.61)
EOSINOPHIL NFR BLD AUTO: 1 % (ref 0–6)
ERYTHROCYTE [DISTWIDTH] IN BLOOD BY AUTOMATED COUNT: 11.9 % (ref 11.6–15.1)
GFR SERPL CREATININE-BSD FRML MDRD: 54 ML/MIN/1.73SQ M
GLUCOSE SERPL-MCNC: 177 MG/DL (ref 65–140)
HCG SERPL QL: NEGATIVE
HCT VFR BLD AUTO: 43.3 % (ref 34.8–46.1)
HGB BLD-MCNC: 15.2 G/DL (ref 11.5–15.4)
IMM GRANULOCYTES # BLD AUTO: 0.04 THOUSAND/UL (ref 0–0.2)
IMM GRANULOCYTES NFR BLD AUTO: 0 % (ref 0–2)
LIPASE SERPL-CCNC: 21 U/L (ref 11–82)
LYMPHOCYTES # BLD AUTO: 1.73 THOUSANDS/ΜL (ref 0.6–4.47)
LYMPHOCYTES NFR BLD AUTO: 15 % (ref 14–44)
MCH RBC QN AUTO: 29.9 PG (ref 26.8–34.3)
MCHC RBC AUTO-ENTMCNC: 35.1 G/DL (ref 31.4–37.4)
MCV RBC AUTO: 85 FL (ref 82–98)
MONOCYTES # BLD AUTO: 0.49 THOUSAND/ΜL (ref 0.17–1.22)
MONOCYTES NFR BLD AUTO: 4 % (ref 4–12)
NEUTROPHILS # BLD AUTO: 9.19 THOUSANDS/ΜL (ref 1.85–7.62)
NEUTS SEG NFR BLD AUTO: 80 % (ref 43–75)
NRBC BLD AUTO-RTO: 0 /100 WBCS
P AXIS: 78 DEGREES
PLATELET # BLD AUTO: 264 THOUSANDS/UL (ref 149–390)
PMV BLD AUTO: 9.5 FL (ref 8.9–12.7)
POTASSIUM SERPL-SCNC: 3.4 MMOL/L (ref 3.5–5.3)
PR INTERVAL: 132 MS
PROT SERPL-MCNC: 7.9 G/DL (ref 6.4–8.4)
QRS AXIS: 94 DEGREES
QRSD INTERVAL: 92 MS
QT INTERVAL: 438 MS
QTC INTERVAL: 489 MS
RBC # BLD AUTO: 5.08 MILLION/UL (ref 3.81–5.12)
SODIUM SERPL-SCNC: 141 MMOL/L (ref 135–147)
T WAVE AXIS: 84 DEGREES
VENTRICULAR RATE: 75 BPM
WBC # BLD AUTO: 11.56 THOUSAND/UL (ref 4.31–10.16)

## 2024-09-22 PROCEDURE — 93010 ELECTROCARDIOGRAM REPORT: CPT | Performed by: STUDENT IN AN ORGANIZED HEALTH CARE EDUCATION/TRAINING PROGRAM

## 2024-09-22 PROCEDURE — 96366 THER/PROPH/DIAG IV INF ADDON: CPT

## 2024-09-22 PROCEDURE — 96372 THER/PROPH/DIAG INJ SC/IM: CPT

## 2024-09-22 PROCEDURE — 93005 ELECTROCARDIOGRAM TRACING: CPT

## 2024-09-22 PROCEDURE — 96376 TX/PRO/DX INJ SAME DRUG ADON: CPT

## 2024-09-22 PROCEDURE — 99284 EMERGENCY DEPT VISIT MOD MDM: CPT

## 2024-09-22 PROCEDURE — 96361 HYDRATE IV INFUSION ADD-ON: CPT

## 2024-09-22 PROCEDURE — 83690 ASSAY OF LIPASE: CPT | Performed by: EMERGENCY MEDICINE

## 2024-09-22 PROCEDURE — 74177 CT ABD & PELVIS W/CONTRAST: CPT

## 2024-09-22 PROCEDURE — 80053 COMPREHEN METABOLIC PANEL: CPT | Performed by: EMERGENCY MEDICINE

## 2024-09-22 PROCEDURE — 99284 EMERGENCY DEPT VISIT MOD MDM: CPT | Performed by: EMERGENCY MEDICINE

## 2024-09-22 PROCEDURE — 96365 THER/PROPH/DIAG IV INF INIT: CPT

## 2024-09-22 PROCEDURE — 85025 COMPLETE CBC W/AUTO DIFF WBC: CPT | Performed by: EMERGENCY MEDICINE

## 2024-09-22 PROCEDURE — 36415 COLL VENOUS BLD VENIPUNCTURE: CPT | Performed by: EMERGENCY MEDICINE

## 2024-09-22 PROCEDURE — 96375 TX/PRO/DX INJ NEW DRUG ADDON: CPT

## 2024-09-22 PROCEDURE — 84703 CHORIONIC GONADOTROPIN ASSAY: CPT | Performed by: EMERGENCY MEDICINE

## 2024-09-22 RX ORDER — ONDANSETRON 2 MG/ML
4 INJECTION INTRAMUSCULAR; INTRAVENOUS ONCE
Status: COMPLETED | OUTPATIENT
Start: 2024-09-22 | End: 2024-09-22

## 2024-09-22 RX ORDER — ACETAMINOPHEN 10 MG/ML
1000 INJECTION, SOLUTION INTRAVENOUS ONCE
Status: COMPLETED | OUTPATIENT
Start: 2024-09-22 | End: 2024-09-22

## 2024-09-22 RX ORDER — PROMETHAZINE HYDROCHLORIDE 25 MG/ML
25 INJECTION, SOLUTION INTRAMUSCULAR; INTRAVENOUS ONCE
Status: COMPLETED | OUTPATIENT
Start: 2024-09-22 | End: 2024-09-22

## 2024-09-22 RX ORDER — LORAZEPAM 2 MG/ML
1 INJECTION INTRAMUSCULAR ONCE
Status: COMPLETED | OUTPATIENT
Start: 2024-09-22 | End: 2024-09-22

## 2024-09-22 RX ORDER — ONDANSETRON 4 MG/1
4 TABLET, ORALLY DISINTEGRATING ORAL EVERY 8 HOURS PRN
Qty: 20 TABLET | Refills: 0 | Status: ON HOLD | OUTPATIENT
Start: 2024-09-22

## 2024-09-22 RX ORDER — HALOPERIDOL 5 MG/ML
5 INJECTION INTRAMUSCULAR ONCE
Status: COMPLETED | OUTPATIENT
Start: 2024-09-22 | End: 2024-09-22

## 2024-09-22 RX ORDER — DROPERIDOL 2.5 MG/ML
1.25 INJECTION, SOLUTION INTRAMUSCULAR; INTRAVENOUS ONCE
Status: COMPLETED | OUTPATIENT
Start: 2024-09-22 | End: 2024-09-22

## 2024-09-22 RX ORDER — LORAZEPAM 2 MG/ML
2 INJECTION INTRAMUSCULAR ONCE
Status: COMPLETED | OUTPATIENT
Start: 2024-09-22 | End: 2024-09-22

## 2024-09-22 RX ORDER — DICYCLOMINE HCL 20 MG
20 TABLET ORAL ONCE
Status: COMPLETED | OUTPATIENT
Start: 2024-09-22 | End: 2024-09-22

## 2024-09-22 RX ORDER — ONDANSETRON 4 MG/1
4 TABLET, ORALLY DISINTEGRATING ORAL ONCE
Status: COMPLETED | OUTPATIENT
Start: 2024-09-22 | End: 2024-09-22

## 2024-09-22 RX ADMIN — PROMETHAZINE HYDROCHLORIDE 25 MG: 25 INJECTION INTRAMUSCULAR; INTRAVENOUS at 20:37

## 2024-09-22 RX ADMIN — DICYCLOMINE HYDROCHLORIDE 20 MG: 20 TABLET ORAL at 16:23

## 2024-09-22 RX ADMIN — DROPERIDOL 1.25 MG: 2.5 INJECTION, SOLUTION INTRAMUSCULAR; INTRAVENOUS at 17:07

## 2024-09-22 RX ADMIN — HALOPERIDOL LACTATE 5 MG: 5 INJECTION, SOLUTION INTRAMUSCULAR at 16:23

## 2024-09-22 RX ADMIN — LORAZEPAM 1 MG: 2 INJECTION INTRAMUSCULAR; INTRAVENOUS at 18:53

## 2024-09-22 RX ADMIN — ACETAMINOPHEN 1000 MG: 10 INJECTION INTRAVENOUS at 18:53

## 2024-09-22 RX ADMIN — ONDANSETRON 4 MG: 4 TABLET, ORALLY DISINTEGRATING ORAL at 22:16

## 2024-09-22 RX ADMIN — LORAZEPAM 1 MG: 2 INJECTION INTRAMUSCULAR; INTRAVENOUS at 16:29

## 2024-09-22 RX ADMIN — IOHEXOL 100 ML: 350 INJECTION, SOLUTION INTRAVENOUS at 19:04

## 2024-09-22 RX ADMIN — ONDANSETRON 4 MG: 2 INJECTION INTRAMUSCULAR; INTRAVENOUS at 17:59

## 2024-09-22 RX ADMIN — LORAZEPAM 2 MG: 2 INJECTION INTRAMUSCULAR; INTRAVENOUS at 17:59

## 2024-09-22 RX ADMIN — SODIUM CHLORIDE 1000 ML: 0.9 INJECTION, SOLUTION INTRAVENOUS at 16:29

## 2024-09-22 NOTE — ED NOTES
"Pt c/o \"ongoing nausea and anxiety.\" Provider made aware.      Kari Jansen RN  09/22/24 1753       Kari Jansen RN  09/22/24 1753    "

## 2024-09-22 NOTE — ED PROVIDER NOTES
1. Abdominal pain    2. Anxiety    3. Nausea      ED Disposition       ED Disposition   Discharge    Condition   Stable    Date/Time   Sun Sep 22, 2024 10:13 PM    Comment   Melody Huynh discharge to home/self care.                   Assessment & Plan       Medical Decision Making  Amount and/or Complexity of Data Reviewed  Labs: ordered.  Radiology: ordered.    Risk  Prescription drug management.         Patient with anxiety attack in addition to abdominal pain.  CT scan negative for acute process.  Symptoms resolved after multiple rounds of treatment, patient does not want to speak with crisis for her anxiety.            Medications   dicyclomine (BENTYL) tablet 20 mg (20 mg Oral Given 9/22/24 1623)   LORazepam (ATIVAN) injection 1 mg (1 mg Intravenous Given 9/22/24 1629)   haloperidol lactate (HALDOL) injection 5 mg (5 mg Intramuscular Given 9/22/24 1623)   sodium chloride 0.9 % bolus 1,000 mL (0 mL Intravenous Stopped 9/22/24 1729)   droperidol (INAPSINE) injection 1.25 mg (1.25 mg Intravenous Given 9/22/24 1707)   LORazepam (ATIVAN) injection 2 mg (2 mg Intravenous Given 9/22/24 1759)   ondansetron (ZOFRAN) injection 4 mg (4 mg Intravenous Given 9/22/24 1759)   LORazepam (ATIVAN) injection 1 mg (1 mg Intravenous Given 9/22/24 1853)   acetaminophen (Ofirmev) injection 1,000 mg (0 mg Intravenous Stopped 9/22/24 2048)   iohexol (OMNIPAQUE) 350 MG/ML injection (MULTI-DOSE) 100 mL (100 mL Intravenous Given 9/22/24 1904)   promethazine (PHENERGAN) injection 25 mg (25 mg Intramuscular Given 9/22/24 2037)   ondansetron (ZOFRAN-ODT) dispersible tablet 4 mg (4 mg Oral Given 9/22/24 2216)       History of Present Illness       HPI  43-year-old female past medical history of anxiety presents with panic attack in addition to abdominal pain for the last few days.  She has had nausea and loose stools.  She took Klonopin at home without improvement.  Denies HI/SI.  No fevers or chills.  Review of Systems   Constitutional:   Negative for chills and fever.   HENT:  Negative for dental problem and ear pain.    Eyes:  Negative for pain and redness.   Respiratory:  Negative for cough and shortness of breath.    Cardiovascular:  Negative for chest pain and palpitations.   Gastrointestinal:  Positive for abdominal pain, diarrhea and nausea.   Endocrine: Negative for polydipsia and polyphagia.   Genitourinary:  Negative for dysuria and frequency.   Musculoskeletal:  Negative for arthralgias and joint swelling.   Skin:  Negative for color change and rash.   Neurological:  Negative for dizziness and headaches.   Psychiatric/Behavioral:  Negative for behavioral problems and confusion. The patient is nervous/anxious.    All other systems reviewed and are negative.          Objective     ED Triage Vitals [09/22/24 1600]   Temperature Pulse Blood Pressure Respirations SpO2 Patient Position - Orthostatic VS   98.7 °F (37.1 °C) 60 118/92 22 100 % Sitting      Temp src Heart Rate Source BP Location FiO2 (%) Pain Score    -- Monitor Left arm -- --        Physical Exam  Vitals and nursing note reviewed.   Constitutional:       General: She is not in acute distress.     Appearance: She is well-developed. She is not diaphoretic.   HENT:      Head: Atraumatic.      Right Ear: External ear normal.      Left Ear: External ear normal.      Nose: Nose normal.   Eyes:      Conjunctiva/sclera: Conjunctivae normal.      Pupils: Pupils are equal, round, and reactive to light.   Neck:      Vascular: No JVD.   Cardiovascular:      Rate and Rhythm: Normal rate and regular rhythm.      Heart sounds: Normal heart sounds. No murmur heard.  Pulmonary:      Effort: Pulmonary effort is normal. No respiratory distress.      Breath sounds: Normal breath sounds. No wheezing.   Abdominal:      General: Bowel sounds are normal. There is no distension.      Palpations: Abdomen is soft.      Tenderness: There is no abdominal tenderness.   Musculoskeletal:         General: Normal  range of motion.      Cervical back: Normal range of motion and neck supple.   Skin:     General: Skin is warm and dry.      Capillary Refill: Capillary refill takes less than 2 seconds.   Neurological:      Mental Status: She is alert and oriented to person, place, and time.      Cranial Nerves: No cranial nerve deficit.   Psychiatric:         Behavior: Behavior normal.         Labs Reviewed   CBC AND DIFFERENTIAL - Abnormal       Result Value    WBC 11.56 (*)     RBC 5.08      Hemoglobin 15.2      Hematocrit 43.3      MCV 85      MCH 29.9      MCHC 35.1      RDW 11.9      MPV 9.5      Platelets 264      nRBC 0      Segmented % 80 (*)     Immature Grans % 0      Lymphocytes % 15      Monocytes % 4      Eosinophils Relative 1      Basophils Relative 0      Absolute Neutrophils 9.19 (*)     Absolute Immature Grans 0.04      Absolute Lymphocytes 1.73      Absolute Monocytes 0.49      Eosinophils Absolute 0.06      Basophils Absolute 0.05     COMPREHENSIVE METABOLIC PANEL - Abnormal    Sodium 141      Potassium 3.4 (*)     Chloride 105      CO2 17 (*)     ANION GAP 19 (*)     BUN 22      Creatinine 1.23      Glucose 177 (*)     Calcium 12.3 (*)     AST 17      ALT 19      Alkaline Phosphatase 61      Total Protein 7.9      Albumin 4.9      Total Bilirubin 2.70 (*)     eGFR 54      Narrative:     National Kid[employer] Disease Foundation guidelines for Chronic Kidney Disease (CKD):     Stage 1 with normal or high GFR (GFR > 90 mL/min/1.73 square meters)    Stage 2 Mild CKD (GFR = 60-89 mL/min/1.73 square meters)    Stage 3A Moderate CKD (GFR = 45-59 mL/min/1.73 square meters)    Stage 3B Moderate CKD (GFR = 30-44 mL/min/1.73 square meters)    Stage 4 Severe CKD (GFR = 15-29 mL/min/1.73 square meters)    Stage 5 End Stage CKD (GFR <15 mL/min/1.73 square meters)  Note: GFR calculation is accurate only with a steady state creatinine   LIPASE - Normal    Lipase 21     PREGNANCY TEST (HCG QUALITATIVE) - Normal    Preg, Serum  Negative       CT abdomen pelvis with contrast   Final Interpretation by Steve Guzman MD (09/22 1935)      No acute abnormality in the abdomen or pelvis.         Workstation performed: [id number]             Procedures    ED Medication and Procedure Management   Prior to Admission Medications   Prescriptions Last Dose Informant Patient Reported? Taking?   FLUoxetine (PROzac) 10 mg capsule   No No   Sig: TAKE 1 CAPSULE (10 MG TOTAL) BY MOUTH DAILY TAKE WITH 20 MG CAPSULE FOR TOTAL OF 30 MG DAILY   FLUoxetine (PROzac) 20 mg capsule   No No   Sig: TAKE 1 CAPSULE (20 MG TOTAL) BY MOUTH DAILY TAKE WITH 10 MG CAPSULE FOR TOTAL OF 30 MG DAILY   clonazePAM (KlonoPIN) 0.5 mg tablet   No No   Sig: Take 2 tablets (1 mg total) by mouth 2 (two) times a day as needed for anxiety   propranolol (INDERAL) 10 mg tablet   No No   Sig: Take 0.5 tablet (5 mg total) by mouth 2 (two) times a day      Facility-Administered Medications: None     Discharge Medication List as of 9/22/2024 10:15 PM        START taking these medications    Details   ondansetron (ZOFRAN-ODT) 4 mg disintegrating tablet Take 1 tablet (4 mg total) by mouth every 8 (eight) hours as needed for nausea, Starting Sun 9/22/2024, Normal           CONTINUE these medications which have NOT CHANGED    Details   clonazePAM (KlonoPIN) 0.5 mg tablet Take 2 tablets (1 mg total) by mouth 2 (two) times a day as needed for anxiety, Starting Thu 9/19/2024, Until Sat 10/19/2024 at 2359, Normal      !! FLUoxetine (PROzac) 10 mg capsule TAKE 1 CAPSULE (10 MG TOTAL) BY MOUTH DAILY TAKE WITH 20 MG CAPSULE FOR TOTAL OF 30 MG DAILY, Starting Tue 8/27/2024, Until Mon 11/25/2024, Normal      !! FLUoxetine (PROzac) 20 mg capsule TAKE 1 CAPSULE (20 MG TOTAL) BY MOUTH DAILY TAKE WITH 10 MG CAPSULE FOR TOTAL OF 30 MG DAILY, Starting Mon 8/26/2024, Until Sun 11/24/2024, Normal      propranolol (INDERAL) 10 mg tablet Take 0.5 tablet (5 mg total) by mouth 2 (two) times a day, No Print       !!  - Potential duplicate medications found. Please discuss with provider.        No discharge procedures on file.     Luciana Cartwright MD  09/23/24 0109

## 2024-09-25 ENCOUNTER — OFFICE VISIT (OUTPATIENT)
Dept: PSYCHIATRY | Facility: CLINIC | Age: 44
End: 2024-09-25

## 2024-09-25 DIAGNOSIS — F39 UNSPECIFIED MOOD (AFFECTIVE) DISORDER (HCC): ICD-10-CM

## 2024-09-25 DIAGNOSIS — F41.9 ANXIETY DISORDER, UNSPECIFIED TYPE: Primary | ICD-10-CM

## 2024-09-25 PROCEDURE — NC001 PR NO CHARGE

## 2024-09-25 NOTE — PATIENT INSTRUCTIONS
Continue Prozac 30 mg daily (20 mg tablet and 10 mg tablet)   Continue Klonopin 1 mg twice daily as needed  Continue Propranolol 5 mg daily until 10/01/24 - then STOP medication    Please call the office staff for medication issues including refills, problems obtaining medications, side effects, etc at [phone].   Please return for a follow up appointment as discussed. If you are running late or are unable to attend your appointment, please call our [place] office at [phone].    If you are in psychological crisis including not limited to suicidal/homicidal thoughts or thoughts of self-injury, do not hesitate to call/contact your County Crisis hotline (see below), call 701, call 250 (mental health crisis), or go to the nearest emergency department.  [facility] Crisis: [phone]  [facility] Crisis: [phone]  [facility] Crisis: [phone]  [facility] Crisis: [phone]  [facility] Crisis: [phone]  [facility] Crisis: [phone]  [facility] Crisis: [phone]  National Suicide Prevention Hotline: 1-383.381.7511

## 2024-09-25 NOTE — PSYCH
Psychiatric Follow Up Visit - Behavioral Health       [facility]  MRN: [id number]    Assessment/Plan:   Melody Huynh is a 42 y.o. 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 Patient presents individually today.  For review, on initial evaluation, patient presented with symptoms consistent with EPS and akathisia related adverse effects of psychotropic medications. Possible offending agents were previously discontinued (Vraylar, Zyprexa) with adjustments to Prozac and Klonopin dosing.      ***Today, the patient endorses continued improvement in anxiety and depression related symptoms.  Patient endorses intermittent increased rate of thoughts and negative symptoms, notably in the morning.  Patient denies recurrence of down, depressed mood, changes to energy, changes to sleep, or changes to appetite.  Patient endorses EPS related/akathisia related symptoms have continued to improve, on exam the symptoms appear to have resolved.  Discussed with patient recommendation to continue psychiatric medications at current doses and to continue with psychotherapy.  Patient in agreement with plan.     Diagnoses:  Anxiety disorder, unspecified, differential includes Panic disorder, Generalized anxiety disorder,  "Obsessive-compulsive disorder  Mood disorder, unspecified, differential includes Major depressive disorder, Adjustment disorder with anxious distress  Akathisia and related symptoms - resolved     Treatment Recommendations/Precautions:  Continue Prozac 30 mg daily for mood and anxiety  PARQ completed including serotonin syndrome, SIADH, worsening depression, suicidality, induction of ghada, GI upset, headaches, activation, sexual side effects, sedation, potential drug interactions, and others.  Continue Klonopin 1 mg twice daily as needed for anxiety  PDMP reviewed  Discussed with patient the risks of sedation, respiratory depression, impairment in judgment and motor function. Depression, mood changes, GI changes, and others discussed. Patient informed of risks of being on or starting opioid medications due to drug interactions and potential for serious respiratory depression and death.   Continue Propranolol 5 mg BID for akathisia related symptoms, may be augmenting for anxiety  PARQ completed including dizziness, sedation, headache, blood pressure, depression  Patient tapered down from 20 mg BID from last evaluation  Most recent lab work previously reviewed.  Continue psychotherapy with own therapist weekly.  Medication management every 8 weeks  Aware of need to follow up with family physician for medical issues  Aware of 24 hour and weekend coverage for urgent situations accessed by calling [facility] main practice number  Monitor CBC w/diff and CMP in setting of SSRI prescription.  Assessment & Plan        Treatment Recommendations/Precautions:    {EFO AMB FOLLOW UP RECOMMENDATIONS:52695::\"Aware of 24 hour and weekend coverage for urgent situations accessed by calling [facility] main practice number\"}    Medication Risks/Benefits      Risks, Benefits And Possible Side Effects Of Medications:    {EFO AMB RISKS/BENEFITS MEDICATIONS:63539}    Controlled Medication " "Discussion:     {[facility] PSYCH [facility] MED DISCUSSION:29678}  ------------------------------------  History of Present Illness   Melody Huynh is presenting *** to follow up for {[facility] psych cc:80274}. Melody ***    is presenting to follow up for anxiety, depression, and akathisia and EPS related symptoms . Melody reports her mood has been, \"good.\"  Patient denies a down, depressed mood and she endorses continued improvement of depression and anxiety related symptoms.  She endorses motivation has been improving, she is going back to the gym each weekday and has increased socialization with friends.  The patient is spending more time with family and feeling good about helping to care for daughter.   The patient endorses vivid dreams continue, denies the dreams are distressing in nature and denies nightmares.  Patient notes energy levels can be, \" okay,\" and lower on some days in setting of increased anxiety during the morning.  Patient endorses anxiety causes inner tension and restlessness.  It is accompanied with physical symptoms of anxiety. This has worsened over the last 2 weeks.  Discussed possible triggers with patient, patient notes she was denied for continued long-term disability in and around this timeframe, and this may be the cause for increase in negative thinking, inner tension, and other anxiety related symptoms in the mornings.  The patient denies feelings of hopelessness or helplessness.  The patient denies SI, passive death wishes, thoughts to harm self or others, or HI at time of evaluation.     The patient denies and does not demonstrate symptoms consistent with ghada at time of evaluation, including decreased need for sleep, increased energy, elevated mood or irritability, rapid speech rate of thoughts, and she does not make grandiose statements or demonstrate impulsive behaviors at time of evaluation.  The patient does not endorse or demonstrate symptoms consistent with negative or " "positive symptoms of psychosis at time of evaluation.     The patient has been adherent with psychiatric medications, taking Prozac daily as prescribed, propranolol twice daily as prescribed, and requiring on average 1 tablet of Klonopin daily PRN for anxiety.  Discussed continuation of psychiatric medication at current doses, patient is agreeable to plan.    Psychiatric Review Of Systems:  Melody reports {[facility] pos psych ROS:22076::\"Symptoms as described in HPI\"}.  Melody denies {[facility] neg psych ROS:66511::\"Current suicidal thoughts, plan, or intent\",\"Current thoughts of self-harm\",\"Current homicidal thoughts, plan, or intent\"}.    Medical Review Of Systems:  {[facility] ROS:31634::\"Complete review of systems is negative except as noted above.\"}       [place] Akathisia Rating Scale (BARS) -  Prior score on 10/23/23: 8 and Global Clinical Assessment score:3, 11/14/23: 7 and Global Clinical Assessment score of 2  12/04/23: Global Clinical Assessment score: 3 due to distress to patient  02/29/24: 0 and Global Clinical Assessment score of 0  04/22/24: 0 and Global Clinical Assessment score of 0  Objective   0 Normal, occasional fidgety movements of the limbs  1 Presence of characteristic restless movements: shuffling or tramping movements of the legs/feet, or swinging of one leg while sitting, and/or rocking from foot to foot or “walking on the spot” when standing, but movements present for less than half the time observed  2 Observed phenomena, as described in (1) above, which are present for at least half the observation period  3 Patient is constantly engaged in characteristic restless movements, and/or has the inability to remain seated or standing without walking or pacing, during the time observed  Subjective  0 Absence of inner restlessness  1 Non-specific sense of inner restlessness  2 The patient is aware of an inability to keep the legs still, or a desire to move the legs, and/or complains of inner restlessness " aggravated specifically by being required to stand still  3 Awareness of intense compulsion to move most of the time and/or reports strong desire to walk or pace most of the time  Distress related to restlessness  0 No distress  1 Mild  2 Moderate  3 Severe  Global Clinical Assessment of Akathisia  0 Absent. No evidence of awareness of restlessness. Observation of characteristic movements of akathisia in the absence of a subjective report of inner restlessness or compulsive desire to move the legs should be classified as pseudoakathisia  1 Questionable. Non-specific inner tension and fidgety movements  2 Mild akathisia. Awareness of restlessness in the legs and/or inner restlessness worse when required to stand still. Fidgety movements present, but characteristic restless movements of akathisia not necessarily observed. Condition causes little or no distress.  3 Moderate akathisia. Awareness of restlessness as described for mild akathisia above, combined with characteristic restless movements such as rocking from foot to foot when standing. Patient finds the condition distressing.  4 Marked akathisia. Subjective experience of restlessness includes a compulsive desire to walk or pace. However, the patient is able to remain seated for at least five minutes. The condition is obviously distressing.  5 Severe akathisia. The patient reports a strong compulsion to pace up and down most of the time. Unable to sit or lie down for more than a few minutes. Constant restlessness which is associated with intense distress and insomnia.    ------------------------------------  All italicized information has been copied from previous psychiatric evaluation. Information has been reviewed with the patient.     Past Psychiatric History:   Prior psychiatric diagnoses: anxiety, panic disorder, mood disorder, OCD  Inpatient hospitalizations: inpatient hospitalizations - [facility] - [facility], [facility]  [facility] [place], 2021 - panic disorder, mood symptoms  Emergency room visits often in-between for severe panic attacks - 2016  Patient and  report no psychiatric care   Suicide attempts/self-harm: patient denies, patient denies self harm behaviors  Violent/aggressive behavior: patient denies   Patient did have some aggressive outbursts towards  and providers  Outpatient psychiatric providers: currently has outpatient psychiatric provider - [facility]   Was without a psychiatrist from [phone] (Lexapro from [facility] during that time), prior psychiatrist, Dr. Martines, retired  Past/current psychotherapy: patient has a therapist weekly, Dr. Foster, [facility]  Other Services: patient denies  Psychiatric medication trial:   Multiple medication trials - including below,  Antidepressants  Prozac (up to 40 mg), Zoloft (short period of time), Lexapro (multiple years), Luvox  Antipsychotics  Vraylar, Zyprexa, Seroquel, Haldol (in ED)  Sedative hypnotics  Ativan, Klonopin (0.5 mg QID PRN)  Others  Hydroxyzine, Gabapentin, BuSpar (60 mg total daily)      Substance Abuse History:  I have assessed this patient for substance use within the past 12 months.  Patient reports a few cigarettes per day, quit 2007/2008. Patient reports history of daily marijuana use - had medical marijuana card - few months without smoking.   Denies history of other recreational use including opioids, methamphetamines/amphetamines, cocaine use. Denies past legal actions or arrests secondary to substance intoxication. The patient denies prior DWIs/DUIs. Melody does not exhibit objective evidence of substance withdrawal during today's examination nor does Melody appear under the influence of any psychoactive substance.       Family Psychiatric History:   Mother: anxiety (on antidepressant - Lexapro)  No family history of substance use disorder or suicide attempts or completions.     Social  "History  Early life/developmental: Denies a history of milestone/developmental delay. Denies a history of in-utero exposure to toxins/illicit substances. Denies ADHD history. No history of IEP/504, denies special supports in school.  Marital history: /Civil Union   Children: yes, 1 daughter (9 y/o - 2nd grade)  1 older brother   Living arrangement: Lives in a home with  and daughter (family lost their dog in May).  Support system: identifies  as the biggest source of support  Mother also a big support  Education: college graduate - communications ([employer])  Had to withdraw from college twice due to anxiety  Occupational History: unemployed since January 2023, applying for permanent disability - previously in advertising in 10-15 years  Other Pertinent History: no reported  or legal history  Access to firearms:  has firearm, locked and secured      Traumatic History:   Abuse:  patient denied  Other Traumatic Events:  medical trauma, father passed when she was 16 y/o    History Review: {History Review:78170}    Visit Vitals  OB Status Implant   Smoking Status Never      Wt Readings from Last 6 Encounters:   04/16/24 65.8 kg (145 lb)   12/18/23 59.3 kg (130 lb 11.2 oz)   10/11/23 57.8 kg (127 lb 6.4 oz)   10/06/23 58.7 kg (129 lb 8 oz)   09/25/23 60.1 kg (132 lb 8 oz)   05/11/23 61.7 kg (136 lb)        Rating Scales  PHQ-2/9 Depression Screening                 Mental Status Exam:  Appearance:  {[facility] appearance:50519::\"alert\",\"good eye contact\",\"appears stated age\",\"casually dressed\",\"appropriate grooming and hygiene\"}   Behavior:  {[facility] MSE behavior:68657::\"calm\",\"cooperative\"}   Motor: {[facility] motor:04109::\"no abnormal movements\",\"normal gait and balance\"}   Speech:  {[facility] speech:33461::\"spontaneous\",\"coherent\"}   Mood:  {[facility] mood:70960}   Affect:  {[facility] affect:42992}   Thought Process:  {[facility] thought process:02986::\"Organized, logical, goal-directed\"}   Thought " "Content: {[facility] thought content:10018::\"no verbalized delusions or overt paranoia\"}   Perceptual disturbances: {[facility] hallucinations:77714::\"no reported hallucinations\",\"does not appear to be responding to internal stimuli at this time\"}   Risk Potential: {[facility] risk:68841::\"No active or passive suicidal or homicidal ideation was verbalized during interview\"}   Cognition: {[facility] cognition:47888::\"oriented to self and situation\",\"appears to be of average intelligence\",\"cognition not formally tested\"}   Insight:  {[facility] insight judgement:93973}   Judgment: {[facility] insight judgement:72942}       Meds/Allergies    Allergies   Allergen Reactions    Demerol [Meperidine] Hyperactivity    Macrolides And Ketolides      Current Outpatient Medications   Medication Instructions    clonazePAM (KLONOPIN) 1 mg, Oral, 2 times daily PRN    FLUoxetine (PROZAC) 20 mg, Oral, Daily, Take with 10 mg capsule for total of 30 mg daily    FLUoxetine (PROZAC) 10 mg, Oral, Daily, Take with 20 mg capsule for total of 30 mg daily    ondansetron (ZOFRAN-ODT) 4 mg, Oral, Every 8 hours PRN    propranolol (INDERAL) 10 mg tablet Take 0.5 tablet (5 mg total) by mouth 2 (two) times a day        Labs & Imaging:  I have personally reviewed all pertinent laboratory tests and imaging results. ***  Admission on 09/22/2024, Discharged on 09/22/2024   Component Date Value Ref Range Status    Ventricular Rate 09/22/2024 75  BPM Final    Atrial Rate 09/22/2024 75  BPM Final    DC Interval 09/22/2024 132  ms Final    QRSD Interval 09/22/2024 92  ms Final    QT Interval 09/22/2024 438  ms Final    QTC Interval 09/22/2024 489  ms Final    P Axis 09/22/2024 78  degrees Final    QRS Axis 09/22/2024 94  degrees Final    T Wave Hancock 09/22/2024 84  degrees Final    WBC 09/22/2024 11.56 (H)  4.31 - 10.16 Thousand/uL Final    RBC 09/22/2024 5.08  3.81 - 5.12 Million/uL Final    Hemoglobin 09/22/2024 15.2  11.5 - 15.4 g/dL Final    Hematocrit 09/22/2024 43.3  " 34.8 - 46.1 % Final    MCV 09/22/2024 85  82 - 98 fL Final    MCH 09/22/2024 29.9  26.8 - 34.3 pg Final    MCHC 09/22/2024 35.1  31.4 - 37.4 g/dL Final    RDW 09/22/2024 11.9  11.6 - 15.1 % Final    MPV 09/22/2024 9.5  8.9 - 12.7 fL Final    Platelets 09/22/2024 264  149 - 390 Thousands/uL Final    nRBC 09/22/2024 0  /100 WBCs Final    Segmented % 09/22/2024 80 (H)  43 - 75 % Final    Immature Grans % 09/22/2024 0  0 - 2 % Final    Lymphocytes % 09/22/2024 15  14 - 44 % Final    Monocytes % 09/22/2024 4  4 - 12 % Final    Eosinophils Relative 09/22/2024 1  0 - 6 % Final    Basophils Relative 09/22/2024 0  0 - 1 % Final    Absolute Neutrophils 09/22/2024 9.19 (H)  1.85 - 7.62 Thousands/µL Final    Absolute Immature Grans 09/22/2024 0.04  0.00 - 0.20 Thousand/uL Final    Absolute Lymphocytes 09/22/2024 1.73  0.60 - 4.47 Thousands/µL Final    Absolute Monocytes 09/22/2024 0.49  0.17 - 1.22 Thousand/µL Final    Eosinophils Absolute 09/22/2024 0.06  0.00 - 0.61 Thousand/µL Final    Basophils Absolute 09/22/2024 0.05  0.00 - 0.10 Thousands/µL Final    Sodium 09/22/2024 141  135 - 147 mmol/L Final    Potassium 09/22/2024 3.4 (L)  3.5 - 5.3 mmol/L Final    Chloride 09/22/2024 105  96 - 108 mmol/L Final    CO2 09/22/2024 17 (L)  21 - 32 mmol/L Final    ANION GAP 09/22/2024 19 (H)  4 - 13 mmol/L Final    BUN 09/22/2024 22  5 - 25 mg/dL Final    Creatinine 09/22/2024 1.23  0.60 - 1.30 mg/dL Final    Standardized to IDMS reference method    Glucose 09/22/2024 177 (H)  65 - 140 mg/dL Final    If the patient is fasting, the ADA then defines impaired fasting glucose as > 100 mg/dL and diabetes as > or equal to 123 mg/dL.    Calcium 09/22/2024 12.3 (H)  8.4 - 10.2 mg/dL Final    AST 09/22/2024 17  13 - 39 U/L Final    ALT 09/22/2024 19  7 - 52 U/L Final    Specimen collection should occur prior to Sulfasalazine administration due to the potential for falsely depressed results.     Alkaline Phosphatase 09/22/2024 61  34 - 104 U/L  Final    Total Protein 09/22/2024 7.9  6.4 - 8.4 g/dL Final    Albumin 09/22/2024 4.9  3.5 - 5.0 g/dL Final    Total Bilirubin 09/22/2024 2.70 (H)  0.20 - 1.00 mg/dL Final    Use of this assay is not recommended for patients undergoing treatment with eltrombopag due to the potential for falsely elevated results.  N-acetyl-p-benzoquinone imine (metabolite of Acetaminophen) will generate erroneously low results in samples for patients that have taken an overdose of Acetaminophen.    eGFR 09/22/2024 54  ml/min/1.73sq m Final    Lipase 09/22/2024 21  11 - 82 u/L Final    Preg, Serum 09/22/2024 Negative  Negative Final     ---------------------------------------    Although patient's acute lethality risk is low, long-term/chronic lethality risk is mildly elevated in the presence of ***. At the current moment, Melody is future-oriented, forward-thinking, and demonstrates ability to act in a self-preserving manner as evidenced by volitionally presenting to the clinic today, seeking treatment. At this juncture, inpatient hospitalization is not currently warranted. To mitigate future risk, patient should adhere to the recommendations of this writer, avoid alcohol/illicit substance use, utilize community-based resources and familiar support and prioritize mental health treatment.     Based on today's assessment and clinical criteria, Melody Huynh contracts for safety and is not an imminent risk of harm to self or others. Outpatient level of care is deemed appropriate at this present time. Melody understands that if they are no longer able to contract for safety, they need to call/contact the outpatient office including this writer, call/contact crisis and/orattend to the nearest Emergency Department for immediate evaluation.    Although patient's acute lethality risk is low, long-term/chronic lethality risk is mildly elevated in the presence of anxiety, history of depression, history of traumatic experiences, history  of substance use, access to firearms in home, and recent EPS/movement related symptoms.     Risk of Harm to Self:  The following ratings are based on assessment at the time of the interview  Demographic risk factors include:   Historical Risk Factors include: history of depression, chronic anxiety symptoms, history of mood disorder, history of substance use, history of traumatic experiences  Recent Specific Risk Factors include: diagnosis of mood disorder, current depressive symptoms, current anxiety symptoms, feelings of guilt or self blame, hopelessness, unemployed, medication adverse effects  Protective Factors: no current suicidal ideation, access to mental health treatment, being a parent, being , compliant with medications, compliant with mental health treatment, connection to own children, having a desire to be alive, having a sense of purpose or meaning in life, opportunities to participate in community, responsibilities and duties to others, stable living environment, supportive family  Weapons: gun. The following steps have been taken to ensure weapons are properly secured: locked, secured, confirmed with   Based on today's assessment, Melody presents the following risk of harm to self: low     Risk of Harm to Others:  The following ratings are based on assessment at the time of the interview  Demographic Risk Factors include: unemployed.  Historical Risk Factors include: history of substance use.  Recent Specific Risk Factors include: weapons or other means available, concomitant mood disorder, multiple stressors.  Protective Factors: no current homicidal ideation, access to mental health treatment, being a parent, being , compliant with medications, compliant with mental health treatment, opportunities to participate in community, resilience, responsibilities and duties to others, safe and stable living environment, supportive family  Weapons: gun. The following steps have  "been taken to ensure weapons are properly secured: locked, secured, confirmed by   Based on today's assessment, Melody presents the following risk of harm to others: low     Psychotherapy Provided:     Individual psychotherapy provided: {EFO AMB Psychotherapy:08820::\"No\"}    Treatment Plan:    Completed and signed during the session: {EFO Treatment Plan Session:70549}    {Note Share Choices (Optional):54695}    Visit Time:  Visit Start Time: ***  Visit Stop Time: ***  Total Visit Duration: {Psych Total Visit Time:76327}    Deysi Cohen, DO  Psychiatry Resident, PGY-IV    This note has been constructed using a voice recognition system. There may be translation, syntax, or grammatical errors. If you have any questions, please contact the dictating provider.    "

## 2024-09-26 ENCOUNTER — HOSPITAL ENCOUNTER (EMERGENCY)
Facility: HOSPITAL | Age: 44
Discharge: HOME | End: 2024-09-26
Payer: COMMERCIAL

## 2024-09-26 ENCOUNTER — APPOINTMENT (EMERGENCY)
Dept: RADIOLOGY | Facility: HOSPITAL | Age: 44
End: 2024-09-26
Payer: COMMERCIAL

## 2024-09-26 ENCOUNTER — TELEPHONE (OUTPATIENT)
Age: 44
End: 2024-09-26

## 2024-09-26 VITALS
TEMPERATURE: 98.6 F | OXYGEN SATURATION: 100 % | HEART RATE: 86 BPM | RESPIRATION RATE: 20 BRPM | DIASTOLIC BLOOD PRESSURE: 88 MMHG | SYSTOLIC BLOOD PRESSURE: 150 MMHG

## 2024-09-26 DIAGNOSIS — F12.90 MARIJUANA USE: ICD-10-CM

## 2024-09-26 DIAGNOSIS — F41.9 ANXIETY DISORDER, UNSPECIFIED TYPE: Primary | ICD-10-CM

## 2024-09-26 DIAGNOSIS — F39 UNSPECIFIED MOOD (AFFECTIVE) DISORDER (HCC): ICD-10-CM

## 2024-09-26 DIAGNOSIS — F41.0 PANIC ATTACK: ICD-10-CM

## 2024-09-26 DIAGNOSIS — F41.9 ANXIETY: Primary | ICD-10-CM

## 2024-09-26 DIAGNOSIS — E87.6 HYPOKALEMIA: ICD-10-CM

## 2024-09-26 DIAGNOSIS — R11.0 NAUSEA: ICD-10-CM

## 2024-09-26 DIAGNOSIS — R10.9 ABDOMINAL PAIN: ICD-10-CM

## 2024-09-26 LAB
ALBUMIN SERPL BCG-MCNC: 4.6 G/DL (ref 3.5–5)
ALP SERPL-CCNC: 61 U/L (ref 34–104)
ALT SERPL W P-5'-P-CCNC: 13 U/L (ref 7–52)
AMORPH URATE CRY URNS QL MICRO: NORMAL
AMPHETAMINES SERPL QL SCN: NEGATIVE
ANION GAP SERPL CALCULATED.3IONS-SCNC: 14 MMOL/L (ref 4–13)
AST SERPL W P-5'-P-CCNC: 13 U/L (ref 13–39)
ATRIAL RATE: 89 BPM
ATRIAL RATE: 89 BPM
BACTERIA UR QL AUTO: NORMAL /HPF
BARBITURATES UR QL: NEGATIVE
BASOPHILS # BLD AUTO: 0.03 THOUSANDS/ΜL (ref 0–0.1)
BASOPHILS NFR BLD AUTO: 0 % (ref 0–1)
BENZODIAZ UR QL: NEGATIVE
BILIRUB SERPL-MCNC: 1.34 MG/DL (ref 0.2–1)
BILIRUB UR QL STRIP: NEGATIVE
BUN SERPL-MCNC: 20 MG/DL (ref 5–25)
CALCIUM SERPL-MCNC: 9.7 MG/DL (ref 8.4–10.2)
CARDIAC TROPONIN I PNL SERPL HS: 3 NG/L
CHLORIDE SERPL-SCNC: 107 MMOL/L (ref 96–108)
CLARITY UR: ABNORMAL
CO2 SERPL-SCNC: 19 MMOL/L (ref 21–32)
COCAINE UR QL: NEGATIVE
COLOR UR: ABNORMAL
CREAT SERPL-MCNC: 0.97 MG/DL (ref 0.6–1.3)
EOSINOPHIL # BLD AUTO: 0.17 THOUSAND/ΜL (ref 0–0.61)
EOSINOPHIL NFR BLD AUTO: 2 % (ref 0–6)
ERYTHROCYTE [DISTWIDTH] IN BLOOD BY AUTOMATED COUNT: 11.9 % (ref 11.6–15.1)
FENTANYL UR QL SCN: NEGATIVE
FLUAV AG UPPER RESP QL IA.RAPID: NEGATIVE
FLUBV AG UPPER RESP QL IA.RAPID: NEGATIVE
GFR SERPL CREATININE-BSD FRML MDRD: 72 ML/MIN/1.73SQ M
GLUCOSE SERPL-MCNC: 107 MG/DL (ref 65–140)
GLUCOSE UR STRIP-MCNC: NEGATIVE MG/DL
HCG SERPL QL: NEGATIVE
HCT VFR BLD AUTO: 38.7 % (ref 34.8–46.1)
HGB BLD-MCNC: 13.9 G/DL (ref 11.5–15.4)
HGB UR QL STRIP.AUTO: NEGATIVE
HYDROCODONE UR QL SCN: NEGATIVE
IMM GRANULOCYTES # BLD AUTO: 0.03 THOUSAND/UL (ref 0–0.2)
IMM GRANULOCYTES NFR BLD AUTO: 0 % (ref 0–2)
KETONES UR STRIP-MCNC: ABNORMAL MG/DL
LEUKOCYTE ESTERASE UR QL STRIP: ABNORMAL
LIPASE SERPL-CCNC: 37 U/L (ref 11–82)
LYMPHOCYTES # BLD AUTO: 2.13 THOUSANDS/ΜL (ref 0.6–4.47)
LYMPHOCYTES NFR BLD AUTO: 25 % (ref 14–44)
MCH RBC QN AUTO: 30.2 PG (ref 26.8–34.3)
MCHC RBC AUTO-ENTMCNC: 35.9 G/DL (ref 31.4–37.4)
MCV RBC AUTO: 84 FL (ref 82–98)
METHADONE UR QL: NEGATIVE
MONOCYTES # BLD AUTO: 0.44 THOUSAND/ΜL (ref 0.17–1.22)
MONOCYTES NFR BLD AUTO: 5 % (ref 4–12)
NEUTROPHILS # BLD AUTO: 5.59 THOUSANDS/ΜL (ref 1.85–7.62)
NEUTS SEG NFR BLD AUTO: 68 % (ref 43–75)
NITRITE UR QL STRIP: NEGATIVE
NON-SQ EPI CELLS URNS QL MICRO: NORMAL /HPF
NRBC BLD AUTO-RTO: 0 /100 WBCS
OPIATES UR QL SCN: NEGATIVE
OXYCODONE+OXYMORPHONE UR QL SCN: NEGATIVE
P AXIS: 57 DEGREES
P AXIS: 58 DEGREES
PCP UR QL: NEGATIVE
PH UR STRIP.AUTO: 8 [PH]
PLATELET # BLD AUTO: 253 THOUSANDS/UL (ref 149–390)
PMV BLD AUTO: 9.4 FL (ref 8.9–12.7)
POTASSIUM SERPL-SCNC: 2.8 MMOL/L (ref 3.5–5.3)
PR INTERVAL: 132 MS
PR INTERVAL: 132 MS
PROT SERPL-MCNC: 7.2 G/DL (ref 6.4–8.4)
PROT UR STRIP-MCNC: NEGATIVE MG/DL
QRS AXIS: 101 DEGREES
QRS AXIS: 96 DEGREES
QRSD INTERVAL: 100 MS
QRSD INTERVAL: 98 MS
QT INTERVAL: 406 MS
QT INTERVAL: 434 MS
QTC INTERVAL: 493 MS
QTC INTERVAL: 528 MS
RBC # BLD AUTO: 4.61 MILLION/UL (ref 3.81–5.12)
RBC #/AREA URNS AUTO: NORMAL /HPF
SARS-COV+SARS-COV-2 AG RESP QL IA.RAPID: NEGATIVE
SODIUM SERPL-SCNC: 140 MMOL/L (ref 135–147)
SP GR UR STRIP.AUTO: 1.02 (ref 1–1.03)
T WAVE AXIS: 39 DEGREES
T WAVE AXIS: 48 DEGREES
THC UR QL: POSITIVE
UROBILINOGEN UR STRIP-ACNC: <2 MG/DL
VENTRICULAR RATE: 89 BPM
VENTRICULAR RATE: 89 BPM
WBC # BLD AUTO: 8.39 THOUSAND/UL (ref 4.31–10.16)
WBC #/AREA URNS AUTO: NORMAL /HPF

## 2024-09-26 PROCEDURE — 83690 ASSAY OF LIPASE: CPT

## 2024-09-26 PROCEDURE — 84484 ASSAY OF TROPONIN QUANT: CPT

## 2024-09-26 PROCEDURE — 85025 COMPLETE CBC W/AUTO DIFF WBC: CPT

## 2024-09-26 PROCEDURE — 93005 ELECTROCARDIOGRAM TRACING: CPT

## 2024-09-26 PROCEDURE — 84703 CHORIONIC GONADOTROPIN ASSAY: CPT

## 2024-09-26 PROCEDURE — 96374 THER/PROPH/DIAG INJ IV PUSH: CPT

## 2024-09-26 PROCEDURE — 99284 EMERGENCY DEPT VISIT MOD MDM: CPT

## 2024-09-26 PROCEDURE — 96366 THER/PROPH/DIAG IV INF ADDON: CPT

## 2024-09-26 PROCEDURE — 96367 TX/PROPH/DG ADDL SEQ IV INF: CPT

## 2024-09-26 PROCEDURE — 99285 EMERGENCY DEPT VISIT HI MDM: CPT

## 2024-09-26 PROCEDURE — 87811 SARS-COV-2 COVID19 W/OPTIC: CPT

## 2024-09-26 PROCEDURE — 96375 TX/PRO/DX INJ NEW DRUG ADDON: CPT

## 2024-09-26 PROCEDURE — 93010 ELECTROCARDIOGRAM REPORT: CPT | Performed by: STUDENT IN AN ORGANIZED HEALTH CARE EDUCATION/TRAINING PROGRAM

## 2024-09-26 PROCEDURE — 36415 COLL VENOUS BLD VENIPUNCTURE: CPT

## 2024-09-26 PROCEDURE — 80307 DRUG TEST PRSMV CHEM ANLYZR: CPT

## 2024-09-26 PROCEDURE — 96365 THER/PROPH/DIAG IV INF INIT: CPT

## 2024-09-26 PROCEDURE — 80053 COMPREHEN METABOLIC PANEL: CPT

## 2024-09-26 PROCEDURE — 87804 INFLUENZA ASSAY W/OPTIC: CPT

## 2024-09-26 PROCEDURE — 81001 URINALYSIS AUTO W/SCOPE: CPT

## 2024-09-26 RX ORDER — DIPHENHYDRAMINE HYDROCHLORIDE 50 MG/ML
25 INJECTION INTRAMUSCULAR; INTRAVENOUS ONCE
Status: COMPLETED | OUTPATIENT
Start: 2024-09-26 | End: 2024-09-26

## 2024-09-26 RX ORDER — LORAZEPAM 2 MG/ML
1 INJECTION INTRAMUSCULAR ONCE
Status: COMPLETED | OUTPATIENT
Start: 2024-09-26 | End: 2024-09-26

## 2024-09-26 RX ORDER — MAGNESIUM SULFATE HEPTAHYDRATE 40 MG/ML
2 INJECTION, SOLUTION INTRAVENOUS ONCE
Status: COMPLETED | OUTPATIENT
Start: 2024-09-26 | End: 2024-09-26

## 2024-09-26 RX ORDER — POTASSIUM CHLORIDE 14.9 MG/ML
20 INJECTION INTRAVENOUS ONCE
Status: COMPLETED | OUTPATIENT
Start: 2024-09-26 | End: 2024-09-26

## 2024-09-26 RX ORDER — HYDROXYZINE HYDROCHLORIDE 25 MG/1
25 TABLET, FILM COATED ORAL ONCE
Status: COMPLETED | OUTPATIENT
Start: 2024-09-26 | End: 2024-09-26

## 2024-09-26 RX ORDER — ONDANSETRON 4 MG/1
4 TABLET, ORALLY DISINTEGRATING ORAL ONCE
Status: COMPLETED | OUTPATIENT
Start: 2024-09-26 | End: 2024-09-26

## 2024-09-26 RX ORDER — DICYCLOMINE HCL 20 MG
20 TABLET ORAL ONCE
Status: COMPLETED | OUTPATIENT
Start: 2024-09-26 | End: 2024-09-26

## 2024-09-26 RX ORDER — MAGNESIUM HYDROXIDE/ALUMINUM HYDROXICE/SIMETHICONE 120; 1200; 1200 MG/30ML; MG/30ML; MG/30ML
30 SUSPENSION ORAL ONCE
Status: DISCONTINUED | OUTPATIENT
Start: 2024-09-26 | End: 2024-09-26 | Stop reason: HOSPADM

## 2024-09-26 RX ORDER — ONDANSETRON 4 MG/1
4 TABLET, ORALLY DISINTEGRATING ORAL EVERY 6 HOURS PRN
Qty: 12 TABLET | Refills: 0 | Status: ON HOLD | OUTPATIENT
Start: 2024-09-26

## 2024-09-26 RX ORDER — ACETAMINOPHEN 10 MG/ML
1000 INJECTION, SOLUTION INTRAVENOUS ONCE
Status: COMPLETED | OUTPATIENT
Start: 2024-09-26 | End: 2024-09-26

## 2024-09-26 RX ORDER — POTASSIUM CHLORIDE 20MEQ/15ML
40 LIQUID (ML) ORAL ONCE
Status: COMPLETED | OUTPATIENT
Start: 2024-09-26 | End: 2024-09-26

## 2024-09-26 RX ORDER — POTASSIUM CHLORIDE 1500 MG/1
40 TABLET, EXTENDED RELEASE ORAL ONCE
Status: DISCONTINUED | OUTPATIENT
Start: 2024-09-26 | End: 2024-09-26 | Stop reason: HOSPADM

## 2024-09-26 RX ORDER — LORAZEPAM 1 MG/1
2 TABLET ORAL ONCE
Status: COMPLETED | OUTPATIENT
Start: 2024-09-26 | End: 2024-09-26

## 2024-09-26 RX ADMIN — SODIUM CHLORIDE 1000 ML: 0.9 INJECTION, SOLUTION INTRAVENOUS at 03:42

## 2024-09-26 RX ADMIN — DIPHENHYDRAMINE HYDROCHLORIDE 25 MG: 50 INJECTION, SOLUTION INTRAMUSCULAR; INTRAVENOUS at 03:42

## 2024-09-26 RX ADMIN — ONDANSETRON 4 MG: 4 TABLET, ORALLY DISINTEGRATING ORAL at 03:12

## 2024-09-26 RX ADMIN — LORAZEPAM 1 MG: 2 INJECTION INTRAMUSCULAR; INTRAVENOUS at 04:17

## 2024-09-26 RX ADMIN — ACETAMINOPHEN 1000 MG: 1000 INJECTION, SOLUTION INTRAVENOUS at 03:41

## 2024-09-26 RX ADMIN — POTASSIUM CHLORIDE 20 MEQ: 14.9 INJECTION, SOLUTION INTRAVENOUS at 04:06

## 2024-09-26 RX ADMIN — DICYCLOMINE HYDROCHLORIDE 20 MG: 20 TABLET ORAL at 04:59

## 2024-09-26 RX ADMIN — POTASSIUM CHLORIDE 40 MEQ: 1.5 SOLUTION ORAL at 05:42

## 2024-09-26 RX ADMIN — HYDROXYZINE HYDROCHLORIDE 25 MG: 25 TABLET ORAL at 06:28

## 2024-09-26 RX ADMIN — MAGNESIUM SULFATE HEPTAHYDRATE 2 G: 40 INJECTION, SOLUTION INTRAVENOUS at 03:42

## 2024-09-26 RX ADMIN — LORAZEPAM 2 MG: 1 TABLET ORAL at 03:12

## 2024-09-26 RX ADMIN — LORAZEPAM 1 MG: 2 INJECTION INTRAMUSCULAR; INTRAVENOUS at 05:43

## 2024-09-26 NOTE — BH TREATMENT PLAN
TREATMENT PLAN (Medication Management Only)        [facility]    Name and Date of Birth:  Melody Huynh 43 y.o. 1980  Date of Treatment Plan: September 26, 2024  Diagnosis/Diagnoses:    1. Anxiety disorder, unspecified type    2. Unspecified mood (affective) disorder (HCC)      Strengths/Personal Resources for Self-Care: supportive family, supportive friends, ability to communicate needs, ability to communicate well, ability to listen, ability to reason, ability to understand psychiatric illness, average or above intelligence, family ties, general fund of knowledge, good physical health, willingness to work on problems.  Area/Areas of need (in own words): anxiety symptoms, depressive symptoms  1. Long Term Goal: continue improvement in anxiety.  Target Date:6 months - 3/26/2025  Person/Persons responsible for completion of goal: Melody  2.  Short Term Objective (s) - How will we reach this goal?:   A. Provider new recommended medication/dosage changes and/or continue medication(s): continue current medications as prescribed Prozac, Klonopin.  B. Attend medication management appointments regularly.  C. Attend psychotherapy regularly.  D. Spend time with supportive people.  E. Continue positive daily habits for movement, diet, and sleep.  Target Date:6 months - 3/26/2025  Person/Persons Responsible for Completion of Goal: Melody  Progress Towards Goals: continuing treatment, moderate progress  Treatment Modality: medication management every 12 weeks, continue psychotherapy with own therapist  Review due 180 days from date of this plan: 6 months - 3/26/2025  Expected length of service: ongoing treatment    My Physician and I have developed this plan together and I agree to work on the goals and objectives. I understand the treatment goals that were developed for my treatment.  Deysi Cohen, DO  Psychiatry Resident, PGY-IV

## 2024-09-26 NOTE — ED PROVIDER NOTES
1. Anxiety    2. Panic attack    3. Abdominal pain    4. Nausea    5. Marijuana use    6. Hypokalemia      ED Disposition       ED Disposition   Discharge    Condition   Stable    Date/Time   Thu Sep 26, 2024  6:07 AM    Comment   Melody Huynh discharge to home/self care.                   Assessment & Plan       Medical Decision Making  Abdominal exam without peritoneal signs. No evidence of acute abdomen at this time. Well appearing. Given work up, low suspicion for acute hepatobiliary disease (including acute cholecystitis or cholangitis), acute pancreatitis (neg lipase), PUD (including gastric perforation), acute infectious processes (pneumonia, hepatitis, pyelonephritis), acute appendicitis, vascular catastrophe, bowel obstruction, viscus perforation, or genital torsion, diverticulitis. Shared decision making was had with the patient to obtain and due to her having a ct abdomen and pelvis 4 days prior and patient declining at this time. Patient following with psychiatry for severe panic attacks. EKG and troponin without signs of ischemia. Patient potasium 2.8 and repleted. Patient has THC in her urine likely hyperemesis and abdominal pain exacerbating her anxiety. Patient improved with medications and will follow up with GI and PCP. Prior to discharge, discharge instructions were discussed with patient at bedside. Patient was provided both verbal and written instructions. Patient is understanding of the discharge instructions and is agreeable to plan of care. Return precautions were discussed with patient bedside, patient verbalized understanding of signs and symptoms that would necessitate return to the ED. All questions were answered. Patient was comfortable with the plan of care and discharged to home.       Problems Addressed:  Abdominal pain: acute illness or injury  Anxiety: acute illness or injury  Hypokalemia: acute illness or injury  Marijuana use: acute illness or injury  Nausea: acute illness or  injury  Panic attack: acute illness or injury    Amount and/or Complexity of Data Reviewed  Labs: ordered. Decision-making details documented in ED Course.  Radiology: ordered.    Risk  OTC drugs.  Prescription drug management.                ED Course as of 09/26/24 0750   Thu Sep 26, 2024   0357 hs TnI 0hr: 3   0423 PREGNANCY, SERUM: Negative   0425 Pt refused CXR     0537 THC URINE(!): Positive       Medications   potassium chloride (Klor-Con M20) CR tablet 40 mEq (40 mEq Oral Not Given 9/26/24 0403)   aluminum-magnesium hydroxide-simethicone (MAALOX) oral suspension 30 mL (30 mL Oral Not Given 9/26/24 0459)   LORazepam (ATIVAN) tablet 2 mg (2 mg Oral Given 9/26/24 0312)   ondansetron (ZOFRAN-ODT) dispersible tablet 4 mg (4 mg Oral Given 9/26/24 0312)   sodium chloride 0.9 % bolus 1,000 mL (0 mL Intravenous Stopped 9/26/24 0614)   magnesium sulfate 2 g/50 mL IVPB (premix) 2 g (0 g Intravenous Stopped 9/26/24 0437)   acetaminophen (Ofirmev) injection 1,000 mg (0 mg Intravenous Stopped 9/26/24 0405)   diphenhydrAMINE (BENADRYL) injection 25 mg (25 mg Intravenous Given 9/26/24 0342)   potassium chloride 20 mEq IVPB (premix) (0 mEq Intravenous Stopped 9/26/24 0614)   LORazepam (ATIVAN) injection 1 mg (1 mg Intravenous Given 9/26/24 0417)   dicyclomine (BENTYL) tablet 20 mg (20 mg Oral Given 9/26/24 0459)   LORazepam (ATIVAN) injection 1 mg (1 mg Intravenous Given 9/26/24 0543)   potassium chloride oral solution 40 mEq (40 mEq Oral Given 9/26/24 0542)   hydrOXYzine HCL (ATARAX) tablet 25 mg (25 mg Oral Given 9/26/24 0628)       History of Present Illness       The patient is a 43 y.o. female with a history of anxiety, depression, external hemorrhoids, fibroid uterus, GERD, migraine, right-sided sciatica who presents to [facility] with a chief complaint of panic attack. Symptoms began tonight when she had what she calls gas pains and it has been constant since onset. She denies any pain and reports  that she is having a panic attack. Associated symptoms include nausea. Symptoms are aggravated with what she reports as triggers which include nausea and alleviating factors include none noted. The patient denies fever, chills, night sweats, chest pain, cough, sputum, dizziness, lightheadedness, numbness, tingling, weakness, falls, trauma, loss of consciousness, syncope, hematemesis, hematochezia, diarrhea, dysuria, frequency, urgency, ohesitancy, hemoptysis. No other reported symptoms at this time.  Patient affirms allergies to demerol, macrolides, and ketolides            History provided by:  Patient   used: No    Panic Attack  Presenting symptoms: agitation    Presenting symptoms: no self-mutilation and no suicidal thoughts    Associated symptoms: abdominal pain and anxiety    Associated symptoms: no chest pain      Past Medical History:   Diagnosis Date    Anxiety     Depression     Disruption of episiotomy wound in the puerperium     Last assessed 06/22/16    External hemorrhoids     Last assessed 02/05/16    Fibroid uterus     GERD (gastroesophageal reflux disease)     Headache     Leiomyoma of uterus     Migraine     Migraine     Polyhydramnios in third trimester, not applicable or unspecified fetus     Last assessed 01/25/16    Pregnancy headache in third trimester     Resolved 02/05/16    Protein in urine     Last assessed 01/25/16    Sciatica of right side     Third degree laceration of perineum, type 3b 1/26/2016    Varicella         Review of Systems   Constitutional:  Negative for chills and fever.   HENT:  Negative for ear pain and sore throat.    Eyes:  Negative for pain and visual disturbance.   Respiratory:  Negative for cough, shortness of breath, wheezing and stridor.    Cardiovascular:  Negative for chest pain and palpitations.   Gastrointestinal:  Positive for abdominal pain. Negative for blood in stool, constipation, diarrhea, nausea and vomiting.   Genitourinary:  Negative  for dysuria and hematuria.   Musculoskeletal:  Negative for arthralgias, back pain, joint swelling, myalgias and neck pain.   Skin:  Negative for color change and rash.   Neurological:  Negative for seizures and syncope.   Psychiatric/Behavioral:  Positive for agitation. Negative for confusion, self-injury, sleep disturbance and suicidal ideas. The patient is nervous/anxious and is hyperactive.    All other systems reviewed and are negative.          Objective     ED Triage Vitals [09/26/24 0300]   Temperature Pulse Blood Pressure Respirations SpO2 Patient Position - Orthostatic VS   98.6 °F (37 °C) 89 142/93 22 100 % Sitting      Temp Source Heart Rate Source BP Location FiO2 (%) Pain Score    Oral Monitor Left arm -- --        Physical Exam  Vitals reviewed.   Constitutional:       Appearance: Normal appearance. She is not toxic-appearing or diaphoretic.   HENT:      Head: Normocephalic.      Right Ear: Tympanic membrane, ear canal and external ear normal.      Left Ear: Tympanic membrane, ear canal and external ear normal.      Nose: Nose normal.      Mouth/Throat:      Mouth: Mucous membranes are moist.      Pharynx: Oropharynx is clear.   Eyes:      General: No scleral icterus.     Conjunctiva/sclera: Conjunctivae normal.      Pupils: Pupils are equal, round, and reactive to light.   Cardiovascular:      Rate and Rhythm: Normal rate.      Pulses: Normal pulses.   Pulmonary:      Effort: Pulmonary effort is normal. No respiratory distress.      Breath sounds: Normal breath sounds. No stridor. No wheezing, rhonchi or rales.   Abdominal:      General: Abdomen is flat. Bowel sounds are normal. There is no distension.      Palpations: Abdomen is soft. There is no mass.      Tenderness: There is no abdominal tenderness. There is no guarding.      Hernia: No hernia is present.   Musculoskeletal:         General: No swelling or deformity. Normal range of motion.      Cervical back: No tenderness.      Right lower leg:  No edema.   Skin:     General: Skin is warm and dry.      Capillary Refill: Capillary refill takes less than 2 seconds.      Coloration: Skin is not jaundiced.      Findings: No bruising.   Neurological:      Mental Status: She is alert and oriented to person, place, and time. Mental status is at baseline.         Labs Reviewed   COMPREHENSIVE METABOLIC PANEL - Abnormal       Result Value    Sodium 140      Potassium 2.8 (*)     Chloride 107      CO2 19 (*)     ANION GAP 14 (*)     BUN 20      Creatinine 0.97      Glucose 107      Calcium 9.7      AST 13      ALT 13      Alkaline Phosphatase 61      Total Protein 7.2      Albumin 4.6      Total Bilirubin 1.34 (*)     eGFR 72      Narrative:     National Kidney Disease Foundation guidelines for Chronic Kidney Disease (CKD):     Stage 1 with normal or high GFR (GFR > 90 mL/min/1.73 square meters)    Stage 2 Mild CKD (GFR = 60-89 mL/min/1.73 square meters)    Stage 3A Moderate CKD (GFR = 45-59 mL/min/1.73 square meters)    Stage 3B Moderate CKD (GFR = 30-44 mL/min/1.73 square meters)    Stage 4 Severe CKD (GFR = 15-29 mL/min/1.73 square meters)    Stage 5 End Stage CKD (GFR <15 mL/min/1.73 square meters)  Note: GFR calculation is accurate only with a steady state creatinine   UA W REFLEX TO MICROSCOPIC WITH REFLEX TO CULTURE - Abnormal    Color, UA Light Yellow      Clarity, UA Turbid      Specific Gravity, UA 1.019      pH, UA 8.0      Leukocytes, UA Trace (*)     Nitrite, UA Negative      Protein, UA Negative      Glucose, UA Negative      Ketones, UA 40 (2+) (*)     Urobilinogen, UA <2.0      Bilirubin, UA Negative      Occult Blood, UA Negative     RAPID DRUG SCREEN, URINE - Abnormal    Amph/Meth UR Negative      Barbiturate Ur Negative      Benzodiazepine Urine Negative      Cocaine Urine Negative      Methadone Urine Negative      Opiate Urine Negative      PCP Ur Negative      THC Urine Positive (*)     Oxycodone Urine Negative      Fentanyl Urine Negative       HYDROCODONE URINE Negative      Narrative:     Presumptive report. If requested, specimen will be sent to reference lab for confirmation.  FOR MEDICAL PURPOSES ONLY.   IF CONFIRMATION NEEDED PLEASE CONTACT THE LAB WITHIN 5 DAYS.    Drug Screen Cutoff Levels:  AMPHETAMINE/METHAMPHETAMINES  1000 ng/mL  BARBITURATES     200 ng/mL  BENZODIAZEPINES     200 ng/mL  COCAINE      300 ng/mL  METHADONE      300 ng/mL  OPIATES      300 ng/mL  PHENCYCLIDINE     25 ng/mL  THC       50 ng/mL  OXYCODONE      100 ng/mL  FENTANYL      5 ng/mL  HYDROCODONE     300 ng/mL   COVID-19/INFLUENZA A/B RAPID ANTIGEN (30 MIN.TAT) - Normal    SARS COV Rapid Antigen Negative      Influenza A Rapid Antigen Negative      Influenza B Rapid Antigen Negative      Narrative:     This test has been performed using the [employer] Juany 2 FLU+SARS Antigen test under the Emergency Use Authorization (EUA). This test has been validated by the  and verified by the performing laboratory. The Juany uses lateral flow immunofluorescent sandwich assay to detect SARS-COV, Influenza A and Influenza B Antigen.     The [employer] Juany 2 SARS Antigen test does not differentiate between SARS-CoV and SARS-CoV-2.     Negative results are presumptive and may be confirmed with a molecular assay, if necessary, for patient management. Negative results do not rule out SARS-CoV-2 or influenza infection and should not be used as the sole basis for treatment or patient management decisions. A negative test result may occur if the level of antigen in a sample is below the limit of detection of this test.     Positive results are indicative of the presence of viral antigens, but do not rule out bacterial infection or co-infection with other viruses.     All test results should be used as an adjunct to clinical observations and other information available to the provider.    FOR PEDIATRIC PATIENTS - copy/paste COVID Guidelines URL to browser:  [email or web address]   HS TROPONIN I 0HR - Normal    hs TnI 0hr 3     LIPASE - Normal    Lipase 37     PREGNANCY TEST (HCG QUALITATIVE) - Normal    Preg, Serum Negative     CBC AND DIFFERENTIAL    WBC 8.39      RBC 4.61      Hemoglobin 13.9      Hematocrit 38.7      MCV 84      MCH 30.2      MCHC 35.9      RDW 11.9      MPV 9.4      Platelets 253      nRBC 0      Segmented % 68      Immature Grans % 0      Lymphocytes % 25      Monocytes % 5      Eosinophils Relative 2      Basophils Relative 0      Absolute Neutrophils 5.59      Absolute Immature Grans 0.03      Absolute Lymphocytes 2.13      Absolute Monocytes 0.44      Eosinophils Absolute 0.17      Basophils Absolute 0.03     URINE MICROSCOPIC    RBC, UA 1-2      WBC, UA None Seen      Epithelial Cells Occasional      Bacteria, UA Occasional      Amorphous Crystals, UA Occasional       No orders to display       Procedures    ED Medication and Procedure Management   Prior to Admission Medications   Prescriptions Last Dose Informant Patient Reported? Taking?   FLUoxetine (PROzac) 10 mg capsule   No No   Sig: TAKE 1 CAPSULE (10 MG TOTAL) BY MOUTH DAILY TAKE WITH 20 MG CAPSULE FOR TOTAL OF 30 MG DAILY   FLUoxetine (PROzac) 20 mg capsule   No No   Sig: TAKE 1 CAPSULE (20 MG TOTAL) BY MOUTH DAILY TAKE WITH 10 MG CAPSULE FOR TOTAL OF 30 MG DAILY   clonazePAM (KlonoPIN) 0.5 mg tablet   No No   Sig: Take 2 tablets (1 mg total) by mouth 2 (two) times a day as needed for anxiety   ondansetron (ZOFRAN-ODT) 4 mg disintegrating tablet   No No   Sig: Take 1 tablet (4 mg total) by mouth every 8 (eight) hours as needed for nausea   propranolol (INDERAL) 10 mg tablet   No No   Sig: Take 0.5 tablet (5 mg total) by mouth 2 (two) times a day      Facility-Administered Medications: None     Discharge Medication List as of 9/26/2024  6:10 AM        START taking these medications    Details   !! ondansetron (ZOFRAN-ODT) 4 mg  disintegrating tablet Take 1 tablet (4 mg total) by mouth every 6 (six) hours as needed for nausea or vomiting, Starting Thu 9/26/2024, Normal       !! - Potential duplicate medications found. Please discuss with provider.        CONTINUE these medications which have NOT CHANGED    Details   clonazePAM (KlonoPIN) 0.5 mg tablet Take 2 tablets (1 mg total) by mouth 2 (two) times a day as needed for anxiety, Starting Thu 9/19/2024, Until Sat 10/19/2024 at 2359, Normal      !! FLUoxetine (PROzac) 10 mg capsule TAKE 1 CAPSULE (10 MG TOTAL) BY MOUTH DAILY TAKE WITH 20 MG CAPSULE FOR TOTAL OF 30 MG DAILY, Starting Tue 8/27/2024, Until Mon 11/25/2024, Normal      !! FLUoxetine (PROzac) 20 mg capsule TAKE 1 CAPSULE (20 MG TOTAL) BY MOUTH DAILY TAKE WITH 10 MG CAPSULE FOR TOTAL OF 30 MG DAILY, Starting Mon 8/26/2024, Until Sun 11/24/2024, Normal      !! ondansetron (ZOFRAN-ODT) 4 mg disintegrating tablet Take 1 tablet (4 mg total) by mouth every 8 (eight) hours as needed for nausea, Starting Sun 9/22/2024, Normal      propranolol (INDERAL) 10 mg tablet Take 0.5 tablet (5 mg total) by mouth 2 (two) times a day, No Print       !! - Potential duplicate medications found. Please discuss with provider.             Jose Brown PA-C  09/26/24 5011

## 2024-09-26 NOTE — PSYCH
Psychiatric Follow Up Visit - Behavioral Health       [facility]  MRN: [id number]    Assessment/Plan:   Melody Huynh is a 43 y.o. 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 Patient presents individually today.  For review, on initial evaluation, patient presented with symptoms consistent with EPS and akathisia related adverse effects of psychotropic medications. Possible offending agents were previously discontinued (Vraylar, Zyprexa) with adjustments to Prozac and Klonopin dosing.  Today, the patient discusses her anxiety symptoms were well managed in recent months. She discusses acute worsening of anxiety resulting in emergency room visit requiring Ativan over the past weekend, 09/22/24. The patient notes gastrointestinal upset occurred which is a common trigger for her and she began to have a panic attack. She notes her coping strategies did not help and she and her  decided to present to the emergency room.  The patient notes prior to recent ED visits, her increased rate of thoughts, negative thoughts were improving and minimal. She denied a recent or current down, depressed mood, changes to energy, changes to sleep, or appetite. The patient denies recurrence of EPS related/akathisia symptoms.    Discussed potential triggers with patient and recommendation to continue Prozac and Klonopin at current doses, restart consistent psychotherapy, and decrease with recommended cessation in THC use. Patient in agreement with plan.  Assessment & Plan  Anxiety disorder, unspecified type  Continue Prozac 30 mg daily for mood and anxiety  PARQ completed including serotonin syndrome, SIADH, worsening depression, suicidality, induction of ghada, GI upset, headaches, activation, sexual side effects, sedation, potential drug interactions, and others.  Continue Klonopin 1 mg twice daily as needed for anxiety  Patient is averaging 1 mg daily  PDMP reviewed, last filled 09/09/24  Discussed with patient the risks of sedation, respiratory depression, impairment in judgment and motor function. Depression, mood changes, GI changes, and others discussed. Patient informed of risks of being on or starting opioid medications due to drug interactions and potential for serious respiratory depression and death.   Patient has decreased Propranolol to 5 mg daily in the morning  Continue 5 mg daily in the morning x5 days, then discontinue medication  PARQ completed including dizziness, sedation, headache, blood pressure, depression  Continue psychotherapy with own therapist  Recommend increasing frequency of sessions    Unspecified mood (affective) disorder (HCC)  Continue Prozac as above  Continue psychotherapy with own therapist.    Most recent lab work previously reviewed.  Medication management every 8 weeks  Aware of need to follow up with family physician for medical issues  Aware of 24 hour and weekend coverage for urgent situations accessed by calling [facility] main practice number  Monitor CBC w/diff and CMP in setting of SSRI prescription.    Medication Risks/Benefits      Risks, Benefits And Possible Side Effects Of Medications:    Risks, benefits, and possible side effects of medications explained to  Melody and she verbalizes understanding and agreement for treatment.    Controlled Medication Discussion:     Melody has been filling controlled prescriptions on time as prescribed according to [place] Prescription Drug Monitoring Program  Discussed with Melody the risks of sedation, respiratory depression, impairment of ability to drive and potential for abuse and addiction related to treatment with benzodiazepine medications. She understands risk of treatment with benzodiazepine medications, agrees to not drive if feels impaired and agrees to take medications as prescribed  Discussed with Melody Black Box warning on concurrent use of benzodiazepines and opioid medications including sedation, respiratory depression, coma and death. She understands the risk of treatment with benzodiazepines in addition to opioids and wants to continue taking those medications  Discussed with Melody increased risk of impairment related to concurrent use of benzodiazepines and hypnotic medications including excessive sedation, psychomotor impairment and respiratory depression. She understands the risk of treatment with benzodiazepines in addition to hypnotic medications and wants to continue taking those medications  ------------------------------------  History of Present Illness   Melody Huynh is presenting to follow up for anxiety, depression, and history of akathisia and EPS related symptoms . Melody reports her anxiety and mood related symptoms had been well managed in recent months and since last evaluation. Patient endorses she had a recent vacation and experienced GI upset once home, this triggered significant anxiety and a panic attack which resulted in an emergency room visit. She received Ativan and was worked up for the abdominal pain with subsequent symptom relief.  Discussed potential triggers with patient - she notes change in routine, pausing psychotherapy, and physical illness may have contributed  to symptoms. Recommended to patient to continue Prozac and Klonopin at current doses, restart consistent psychotherapy, and decrease with recommended cessation in THC use.     Patient denies a down, depressed mood. She denies low energy or motivation - noting she has been going to the gym regularly and actively involved in getting her daughter to school each morning and to extracurriculars. Patient denies SI, HI, passive death wishes, or thoughts to harm self or others,    The patient does not endorse or demonstrate symptoms consistent with ghada/hypomania or positive or negative symptoms of psychosis at time of evaluation.    The patient does not demonstrate akathisia or EPS related symptoms at time of evaluation and denies experiencing related symptoms in recent months.    Psychiatric Review Of Systems:  Melody reports Symptoms as described in HPI.  Melody denies Current suicidal thoughts, plan, or intent, Current thoughts of self-harm, or Current homicidal thoughts, plan, or intent.    Medical Review Of Systems:  Complete review of systems is negative except as noted above.    [place] Akathisia Rating Scale (BARS) -  Prior score on 10/23/23: 8 and Global Clinical Assessment score:3, 11/14/23: 7 and Global Clinical Assessment score of 2  12/04/23: Global Clinical Assessment score: 3 due to distress to patient  02/29/24: 0 and Global Clinical Assessment score of 0  04/22/24: 0 and Global Clinical Assessment score of 0  Objective   0 Normal, occasional fidgety movements of the limbs  1 Presence of characteristic restless movements: shuffling or tramping movements of the legs/feet, or swinging of one leg while sitting, and/or rocking from foot to foot or “walking on the spot” when standing, but movements present for less than half the time observed  2 Observed phenomena, as described in (1) above, which are present for at least half the observation period  3 Patient is constantly engaged in characteristic restless  movements, and/or has the inability to remain seated or standing without walking or pacing, during the time observed  Subjective  0 Absence of inner restlessness  1 Non-specific sense of inner restlessness  2 The patient is aware of an inability to keep the legs still, or a desire to move the legs, and/or complains of inner restlessness aggravated specifically by being required to stand still  3 Awareness of intense compulsion to move most of the time and/or reports strong desire to walk or pace most of the time  Distress related to restlessness  0 No distress  1 Mild  2 Moderate  3 Severe  Global Clinical Assessment of Akathisia  0 Absent. No evidence of awareness of restlessness. Observation of characteristic movements of akathisia in the absence of a subjective report of inner restlessness or compulsive desire to move the legs should be classified as pseudoakathisia  1 Questionable. Non-specific inner tension and fidgety movements  2 Mild akathisia. Awareness of restlessness in the legs and/or inner restlessness worse when required to stand still. Fidgety movements present, but characteristic restless movements of akathisia not necessarily observed. Condition causes little or no distress.  3 Moderate akathisia. Awareness of restlessness as described for mild akathisia above, combined with characteristic restless movements such as rocking from foot to foot when standing. Patient finds the condition distressing.  4 Marked akathisia. Subjective experience of restlessness includes a compulsive desire to walk or pace. However, the patient is able to remain seated for at least five minutes. The condition is obviously distressing.  5 Severe akathisia. The patient reports a strong compulsion to pace up and down most of the time. Unable to sit or lie down for more than a few minutes. Constant restlessness which is associated with intense distress and insomnia.  ------------------------------------  All italicized  information has been copied from previous psychiatric evaluation. Information has been reviewed with the patient.     Past Psychiatric History:   Prior psychiatric diagnoses: anxiety, panic disorder, mood disorder, OCD  Inpatient hospitalizations: inpatient hospitalizations - first hospitalization 2001 - [facility], 2008 - [facility] [place], 2021 - panic disorder, mood symptoms  Emergency room visits often in-between for severe panic attacks - 2016  Patient and  report no psychiatric care   Suicide attempts/self-harm: patient denies, patient denies self harm behaviors  Violent/aggressive behavior: patient denies   Patient did have some aggressive outbursts towards  and providers  Outpatient psychiatric providers: currently has outpatient psychiatric provider - [facility]   Was without a psychiatrist from [phone] (Lexapro from [facility] during that time), prior psychiatrist, Dr. Martines, retired  Past/current psychotherapy: patient has a therapist weekly, Dr. Foster, [facility]  Other Services: patient denies  Psychiatric medication trial:   Multiple medication trials - including below,  Antidepressants  Prozac (up to 40 mg), Zoloft (short period of time), Lexapro (multiple years), Luvox  Antipsychotics  Vraylar, Zyprexa, Seroquel, Haldol (in ED)  Sedative hypnotics  Ativan, Klonopin (0.5 mg QID PRN)  Others  Hydroxyzine, Gabapentin, BuSpar (60 mg total daily)      Substance Abuse History:  I have assessed this patient for substance use within the past 12 months.  Patient reports a few cigarettes per day, quit 2007/2008. Patient reports history of daily marijuana use - had medical marijuana card - few months without smoking.   Denies history of other recreational use including opioids, methamphetamines/amphetamines, cocaine use. Denies past legal actions or arrests secondary to substance intoxication. The patient denies prior DWIs/DUIs. Melody yen  not exhibit objective evidence of substance withdrawal during today's examination nor does Melody appear under the influence of any psychoactive substance.       Family Psychiatric History:   Mother: anxiety (on antidepressant - Lexapro)  No family history of substance use disorder or suicide attempts or completions.     Social History  Early life/developmental: Denies a history of milestone/developmental delay. Denies a history of in-utero exposure to toxins/illicit substances. Denies ADHD history. No history of IEP/504, denies special supports in school.  Marital history: /Civil Union   Children: yes, 1 daughter (7 y/o - 2nd grade)  1 older brother   Living arrangement: Lives in a home with  and daughter (family lost their dog in May).  Support system: identifies  as the biggest source of support  Mother also a big support  Education: college graduate - communications ([employer])  Had to withdraw from college twice due to anxiety  Occupational History: unemployed since January 2023, applying for permanent disability - previously in advertising in 10-15 years  Other Pertinent History: no reported  or legal history  Access to firearms:  has firearm, locked and secured      Traumatic History:   Abuse:  patient denied  Other Traumatic Events:  medical trauma, father passed when she was 16 y/o    History Review: The following portions of the patient's history were reviewed and updated as appropriate: allergies, current medications, past family history, past medical history, past social history, past surgical history, and problem list.    Visit Vitals  OB Status Implant   Smoking Status Never      Wt Readings from Last 6 Encounters:   04/16/24 65.8 kg (145 lb)   12/18/23 59.3 kg (130 lb 11.2 oz)   10/11/23 57.8 kg (127 lb 6.4 oz)   10/06/23 58.7 kg (129 lb 8 oz)   09/25/23 60.1 kg (132 lb 8 oz)   05/11/23 61.7 kg (136 lb)        Rating Scales  PHQ-2/9 Depression Screening        "          Mental Status Exam:  Appearance:  alert, good eye contact, appears stated age, casually dressed, appropriate grooming and hygiene, and smiling   Behavior:  calm, cooperative, and sitting comfortably   Motor: no abnormal movements, normal gait and balance, and no tremor, no rigidity observed   Speech:  spontaneous, clear, normal rate, not pressured, and coherent   Mood:  \"okay\"   Affect:  mood-congruent, appropriate range, and tearful at times discussing recent ED visit   Thought Process:  Organized, logical, goal-directed, intermittent increased rate of thoughts   Thought Content: no verbalized delusions or overt paranoia, intermittent negative thinking   Perceptual disturbances: no reported hallucinations and does not appear to be responding to internal stimuli at this time   Risk Potential: No active or passive suicidal or homicidal ideation was verbalized during interview   Cognition: oriented to person, place, time, and situation, memory grossly intact, appears to be of average intelligence, age-appropriate attention span and concentration, and cognition not formally tested   Insight:  Good   Judgment: Good       Meds/Allergies    Allergies   Allergen Reactions    Demerol [Meperidine] Hyperactivity    Macrolides And Ketolides      Current Outpatient Medications   Medication Instructions    clonazePAM (KLONOPIN) 1 mg, Oral, 2 times daily PRN    FLUoxetine (PROZAC) 20 mg, Oral, Daily, Take with 10 mg capsule for total of 30 mg daily    FLUoxetine (PROZAC) 10 mg, Oral, Daily, Take with 20 mg capsule for total of 30 mg daily    ondansetron (ZOFRAN-ODT) 4 mg, Oral, Every 8 hours PRN    propranolol (INDERAL) 10 mg tablet Take 0.5 tablet (5 mg total) by mouth 2 (two) times a day        Labs & Imaging:  I have personally reviewed all pertinent laboratory tests and imaging results.   Admission on 09/22/2024, Discharged on 09/22/2024   Component Date Value Ref Range Status    Ventricular Rate 09/22/2024 75  BPM " Final    Atrial Rate 09/22/2024 75  BPM Final    CO Interval 09/22/2024 132  ms Final    QRSD Interval 09/22/2024 92  ms Final    QT Interval 09/22/2024 438  ms Final    QTC Interval 09/22/2024 489  ms Final    P Axis 09/22/2024 78  degrees Final    QRS Axis 09/22/2024 94  degrees Final    T Wave Hope 09/22/2024 84  degrees Final    WBC 09/22/2024 11.56 (H)  4.31 - 10.16 Thousand/uL Final    RBC 09/22/2024 5.08  3.81 - 5.12 Million/uL Final    Hemoglobin 09/22/2024 15.2  11.5 - 15.4 g/dL Final    Hematocrit 09/22/2024 43.3  34.8 - 46.1 % Final    MCV 09/22/2024 85  82 - 98 fL Final    MCH 09/22/2024 29.9  26.8 - 34.3 pg Final    MCHC 09/22/2024 35.1  31.4 - 37.4 g/dL Final    RDW 09/22/2024 11.9  11.6 - 15.1 % Final    MPV 09/22/2024 9.5  8.9 - 12.7 fL Final    Platelets 09/22/2024 264  149 - 390 Thousands/uL Final    nRBC 09/22/2024 0  /100 WBCs Final    Segmented % 09/22/2024 80 (H)  43 - 75 % Final    Immature Grans % 09/22/2024 0  0 - 2 % Final    Lymphocytes % 09/22/2024 15  14 - 44 % Final    Monocytes % 09/22/2024 4  4 - 12 % Final    Eosinophils Relative 09/22/2024 1  0 - 6 % Final    Basophils Relative 09/22/2024 0  0 - 1 % Final    Absolute Neutrophils 09/22/2024 9.19 (H)  1.85 - 7.62 Thousands/µL Final    Absolute Immature Grans 09/22/2024 0.04  0.00 - 0.20 Thousand/uL Final    Absolute Lymphocytes 09/22/2024 1.73  0.60 - 4.47 Thousands/µL Final    Absolute Monocytes 09/22/2024 0.49  0.17 - 1.22 Thousand/µL Final    Eosinophils Absolute 09/22/2024 0.06  0.00 - 0.61 Thousand/µL Final    Basophils Absolute 09/22/2024 0.05  0.00 - 0.10 Thousands/µL Final    Sodium 09/22/2024 141  135 - 147 mmol/L Final    Potassium 09/22/2024 3.4 (L)  3.5 - 5.3 mmol/L Final    Chloride 09/22/2024 105  96 - 108 mmol/L Final    CO2 09/22/2024 17 (L)  21 - 32 mmol/L Final    ANION GAP 09/22/2024 19 (H)  4 - 13 mmol/L Final    BUN 09/22/2024 22  5 - 25 mg/dL Final    Creatinine 09/22/2024 1.23  0.60 - 1.30 mg/dL Final     Standardized to IDMS reference method    Glucose 09/22/2024 177 (H)  65 - 140 mg/dL Final    If the patient is fasting, the ADA then defines impaired fasting glucose as > 100 mg/dL and diabetes as > or equal to 123 mg/dL.    Calcium 09/22/2024 12.3 (H)  8.4 - 10.2 mg/dL Final    AST 09/22/2024 17  13 - 39 U/L Final    ALT 09/22/2024 19  7 - 52 U/L Final    Specimen collection should occur prior to Sulfasalazine administration due to the potential for falsely depressed results.     Alkaline Phosphatase 09/22/2024 61  34 - 104 U/L Final    Total Protein 09/22/2024 7.9  6.4 - 8.4 g/dL Final    Albumin 09/22/2024 4.9  3.5 - 5.0 g/dL Final    Total Bilirubin 09/22/2024 2.70 (H)  0.20 - 1.00 mg/dL Final    Use of this assay is not recommended for patients undergoing treatment with eltrombopag due to the potential for falsely elevated results.  N-acetyl-p-benzoquinone imine (metabolite of Acetaminophen) will generate erroneously low results in samples for patients that have taken an overdose of Acetaminophen.    eGFR 09/22/2024 54  ml/min/1.73sq m Final    Lipase 09/22/2024 21  11 - 82 u/L Final    Preg, Serum 09/22/2024 Negative  Negative Final     ---------------------------------------    Although patient's acute lethality risk is low, long-term/chronic lethality risk is mildly elevated in the presence of anxiety, history of depression, history of traumatic experiences, history of substance use, access to firearms (locked and secured). At the current moment, Melody is future-oriented, forward-thinking, and demonstrates ability to act in a self-preserving manner as evidenced by volitionally presenting to the clinic today, seeking treatment. At this juncture, inpatient hospitalization is not currently warranted. To mitigate future risk, patient should adhere to the recommendations of this writer, avoid alcohol/illicit substance use, utilize community-based resources and familiar support and prioritize mental health  treatment.     Based on today's assessment and clinical criteria, Melody Huynh contracts for safety and is not an imminent risk of harm to self or others. Outpatient level of care is deemed appropriate at this present time. Melody understands that if they are no longer able to contract for safety, they need to call/contact the outpatient office including this writer, call/contact crisis and/orattend to the nearest Emergency Department for immediate evaluation.    Risk of Harm to Self:  The following ratings are based on assessment at the time of the interview  Demographic risk factors include:   Historical Risk Factors include: history of depression, chronic anxiety symptoms, history of mood disorder, history of substance use, history of traumatic experiences  Recent Specific Risk Factors include: diagnosis of mood disorder, current depressive symptoms, current anxiety symptoms, unemployed, medication adverse effects  Protective Factors: no current suicidal ideation, access to mental health treatment, being a parent, being , compliant with medications, compliant with mental health treatment, connection to own children, having a desire to be alive, having a sense of purpose or meaning in life, opportunities to participate in community, responsibilities and duties to others, stable living environment, supportive family  Weapons: gun. The following steps have been taken to ensure weapons are properly secured: locked, secured, confirmed with   Based on today's assessment, Melody presents the following risk of harm to self: low     Risk of Harm to Others:  The following ratings are based on assessment at the time of the interview  Demographic Risk Factors include: unemployed.  Historical Risk Factors include: history of substance use.  Recent Specific Risk Factors include: weapons or other means available, concomitant mood disorder, multiple stressors.  Protective Factors: no current  homicidal ideation, access to mental health treatment, being a parent, being , compliant with medications, compliant with mental health treatment, opportunities to participate in community, resilience, responsibilities and duties to others, safe and stable living environment, supportive family  Weapons: gun. The following steps have been taken to ensure weapons are properly secured: locked, secured, confirmed by   Based on today's assessment, Melody presents the following risk of harm to others: low     Psychotherapy Provided:     Individual psychotherapy provided: Yes  Counseling was provided during the session today for 10 minutes.  Medications, treatment progress and treatment plan reviewed with Melody.    Treatment Plan:    Completed and signed during the session:  Yes, and sent to [employer] for electronic signature.    This note was shared with patient.    Visit Time:  Visit Start Time: 1600  Visit Stop Time: 1635  Total Visit Duration:  35 minutes    Deysi Cohen DO  Psychiatry Resident, PGY-IV    This note has been constructed using a voice recognition system. There may be translation, syntax, or grammatical errors. If you have any questions, please contact the dictating provider.

## 2024-09-26 NOTE — TELEPHONE ENCOUNTER
[facility] PARTIAL PROGRAM REFERRAL     [facility]    Name and Date of Birth:  Melody Huynh 43 y.o. 1980    Date of Referral: September 26, 2024    Referral Source (Agency/Name): [facility] - Dr. Deysi Cohen DO    Correct Demographics on file:  Yes     Emergency contact on file: Yes     Insurance on file: Yes     _____________________________________________      Presenting Symptoms and Stressors:      Please describe the reason for Referral: Increase in anxiety related symptoms resulting in 2 emergency room presentations in 1 week.    Stressors: medical illness in family, everyday stressors, chronic anxiety, and ongoing anxiety    Symptoms:  anxiety symptoms, worsening anxiety, panic attacks, problems with concentration, and THC use    Suicidal Ideation: None at present    Homicidal Ideation: None at present    Depressed Mood: ruminations, negative thoughts, guilt    Neda/Hypomania: None    Psychosis: None    Agitation: No    Appetite Changes: normal appetite    Sleep Disturbance: normal sleep    Access to Weapons:  Yes: firearm    Smoking Status: denies current use    Substance Use:  Cannabis: current use, history of past use    If Use: Current SA treatment: N/A    Current Psychiatrist or Therapist:    Psychiatrist: Dr. Deysi Cohen DO  Therapist: Outside individual therapist    Past Psychiatric Treatment: Inpatient hospitalizations - first hospitalization 2001 - [facility], 2008 - [facility], 2021 - panic disorder, mood symptoms  Emergency room visits often in-between for severe panic attacks    If currently Inpatient - Date of Anticipated discharge: No    Diagnoses:  Unspecified Mood Disorder  Anxiety Disorder    Do they Require Ambulatory Assistance: No    Communication Assistance: not required     Legal Issues: None        Deysi Cohen DO  Psychiatry Resident, PGY-IV

## 2024-09-26 NOTE — TELEPHONE ENCOUNTER
Melody called back again and she is still problematic with the Panic Attack Symptoms. She is having a very difficult time and is asking to speak to you again. Writer informed that Provider is not in the office today and we would send a message to see if you could reach out to her. You can reach out to her at [phone]

## 2024-09-26 NOTE — TELEPHONE ENCOUNTER
Returned call to patient and her , German ([phone]), patient experienced gas pains, nausea, followed by emesis overnight. She entered in a panic attack and she utilized coping skills for 2 hours, however, panic attack escalated and patient presented to the emergency room. She received fluids, repletion of potassium, Zofran, and Ativan x3. EKG WNL.  Patient is currently experiencing anxiety related symptoms of increased rate of thoughts, negative thinking, restlessness, and intermittent tearfulness.    Patient was seen in office yesterday - recent ED visit from 09/22/24 was reviewed and discussed. Medications were reviewed. Recommendation to continue Prozac and Klonopin made as well as to increase frequency of psychotherapy sessions.  Patient denied SI, passive death wishes, thoughts to harm self or others at time of office visit and at time of today's phone encounter. Patient has familial support of  and her mother at this time. Family feels safe to have patient at home at this time.     Discussed with patient and  recommendation for PHP referral at this time given increase in anxiety resulting in ED presentations in setting of psychosocial stressors. Patient agreeable to referral.  Discussed increasing Klonopin to 1 mg TID x3 days to help bridge to care and in setting of anxiety related symptoms and history of positive response to PRN Klonopin. Patient to continue Prozac at current dose.    Return precautions for ED presentation reviewed with patient and German who expressed understanding.  Patient and family aware of 24/7 coverage for urgent needs through [facility] main line.

## 2024-09-26 NOTE — TELEPHONE ENCOUNTER
German Huynh () called regarding Melody. He states Melody had an episode of stomach pain which is a trigger for the panic attack she had. He took her to the ER 3:30am to 6:30am this morning. They gave her medication (he said see medications on chart) so she was able to rest and get through it. Melody was just in the hospital back on 9/22/24 for the same issue. German is asking where do they go from here.   Pt and  asking for return call. Please call German at [phone]

## 2024-09-26 NOTE — DISCHARGE INSTRUCTIONS
Follow-up with PCP and GI as needed.  Use medication as directed.  Cease marijuana use.  If any symptoms worsen or new symptoms peer return to the ER.

## 2024-09-27 ENCOUNTER — HOSPITAL ENCOUNTER (EMERGENCY)
Facility: HOSPITAL | Age: 44
End: 2024-09-27
Attending: EMERGENCY MEDICINE
Payer: COMMERCIAL

## 2024-09-27 ENCOUNTER — HOSPITAL ENCOUNTER (INPATIENT)
Facility: HOSPITAL | Age: 44
LOS: 10 days | Discharge: HOME | DRG: 880 | End: 2024-10-07
Attending: HOSPITALIST | Admitting: PSYCHIATRY & NEUROLOGY
Payer: COMMERCIAL

## 2024-09-27 VITALS
SYSTOLIC BLOOD PRESSURE: 104 MMHG | BODY MASS INDEX: 24.65 KG/M2 | DIASTOLIC BLOOD PRESSURE: 63 MMHG | WEIGHT: 139.11 LBS | TEMPERATURE: 98.2 F | HEIGHT: 63 IN | HEART RATE: 75 BPM | RESPIRATION RATE: 18 BRPM | OXYGEN SATURATION: 97 %

## 2024-09-27 DIAGNOSIS — F41.9 ANXIETY DISORDER, UNSPECIFIED TYPE: ICD-10-CM

## 2024-09-27 DIAGNOSIS — F41.9 ANXIETY: ICD-10-CM

## 2024-09-27 DIAGNOSIS — F41.0 PANIC DISORDER WITHOUT AGORAPHOBIA WITH SEVERE PANIC ATTACKS: Primary | ICD-10-CM

## 2024-09-27 DIAGNOSIS — Z00.8 ENCOUNTER FOR PSYCHOLOGICAL EVALUATION: ICD-10-CM

## 2024-09-27 DIAGNOSIS — E53.8 VITAMIN B12 DEFICIENCY: ICD-10-CM

## 2024-09-27 DIAGNOSIS — F41.9 ANXIETY: Primary | ICD-10-CM

## 2024-09-27 DIAGNOSIS — R11.0 NAUSEA: ICD-10-CM

## 2024-09-27 DIAGNOSIS — Z13.30 ENCOUNTER FOR SCREENING EXAMINATION FOR MENTAL HEALTH AND BEHAVIORAL DISORDERS, UNSPECIFIED: ICD-10-CM

## 2024-09-27 DIAGNOSIS — F39 UNSPECIFIED MOOD (AFFECTIVE) DISORDER (HCC): ICD-10-CM

## 2024-09-27 LAB
AMPHETAMINES SERPL QL SCN: NEGATIVE
ATRIAL RATE: 78 BPM
BARBITURATES UR QL: NEGATIVE
BENZODIAZ UR QL: POSITIVE
COCAINE UR QL: NEGATIVE
ETHANOL EXG-MCNC: 0 MG/DL
FENTANYL UR QL SCN: NEGATIVE
HYDROCODONE UR QL SCN: NEGATIVE
METHADONE UR QL: NEGATIVE
OPIATES UR QL SCN: NEGATIVE
OXYCODONE+OXYMORPHONE UR QL SCN: NEGATIVE
P AXIS: 77 DEGREES
PCP UR QL: NEGATIVE
PR INTERVAL: 118 MS
QRS AXIS: 95 DEGREES
QRSD INTERVAL: 90 MS
QT INTERVAL: 428 MS
QTC INTERVAL: 487 MS
T WAVE AXIS: 73 DEGREES
THC UR QL: POSITIVE
VENTRICULAR RATE: 78 BPM

## 2024-09-27 PROCEDURE — 96375 TX/PRO/DX INJ NEW DRUG ADDON: CPT

## 2024-09-27 PROCEDURE — 99284 EMERGENCY DEPT VISIT MOD MDM: CPT

## 2024-09-27 PROCEDURE — 93005 ELECTROCARDIOGRAM TRACING: CPT

## 2024-09-27 PROCEDURE — 99283 EMERGENCY DEPT VISIT LOW MDM: CPT | Performed by: EMERGENCY MEDICINE

## 2024-09-27 PROCEDURE — 80307 DRUG TEST PRSMV CHEM ANLYZR: CPT | Performed by: EMERGENCY MEDICINE

## 2024-09-27 PROCEDURE — 93010 ELECTROCARDIOGRAM REPORT: CPT | Performed by: INTERNAL MEDICINE

## 2024-09-27 PROCEDURE — 82075 ASSAY OF BREATH ETHANOL: CPT | Performed by: EMERGENCY MEDICINE

## 2024-09-27 PROCEDURE — 96374 THER/PROPH/DIAG INJ IV PUSH: CPT

## 2024-09-27 RX ORDER — HALOPERIDOL 5 MG/1
5 TABLET ORAL
Status: DISCONTINUED | OUTPATIENT
Start: 2024-09-27 | End: 2024-10-07 | Stop reason: HOSPADM

## 2024-09-27 RX ORDER — IBUPROFEN 600 MG/1
600 TABLET, FILM COATED ORAL EVERY 6 HOURS PRN
Status: DISCONTINUED | OUTPATIENT
Start: 2024-09-27 | End: 2024-10-07 | Stop reason: HOSPADM

## 2024-09-27 RX ORDER — DIPHENHYDRAMINE HYDROCHLORIDE 50 MG/ML
25 INJECTION INTRAMUSCULAR; INTRAVENOUS ONCE
Status: COMPLETED | OUTPATIENT
Start: 2024-09-27 | End: 2024-09-27

## 2024-09-27 RX ORDER — HALOPERIDOL 5 MG/ML
2.5 INJECTION INTRAMUSCULAR
Status: CANCELLED | OUTPATIENT
Start: 2024-09-27

## 2024-09-27 RX ORDER — BENZTROPINE MESYLATE 1 MG/1
1 TABLET ORAL
Status: DISCONTINUED | OUTPATIENT
Start: 2024-09-27 | End: 2024-10-07 | Stop reason: HOSPADM

## 2024-09-27 RX ORDER — AMOXICILLIN 250 MG
1 CAPSULE ORAL DAILY PRN
Status: CANCELLED | OUTPATIENT
Start: 2024-09-27

## 2024-09-27 RX ORDER — HALOPERIDOL 1 MG/1
1 TABLET ORAL EVERY 6 HOURS PRN
Status: CANCELLED | OUTPATIENT
Start: 2024-09-27

## 2024-09-27 RX ORDER — HALOPERIDOL 1 MG/1
5 TABLET ORAL
Status: CANCELLED | OUTPATIENT
Start: 2024-09-27

## 2024-09-27 RX ORDER — LANOLIN ALCOHOL/MO/W.PET/CERES
3 CREAM (GRAM) TOPICAL
Status: DISCONTINUED | OUTPATIENT
Start: 2024-09-27 | End: 2024-10-03

## 2024-09-27 RX ORDER — LORAZEPAM 2 MG/ML
2 INJECTION INTRAMUSCULAR
Status: DISCONTINUED | OUTPATIENT
Start: 2024-09-27 | End: 2024-10-07 | Stop reason: HOSPADM

## 2024-09-27 RX ORDER — DROPERIDOL 2.5 MG/ML
1.25 INJECTION, SOLUTION INTRAMUSCULAR; INTRAVENOUS ONCE
Status: COMPLETED | OUTPATIENT
Start: 2024-09-27 | End: 2024-09-27

## 2024-09-27 RX ORDER — TRAZODONE HYDROCHLORIDE 50 MG/1
50 TABLET, FILM COATED ORAL
Status: CANCELLED | OUTPATIENT
Start: 2024-09-27

## 2024-09-27 RX ORDER — PROPRANOLOL HCL 20 MG
10 TABLET ORAL EVERY 8 HOURS PRN
Status: CANCELLED | OUTPATIENT
Start: 2024-09-27

## 2024-09-27 RX ORDER — IBUPROFEN 400 MG/1
400 TABLET, FILM COATED ORAL EVERY 4 HOURS PRN
Status: CANCELLED | OUTPATIENT
Start: 2024-09-27

## 2024-09-27 RX ORDER — DIPHENHYDRAMINE HYDROCHLORIDE 50 MG/ML
50 INJECTION INTRAMUSCULAR; INTRAVENOUS EVERY 6 HOURS PRN
Status: DISCONTINUED | OUTPATIENT
Start: 2024-09-27 | End: 2024-10-07 | Stop reason: HOSPADM

## 2024-09-27 RX ORDER — HYDROXYZINE HYDROCHLORIDE 25 MG/1
100 TABLET, FILM COATED ORAL
Status: CANCELLED | OUTPATIENT
Start: 2024-09-27

## 2024-09-27 RX ORDER — HYDROXYZINE HYDROCHLORIDE 25 MG/1
25 TABLET, FILM COATED ORAL
Status: DISCONTINUED | OUTPATIENT
Start: 2024-09-27 | End: 2024-10-07 | Stop reason: HOSPADM

## 2024-09-27 RX ORDER — AMOXICILLIN 250 MG
1 CAPSULE ORAL DAILY PRN
Status: DISCONTINUED | OUTPATIENT
Start: 2024-09-27 | End: 2024-10-07 | Stop reason: HOSPADM

## 2024-09-27 RX ORDER — HYDROXYZINE HYDROCHLORIDE 25 MG/1
50 TABLET, FILM COATED ORAL
Status: CANCELLED | OUTPATIENT
Start: 2024-09-27

## 2024-09-27 RX ORDER — IBUPROFEN 800 MG/1
800 TABLET, FILM COATED ORAL EVERY 8 HOURS PRN
Status: DISCONTINUED | OUTPATIENT
Start: 2024-09-27 | End: 2024-10-07 | Stop reason: HOSPADM

## 2024-09-27 RX ORDER — BENZTROPINE MESYLATE 1 MG/ML
0.5 INJECTION, SOLUTION INTRAMUSCULAR; INTRAVENOUS
Status: CANCELLED | OUTPATIENT
Start: 2024-09-27

## 2024-09-27 RX ORDER — IBUPROFEN 400 MG/1
800 TABLET, FILM COATED ORAL EVERY 8 HOURS PRN
Status: CANCELLED | OUTPATIENT
Start: 2024-09-27

## 2024-09-27 RX ORDER — HALOPERIDOL 5 MG/ML
2.5 INJECTION INTRAMUSCULAR
Status: DISCONTINUED | OUTPATIENT
Start: 2024-09-27 | End: 2024-10-07 | Stop reason: HOSPADM

## 2024-09-27 RX ORDER — MAGNESIUM HYDROXIDE/ALUMINUM HYDROXICE/SIMETHICONE 120; 1200; 1200 MG/30ML; MG/30ML; MG/30ML
30 SUSPENSION ORAL EVERY 4 HOURS PRN
Status: DISCONTINUED | OUTPATIENT
Start: 2024-09-27 | End: 2024-10-07 | Stop reason: HOSPADM

## 2024-09-27 RX ORDER — BISACODYL 10 MG
10 SUPPOSITORY, RECTAL RECTAL DAILY PRN
Status: CANCELLED | OUTPATIENT
Start: 2024-09-27

## 2024-09-27 RX ORDER — LORAZEPAM 2 MG/ML
2 INJECTION INTRAMUSCULAR EVERY 6 HOURS PRN
Status: CANCELLED | OUTPATIENT
Start: 2024-09-27

## 2024-09-27 RX ORDER — LORAZEPAM 2 MG/ML
2 INJECTION INTRAMUSCULAR
Status: CANCELLED | OUTPATIENT
Start: 2024-09-27

## 2024-09-27 RX ORDER — HALOPERIDOL 5 MG/ML
5 INJECTION INTRAMUSCULAR
Status: DISCONTINUED | OUTPATIENT
Start: 2024-09-27 | End: 2024-10-07 | Stop reason: HOSPADM

## 2024-09-27 RX ORDER — BISACODYL 10 MG
10 SUPPOSITORY, RECTAL RECTAL DAILY PRN
Status: DISCONTINUED | OUTPATIENT
Start: 2024-09-27 | End: 2024-10-07 | Stop reason: HOSPADM

## 2024-09-27 RX ORDER — BENZTROPINE MESYLATE 1 MG/1
1 TABLET ORAL
Status: CANCELLED | OUTPATIENT
Start: 2024-09-27

## 2024-09-27 RX ORDER — LORAZEPAM 2 MG/ML
1 INJECTION INTRAMUSCULAR
Status: CANCELLED | OUTPATIENT
Start: 2024-09-27

## 2024-09-27 RX ORDER — HYDROXYZINE HYDROCHLORIDE 50 MG/1
100 TABLET, FILM COATED ORAL
Status: DISCONTINUED | OUTPATIENT
Start: 2024-09-27 | End: 2024-10-07 | Stop reason: HOSPADM

## 2024-09-27 RX ORDER — BENZTROPINE MESYLATE 1 MG/ML
1 INJECTION, SOLUTION INTRAMUSCULAR; INTRAVENOUS
Status: DISCONTINUED | OUTPATIENT
Start: 2024-09-27 | End: 2024-10-07 | Stop reason: HOSPADM

## 2024-09-27 RX ORDER — LORAZEPAM 2 MG/ML
1 INJECTION INTRAMUSCULAR
Status: DISCONTINUED | OUTPATIENT
Start: 2024-09-27 | End: 2024-10-07 | Stop reason: HOSPADM

## 2024-09-27 RX ORDER — MAGNESIUM HYDROXIDE/ALUMINUM HYDROXICE/SIMETHICONE 120; 1200; 1200 MG/30ML; MG/30ML; MG/30ML
30 SUSPENSION ORAL EVERY 4 HOURS PRN
Status: CANCELLED | OUTPATIENT
Start: 2024-09-27

## 2024-09-27 RX ORDER — TRAZODONE HYDROCHLORIDE 50 MG/1
50 TABLET, FILM COATED ORAL
Status: DISCONTINUED | OUTPATIENT
Start: 2024-09-27 | End: 2024-10-07 | Stop reason: HOSPADM

## 2024-09-27 RX ORDER — HALOPERIDOL 5 MG/ML
5 INJECTION INTRAMUSCULAR
Status: CANCELLED | OUTPATIENT
Start: 2024-09-27

## 2024-09-27 RX ORDER — DIPHENHYDRAMINE HYDROCHLORIDE 50 MG/ML
50 INJECTION INTRAMUSCULAR; INTRAVENOUS EVERY 6 HOURS PRN
Status: CANCELLED | OUTPATIENT
Start: 2024-09-27

## 2024-09-27 RX ORDER — HALOPERIDOL 1 MG/1
2.5 TABLET ORAL
Status: CANCELLED | OUTPATIENT
Start: 2024-09-27

## 2024-09-27 RX ORDER — BENZTROPINE MESYLATE 1 MG/ML
1 INJECTION, SOLUTION INTRAMUSCULAR; INTRAVENOUS
Status: CANCELLED | OUTPATIENT
Start: 2024-09-27

## 2024-09-27 RX ORDER — BENZTROPINE MESYLATE 1 MG/ML
0.5 INJECTION, SOLUTION INTRAMUSCULAR; INTRAVENOUS
Status: DISCONTINUED | OUTPATIENT
Start: 2024-09-27 | End: 2024-10-07 | Stop reason: HOSPADM

## 2024-09-27 RX ORDER — POLYETHYLENE GLYCOL 3350 17 G/17G
17 POWDER, FOR SOLUTION ORAL DAILY PRN
Status: CANCELLED | OUTPATIENT
Start: 2024-09-27

## 2024-09-27 RX ORDER — IBUPROFEN 400 MG/1
400 TABLET, FILM COATED ORAL EVERY 4 HOURS PRN
Status: DISCONTINUED | OUTPATIENT
Start: 2024-09-27 | End: 2024-10-07 | Stop reason: HOSPADM

## 2024-09-27 RX ORDER — HALOPERIDOL 0.5 MG/1
1 TABLET ORAL EVERY 6 HOURS PRN
Status: DISCONTINUED | OUTPATIENT
Start: 2024-09-27 | End: 2024-10-07 | Stop reason: HOSPADM

## 2024-09-27 RX ORDER — POLYETHYLENE GLYCOL 3350 17 G/17G
17 POWDER, FOR SOLUTION ORAL DAILY PRN
Status: DISCONTINUED | OUTPATIENT
Start: 2024-09-27 | End: 2024-10-07 | Stop reason: HOSPADM

## 2024-09-27 RX ORDER — IBUPROFEN 600 MG/1
600 TABLET, FILM COATED ORAL EVERY 6 HOURS PRN
Status: CANCELLED | OUTPATIENT
Start: 2024-09-27

## 2024-09-27 RX ORDER — LORAZEPAM 2 MG/ML
2 INJECTION INTRAMUSCULAR EVERY 6 HOURS PRN
Status: DISCONTINUED | OUTPATIENT
Start: 2024-09-27 | End: 2024-10-07 | Stop reason: HOSPADM

## 2024-09-27 RX ORDER — HYDROXYZINE HYDROCHLORIDE 25 MG/1
25 TABLET, FILM COATED ORAL
Status: CANCELLED | OUTPATIENT
Start: 2024-09-27

## 2024-09-27 RX ORDER — LANOLIN ALCOHOL/MO/W.PET/CERES
3 CREAM (GRAM) TOPICAL
Status: CANCELLED | OUTPATIENT
Start: 2024-09-27

## 2024-09-27 RX ORDER — HYDROXYZINE HYDROCHLORIDE 50 MG/1
50 TABLET, FILM COATED ORAL
Status: DISCONTINUED | OUTPATIENT
Start: 2024-09-27 | End: 2024-10-07 | Stop reason: HOSPADM

## 2024-09-27 RX ORDER — PROPRANOLOL HCL 10 MG
10 TABLET ORAL EVERY 8 HOURS PRN
Status: DISCONTINUED | OUTPATIENT
Start: 2024-09-27 | End: 2024-10-07 | Stop reason: HOSPADM

## 2024-09-27 RX ADMIN — TRAZODONE HYDROCHLORIDE 50 MG: 50 TABLET ORAL at 23:26

## 2024-09-27 RX ADMIN — DROPERIDOL 1.25 MG: 2.5 INJECTION, SOLUTION INTRAMUSCULAR; INTRAVENOUS at 16:10

## 2024-09-27 RX ADMIN — Medication 3 MG: at 23:26

## 2024-09-27 RX ADMIN — DIPHENHYDRAMINE HYDROCHLORIDE 25 MG: 50 INJECTION, SOLUTION INTRAMUSCULAR; INTRAVENOUS at 16:10

## 2024-09-27 NOTE — TELEPHONE ENCOUNTER
Messages received and reviewed. Returned call to patient, Melody, and , German, ([phone]).  Patient had improvement in anxiety related symptoms last night and was able to sleep, however, symptoms escalated as noted above. Patient with impairment in functioning at this time necessitating recommendation for inpatient psychiatric admission. Patient and  agreeable to present to emergency room and crisis team at [facility] was made aware by this writer of patient's plan and treatment recommendations.    Patient received PHP referral yesterday and is scheduled for intake on 09/30/24 - this can be adjusted if inpatient admission is pursued.

## 2024-09-27 NOTE — ED NOTES
As per Kingsley MEDEROS of Intake, there is a bed available at [facility]. CW faxed pt's 201 and face sheet to Kingsley for review.    CAREN Truong, CIS   09/27/24 19:20

## 2024-09-27 NOTE — ED NOTES
Pt ambulated to restroom with this writer     Dannielle Nguyen, RN  09/27/24 9872     WOUND CARE NOTE  REASON FOR VISIT: No chief complaint on file.    HISTORY:  Past Medical History:   Diagnosis Date   • Coronary artery disease    • High cholesterol    • History of Doppler echocardiogram 2019    EF 50%   • Hx of cardiac catheterization    • Polyneuropathy       Past Surgical History:   Procedure Laterality Date   •  section, classic       ASSESSMENT & TREATMENT:   Pt. In ICU-  Chart reviewed-  Discussed with RN   -  PT to be notified to place wound vac as ordered per  Juanita Loaiza    Wound  RLE  Compartmental syndrome-  Fasciotomy  Performed by Dr. Michael Royal      Recommendation:  Wound vac  And PT notified to place    luz maria JOHNSON d/c wound consult-   updated on POC.   Davi: #16  Nutrition:   Dietary Orders (From admission, onward)     Start     Ordered    10/12/20 0537  Cardiac Diet  DIET EFFECTIVE NOW     Question:  Diet Modifiers  Answer:  Cardiac    10/12/20 0536               Labs:     Recent Labs   Lab 10/11/20  1237 10/11/20  1650   GLUCOSE BEDSIDE 124* 112*        Recent Labs   Lab 10/12/20  0530   WBC 16.9*     Jeannie Stone RN, BSN, CWON  Please call wound care dept with any questions:

## 2024-09-27 NOTE — TELEPHONE ENCOUNTER
Melody is struggling with a panic attack and can't get herself to calm down.  Writer stayed on the phone with her and her  trying to work on deep breathing.   Melody's breathing was more forced, had difficulty with relaxing.  Attempted to distract with good memories and relaxing times.  Melody said that her daughter Ernestine makes her laugh but unable to continue with conversation.  German was present throughout whole conversation and was attempting to get her to focus on her breathing.  Kaz said she can't and left the room.   She wants to go to the ER but German is telling her she can't do that this whole weekend.  Melody took a dose of klonopin at 7 am and another dose at 10 am.  She threw up around 10:45 but it wasn't much.  German wanted to know if she may have thrown up some of the medication.  She has a third dose available but second dose was less than 2 hours ago.  He would like to speak to provider but is aware that she is presently unavailable but will be able to access her messages.  He don't know what to do over the next couple of days and feels that Melody will not even be able to speak to anybody at [facility] on Monday feeling this way.      Will refer to Dr Cohen for review.

## 2024-09-27 NOTE — LETTER
[facility] EMERGENCY DEPARTMENT  [address]  [place] [address]  Dept: [phone]      EMTALA TRANSFER CONSENT    NAME Meloyd Huynh                             1980                              MRN [id number]    I have been informed of my rights regarding examination, treatment, and transfer   by Dr. Anabelle Anton MD    Benefits: Continuity of care    Risks: Potential for delay in receiving treatment      Consent for Transfer:  I acknowledge that my medical condition has been evaluated and explained to me by the emergency department physician or other qualified medical person and/or my attending physician, who has recommended that I be transferred to the service of  Accepting Physician: Dr Francisca Stevens at Accepting Facility Name, City & State : [facility]. The above potential benefits of such transfer, the potential risks associated with such transfer, and the probable risks of not being transferred have been explained to me, and I fully understand them.  The doctor has explained that, in my case, the benefits of transfer outweigh the risks.  I agree to be transferred.    I authorize the performance of emergency medical procedures and treatments upon me in both transit and upon arrival at the receiving facility.  Additionally, I authorize the release of any and all medical records to the receiving facility and request they be transported with me, if possible.  I understand that the safest mode of transportation during a medical emergency is an ambulance and that the Hospital advocates the use of this mode of transport. Risks of traveling to the receiving facility by car, including absence of medical control, life sustaining equipment, such as oxygen, and medical personnel has been explained to me and I fully understand them.    (HEAVEN CORRECT BOX BELOW)  [ X ]  I consent to the stated transfer and to be transported by ambulance/helicopter.  [   ]  I consent to the stated transfer, but refuse transportation by ambulance and accept full responsibility for my transportation by car.  I understand the risks of non-ambulance transfers and I exonerate the Hospital and its staff from any deterioration in my condition that results from this refusal.    X___________________________________________    DATE  24  TIME________  Signature of patient or legally responsible individual signing on patient behalf           RELATIONSHIP TO PATIENT______Self___________________                              Provider Certification    NAME Melody Huynh                                 1980                              MRN [id number]    A medical screening exam was performed on the above named patient.  Based on the examination:    Condition Necessitating Transfer The primary encounter diagnosis was Anxiety. Diagnoses of Encounter for psychological evaluation and Encounter for screening examination for mental health and behavioral disorders, unspecified were also pertinent to this visit.    Patient Condition: The patient has been stabilized such that within reasonable medical probability, no material deterioration of the patient condition or the condition of the unborn child(dilan) is likely to result from the transfer    Reason for Transfer: Level of Care needed not available at this facility    Transfer Requirements: Facility [facility]   Space available and qualified personnel available for treatment as acknowledged by JULY Cunningham  @ [phone]  Agreed to accept transfer and to provide appropriate medical treatment as acknowledged by       Dr Francisca Stevens  Appropriate medical records of the examination and treatment of the patient are provided at the time of transfer   STAFF INITIAL WHEN COMPLETED __A.A._____  Transfer will be performed by qualified personnel from [facility]  and appropriate transfer equipment as required, including the use of  necessary and appropriate life support measures.    Provider Certification: I have examined the patient and explained the following risks and benefits of being transferred/refusing transfer to the patient/family:  General risk, such as traffic hazards, adverse weather conditions, rough terrain or turbulence, possible failure of equipment (including vehicle or aircraft), or consequences of actions of persons outside the control of the transport personnel      Based on these reasonable risks and benefits to the patient and/or the unborn child(dilan), and based upon the information available at the time of the patient’s examination, I certify that the medical benefits reasonably to be expected from the provision of appropriate medical treatments at another medical facility outweigh the increasing risks, if any, to the individual’s medical condition, and in the case of labor to the unborn child, from effecting the transfer.    X____________________________________________ DATE 09/27/24        TIME_______      ORIGINAL - SEND TO MEDICAL RECORDS   COPY - SEND WITH PATIENT DURING TRANSFER

## 2024-09-27 NOTE — TELEPHONE ENCOUNTER
"Called Melody back and spoke with her and her . She said she is doing a little better today. She was able to sleep and have symptoms \"slow down.\" She said she is feeling better this morning just a little groggy and has a slight stomach ache. I reinforced increasing her klonopin TID for 3 days and she said she will be doing that. PHP called yesterday but they missed it, so they called back and left a VM. Expecting a call today. I also asked if they have been seen for an assessment of her gastro system. She said the ER referred her to a gastroenterologist. I encouraged they get that appt set up. I also reviewed that anxiety can manifest in many different ways, including stomach and chest pain. So once they are seen by gastroenterology and rule other things out it could give her peace of mind. They are going to work on getting that set up. I encouraged they reach out to speak with nursing at any time even if it is just to talk and reviewed our hours with her. Nothing further needed at this time. Forwarding to provider to make aware.   "

## 2024-09-27 NOTE — ED NOTES
Patient is accepted at [facility].  Patient is accepted by Dr. Francisca Stevens per Kingsley MEDEROS of Intake.     Transportation is arranged with [facility] via [employer].   Transportation is scheduled for 21:00.   Patient may go to the floor at anytime.      CW informed Kingsley MEDEROS of Intake, Dr JOSE Anton, and pt's Rns, Dannielle MCWILLIAMS and Catherine MEDEIROS of pt's ETA.        Nurse report is to be called to [phone] prior to patient transfer.    CAREN Truong, CIS   09/27/24 19:58

## 2024-09-27 NOTE — TELEPHONE ENCOUNTER
German called and states the Panic Attack is increasing again with same problematic symptoms. He asked to be transferred to a nurse. Writer reiterated message from German to nurse staff regarding pts symptoms continuing. Writer warm transferred to the Nurse Pod staff.

## 2024-09-27 NOTE — ED PROVIDER NOTES
Final diagnoses:   Anxiety   Encounter for psychological evaluation   Encounter for screening examination for mental health and behavioral disorders, unspecified     ED Disposition       None          Assessment & Plan       Medical Decision Making  Patient is a 43-year-old female with a longstanding history of anxiety and panic attacks.  She was referred to the emergency department by her primary psychiatrist.  She has had multiple ER visits for similar complaints with no resolution.  She and her  are agreeable to a crisis evaluation and her primary treating psychiatrist is recommending a 201.  The patient is aware of and agreeable with this recommendation.   She presents with her typical complaints of abdominal discomfort and chest discomfort associated with her severe panic.  She has had 2 emergency department visits both with normal workups including a normal CT scan, and reassuring laboratory studies.  Are included in the ED course.  Of note, she does regularly have THC detected in her urinalysis, and I am concerned that some of her symptoms may be related to THC use.  At this time I believe there is very little benefit to repeating laboratory studies and imaging based on her 2 previously reassuring workups.    Amount and/or Complexity of Data Reviewed  Labs: ordered. Decision-making details documented in ED Course.  Radiology:  Decision-making details documented in ED Course.    Risk  Prescription drug management.  Decision regarding hospitalization.        ED Course as of 09/27/24 1937   Fri Sep 27, 2024   1704 CT abdomen pelvis with contrast (9/22/24 1937)   1705 hCG, qualitative pregnancy (9/26/24 0401)   1705 FLU/COVID Rapid Antigen (30 min. TAT) - Preferred screening test in ED (9/26/24 0334)   1705 Lipase (9/26/24 0334)   1705 Comprehensive metabolic panel (9/26/24 0334)(!)  Potassium repleted.  Patient tolerating p.o. hydration during prior ED stay.  She denies any nausea and vomiting currently.    1705 CBC and differential (9/26/24 0334)   1705 HS Troponin 0hr (reflex protocol) (9/26/24 0334)   1749 Reevaluated patient.  Symptomatically improved following meds.  Has been seen by crisis.  Agreeable with 201 at this time   1936 Patient medically cleared for psychiatric admission.        Medications   droperidol (INAPSINE) injection 1.25 mg (1.25 mg Intravenous Given 9/27/24 1610)   diphenhydrAMINE (BENADRYL) injection 25 mg (25 mg Intravenous Given 9/27/24 1610)       ED Risk Strat Scores                           SBIRT 20yo+      Flowsheet Row Most Recent Value   Initial Alcohol Screen: US AUDIT-C     1. How often do you have a drink containing alcohol? 0 Filed at: 09/27/2024 1515   2. How many drinks containing alcohol do you have on a typical day you are drinking?  0 Filed at: 09/27/2024 1515   3b. FEMALE Any Age, or MALE 65+: How often do you have 4 or more drinks on one occassion? 0 Filed at: 09/27/2024 1515   Audit-C Score 0 Filed at: 09/27/2024 1515   BULMARO: How many times in the past year have you...    Used an illegal drug or used a prescription medication for non-medical reasons? Never Filed at: 09/27/2024 1515                            History of Present Illness       Chief Complaint   Patient presents with    Psychiatric Evaluation     Patient arrived to ER c/o psych eval. Patient states that she's had panic attacks non stop for the last few days. Patient states that the anxiety starts when she gets chest pain and abd pain. 9/10 chest pain/abd pain Denies SI/HI Denies AVH        Past Medical History:   Diagnosis Date    Anxiety     Depression     Disruption of episiotomy wound in the puerperium     Last assessed 06/22/16    External hemorrhoids     Last assessed 02/05/16    Fibroid uterus     GERD (gastroesophageal reflux disease)     Headache     Leiomyoma of uterus     Migraine     Migraine     Polyhydramnios in third trimester, not applicable or unspecified fetus     Last assessed 01/25/16     Pregnancy headache in third trimester     Resolved 02/05/16    Protein in urine     Last assessed 01/25/16    Sciatica of right side     Third degree laceration of perineum, type 3b 1/26/2016    Varicella       Past Surgical History:   Procedure Laterality Date    CHOLECYSTECTOMY  2006    MYOMECTOMY      [facility] POST COLPORRHAPHY RECTOCELE W/WO PERINEORRHAPHY N/A 3/24/2016    Procedure: PERINEORRAPHY ;  Surgeon: Sallie Flower MD;  Location: [facility];  Service: Gynecology    WISDOM TOOTH EXTRACTION        Family History   Problem Relation Age of Onset    Breast cancer Mother 58    Cancer Mother     No Known Problems Father     No Known Problems Daughter     No Known Problems Maternal Grandmother     No Known Problems Maternal Grandfather     No Known Problems Paternal Grandmother     No Known Problems Paternal Grandfather     Breast cancer Maternal Aunt         unknown age      Social History     Tobacco Use    Smoking status: Never    Smokeless tobacco: Never   Vaping Use    Vaping status: Never Used   Substance Use Topics    Alcohol use: Yes     Alcohol/week: 1.0 standard drink of alcohol     Types: 1 Glasses of wine per week     Comment: socially; 3-4 drinks/week (8/13)    Drug use: Yes     Types: Marijuana     Comment: Pt has a medical marijuana card (8/13)      E-Cigarette/Vaping    E-Cigarette Use Never User       E-Cigarette/Vaping Substances    Nicotine No     THC No     CBD No     Flavoring No     Other No     Unknown No       I have reviewed and agree with the history as documented.     Patient is a 43-year-old female who presents to the emergency department with severe anxiety.          Review of Systems   Psychiatric/Behavioral:  The patient is nervous/anxious.            Objective       ED Triage Vitals [09/27/24 1511]   Temperature Pulse Blood Pressure Respirations SpO2 Patient Position - Orthostatic VS   98.2 °F (36.8 °C) 72 (!) 172/98 18 100 % Sitting      Temp Source Heart Rate Source BP  Location FiO2 (%) Pain Score    Oral Monitor Left arm -- --      Vitals      Date and Time Temp Pulse SpO2 Resp BP Pain Score FACES Pain Rating User   09/27/24 1915 -- 75 97 % 18 -- -- -- KB   09/27/24 1642 -- 71 98 % 18 104/63 -- -- RO   09/27/24 1515 -- -- 100 % -- -- -- -- CC   09/27/24 1511 98.2 °F (36.8 °C) 72 100 % 18 172/98 9/10 pain -- -- LA            Physical Exam  Vitals and nursing note reviewed.   Constitutional:       General: She is not in acute distress.  HENT:      Head: Normocephalic and atraumatic.      Right Ear: External ear normal.      Left Ear: External ear normal.      Nose: Nose normal.   Cardiovascular:      Rate and Rhythm: Normal rate and regular rhythm.   Pulmonary:      Effort: Pulmonary effort is normal.      Breath sounds: Normal breath sounds.   Abdominal:      General: There is no distension.      Palpations: Abdomen is soft.      Tenderness: There is no abdominal tenderness.   Musculoskeletal:      Right lower leg: No edema.      Left lower leg: No edema.   Skin:     General: Skin is warm and dry.   Neurological:      General: No focal deficit present.      Mental Status: She is alert and oriented to person, place, and time. Mental status is at baseline.      GCS: GCS eye subscore is 4. GCS verbal subscore is 5. GCS motor subscore is 6.   Psychiatric:         Attention and Perception: Attention and perception normal.         Mood and Affect: Mood is anxious. Affect is labile and tearful.         Speech: Speech normal.         Behavior: Behavior is hyperactive. Behavior is cooperative.         Thought Content: Thought content normal.         Results Reviewed       Procedure Component Value Units Date/Time    Rapid drug screen, urine [[id number]]  (Abnormal) Collected: 09/27/24 1620    Lab Status: Final result Specimen: Urine, Clean Catch Updated: 09/27/24 1646     Amph/Meth UR Negative     Barbiturate Ur Negative     Benzodiazepine Urine Positive     Cocaine Urine Negative      Methadone Urine Negative     Opiate Urine Negative     PCP Ur Negative     THC Urine Positive     Oxycodone Urine Negative     Fentanyl Urine Negative     HYDROCODONE URINE Negative    Narrative:      Presumptive report. If requested, specimen will be sent to reference lab for confirmation.  FOR MEDICAL PURPOSES ONLY.   IF CONFIRMATION NEEDED PLEASE CONTACT THE LAB WITHIN 5 DAYS.    Drug Screen Cutoff Levels:  AMPHETAMINE/METHAMPHETAMINES  1000 ng/mL  BARBITURATES     200 ng/mL  BENZODIAZEPINES     200 ng/mL  COCAINE      300 ng/mL  METHADONE      300 ng/mL  OPIATES      300 ng/mL  PHENCYCLIDINE     25 ng/mL  THC       50 ng/mL  OXYCODONE      100 ng/mL  FENTANYL      5 ng/mL  HYDROCODONE     300 ng/mL    POCT alcohol breath test [[id number]]  (Normal) Resulted: 09/27/24 1638    Lab Status: Edited Result - FINAL Updated: 09/27/24 1638     EXTBreath Alcohol 0.000            No orders to display       Procedures    ED Medication and Procedure Management   Prior to Admission Medications   Prescriptions Last Dose Informant Patient Reported? Taking?   FLUoxetine (PROzac) 10 mg capsule   No No   Sig: TAKE 1 CAPSULE (10 MG TOTAL) BY MOUTH DAILY TAKE WITH 20 MG CAPSULE FOR TOTAL OF 30 MG DAILY   FLUoxetine (PROzac) 20 mg capsule   No No   Sig: TAKE 1 CAPSULE (20 MG TOTAL) BY MOUTH DAILY TAKE WITH 10 MG CAPSULE FOR TOTAL OF 30 MG DAILY   clonazePAM (KlonoPIN) 0.5 mg tablet   No No   Sig: Take 2 tablets (1 mg total) by mouth 2 (two) times a day as needed for anxiety   ondansetron (ZOFRAN-ODT) 4 mg disintegrating tablet   No No   Sig: Take 1 tablet (4 mg total) by mouth every 8 (eight) hours as needed for nausea   ondansetron (ZOFRAN-ODT) 4 mg disintegrating tablet   No No   Sig: Take 1 tablet (4 mg total) by mouth every 6 (six) hours as needed for nausea or vomiting   propranolol (INDERAL) 10 mg tablet   No No   Sig: Take 0.5 tablet (5 mg total) by mouth 2 (two) times a day      Facility-Administered Medications: None      Patient's Medications   Discharge Prescriptions    No medications on file     No discharge procedures on file.  ED SEPSIS DOCUMENTATION   Time reflects when diagnosis was documented in both MDM as applicable and the Disposition within this note       Time User Action Codes Description Comment    9/27/2024  4:25 PM Anabelle Anton [F41.9] Anxiety     9/27/2024  4:25 PM Anabelle Anton [Z00.8] Encounter for psychological evaluation     9/27/2024  7:31 PM Anabelle Anton [Z13.30] Encounter for screening examination for mental health and behavioral disorders, unspecified                  Anabelle Anton MD  09/27/24 1937

## 2024-09-27 NOTE — ED NOTES
Pt presents to the ED with her  on the recommendation of her psychiatrist due to increased anxiety and panic attacks over the past week. This is pt's 3rd ED visit in so many days for same complaints. Pt reports not knowing what triggers these panic attacks, but states they manifest in stomach upset, a sensation of gas bubbles in her chest and stomach, nausea, forced vomiting, fingers tingling and hyperventilating. Pt reports these panic attacks and anxiety have been occurring inermittently since age 19. Pt adds she has not suffered from such a panic attack for the past 2 yrs. Recently, pt notes she has been taking care of her  for the past 4-5 weeks since he had rotator cuff surgery, and also drove him to [place] recently for a work-related spech he had to give. However, pt notes these were not stressful events for her, even adding that she went sight seeing while in [place]. Pt denies any suicidal or homicidal ideations, nor any auditory or visual hallucinations at this time. Pt denies any prior suicide attempts, nor any self injurious behaviors. Pt denies any D & A problems, though admits to using Marijuana x2 weekly since age 18, and having 2 drinks once or twice weekly of either beer, wine or gin. Pt denies any legal issues nor any Hx of abuse or neglect. Pt reports s;leeping 8 hrs nightly, but notes this week her appetite has been poor and she mainly drinks fluids and maybe has a roll at some point during the day. Her  does report cooking for her chicken and rice and other such nutritious meals. Pt reports her  is highly supportive of her and is very attentive. She has both out-pt psychiatry and therapy. Pt has been hospitalized previously x2. Pt reports being prescribed Prozac 30 mg q d and Klonopin 1 mg BID for OCD and ELOY. CW discussed Tx options with pt and her , including the benefits of 201, and pt ultimately agreed to sign a 201. Dr Anton is in agreement with  this Tx plan.     CAREN Truong, CIS   09/27/24 18:34

## 2024-09-27 NOTE — DISCHARGE INSTRUCTIONS
Patient has been medically evaluated and screened for clearance for psychiatric admission.       Please take all medications as instructed. Follow up with your PCP as discussed.   RETURN TO THE EMERGENCY DEPARTMENT if you develop new or worsening symptoms and are unable to see your PCP.

## 2024-09-28 PROBLEM — F39 UNSPECIFIED MOOD (AFFECTIVE) DISORDER (HCC): Status: ACTIVE | Noted: 2024-09-28

## 2024-09-28 LAB
25(OH)D3 SERPL-MCNC: 38.6 NG/ML (ref 30–100)
ALBUMIN SERPL BCG-MCNC: 4.4 G/DL (ref 3.5–5)
ALP SERPL-CCNC: 50 U/L (ref 34–104)
ALT SERPL W P-5'-P-CCNC: 11 U/L (ref 7–52)
ANION GAP SERPL CALCULATED.3IONS-SCNC: 8 MMOL/L (ref 4–13)
AST SERPL W P-5'-P-CCNC: 12 U/L (ref 13–39)
ATRIAL RATE: 67 BPM
BACTERIA UR QL AUTO: ABNORMAL /HPF
BASOPHILS # BLD AUTO: 0.06 THOUSANDS/ΜL (ref 0–0.1)
BASOPHILS NFR BLD AUTO: 1 % (ref 0–1)
BILIRUB SERPL-MCNC: 1.73 MG/DL (ref 0.2–1)
BILIRUB UR QL STRIP: ABNORMAL
BUN SERPL-MCNC: 19 MG/DL (ref 5–25)
CALCIUM SERPL-MCNC: 9.4 MG/DL (ref 8.4–10.2)
CAOX CRY URNS QL MICRO: ABNORMAL /HPF
CHLORIDE SERPL-SCNC: 108 MMOL/L (ref 96–108)
CHOLEST SERPL-MCNC: 147 MG/DL
CLARITY UR: ABNORMAL
CO2 SERPL-SCNC: 24 MMOL/L (ref 21–32)
COLOR UR: YELLOW
CREAT SERPL-MCNC: 1.09 MG/DL (ref 0.6–1.3)
EOSINOPHIL # BLD AUTO: 0.26 THOUSAND/ΜL (ref 0–0.61)
EOSINOPHIL NFR BLD AUTO: 3 % (ref 0–6)
ERYTHROCYTE [DISTWIDTH] IN BLOOD BY AUTOMATED COUNT: 12.2 % (ref 11.6–15.1)
FOLATE SERPL-MCNC: 11.9 NG/ML
GFR SERPL CREATININE-BSD FRML MDRD: 62 ML/MIN/1.73SQ M
GLUCOSE P FAST SERPL-MCNC: 93 MG/DL (ref 65–99)
GLUCOSE SERPL-MCNC: 93 MG/DL (ref 65–140)
GLUCOSE UR STRIP-MCNC: NEGATIVE MG/DL
HCG SERPL QL: NEGATIVE
HCT VFR BLD AUTO: 39.1 % (ref 34.8–46.1)
HDLC SERPL-MCNC: 38 MG/DL
HGB BLD-MCNC: 13.2 G/DL (ref 11.5–15.4)
HGB UR QL STRIP.AUTO: NEGATIVE
IMM GRANULOCYTES # BLD AUTO: 0.01 THOUSAND/UL (ref 0–0.2)
IMM GRANULOCYTES NFR BLD AUTO: 0 % (ref 0–2)
KETONES UR STRIP-MCNC: ABNORMAL MG/DL
LDLC SERPL CALC-MCNC: 84 MG/DL (ref 0–100)
LEUKOCYTE ESTERASE UR QL STRIP: ABNORMAL
LYMPHOCYTES # BLD AUTO: 3.74 THOUSANDS/ΜL (ref 0.6–4.47)
LYMPHOCYTES NFR BLD AUTO: 46 % (ref 14–44)
MCH RBC QN AUTO: 29.7 PG (ref 26.8–34.3)
MCHC RBC AUTO-ENTMCNC: 33.8 G/DL (ref 31.4–37.4)
MCV RBC AUTO: 88 FL (ref 82–98)
MONOCYTES # BLD AUTO: 0.71 THOUSAND/ΜL (ref 0.17–1.22)
MONOCYTES NFR BLD AUTO: 9 % (ref 4–12)
NEUTROPHILS # BLD AUTO: 3.34 THOUSANDS/ΜL (ref 1.85–7.62)
NEUTS SEG NFR BLD AUTO: 41 % (ref 43–75)
NITRITE UR QL STRIP: NEGATIVE
NON-SQ EPI CELLS URNS QL MICRO: ABNORMAL /HPF
NONHDLC SERPL-MCNC: 109 MG/DL
NRBC BLD AUTO-RTO: 0 /100 WBCS
P AXIS: 40 DEGREES
PH UR STRIP.AUTO: 6 [PH]
PLATELET # BLD AUTO: 244 THOUSANDS/UL (ref 149–390)
PMV BLD AUTO: 10.1 FL (ref 8.9–12.7)
POTASSIUM SERPL-SCNC: 3.3 MMOL/L (ref 3.5–5.3)
PR INTERVAL: 144 MS
PROT SERPL-MCNC: 7.1 G/DL (ref 6.4–8.4)
PROT UR STRIP-MCNC: ABNORMAL MG/DL
QRS AXIS: 80 DEGREES
QRSD INTERVAL: 92 MS
QT INTERVAL: 450 MS
QTC INTERVAL: 475 MS
RBC # BLD AUTO: 4.45 MILLION/UL (ref 3.81–5.12)
RBC #/AREA URNS AUTO: ABNORMAL /HPF
SODIUM SERPL-SCNC: 140 MMOL/L (ref 135–147)
SP GR UR STRIP.AUTO: >=1.03
T WAVE AXIS: 69 DEGREES
TRIGL SERPL-MCNC: 124 MG/DL
TSH SERPL DL<=0.05 MIU/L-ACNC: 1.69 UIU/ML (ref 0.45–4.5)
UROBILINOGEN UR QL STRIP.AUTO: 1 E.U./DL
VENTRICULAR RATE: 67 BPM
VIT B12 SERPL-MCNC: 279 PG/ML (ref 180–914)
WBC # BLD AUTO: 8.12 THOUSAND/UL (ref 4.31–10.16)
WBC #/AREA URNS AUTO: ABNORMAL /HPF

## 2024-09-28 PROCEDURE — 85025 COMPLETE CBC W/AUTO DIFF WBC: CPT | Performed by: PSYCHIATRY & NEUROLOGY

## 2024-09-28 PROCEDURE — 82746 ASSAY OF FOLIC ACID SERUM: CPT | Performed by: PSYCHIATRY & NEUROLOGY

## 2024-09-28 PROCEDURE — 82306 VITAMIN D 25 HYDROXY: CPT | Performed by: PSYCHIATRY & NEUROLOGY

## 2024-09-28 PROCEDURE — 80053 COMPREHEN METABOLIC PANEL: CPT | Performed by: PSYCHIATRY & NEUROLOGY

## 2024-09-28 PROCEDURE — 84703 CHORIONIC GONADOTROPIN ASSAY: CPT | Performed by: PSYCHIATRY & NEUROLOGY

## 2024-09-28 PROCEDURE — 86780 TREPONEMA PALLIDUM: CPT | Performed by: PSYCHIATRY & NEUROLOGY

## 2024-09-28 PROCEDURE — 99223 1ST HOSP IP/OBS HIGH 75: CPT

## 2024-09-28 PROCEDURE — 80061 LIPID PANEL: CPT | Performed by: PSYCHIATRY & NEUROLOGY

## 2024-09-28 PROCEDURE — 81001 URINALYSIS AUTO W/SCOPE: CPT | Performed by: PSYCHIATRY & NEUROLOGY

## 2024-09-28 PROCEDURE — 84443 ASSAY THYROID STIM HORMONE: CPT | Performed by: PSYCHIATRY & NEUROLOGY

## 2024-09-28 PROCEDURE — 82607 VITAMIN B-12: CPT | Performed by: PSYCHIATRY & NEUROLOGY

## 2024-09-28 PROCEDURE — 93010 ELECTROCARDIOGRAM REPORT: CPT | Performed by: INTERNAL MEDICINE

## 2024-09-28 RX ORDER — ONDANSETRON 4 MG/1
4 TABLET, ORALLY DISINTEGRATING ORAL EVERY 6 HOURS PRN
Status: DISCONTINUED | OUTPATIENT
Start: 2024-09-28 | End: 2024-10-07 | Stop reason: HOSPADM

## 2024-09-28 RX ORDER — ALPRAZOLAM 0.5 MG
1 TABLET ORAL DAILY PRN
Status: DISCONTINUED | OUTPATIENT
Start: 2024-09-28 | End: 2024-09-30

## 2024-09-28 RX ORDER — POTASSIUM CHLORIDE 1500 MG/1
40 TABLET, EXTENDED RELEASE ORAL ONCE
Status: COMPLETED | OUTPATIENT
Start: 2024-09-28 | End: 2024-09-28

## 2024-09-28 RX ORDER — DICYCLOMINE HYDROCHLORIDE 10 MG/1
10 CAPSULE ORAL EVERY 6 HOURS PRN
Status: DISCONTINUED | OUTPATIENT
Start: 2024-09-28 | End: 2024-10-07 | Stop reason: HOSPADM

## 2024-09-28 RX ORDER — CLONAZEPAM 1 MG/1
1 TABLET ORAL DAILY
Status: DISCONTINUED | OUTPATIENT
Start: 2024-09-29 | End: 2024-09-30

## 2024-09-28 RX ADMIN — ALPRAZOLAM 1 MG: 0.5 TABLET ORAL at 17:07

## 2024-09-28 RX ADMIN — POTASSIUM CHLORIDE 40 MEQ: 1500 TABLET, EXTENDED RELEASE ORAL at 13:03

## 2024-09-28 RX ADMIN — LORAZEPAM 2 MG: 2 INJECTION INTRAMUSCULAR; INTRAVENOUS at 06:06

## 2024-09-28 RX ADMIN — IBUPROFEN 800 MG: 800 TABLET, FILM COATED ORAL at 05:40

## 2024-09-28 RX ADMIN — BENZTROPINE MESYLATE 1 MG: 1 INJECTION INTRAMUSCULAR; INTRAVENOUS at 07:40

## 2024-09-28 RX ADMIN — HYDROXYZINE HYDROCHLORIDE 100 MG: 50 TABLET, FILM COATED ORAL at 05:34

## 2024-09-28 RX ADMIN — FLUOXETINE HYDROCHLORIDE 40 MG: 20 CAPSULE ORAL at 13:03

## 2024-09-28 RX ADMIN — Medication 3 MG: at 22:54

## 2024-09-28 RX ADMIN — TRAZODONE HYDROCHLORIDE 50 MG: 50 TABLET ORAL at 22:54

## 2024-09-28 RX ADMIN — HALOPERIDOL LACTATE 5 MG: 5 INJECTION, SOLUTION INTRAMUSCULAR at 07:40

## 2024-09-28 NOTE — H&P
H&P - Behavioral Health   Name: Melody Huynh 43 y.o. female I MRN: [id number]  Unit/Bed#: U 218-02 I Date of Admission: 9/27/2024   Date of Service: 9/28/2024 I Hospital Day: 1     Assessment & Plan  Panic disorder without agoraphobia with severe panic attacks  Increase Prozac to 40 mg daily for symptoms of depression and anxiety    Restarted Klonopin at 1 mg daily for ongoing struggles with chronic daily anxiety  Started Xanax 1 mg daily as needed for breakthrough anxiety not responsive to hydroxyzine as well as for panic attacks    Continue PRNs for hydroxyzine for mild, moderate, and severe anxiety (25 mg every 6 hours as needed for mild anxiety, 50 mg every 6 hours as needed for moderate anxiety, 100 mg every 6 hours as needed for severe anxiety)  Unspecified mood (affective) disorder (HCC)  Increase Prozac to 40 mg daily for symptoms of depression and anxiety    Treatment Plan:  Planned Medication Changes:    Increase Prozac to 40 mg daily for symptoms of depression and anxiety    Restarted Klonopin at 1 mg daily for ongoing struggles with chronic daily anxiety  Started Xanax 1 mg daily as needed for breakthrough anxiety not responsive to hydroxyzine as well as for panic attacks  PDMP website reviewed. Melody has been appropriately adherent to controlled psychotropic medications without evidence of abuse or misuse    Continue PRNs for hydroxyzine for mild, moderate, and severe anxiety (25 mg every 6 hours as needed for mild anxiety, 50 mg every 6 hours as needed for moderate anxiety, 100 mg every 6 hours as needed for severe anxiety)    Continue group therapy, milieu therapy, occupational therapy  Will collaborate with collaterals as needed for baseline and disposition planning      Risks / Benefits of Treatment:  Risks, benefits, and possible side effects of medications explained to patient and patient verbalizes understanding.      History of Present Illness    Reason for Consult: Patient was admitted  to the hospital on a voluntary commitment basis due to panic attacks that have been occurring consistently since 9/22/2024 and have had limited response to coping skills as well as outpatient medication regimen.    This is a comprehensive psychiatric assessment for the patient Melody Huynh, who is being admitted to [facility] inpatient behavioral health unit on a voluntary 201 commitment basis. Patient was admitted to the hospital on a voluntary commitment basis due to panic attacks that have been occurring consistently since 9/22/2024 and have had limited response to coping skills as well as outpatient medication regimen. Melody is a 43-year-old female, , one 8-year-old daughter, currently unemployed (patient worked for 15 years in [employer]), currently living with her  and daughter in [facility]. Melody has a significant past medical history that includes gastroesophageal reflux disease, history of cholecystectomy in 2006, history of perineorraphy and colporrhapy in 2016, and leiomyoma of the uterus. Melody has a past psychiatric history that includes unspecified mood disorder as well as panic disorder.    The following italicized information is copied from the telephone encounter on 9/26/2024 from Dr. Vicki DO:  Returned call to patient and her , German ([phone]), patient experienced gas pains, nausea, followed by emesis overnight. She entered in a panic attack and she utilized coping skills for 2 hours, however, panic attack escalated and patient presented to the emergency room. She received fluids, repletion of potassium, Zofran, and Ativan x3. EKG WNL.  Patient is currently experiencing anxiety related symptoms of increased rate of thoughts, negative thinking, restlessness, and intermittent tearfulness.     Patient was seen in office yesterday - recent ED visit from 09/22/24 was reviewed and discussed. Medications were reviewed.  Recommendation to continue Prozac and Klonopin made as well as to increase frequency of psychotherapy sessions.  Patient denied SI, passive death wishes, thoughts to harm self or others at time of office visit and at time of today's phone encounter. Patient has familial support of  and her mother at this time. Family feels safe to have patient at home at this time.      Discussed with patient and  recommendation for PHP referral at this time given increase in anxiety resulting in ED presentations in setting of psychosocial stressors. Patient agreeable to referral.  Discussed increasing Klonopin to 1 mg TID x3 days to help bridge to care and in setting of anxiety related symptoms and history of positive response to PRN Klonopin. Patient to continue Prozac at current dose.     Return precautions for ED presentation reviewed with patient and German who expressed understanding.  Patient and family aware of 24/7 coverage for urgent needs through [facility] main line.    The following italicized information is copied from the telephone encounter on 9/27/2024 from Dr. Cohen DO:  Messages received and reviewed. Returned call to patient, Melody, and , German, ([phone]).  Patient had improvement in anxiety related symptoms last night and was able to sleep, however, symptoms escalated as noted above. Patient with impairment in functioning at this time necessitating recommendation for inpatient psychiatric admission. Patient and  agreeable to present to emergency room and crisis team at [facility] was made aware by this writer of patient's plan and treatment recommendations.     Patient received PHP referral yesterday and is scheduled for intake on 09/30/24 - this can be adjusted if inpatient admission is pursued.    The following italicized information is copied from the ED crisis note 9/27/24:  Pt presents to the ED with her  on the recommendation of her psychiatrist due to  increased anxiety and panic attacks over the past week. This is pt's 3rd ED visit in so many days for same complaints. Pt reports not knowing what triggers these panic attacks, but states they manifest in stomach upset, a sensation of gas bubbles in her chest and stomach, nausea, forced vomiting, fingers tingling and hyperventilating. Pt reports these panic attacks and anxiety have been occurring inermittently since age 19. Pt adds she has not suffered from such a panic attack for the past 2 yrs. Recently, pt notes she has been taking care of her  for the past 4-5 weeks since he had rotator cuff surgery, and also drove him to [place] recently for a work-related spech he had to give. However, pt notes these were not stressful events for her, even adding that she went sight seeing while in [place]. Pt denies any suicidal or homicidal ideations, nor any auditory or visual hallucinations at this time. Pt denies any prior suicide attempts, nor any self injurious behaviors. Pt denies any D & A problems, though admits to using Marijuana x2 weekly since age 18, and having 2 drinks once or twice weekly of either beer, wine or gin. Pt denies any legal issues nor any Hx of abuse or neglect. Pt reports s;leeping 8 hrs nightly, but notes this week her appetite has been poor and she mainly drinks fluids and maybe has a roll at some point during the day. Her  does report cooking for her chicken and rice and other such nutritious meals. Pt reports her  is highly supportive of her and is very attentive. She has both out-pt psychiatry and therapy. Pt has been hospitalized previously x2. Pt reports being prescribed Prozac 30 mg q d and Klonopin 1 mg BID for OCD and ELOY. CW discussed Tx options with pt and her , including the benefits of 201, and pt ultimately agreed to sign a 201. Dr Anton is in agreement with this Tx plan.     Melody was seen today for comprehensive psychiatric interview.  At  "the time of interview Melody is in her bed, appearing fatigued following morning medication administration.  In discussion with nursing she received as needed of hydroxyzine 100 mg this morning at 5:34 AM, subsequently received an injection of Haldol 2 mg IM at 6:06 AM, and then subsequently received an injection of Haldol 5 mg IM and an injection of Cogentin 1 mg IM at 7:40 AM. At the time of interview, Mi is calm, polite, and cooperative with interview.  Overall, she appears mildly irritable in terms of her affect. During today's examination, Melody does not exhibit objective evidence of ghada/hypomania or psychosis. Melody is not currently grandiose, labile, or pathologically euphoric. Melody denies perceptual disturbances, such as A/V hallucinations, paranoia, ideas of reference, or delusional beliefs.    Today, Melody reports that she feels \"tired.\"  She confirms the aforementioned information and reports panic attacks that have been occurring consistently since 9/22/2024 and have had limited response to coping skills as well as outpatient medication regimen. As detailed above, Melody has presented multiple times to the emergency department for prominent symptoms of anxiety and panic attacks.  As noted above, Melody reports no specific triggers to these anxiety episodes.  As noted above, she reports these panic attacks to manifest as symptoms of feelings of impending doom, nausea, vomiting, generalized numbness and tingling, hyperventilation, palpitations. Melody reports that she has struggled with these panic attacks off and on since the age of 19 and she reports that she last experienced panic attacks at this level of severity approximately 3 years ago, when she was hospitalized at [facility].    As detailed in the patient's most recent visit with her outpatient psychiatrist Dr. Cohen, Melody reports that recent life changes include recent struggles with physical illness " "and routine change (as detailed in outpatient notes, she reported her anxiety and mood related symptoms had been well controlled until recently when she had a vacation and experienced gastrointestinal upset once at home, prompting an emergency department visit 9/22/2024). She has also not seen her outpatient therapist Dr. Foster in several weeks. As documented during hospitalization at [facility], returning from vacation appears to be a notable stressor for this patient. In addition to this acute stressor, there is also a question of the patient's current marijuana use is contributing to her gastrointestinal issues.  Patient reports a history of daily marijuana use via smoking and she reports she has a medical marijuana card.    Today, with regards to symptoms of depression, Melody reports that she has been struggling with issues that include decreased energy, decreased concentration, decreased appetite.  In the midst of these severe anxiety symptoms, she has been endorsing passive suicidal ideation \"that I would be better off dead.\"  She denies any active suicidal ideation, homicidal ideation, plan or intent to harm herself or others.  She denies issues with sleep, denies any changes in life interest, and denies feelings of hopelessness.    Melody denies any acute or chronic history suggestive of an underlying affective (bipolar) organization. Melody denies previous episodes of elevated/expansive mood, lengthy periods without sleep, grandiosity, or intense and prolonged irritability. Melody denies atypical periods of increased goal-directed behavior, excessive spending, or sexual promiscuity. The patient has no history of pathologic impulsivity or extreme mood lability.     Melody denies past or present visual and auditory hallucinations.    Medication management options were discussed in detail with Melody.  She has been adherent to her outpatient psychiatric medication regimen which includes " Prozac 30 mg daily as well as Klonopin 1 mg 3 times daily as needed for increased anxiety.  Following a thorough conversation, Rezek was increased to 40 mg daily for symptoms of depression and anxiety.  Klonopin was restarted at 1 mg daily scheduled for chronic anxiety.  The patient was started on Xanax 1 mg daily as needed for breakthrough anxiety not responsive to hydroxyzine well as panic attacks.    Psychiatric Review Of Systems:  sleep: Patient denies any issues with sleep  appetite changes: Patient reports that she has had a decreased appetite over the past week in the context of aforementioned anxiety  weight changes: Patient denies any changes in weight  energy/anergy: Patient reports that she has had decreased energy over the past week in the context of aforementioned anxiety  interest/pleasure/anhedonia: Patient reports no changes in her life interests  somatic symptoms: Patient reports somatic symptoms which include gastrointestinal upset, nausea, vomiting, generalized numbness and tingling, palpitations, hyperventilation that occur during her panic attacks  anxiety/panic:  As detailed above, Melody has presented multiple times to the emergency department for prominent symptoms of anxiety and panic attacks.  As noted above, Melody reports no specific triggers to these anxiety episodes.  As noted above, she reports these panic attacks to manifest as symptoms of feelings of impending doom, nausea, vomiting, generalized numbness and tingling, hyperventilation, palpitations.   ghada: Patient does not endorse symptomatology consistent with ghada or hypomania  guilty/hopeless: Patient denies feelings of hopelessness towards life  self injurious behavior/risky behavior: Patient denies self-injurious behavior    Melody denies any acute or chronic history suggestive of an underlying affective (bipolar) organization. Melody denies previous episodes of elevated/expansive mood, lengthy periods without sleep,  grandiosity, or intense and prolonged irritability. Melody denies atypical periods of increased goal-directed behavior, excessive spending, or sexual promiscuity. The patient has no history of pathologic impulsivity or extreme mood lability.     Melody denies past or present visual and auditory hallucinations.    Historical Information   Past Psychiatric History:   Inpatient Treatment: Patient reports 3 past inpatient psychiatric hospitalizations.  Her first hospitalization was in 2001 in [place] in college.  She was subsequently hospitalized in 2008 at [facility].  She was most recently hospitalized in September 2021 at [facility]. The most common reason for hospitalization is for panic attacks that are refractory to coping skills and  outpatient medication regimen.  Outpatient Treatment: Patient currently sees Dr. Cohen through [facility] for outpatient psychiatric treatment.  She sees an out side of the network therapist, Dr. Foster, for psychotherapy generally on a weekly basis ([facility]), however she has not seen Dr. Foster for several weeks at this time.  At this time the patient has also been referred to partial hospitalization through [facility].  Past Suicide Attempts: Patient denies suicide attempts  Past Violent Behavior: Patient denies violent behavior, she does report some aggressive outburst in the past towards her   Past Psychiatric Medication Trials:     Antidepressants: Prozac (up to 40 mg daily), Zoloft (briefly trialed), Luvox  Antipsychotics: Regular, Zyprexa, Seroquel, Haldol.  Of note, patient has struggled with akathisia and extrapyramidal symptoms from antipsychotic use in the past.  Benzodiazepines: Klonopin, Ativan  Anxiolytics: Hydroxyzine, gabapentin, BuSpar (up to 60 mg total daily)    Substance Abuse History:  E-Cigarette/Vaping    E-Cigarette Use Never User       E-Cigarette/Vaping Substances    Nicotine No     THC No     CBD  No     Flavoring No     Other No     Unknown No        Social History       Tobacco History       Smoking Status  Never      Smokeless Tobacco Use  Never              Alcohol History       Alcohol Use Status  Yes Drinks/Week  1 Glasses of wine, 0 Cans of beer, 0 Shots of liquor, 0 Standard drinks or equivalent per week Amount  1.0 standard drink of alcohol/wk Comment  socially; 3-4 drinks/week (8/13)              Drug Use       Drug Use Status  Yes Types  Marijuana Comment  Pt has a medical marijuana card (8/13)              Sexual Activity       Sexually Active  Yes Partners  Male Birth Control/Protection  I.U.D.              Activities of Daily Living    Not Asked                 Additional Substance Use Detail       Questions Responses    Problems Due to Past Use of Alcohol? No    Problems Due to Past Use of Substances? No    Alcohol Use Frequency 1 or 2 times/week    Cannabis frequency 3 or more times/week    Comment: 3 or more times/week on 8/14/2021     Heroin Frequency Denies use in past 12 months    Cannabis method     Comment: Various methods     Cocaine frequency Never used    Comment: Never used on 8/14/2021     Crack Cocaine Frequency Denies use in past 12 months    Methamphetamine Frequency Denies use in past 12 months    Narcotic Frequency Denies use in past 12 months    Benzodiazepine Frequency Denies use in past 12 months    Amphetamine frequency Denies use in past 12 months    Barbituate Frequency Denies use use in past 12 months    Inhalant frequency Never used    Comment: Never used on 8/14/2021     Hallucinogen frequency Never used    Comment: Never used on 8/14/2021     Ecstasy frequency Never used    Comment: Never used on 8/14/2021     Other drug frequency Never used    Comment: Never used on 8/14/2021     Opiate frequency Denies use in past 12 months    Last reviewed by Sergio Gomes RN on 9/27/2024          Patient reports daily marijuana use to be a smoking.  She states she has a medical  marijuana card.  Patient reports that she quit cigarette smoking in 2008.  She reports prior to that she was smoking a few cigarettes per day.  Patient reports social alcohol use.    I have assessed this patient for substance use within the past 12 months    Family Psychiatric History:     The patient reports her mother has a history of anxiety and is currently on Lexapro    There is no other known family psychiatric history of mental illness, substance use, or suicide attempts    Social History:  Education: Patient completed college with an undergraduate degree in communications  Learning Disabilities: None  Marital history:   Children: Patient reports she has an 8-year-old daughter  Living arrangement, social support: Patient currently lives with her  and daughter in [facility]  Occupational History: Patient is currently unemployed.  Patient is currently applying for permanent disability.  Patient previously worked in [employer] for approximately 15 years  Functioning Relationships: Patient reports that she has a good support system and reports that her family and her  are supportive.  Other Pertinent History:  Legal History: Patient denies legal issues   History: Patient denies  history  Guns: Melody Huynh reports that her  has a gun in the house.  She reports that his gun is locked and secured.      Traumatic History:   Abuse: Patient denies any history of physical, emotional, or sexual abuse  Other Traumatic Events: Patient reports that she was significantly impacted following the passing of her father when she was 16 years old  I have reviewed the patient's PMH, PSH, Social History, Family History, Meds, and Allergies    Objective   Temp:  [97.9 °F (36.6 °C)-98.2 °F (36.8 °C)] 97.9 °F (36.6 °C)  HR:  [71-84] 83  Resp:  [16-18] 16  BP: (104-172)/(63-98) 146/94  No intake or output data in the 24 hours ending 09/28/24 1020    Mental Status  "Evaluation:  Appearance:  Drowsy, limited eye contact, overtly  appearing female, appears stated age, fair grooming and hygiene   Behavior:  Polite, cooperative   Speech:  Normal rate, normal volume, fluent, clear, coherent   Mood:  \"Tired\"   Affect:  Patient presents with an irritable edge   Language: naming objects and repeating phrases   Thought Process:  Organized, logical, goal directed   Associations intact associations   Thought Content:  normal   Perceptual Disturbances: Denies visual and auditory hallucinations, does not appear responding to internal stimuli   Risk Potential: Suicidal Ideations - patient reports passive suicidal ideation without any active suicidal ideation, homicidal ideation, plan or intent to harm herself or others  Homicidal Ideations - none  Potential for Aggression No   Sensorium:  person, place, and time/date   Cognition:  recent and remote memory grossly intact   Consciousness:  sedated    Attention: attention span appeared shorter than expected for age   Intellect: within normal limits   Fund of Knowledge: awareness of current events: normal, past history: normal, and vocabulary: normal   Insight:  fair   Judgment:  fair   Muscle Strength:  Muscle Tone: Within normal limits   Gait/Station: Patient is currently resting in her bed   Motor Activity: no abnormal movements       Patient Strengths/Assets: Supportive family, supportive friends, ability to communicate needs, ability to communicate well, ability to listen, ability to reason, ability to understand psychiatric illness, average or above average intelligence, family ties, general fund of knowledge, good physical health, willingness to work on problems  Patient Barriers/Limitations: Ongoing struggles with marijuana use      Lab Results: I have reviewed the following results: Most Recent Labs:   Lab Results   Component Value Date    WBC 8.12 09/28/2024    RBC 4.45 09/28/2024    HGB 13.2 09/28/2024    HCT 39.1 09/28/2024 "     09/28/2024    RDW 12.2 09/28/2024    NEUTROABS 3.34 09/28/2024    SODIUM 140 09/28/2024    K 3.3 (L) 09/28/2024     09/28/2024    CO2 24 09/28/2024    BUN 19 09/28/2024    CREATININE 1.09 09/28/2024    GLUC 93 09/28/2024    GLUF 93 09/28/2024    CALCIUM 9.4 09/28/2024    AST 12 (L) 09/28/2024    ALT 11 09/28/2024    ALKPHOS 50 09/28/2024    TP 7.1 09/28/2024    ALB 4.4 09/28/2024    TBILI 1.73 (H) 09/28/2024    CHOLESTEROL 147 09/28/2024    HDL 38 (L) 09/28/2024    TRIG 124 09/28/2024    LDLCALC 84 09/28/2024    NONHDLC 109 09/28/2024    JEZ8CGOVXITZ 1.689 09/28/2024    PREGSERUM Negative 09/28/2024    RPR Non-Reactive 08/14/2021    HGBA1C 4.9 04/26/2023    EAG 94 04/26/2023        Administrative Statements   I have spent a total time of 45 minutes in caring for this patient on the day of the visit/encounter including Prognosis, Risks and benefits of tx options, Counseling / Coordination of care, Reviewing / ordering tests, medicine, procedures  , and Obtaining or reviewing history  . Topics discussed with the patient / family include medication review, medication adjustment, and supportive listening.    Olayinka Palacios MD

## 2024-09-28 NOTE — ED NOTES
09/28/2024 at 0828    CW placed call to BC/BS, [phone], left VM requesting return call to complete pre-cert. CW visited [employer] to attempt to request prior authorization; however, FEP is not supported.    TDS, CW

## 2024-09-28 NOTE — PLAN OF CARE
Problem: Depression  Goal: Treatment Goal: Demonstrate behavioral control of depressive symptoms, verbalize feelings of improved mood/affect, and adopt new coping skills prior to discharge  Outcome: Progressing  Goal: Verbalize thoughts and feelings  Description: Interventions:  - Assess and re-assess patient's level of risk   - Engage patient in 1:1 interactions, daily, for a minimum of 15 minutes   - Encourage patient to express feelings, fears, frustrations, hopes   Outcome: Progressing  Goal: Refrain from harming self  Description: Interventions:  - Monitor patient closely, per order   - Supervise medication ingestion, monitor effects and side effects   Outcome: Progressing     Problem: Anxiety  Goal: Anxiety is at manageable level  Description: Interventions:  - Assess and monitor patient's anxiety level.   - Monitor for signs and symptoms (heart palpitations, chest pain, shortness of breath, headaches, nausea, feeling jumpy, restlessness, irritable, apprehensive).   - Collaborate with interdisciplinary team and initiate plan and interventions as ordered.  - Bee patient to unit/surroundings  - Explain treatment plan  - Encourage participation in care  - Encourage verbalization of concerns/fears  - Identify coping mechanisms  - Assist in developing anxiety-reducing skills  - Administer/offer alternative therapies  - Limit or eliminate stimulants  Outcome: Progressing    See Chart Note

## 2024-09-28 NOTE — NURSING NOTE
Administered xanax 1 mg po prn for breakthrough anxiety as prescribed.Lisa Anxiety Scale 29. Will evaluate effectiveness.

## 2024-09-28 NOTE — TREATMENT TEAM
"   09/28/24 0730   Broset Violence Checklist   Assessment type Shift   Irritability 1   Confusion 1   Boisterousness 1   Threatening physical violence 0   Verbal threats 0   Violence 0   Broset score 3     Patient moaning and yelling for help states she \"can't take this anymore\" states she is having pain in multiple areas of her body such as stomach, neck and chest. Patient already received IM ativan 2mg at 0606 which out relief. Administered IM haldol 5mg and cogentin 1mg will monitor for effectiveness   "

## 2024-09-28 NOTE — NURSING NOTE
Prior med given effective patient able to rest quietly and states no longer is having severe anxiety and no longer yelling out anymore.

## 2024-09-28 NOTE — PROGRESS NOTES
PT was a witness to the following belongings:    Assistive device: eyeglasses, contacts    Remains with pt: 1 short sleeve t shirt, 1 long sleeve t shirt, 1 grey sweater, 1 navy sweater, 1 blue leggings, 1 grey leggings, 1 green bra, 3 pairs of socks, black slippers, silver sandals     In locker #1: 2 bra wires, toothpaste, 1  purse    Valuables(taken by security): 1 x [place] passport, 1x [place] medical [place] [place], 1x  [place], 5x credit cards, 2x debit cards, 2x health insurance cards, 2x wallets, 1x keys    Medications(given to nursing staff): over the counter tylenol, silver container with klonopin    Contraband container #2: 1 cell phone (not broken), 1 set of airpods with case, bottle of contact solution, eyeglasses case, eyeglasses

## 2024-09-28 NOTE — TREATMENT PLAN
TREATMENT PLAN REVIEW - Behavioral Health Melody Huynh 43 y.o. 1980 female MRN: [id number]    [facility] Room / Bed: [facility] 218/[facility] 218-02 Encounter: [id number]          Admit Date/Time:  9/27/2024 10:17 PM    Treatment Team:   MD Doug Rodriguez  [facility]    Diagnosis: Principal Problem:    Panic disorder without agoraphobia with severe panic attacks  Active Problems:    Unspecified mood (affective) disorder (HCC)      Patient Strengths/Assets: Supportive, supportive friends, ability to get needs, ability to, ability to listen, ability psychiatric illness, average or above average intelligence, family ties, general fund of knowledge, good health, willingness to work on problems    Patient Barriers/Limitations: difficulty adapting, lack of stable employment, Ongoing struggles with marijuana use    Short Term Goals: decrease in anxiety symptoms, decrease in suicidal thoughts, ability to stay safe on the unit    Long Term Goals: improvement in anxiety, free of suicidal thoughts, acceptance of need for psychiatric follow up after discharge, appropriate interaction with peers, appropriate interaction with family, stable living arrangements upon discharge    Progress Towards Goals: starting psychiatric medications as prescribed    Recommended Treatment: medication management, patient medication education, group therapy, milieu therapy, continued Behavioral Health psychiatric evaluation/assessment process    Treatment Frequency: daily medication monitoring, group and milieu therapy daily, monitoring through interdisciplinary rounds, monitoring through weekly patient care conferences    Expected Discharge Date:  10/4/2024    Discharge Plan: Return to previous living arrangement.  Continue with outpatient psychiatrist Dr. Cohen.  At this time patient has been referred for partial  hospitalization and ideally will be discharged to partial following inpatient stay.    Treatment Plan Created/Updated By: Olayinka Palacios MD

## 2024-09-28 NOTE — H&P
Max Huynh#  :1980 F  MRN:[id number]    CSN:[id number]  Adm Date: 2024  10:17 PM   ATT PHY: Lesli Rosales Md  [facility]         Chief Complaint: anxiety      History of Presenting Illness: Melody Huynh is a(n) 43 y.o. year old female who is admitted to [facility] on voluntary 201 commitment basis.  Patient originally presented to [facility] ED on 24 for anxiety and panic attacks.  Potassium level 2.8 in ED and repleted.    Patient examined at bedside.  Patient complaining of generalized abdominal pain, nausea, vomiting since .   She had normal abd/pelvis CT on .  Took Zofran and Bentyl in ED with some relief.  She feels symptoms are brought on by severe anxiety.  Denies any diarrhea, constipation, dysuria, urinary frequency, flank pain, chest pain, shortness of breath.    Allergies   Allergen Reactions    Demerol [Meperidine] Hyperactivity    Macrolides And Ketolides      No current facility-administered medications on file prior to encounter.     Current Outpatient Medications on File Prior to Encounter   Medication Sig Dispense Refill    clonazePAM (KlonoPIN) 0.5 mg tablet Take 2 tablets (1 mg total) by mouth 2 (two) times a day as needed for anxiety 90 tablet 0    FLUoxetine (PROzac) 10 mg capsule TAKE 1 CAPSULE (10 MG TOTAL) BY MOUTH DAILY TAKE WITH 20 MG CAPSULE FOR TOTAL OF 30 MG DAILY 90 capsule 0    FLUoxetine (PROzac) 20 mg capsule TAKE 1 CAPSULE (20 MG TOTAL) BY MOUTH DAILY TAKE WITH 10 MG CAPSULE FOR TOTAL OF 30 MG DAILY 90 capsule 0    ondansetron (ZOFRAN-ODT) 4 mg disintegrating tablet Take 1 tablet (4 mg total) by mouth every 6 (six) hours as needed for nausea or vomiting 12 tablet 0    propranolol (INDERAL) 10 mg tablet Take 0.5 tablet (5 mg total) by mouth 2 (two) times a day      ondansetron (ZOFRAN-ODT) 4 mg disintegrating tablet Take 1 tablet (4 mg total) by  mouth every 8 (eight) hours as needed for nausea 20 tablet 0     Active Ambulatory Problems     Diagnosis Date Noted    Fibroid uterus 01/26/2016    Panic disorder without agoraphobia with severe panic attacks 05/11/2016    Family history of breast cancer 03/05/2019     Resolved Ambulatory Problems     Diagnosis Date Noted    Pregnancy 01/26/2016    GERD (gastroesophageal reflux disease) 01/26/2016    Normal labor 01/26/2016    Group B streptococcal infection during pregnancy 01/26/2016    Forceps delivery with baby delivered 01/26/2016    Third degree laceration of perineum, type 3b 01/26/2016    Encounter for annual routine gynecological examination 02/27/2018    Medical clearance for psychiatric admission 03/05/2019    Mood disorder (HCC) 08/14/2021    Limb tremor 10/28/2022    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    Akathisia 10/06/2023     Past Medical History:   Diagnosis Date    Anxiety     Depression     Disruption of episiotomy wound in the puerperium     External hemorrhoids     Headache     Leiomyoma of uterus     Migraine     Migraine     Polyhydramnios in third trimester, not applicable or unspecified fetus     Pregnancy headache in third trimester     Protein in urine     Sciatica of right side     Unspecified mood (affective) disorder (HCC) 9/28/2024    Varicella      Past Surgical History:   Procedure Laterality Date    CHOLECYSTECTOMY  2006    MYOMECTOMY      ID POST COLPORRHAPHY RECTOCELE W/WO PERINEORRHAPHY N/A 3/24/2016    Procedure: PERINEORRAPHY ;  Surgeon: Sallie Flower MD;  Location: [facility];  Service: Gynecology    WISDOM TOOTH EXTRACTION       Social History:   Social History     Socioeconomic History    Marital status: /Civil Union     Spouse name: None    Number of children: None    Years of education: None    Highest education level: None   Occupational History    None   Tobacco Use    Smoking status: Never    Smokeless tobacco: Never   Vaping Use    Vaping  status: Never Used   Substance and Sexual Activity    Alcohol use: Yes     Alcohol/week: 1.0 standard drink of alcohol     Types: 1 Glasses of wine per week     Comment: socially; 3-4 drinks/week (8/13)    Drug use: Yes     Types: Marijuana     Comment: Pt has a medical marijuana card (8/13)    Sexual activity: Yes     Partners: Male     Birth control/protection: I.U.D.   Other Topics Concern    None   Social History Narrative    H/O of pregnancy,first,obstetrical care, third trimester        Always uses seatbelt     Social Determinants of Health     Financial Resource Strain: Not on file   Food Insecurity: Not on file   Transportation Needs: Not on file   Physical Activity: Sufficiently Active (4/26/2023)    Exercise Vital Sign     Days of Exercise per Week: 5 days     Minutes of Exercise per Session: 60 min   Stress: Not on file   Social Connections: Unknown (6/18/2024)    Received from [facility]     How often do you feel lonely or isolated from those around you? (Adult - for ages 18 years and over): Not on file   Intimate Partner Violence: Not on file   Housing Stability: Not on file     Family History:  Family History   Problem Relation Age of Onset    Breast cancer Mother 58    Cancer Mother     No Known Problems Father     No Known Problems Daughter     No Known Problems Maternal Grandmother     No Known Problems Maternal Grandfather     No Known Problems Paternal Grandmother     No Known Problems Paternal Grandfather     Breast cancer Maternal Aunt         unknown age     Review of Systems   Constitutional:  Negative for chills and fever.   HENT:  Negative for ear pain and sore throat.    Eyes:  Negative for pain and visual disturbance.   Respiratory:  Negative for cough and shortness of breath.    Cardiovascular:  Negative for chest pain and palpitations.   Gastrointestinal:  Positive for abdominal pain, nausea and vomiting. Negative for constipation and diarrhea.   Genitourinary:   "Negative for dysuria, flank pain and frequency.   Musculoskeletal:  Negative for arthralgias and back pain.   Skin:  Negative for color change and rash.   Neurological:  Negative for dizziness and headaches.   Psychiatric/Behavioral:  The patient is nervous/anxious.    All other systems reviewed and are negative.    Physical Exam   Vitals: Blood pressure 146/81, pulse 94, temperature 99.9 °F (37.7 °C), temperature source Temporal, resp. rate 16, height 5' 3\" (1.6 m), weight 61.8 kg (136 lb 3.2 oz), SpO2 99%, not currently breastfeeding.,Body mass index is 24.13 kg/m².  Constitutional: Awake, alert, in no acute distress.  Head: Normocephalic and atraumatic.   Mouth/Throat: Oropharynx is clear and moist.    Eyes: Conjunctivae and EOM are normal.   Neck: Neck supple. No thyromegaly present.   Cardiovascular: Normal rate, regular rhythm and normal heart sounds.    Pulmonary/Chest: Effort normal and breath sounds normal.   Abdominal: Soft. Bowel sounds are normal. There is mild generalized abd tenderness. There is no rebound and no guarding.   Neurological: No focal deficits.   Skin: Skin is warm and dry.     Assessment     Melody Huynh is a(n) 43 y.o. female with panic disorder.    Migraine headaches.  Tylenol as needed.  GERD.  Maalox as needed.  N/V/abd pain.  CT abd/pelv 9/22 with no acute abnormality.  Afebrile.  Zofran as needed.  Fluids encouraged.  Add Bentyl 10 mg q6h as needed 9/28.  Hypokalemia.  Level 2.8 -> 3.3 on 9/28.  Ordered potassium chloride 40 mEq x1 dose 9/28.  Panic disorder.  This is being managed by the psych team.      Prognosis: Fair.    Discharge Plan: In progress.    Advanced Directives: I have discussed in detail with the patient the advanced directives. The patient does not have an appointed POA and does not have a living will. Patient's emergency contact is her spouse, German Huynh, whose number is [phone].  When discussing cardiac and pulmonary resuscitation efforts with the " patient, the patient wishes to be full code.    I have spent more than 50 minutes gathering data, doing physical examination, and discussing the advanced directives, which was witnessed by caring staff.    The patient was discussed with Dr. Downey and he is in agreement with the above note.

## 2024-09-28 NOTE — ED NOTES
CW attempted to contact pt's insurance company, [employer] at [phone] but the recorded message stated they are open Monday- Friday from 8 AM - 4:30 PM.    Call will need to be placed on Monday, 9/30/24 to complete pre-cert.    CAREN Truong, CIS ll  09/27/24 21:39

## 2024-09-28 NOTE — NURSING NOTE
Patient compliant with meds, patient minimally eating states r/t not feeling well and stomach pain r/t anxiety. Patient is cooperative and pleasant. Patient is withdrawn to room and self. Patient states anxiety is severe and depression moderate. States she has pain throughout her body and her is anxiety/ panic is what is causing this to happen. Q 7 min behavioral and safety checks in place.

## 2024-09-28 NOTE — ASSESSMENT & PLAN NOTE
Increase Prozac to 40 mg daily for symptoms of depression and anxiety    Restarted Klonopin at 1 mg daily for ongoing struggles with chronic daily anxiety  Started Xanax 1 mg daily as needed for breakthrough anxiety not responsive to hydroxyzine as well as for panic attacks    Continue PRNs for hydroxyzine for mild, moderate, and severe anxiety (25 mg every 6 hours as needed for mild anxiety, 50 mg every 6 hours as needed for moderate anxiety, 100 mg every 6 hours as needed for severe anxiety)

## 2024-09-28 NOTE — NURSING NOTE
"Patient arrived via EMS from [facility] ED. 201. UDS: + thc and Benzos. Patient is prescribed Klonopin. Psych Hx; anxiety and depression. Medical Hx: migraines. Patient stated that she has been in the ED 3 times in the past week for anxiety \"panic attacks\".  First attack started in the middle of the night with somatic issues of gas pain that triggered the anxiety. Patient goes on and said the pain went away but the anxiety remained. The next 2 days were fine no issues, Wednesday the anxiety returned like a \" vengeance in full force with no triggers\". The anxiety just \"comes out of no where\". Patient stated she suffered a severe stomach ache, vomiting, chest pain, and with other somatic issues and released again. Patient said that when \"not feeling good\" she will become OCD- like and fixate on the feelings. Then, finally Thursday morning she felt the anxiety \"building back up\" and went back to the ED for the third and final visit.   Patient appears sad, anxious and tearful during admission process to unit. Patient denies any SI/HI, AV/H or self harming behaviors.Endorses mild anxiety and no depression. Patient stated \" no depression in awhile\" at least a year. Past has a Hx of past inpatient in Feb. 2023 for the anxiety/ \"panic attacks\". No Hx of any suicidal attempts per patient.  Signed admission paper work without any issues. Allowed EKG to be done. Offered patient something to eat and drink, provided with a bagged lunch and drink. Offered to let her take a shower but declined would like to wait until morning to shower. Skin assessment completed. Observed no opened areas or rashes. Except for a big ecchymosis on L forearm and a much smaller one on the R antecubital area.  PTA medication list reviewed with patient. Francisca Stevens and Emi Alcazar were both notified @ 0130.       "

## 2024-09-28 NOTE — NURSING NOTE
Patient woke up from sleep in a panic. C/o headache that turned into stomach pain, breathing heavy, nausea, and vomiting. Administered Atarax 100 mg PO @ 0534 and shortly after receiving the Atarax and Motrin patient started to vomiting. Preceded to administer Ativan 2 mg IM @ 0606. Patient has been using different coping skills like deep breathing, rubbing back, rollong into a ball and showered.

## 2024-09-29 LAB — TREPONEMA PALLIDUM IGG+IGM AB [PRESENCE] IN SERUM OR PLASMA BY IMMUNOASSAY: NORMAL

## 2024-09-29 PROCEDURE — 99232 SBSQ HOSP IP/OBS MODERATE 35: CPT

## 2024-09-29 RX ORDER — CYANOCOBALAMIN 1000 UG/ML
1000 INJECTION, SOLUTION INTRAMUSCULAR; SUBCUTANEOUS
Status: DISCONTINUED | OUTPATIENT
Start: 2024-09-30 | End: 2024-10-07 | Stop reason: HOSPADM

## 2024-09-29 RX ADMIN — HALOPERIDOL 5 MG: 5 TABLET ORAL at 14:45

## 2024-09-29 RX ADMIN — ONDANSETRON 4 MG: 4 TABLET, ORALLY DISINTEGRATING ORAL at 18:06

## 2024-09-29 RX ADMIN — FLUOXETINE HYDROCHLORIDE 40 MG: 20 CAPSULE ORAL at 08:17

## 2024-09-29 RX ADMIN — ALPRAZOLAM 1 MG: 0.5 TABLET ORAL at 09:03

## 2024-09-29 RX ADMIN — CLONAZEPAM 1 MG: 1 TABLET ORAL at 08:17

## 2024-09-29 RX ADMIN — ONDANSETRON 4 MG: 4 TABLET, ORALLY DISINTEGRATING ORAL at 10:43

## 2024-09-29 RX ADMIN — HALOPERIDOL 5 MG: 5 TABLET ORAL at 20:41

## 2024-09-29 RX ADMIN — ALUMINUM HYDROXIDE, MAGNESIUM HYDROXIDE, AND DIMETHICONE 30 ML: 200; 20; 200 SUSPENSION ORAL at 00:27

## 2024-09-29 RX ADMIN — DICYCLOMINE HYDROCHLORIDE 10 MG: 10 CAPSULE ORAL at 18:06

## 2024-09-29 RX ADMIN — Medication 3 MG: at 20:41

## 2024-09-29 RX ADMIN — TRAZODONE HYDROCHLORIDE 50 MG: 50 TABLET ORAL at 20:42

## 2024-09-29 RX ADMIN — DICYCLOMINE HYDROCHLORIDE 10 MG: 10 CAPSULE ORAL at 08:17

## 2024-09-29 NOTE — NURSING NOTE
"Patient compliant with meds and meals. Patient states she has severe anxiety with moderate depression. Patient cont to c/o somatic issues states her stomach is \"upset\" and states that what starts first then her anxiety comes after. Patient requests frequent PRN medications for anxiety. Patient affect is constricted and appears depressed. Patient is pleasant and cooperative  minimally social. Patient mostly withdrawn to room visible during meals and on phone at times. Patient noted to have minor tremor in bilateral hands states this has been there. Q 7 min behavioral and safety checks in place.  "

## 2024-09-29 NOTE — PROGRESS NOTES
Progress Note - Behavioral Health   Name: Melody Huynh 43 y.o. female I MRN: [id number]  Unit/Bed#: -02 I Date of Admission: 9/27/2024   Date of Service: 9/29/2024 I Hospital Day: 2     Assessment & Plan  Panic disorder without agoraphobia with severe panic attacks  Continue Prozac 40 mg daily for depression/anxiety    Continue Klonopin at 1 mg daily for chronic daily anxiety  Continue Xanax 1 mg daily as needed for breakthrough anxiety not responsive to hydroxyzine.    Continue Atarax prn for mild/moderate/severe anxiety.  Unspecified mood (affective) disorder (HCC)  Continue Prozac to 40 mg daily for depression and anxiety.    Planned medication and treatment changes:    All current active medications have been reviewed  Encourage group therapy, milieu therapy and occupational therapy  Behavioral Health checks every 7 minutes  Continue current medications:    Progress Note - Behavioral Health     Melody Huynh 43 y.o. female MRN: [id number]   Unit/Bed#: -02 Encounter: [id number]    Behavior over the last 24 hours:  Reports ongoing anxiety .     Melody is seen today for psychiatric follow up. Per nursing notes, med compliant, minimally eating due to some stomach pain from anxiety, cooperative and pleasant, withdrawn to self/room, received Xanax 1 mg p.o. yesterday, denies SI HI AVH, slept well, receive Xanax 1 mg as needed this morning, continue to assess effectiveness.  Patient remains in behavioral control.     Today denies depression, reports ongoing struggle with anxiety and panic attacks.  Notes she felt a panic attack starting to present this morning and took Xanax 1 mg, reports she is waiting for it to kick in currently. Patient's current presentation in the context of recent benzo administration. Notes these panic attacks stem from certain thoughts, states that eating is one of them.  States the thought of eating right now stresses her out.  However ate 100% of breakfast.  Denies  suicidal/homicidal ideations.  Denies hallucinations when asked.  Tolerating current medications.  Reports she slept well last night, however mostly withdrawn to room/self this morning.    Sleep: slept better  Appetite:  Fluctuating  Medication side effects: No   ROS: no complaints    Mental Status Evaluation:    Appearance:  age appropriate, dressed appropriately, adequate grooming, looks stated age, appears drowsy   Behavior:  cooperative, calm, mildly guarded regarding anxiety topics.   Speech:  normal rate and volume, clear   Mood:  anxious   Affect:  constricted, mood-congruent   Thought Process:  logical, goal directed   Associations: intact associations   Thought Content:  no overt delusions, ruminating thoughts   Perceptual Disturbances: denies auditory hallucinations when asked, does not appear responding to internal stimuli, denies visual hallucinations when asked   Risk Potential: Suicidal ideation -  denies passive and active suicidal ideation today, in the context of reporting passive SI yesterday.  No intent/plan. Contracts for safety on the unit  Homicidal ideation - None  Potential for aggression - No   Sensorium:  oriented to person, place, and time/date   Memory:  recent and remote memory grossly intact   Consciousness:  sedated   Attention/Concentration: attention span and concentration appear shorter than expected for age   Insight:  moderate   Judgment: fair   Gait/Station: Seen in bed   Motor Activity: no abnormal movements     Vital signs in last 24 hours:    Temp:  [98 °F (36.7 °C)-99.9 °F (37.7 °C)] 98 °F (36.7 °C)  HR:  [92-94] 92  Resp:  [16] 16  BP: (105-146)/(65-81) 105/65    Laboratory results: I have personally reviewed all pertinent laboratory/tests results    Results from the past 24 hours: No results found for this or any previous visit (from the past 24 hour(s)).    Progress Toward Goals: No significant improvement today, however no panic attacks today thus far, utilizing Xanax as  needed for anxiety, reports ongoing anxiety but denies depression, denies SI HI and AVH when asked today.    Assessment & Plan   Principal Problem:    Panic disorder without agoraphobia with severe panic attacks  Active Problems:    Unspecified mood (affective) disorder (HCC)        Current Facility-Administered Medications   Medication Dose Route Frequency Provider Last Rate    ALPRAZolam  1 mg Oral Daily PRN Olayinka Palacios MD      aluminum-magnesium hydroxide-simethicone  30 mL Oral Q4H PRN Francisca Stevens MD      haloperidol lactate  2.5 mg Intramuscular Q4H PRN Max 4/day Francisca Stevens MD      And    LORazepam  1 mg Intramuscular Q4H PRN Max 4/day Francisca Stevens MD      And    benztropine  0.5 mg Intramuscular Q4H PRN Max 4/day Francisca Stevens MD      haloperidol lactate  5 mg Intramuscular Q4H PRN Max 4/day Francisac Stevens MD      And    LORazepam  2 mg Intramuscular Q4H PRN Max 4/day Francisca Stevens MD      And    benztropine  1 mg Intramuscular Q4H PRN Max 4/day Francisca Stevens MD      benztropine  1 mg Intramuscular Q4H PRN Max 6/day Francisca Stevens MD      benztropine  1 mg Oral Q4H PRN Max 6/day Francisca Stevens MD      bisacodyl  10 mg Rectal Daily PRN Francisca Stevens MD      clonazePAM  1 mg Oral Daily Olayinka Palacios MD      dicyclomine  10 mg Oral Q6H PRN Emi Madden PA-C      hydrOXYzine HCL  50 mg Oral Q6H PRN Max 4/day Francisca Stevens MD      Or    diphenhydrAMINE  50 mg Intramuscular Q6H PRN Francisca Stevens MD      FLUoxetine  40 mg Oral Daily Olayinka Palacios MD      haloperidol  1 mg Oral Q6H PRN Francisca Stevens MD      haloperidol  2.5 mg Oral Q4H PRN Max 4/day Francisca Stevens MD      haloperidol  5 mg Oral Q4H PRN Max 4/day Francisca Stevens MD      hydrOXYzine HCL  100 mg Oral Q6H PRN Max 4/day Francisca Stevens MD      Or    LORazepam  2 mg Intramuscular Q6H PRN Francisca Stevens MD      hydrOXYzine HCL  25 mg Oral Q6H PRN  Max 4/day Francisca Stevens MD      ibuprofen  400 mg Oral Q4H PRN Francisca Stevens MD      ibuprofen  600 mg Oral Q6H PRN Francisca Stevens MD      ibuprofen  800 mg Oral Q8H PRN Francisca Stevens MD      melatonin  3 mg Oral HS Francisca Stevens MD      ondansetron  4 mg Oral Q6H PRN Emi Madden PA-C      polyethylene glycol  17 g Oral Daily PRN Francisca Stevens MD      propranolol  10 mg Oral Q8H PRN Francisca Stevens MD      senna-docusate sodium  1 tablet Oral Daily PRN Francisca Stevens MD      traZODone  50 mg Oral HS PRN Francisca Stevens MD       Risks / Benefits of Treatment:    Risks, benefits, and possible side effects of medications explained to patient and patient verbalizes understanding and agreement for treatment.    Counseling / Coordination of Care:    Medication education provided to patient.  Educated on importance of medication and treatment compliance.  Group attendance encouraged.    Jaswant Cruz PA-C 09/29/24

## 2024-09-29 NOTE — PROGRESS NOTES
"Progress Note - Melody Huynh 43 y.o. female MRN: [id number]    Unit/Bed#: [facility] 218-02 Encounter: [id number]        Subjective:   Patient seen and examined at bedside after reviewing the chart and discussing the case with the caring staff.      Patient examined at bedside.  Patient reports feeling better today but still having some nausea related to ongoing anxiety.  Took Bentyl and Zofran this morning.  No vomiting or abdominal pain.    Labs reviewed.  CBC, TSH, lipid pane, folate, vit D WNL.  Vit B12 low 279.  K 3.3.  UA with trace leukocytes, mod bacteria - pt denies urinary symptoms.   Recheck potassium level in AM.    Physical Exam   Vitals: Blood pressure 105/65, pulse 92, temperature 98 °F (36.7 °C), temperature source Temporal, resp. rate 16, height 5' 3\" (1.6 m), weight 62.5 kg (137 lb 12.8 oz), SpO2 99%, not currently breastfeeding.,Body mass index is 24.41 kg/m².  Constitutional: Patient in no acute distress.  HEENT: PERR, EOMI, MMM.  Cardiovascular: Normal rate and regular rhythm.    Pulmonary/Chest: Effort normal and breath sounds normal.   Abdomen: Soft, + BS, NT.    Assessment/Plan:  Melody Huynh is a(n) 43 y.o. female with panic disorder.     Migraine headaches.  Tylenol as needed.  GERD.  Maalox as needed.  N/V/abd pain.  CT abd/pelv 9/22 with no acute abnormality.  Afebrile.  Zofran and Bentyl as needed.  Fluids encouraged.   Hypokalemia.  Level 2.8 -> 3.3 on 9/28.  Ordered potassium chloride 40 mEq x1 dose 9/28.  Recheck potassium level in AM.    The patient was discussed with Dr. Downey and he is in agreement with the above note.  "

## 2024-09-29 NOTE — ASSESSMENT & PLAN NOTE
Continue Prozac 40 mg daily for depression/anxiety    Continue Klonopin at 1 mg daily for chronic daily anxiety  Continue Xanax 1 mg daily as needed for breakthrough anxiety not responsive to hydroxyzine.    Continue Atarax prn for mild/moderate/severe anxiety.

## 2024-09-29 NOTE — NURSING NOTE
"BLEPS assessment completed patient cont to c/o stomach upset states r/t anxiety provider is aware, meds given per order. Patient states has not had a BM in a \"couple\" days but states she does not feel constipated and does not want a med for it states she has not been eating well and feels that is related.   "

## 2024-09-29 NOTE — NURSING NOTE
Patient visible and selectively social in dayroom. Appears flat, depressed and anxious. Denies any SI/HI,AV/H or depression. Endorses a little anxiety that is her everyday normal amount. Q 7 continuous monitoring maintained.

## 2024-09-29 NOTE — TREATMENT TEAM
09/29/24 0900   Lisa Anxiety Scale   Anxious Mood 4   Tension 4   Fears 4   Insomnia 0   Intellectual 3   Depressed Mood 4   Somatic Complaints: Muscular 2   Somatic Complaints: Sensory 2   Cardiovascular Symptoms 1   Respiratory Symptoms 1   Gastrointestinal Symptoms 0   Genitourinary Symptoms 0   Autonomic Symptoms 3   Behavior at Interview 4   Lisa Anxiety Score 32     Patient states she feels she is about to have a panic attack and states morning clonazepam is not affect and states she is worried things are going to get as bad as yesterday. Offered atarax but states that was not effective and states she felt it made it worse. Administered xanax 1mg PRN will monitor for effectiveness

## 2024-09-29 NOTE — NURSING NOTE
Patient had difficulty falling asleep, very broken sleep throughout the night. Patient awake most of the night with minimal sleep. No s/s of distress.  Q 7 continuous monitoring maintained.

## 2024-09-29 NOTE — TREATMENT TEAM
"   09/29/24 5051   Broset Violence Checklist   Assessment type Shift   Irritability 1   Confusion 1   Boisterousness 1   Threatening physical violence 0   Verbal threats 0   Violence 0   Broset score 3     Patient states feeling anxious and causing patient to feel irritable, patient ruminating on somatic symptoms. Requesting \"ativan injection\" explained to patient I was unable to give this administered 5mg haldol will monitor for effectiveness   "

## 2024-09-29 NOTE — NURSING NOTE
Prior med given effective patient resting quietly states no longer feels she is going to have a panic attack.

## 2024-09-30 LAB — POTASSIUM SERPL-SCNC: 4.3 MMOL/L (ref 3.5–5.3)

## 2024-09-30 PROCEDURE — 99232 SBSQ HOSP IP/OBS MODERATE 35: CPT | Performed by: PSYCHIATRY & NEUROLOGY

## 2024-09-30 PROCEDURE — 84132 ASSAY OF SERUM POTASSIUM: CPT

## 2024-09-30 RX ORDER — CLONIDINE HYDROCHLORIDE 0.1 MG/1
0.05 TABLET ORAL 3 TIMES DAILY
Status: DISCONTINUED | OUTPATIENT
Start: 2024-09-30 | End: 2024-10-03

## 2024-09-30 RX ORDER — LORAZEPAM 1 MG/1
1 TABLET ORAL EVERY 6 HOURS PRN
Status: DISCONTINUED | OUTPATIENT
Start: 2024-09-30 | End: 2024-10-07 | Stop reason: HOSPADM

## 2024-09-30 RX ORDER — CLONAZEPAM 1 MG/1
1 TABLET ORAL 2 TIMES DAILY
Status: DISCONTINUED | OUTPATIENT
Start: 2024-09-30 | End: 2024-10-07 | Stop reason: HOSPADM

## 2024-09-30 RX ADMIN — ONDANSETRON 4 MG: 4 TABLET, ORALLY DISINTEGRATING ORAL at 09:23

## 2024-09-30 RX ADMIN — CLONIDINE HYDROCHLORIDE 0.05 MG: 0.1 TABLET ORAL at 17:30

## 2024-09-30 RX ADMIN — CLONAZEPAM 1 MG: 1 TABLET ORAL at 08:30

## 2024-09-30 RX ADMIN — TRAZODONE HYDROCHLORIDE 50 MG: 50 TABLET ORAL at 21:58

## 2024-09-30 RX ADMIN — Medication 3 MG: at 21:52

## 2024-09-30 RX ADMIN — CLONAZEPAM 1 MG: 1 TABLET ORAL at 17:30

## 2024-09-30 RX ADMIN — ALUMINUM HYDROXIDE, MAGNESIUM HYDROXIDE, AND DIMETHICONE 30 ML: 200; 20; 200 SUSPENSION ORAL at 13:29

## 2024-09-30 RX ADMIN — HALOPERIDOL 5 MG: 5 TABLET ORAL at 09:23

## 2024-09-30 RX ADMIN — ALPRAZOLAM 1 MG: 0.5 TABLET ORAL at 11:05

## 2024-09-30 RX ADMIN — CLONIDINE HYDROCHLORIDE 0.05 MG: 0.1 TABLET ORAL at 21:52

## 2024-09-30 RX ADMIN — FLUOXETINE HYDROCHLORIDE 40 MG: 20 CAPSULE ORAL at 08:30

## 2024-09-30 RX ADMIN — CYANOCOBALAMIN 1000 MCG: 1000 INJECTION, SOLUTION INTRAMUSCULAR; SUBCUTANEOUS at 08:31

## 2024-09-30 NOTE — ED NOTES
Insurance Authorization for admission:   Phone call placed to [facility]   Phone number: [phone]   Level of care: voluntary inpatient mental health    Plan is handled by [facility].    Samanta at [phone] or Yuli at [phone]

## 2024-09-30 NOTE — TREATMENT TEAM
09/30/24 1105   Lisa Anxiety Scale   Anxious Mood 4   Tension 4   Fears 4   Insomnia 0   Intellectual 4   Depressed Mood 4   Somatic Complaints: Muscular 4   Somatic Complaints: Sensory 4   Cardiovascular Symptoms 0   Respiratory Symptoms 0   Gastrointestinal Symptoms 4   Genitourinary Symptoms 0   Autonomic Symptoms 0   Behavior at Interview 4   Lisa Anxiety Score 36     Patient reporting severe anxiety. Xanax 1mg PO given.

## 2024-09-30 NOTE — PROGRESS NOTES
09/30/24    Team Meeting   Meeting Type Daily Rounds   Team Members Present   Team Members Present Physician;Nurse;;Occupational Therapist   Physician Team Member Alfred Chacon, Charlie Chacon, Chas Chacon, Nancy Hankins PA-C   Nursing Team Member Adal GILLETTE   Social Work Team Member Yury ZARATE   OT Team Member Pope NICK   Patient/Family Present   Patient Present No   Patient's Family Present No     New 201, panic attacks, prn xanax, received haldol, reports ativan is not effective, potassium drawn due to abnormal level, meds adjusted

## 2024-09-30 NOTE — TREATMENT TEAM
09/30/24 0923   Broset Violence Checklist   Assessment type Shift   Irritability 1   Confusion 0   Boisterousness 1   Threatening physical violence 0   Verbal threats 0   Violence 0   Broset score 2     Patient reported feeling agitated and anxious. Haldol 5mg PO given.

## 2024-09-30 NOTE — PROGRESS NOTES
"Progress Note - Behavioral Health   Name: Melody Huynh 43 y.o. female I MRN: [id number]  Unit/Bed#: -02 I Date of Admission: 9/27/2024   Date of Service: 9/30/2024 I Hospital Day: 3     Assessment & Plan  Panic disorder without agoraphobia with severe panic attacks  Continue Prozac 40 mg daily for depression/anxiety.    Increase Klonopin to 1 mg PO BID daily for chronic daily anxiety, with continued panic attacks/anxiety despite Klonopin 1mg in the morning.  Change Xanax to Ativan 1 mg q6h as needed for breakthrough anxiety not responsive to hydroxyzine.  Start Clonidine 0.05 PO TID for anxiety/potential substance use withdrawal.    Continue Atarax prn for mild/moderate/severe anxiety.  Unspecified mood (affective) disorder (HCC)  Continue Prozac to 40 mg daily for depression and anxiety.    Planned medication and treatment changes:    All current active medications have been reviewed  Encourage group therapy, milieu therapy and occupational therapy  Behavioral Health checks every 7 minutes  Increase Klonopin to twice daily dosing.  Change Xanax to Ativan q6h as needed prn .  Start Clonidine 0.05mg PO TID.    Progress Note - Behavioral Health     Melody Huynh 43 y.o. female MRN: [id number]   Unit/Bed#: -02 Encounter: [id number]    Behavior over the last 24 hours:  Ongoing anxiety .     Melody is seen today for psychiatric follow up. Per nursing notes, patient med and meal compliant, continued moderate/severe anxiety and moderate depression, \"upset stomach\" with somatic issues, constricted affect, minor tremor in bilateral hands, withdrawn. Has been receiving prns for anxiety, and remains anxious and agitated. Prn Haldol minimally effective.    Today Melody continues to experience severe anxiety symptoms, causing her to be seen most of the morning sitting in bed with her head in her covers. She continues to be unable to identify specific triggers that cause these panic attacks. She does note " these panic attacks can stem from specific topics, however does not further elaborate. She is anxious appearing. Cooperative but mildly interactive given current anxiety. Notes inability to eat secondary to anxiety, however can sleep okay. Denies suicidal ideations and homicidal ideations. Denies hallucinations when asked. Educated patient on risks/benefits/side effects of increasing Klonopin and starting Clonidine, as well as risk of dependency and tolerance for benzo usage. Verbalized understanding and agreement. Patient denies using more benzos than usual, reporting she has been taking her medications as prescribed. Reports thinking about/going to eat can contribute to her panic attacks.    Denies medication side effects.     Sleep:  adequate  Appetite:  Fluctuating secondary to anxiety  Medication side effects: No   ROS: no complaints    Mental Status Evaluation:    Appearance:  age appropriate, dressed appropriately, adequate grooming, looks stated age, shaking in her room under the covers   Behavior:  psychomotor agitation, restless and fidgety, somewhat guarded not elaborating on topics that cause anxiety   Speech:  scant   Mood:  anxious, irritable   Affect:  mood-congruent, visibly anxious   Thought Process:  goal directed   Associations: intact associations   Thought Content:  no overt delusions, negative thoughts, ruminating thoughts   Perceptual Disturbances: denies auditory hallucinations when asked, does not appear responding to internal stimuli, denies visual hallucinations when asked   Risk Potential: Suicidal ideation - None at present, contracts for safety on the unit  Homicidal ideation - None  Potential for aggression - No   Sensorium:  oriented to person, place, and time/date   Memory:  recent and remote memory grossly intact   Consciousness:  alert and awake   Attention/Concentration: attention span and concentration appear shorter than expected for age   Insight:  partial   Judgment: fair    Gait/Station: Seen in bed   Motor Activity: abnormal movement noted: mild tremor present bilaterally     Vital signs in last 24 hours:    Temp:  [97.8 °F (36.6 °C)-98.1 °F (36.7 °C)] 97.8 °F (36.6 °C)  HR:  [75-95] 75  Resp:  [17] 17  BP: (111-146)/(73-95) 111/73    Laboratory results: I have personally reviewed all pertinent laboratory/tests results    Results from the past 24 hours:   Recent Results (from the past 24 hour(s))   Potassium    Collection Time: 09/30/24  5:40 AM   Result Value Ref Range    Potassium 4.3 3.5 - 5.3 mmol/L       Progress Toward Goals: No significant improvement today, despite multiple prns and receiving Xanax 1mg, Klonopin 1mg, and Haldol 5mg today she continues to experience severe anxiety symptoms. Will adjust Klonopin to BID and start Clonidine for anxiety/possible withdrawal symptoms. Patient reports adherence to prescribed medication.     Assessment & Plan   Principal Problem:    Panic disorder without agoraphobia with severe panic attacks  Active Problems:    Unspecified mood (affective) disorder (HCC)        Current Facility-Administered Medications   Medication Dose Route Frequency Provider Last Rate    aluminum-magnesium hydroxide-simethicone  30 mL Oral Q4H PRN Francisca Stevens MD      haloperidol lactate  2.5 mg Intramuscular Q4H PRN Max 4/day Francisca Stevens MD      And    LORazepam  1 mg Intramuscular Q4H PRN Max 4/day Francisca Stevens MD      And    benztropine  0.5 mg Intramuscular Q4H PRN Max 4/day Francisca Stevens MD      haloperidol lactate  5 mg Intramuscular Q4H PRN Max 4/day Francisca Stevens MD      And    LORazepam  2 mg Intramuscular Q4H PRN Max 4/day Francisca Stevens MD      And    benztropine  1 mg Intramuscular Q4H PRN Max 4/day Francisca Stevens MD      benztropine  1 mg Intramuscular Q4H PRN Max 6/day Francisca Stevens MD      benztropine  1 mg Oral Q4H PRN Max 6/day Francisca Stevens MD      bisacodyl  10 mg Rectal Daily PRN Francisca Stevens MD       clonazePAM  1 mg Oral BID Jaswant Cruz PA-C      cloNIDine  0.05 mg Oral TID Jaswant Cruz PA-C      cyanocobalamin  1,000 mcg Intramuscular Q30 Days Emiquang Madden PA-C      dicyclomine  10 mg Oral Q6H PRN Emi Madden PA-C      hydrOXYzine HCL  50 mg Oral Q6H PRN Max 4/day Francisca Stevens MD      Or    diphenhydrAMINE  50 mg Intramuscular Q6H PRN Francisca Stevens MD      FLUoxetine  40 mg Oral Daily Olayinka Palacios MD      haloperidol  1 mg Oral Q6H PRN Francisca Stevens MD      haloperidol  2.5 mg Oral Q4H PRN Max 4/day Francisca Stevens MD      haloperidol  5 mg Oral Q4H PRN Max 4/day Francisca Stevens MD      hydrOXYzine HCL  100 mg Oral Q6H PRN Max 4/day Francisca Stevens MD      Or    LORazepam  2 mg Intramuscular Q6H PRN Francisca Stevens MD      hydrOXYzine HCL  25 mg Oral Q6H PRN Max 4/day Francisca Stevens MD      ibuprofen  400 mg Oral Q4H PRN Francisca Stevens MD      ibuprofen  600 mg Oral Q6H PRN Francisca Stevens MD      ibuprofen  800 mg Oral Q8H PRN Francisca Stevens MD      LORazepam  1 mg Oral Q6H PRN Jaswant Cruz PA-C      melatonin  3 mg Oral HS Francisca Stevens MD      ondansetron  4 mg Oral Q6H PRN Emi Madden PA-C      polyethylene glycol  17 g Oral Daily PRN Francisca Stevens MD      propranolol  10 mg Oral Q8H PRN Francisca Stevens MD      senna-docusate sodium  1 tablet Oral Daily PRN Francisca Stevens MD      traZODone  50 mg Oral HS PRN Francisca Stevens MD       Risks / Benefits of Treatment:    Risks, benefits, and possible side effects of medications explained to patient and patient verbalizes understanding and agreement for treatment.    Counseling / Coordination of Care:    Patient's progress discussed with staff in treatment team meeting.  Medication changes discussed with patient.  Medication education provided to patient.  Recent stressors including food/nausea discussed with patient.  Educated on importance of medication and treatment  compliance.  Group attendance encouraged.    Jaswant Cruz PA-C 09/30/24

## 2024-09-30 NOTE — PROGRESS NOTES
"Progress Note - Melody Huynh 43 y.o. female MRN: [id number]    Unit/Bed#: [facility] 218-02 Encounter: [id number]        Subjective:   Patient seen and examined at bedside after reviewing the chart and discussing the case with the caring staff.      Patient examined at bedside.  Patient denies any acute symptoms other than anxiety.     Physical Exam   Vitals: Blood pressure 111/73, pulse 75, temperature 97.8 °F (36.6 °C), temperature source Temporal, resp. rate 17, height 5' 3\" (1.6 m), weight 62.5 kg (137 lb 12.8 oz), SpO2 98%, not currently breastfeeding.,Body mass index is 24.41 kg/m².  Constitutional: Patient in no acute distress.  HEENT: PERR, EOMI, MMM.  Cardiovascular: Normal rate and regular rhythm.    Pulmonary/Chest: Effort normal and breath sounds normal.   Abdomen: Soft, + BS, NT.    Assessment/Plan:  Melody Huynh is a(n) 43 y.o. female with panic disorder.     Migraine headaches.  Tylenol as needed.  GERD.  Maalox as needed.  N/V/abd pain.  CT abd/pelv 9/22 with no acute abnormality.  Afebrile.  Zofran and Bentyl as needed.  Fluids encouraged.   Hypokalemia.  Resolved.  Level 2.8 -> 3.3 -> 4.3 on 9/30.  Ordered potassium chloride 40 mEq x1 dose 9/28.    Vitamin B12 deficiency.  Patient started on monthly B12 injections.     The patient was discussed with Dr. Downey and he is in agreement with the above note.  "

## 2024-09-30 NOTE — ASSESSMENT & PLAN NOTE
Continue Prozac 40 mg daily for depression/anxiety.    Increase Klonopin to 1 mg PO BID daily for chronic daily anxiety, with continued panic attacks/anxiety despite Klonopin 1mg in the morning.  Change Xanax to Ativan 1 mg q6h as needed for breakthrough anxiety not responsive to hydroxyzine.  Start Clonidine 0.05 PO TID for anxiety/potential substance use withdrawal.    Continue Atarax prn for mild/moderate/severe anxiety.

## 2024-09-30 NOTE — SOCIAL WORK
SW attempted to meet with pt to complete psychosocial, pt resting in bed and declined at this time (11:30)    SW attempted to meet with pt for psychosocial, pt resting and declined (3:05)    Sw attempted to meet with pt to complete psychosocial, pt resting and declined. Will complete psychosocial via chart review.

## 2024-09-30 NOTE — ED NOTES
Insurance Authorization for admission:   Phone call placed to [employer]   Phone number: [phone]  Spoke to Lisha BRADFORD  Level of care: voluntary inpatient mental health     File needed to be created before auth given. Clinical will call back when able to.

## 2024-09-30 NOTE — PROGRESS NOTES
Patient states she was having a panic attack and did not want to meet to do her admission self asessment.

## 2024-09-30 NOTE — PLAN OF CARE
Problem: Ineffective Coping  Goal: Understands least restrictive measures  Description: Interventions:  - Utilize least restrictive behavior  Outcome: Progressing  Goal: Free from restraint events  Description: - Utilize least restrictive measures   - Provide behavioral interventions   - Redirect inappropriate behaviors   Outcome: Progressing     Problem: Depression  Goal: Refrain from harming self  Description: Interventions:  - Monitor patient closely, per order   - Supervise medication ingestion, monitor effects and side effects   Outcome: Progressing   See chart note

## 2024-09-30 NOTE — NURSING NOTE
Patient ws anxious and restless. Patient isolative and guarded. Staff support provided. Q 7 minute safety checks maintained. Patient denied SI/HI. Patient endorsing severe anxiety that comes and goes through out the day. Patient was compliant with medications. Patient did not attend groups despite encouragement. Staff will continue to monitor and support.

## 2024-09-30 NOTE — PROGRESS NOTES
09/30/24    Team Meeting   Meeting Type Tx Team Meeting   Team Members Present   Team Members Present Physician;Nurse;   Physician Team Member Alfred JUDD   Nursing Team Member Abdifatah GILLETTE   Social Work Team Member Yury ZARATE   Patient/Family Present   Patient Present Yes   Patient's Family Present No     Treatment team met with pt to review care plan including strengths, limitations, coping skills, treatment plan and goals; pt agreeable and signed; copy on chart.

## 2024-09-30 NOTE — NURSING NOTE
Patient mostly withdrawn to room. Attended snack. Pleasant. Came to staff requesting a PRN medication. Patient appeared anxious, hands were shaky and she looked cold. Administered Trazodone 50 mg PO and Haldol 5 mg PO @ 2041. Broset score: 3. Follow up: Haldol 5 mg PO was effective. The patient is sleeping.

## 2024-09-30 NOTE — PLAN OF CARE
Problem: Ineffective Coping  Goal: Participates in unit activities  Description: Interventions:  - Provide therapeutic environment   - Provide required programming   - Redirect inappropriate behaviors   Outcome: Progressing   Pt is new and will be encouraged to attend groups.

## 2024-10-01 PROCEDURE — 99232 SBSQ HOSP IP/OBS MODERATE 35: CPT | Performed by: PSYCHIATRY & NEUROLOGY

## 2024-10-01 PROCEDURE — 90471 IMMUNIZATION ADMIN: CPT | Performed by: FAMILY MEDICINE

## 2024-10-01 RX ADMIN — TRAZODONE HYDROCHLORIDE 50 MG: 50 TABLET ORAL at 21:31

## 2024-10-01 RX ADMIN — CLONIDINE HYDROCHLORIDE 0.05 MG: 0.1 TABLET ORAL at 16:25

## 2024-10-01 RX ADMIN — CLONAZEPAM 1 MG: 1 TABLET ORAL at 17:43

## 2024-10-01 RX ADMIN — CLONAZEPAM 1 MG: 1 TABLET ORAL at 08:20

## 2024-10-01 RX ADMIN — CLONIDINE HYDROCHLORIDE 0.05 MG: 0.1 TABLET ORAL at 08:20

## 2024-10-01 RX ADMIN — FLUOXETINE HYDROCHLORIDE 40 MG: 20 CAPSULE ORAL at 08:20

## 2024-10-01 RX ADMIN — Medication 3 MG: at 21:31

## 2024-10-01 RX ADMIN — INFLUENZA VIRUS VACCINE 0.5 ML: 15; 15; 15 SUSPENSION INTRAMUSCULAR at 15:29

## 2024-10-01 NOTE — PROGRESS NOTES
"Progress Note - Melody Huynh 43 y.o. female MRN: [id number]    Unit/Bed#: [facility] 218-02 Encounter: [id number]        Subjective:   Patient seen and examined at bedside after reviewing the chart and discussing the case with the caring staff.      Patient examined at bedside.  Patient reports no acute symptoms.    Physical Exam   Vitals: Blood pressure 100/61, pulse 78, temperature 98.1 °F (36.7 °C), temperature source Temporal, resp. rate 16, height 5' 3\" (1.6 m), weight 62.5 kg (137 lb 12.8 oz), SpO2 97%, not currently breastfeeding.,Body mass index is 24.41 kg/m².  Constitutional: Patient in no acute distress.  HEENT: PERR, EOMI, MMM.  Cardiovascular: Normal rate and regular rhythm.    Pulmonary/Chest: Effort normal and breath sounds normal.   Abdomen: Soft, + BS, NT.    Assessment/Plan:  Melody Huynh is a(n) 43 y.o. female with panic disorder.     Migraine headaches.  Tylenol as needed.  GERD.  Maalox as needed.  N/V/abd pain.  CT abd/pelv 9/22 with no acute abnormality.  Afebrile.  Zofran and Bentyl as needed.  Fluids encouraged.   Hypokalemia.  Resolved.  Level 2.8 -> 3.3 -> 4.3 on 9/30.  Ordered potassium chloride 40 mEq x1 dose 9/28.    Vitamin B12 deficiency.  Patient started on monthly B12 injections.   "

## 2024-10-01 NOTE — NURSING NOTE
Patient withdrawn at start of shift but than became visible/social with peers and staff.  Attended snack. Pleasant. Cooperative. Endorses mild anxiety, patient stated last PRN dose of Haldol took the edge off of her anxiety. Patient reported that she is not feeling hungry and not eating r/t the anxiety. Administered Trazodone 50 mg PO @ 2158. Q 7 continuous monitoring maintained.

## 2024-10-01 NOTE — CASE MANAGEMENT
"CM placed call to patient's spouse, Nicole< [phone] for family notification. Spoke with  who was very supportive and in agreement with patient's treatment plan as reviewed by CM. He did state that prior to med mgmt by [facility], the patient had adverse effects to certain medications prescribed, to include Vraylar and high doses of Buspar. He was hopeful that the patient's current  inpatient provider has clear access to previous records to support improved management. He stated that both he and his wife are 'happy with current OP provider\". He did state that the patient was scheduled to begin Virtual Partial Treatment at [facility] prior to this hospitalization. He was uncertain if the patient would continue with that plan, stating that she never wanted Partial in the past, and only agreed because of \"present panic.\"   Relevant phone numbers were provided for future contact as needed. The  had no further questions or concerns. The call ended mutually.    STARR placed call to patient's Med Mgmt provider< [phone] for admission notification. Spoke with staff who reported the next f/u appt. For med mgmt with Dr. Ross is 11/5/24. CM will call office at the time of d/c to determine availability for a ROXANNE reschedule if appropriate.    STARR placed call to patient's PCP, [phone] for admission notification. Spoke with office staff who stated that the patient has no currently scheduled f/u appts and is willing to schedule a ROXANNE appt d/c notification.  "

## 2024-10-01 NOTE — PROGRESS NOTES
Met with Melody completed her admission self assessment. She noted she has panic attacks without any reason other than a feeling starts it. She does have things she enjoys would like to learn coping skills, healthy life style, relaxation and life skills. She appears sincere and wants a better life for herself and her family. She believes she has an awesome family and life.

## 2024-10-01 NOTE — ASSESSMENT & PLAN NOTE
Continue Prozac 40 mg daily for depression/anxiety.  Continue Klonopin to 1 mg PO BID daily for chronic anxiety/panic  Change Xanax to Ativan 1 mg q6h as needed for breakthrough anxiety not responsive to hydroxyzine.  Continue Clonidine 0.05 PO TID for anxiety/potential substance use withdrawal.    Continue Atarax prn for mild/moderate/severe anxiety.

## 2024-10-01 NOTE — PROGRESS NOTES
Discussed with patient: AUDIT score of 0 UDS/Identified Substance(s) used: Marijuana, Alcohol  Risks discussed included:  Physical and Mental Health Risks  Recommendations discussed: IP/ OP D&A Services  Patient's response: Patient declined referral at this time

## 2024-10-01 NOTE — PROGRESS NOTES
10/01/24    Team Meeting   Meeting Type Daily Rounds   Team Members Present   Team Members Present Physician;Nurse;;Occupational Therapist   Physician Team Member Alfred JUDD, Charlie JUDD, Chas JUDD, Edda HANDY   Nursing Team Member Adal GILLETTE   Social Work Team Member Yury ZARATE   OT Team Member Pope NICK   Patient/Family Present   Patient Present No   Patient's Family Present No     201, reported panic attack all day, sometimes appears attention seeking, klonopin decreased, meds adjusted, this morning seems ok

## 2024-10-01 NOTE — PLAN OF CARE
Problem: Ineffective Coping  Goal: Free from restraint events  Description: - Utilize least restrictive measures   - Provide behavioral interventions   - Redirect inappropriate behaviors   Outcome: Progressing     Problem: Depression  Goal: Refrain from harming self  Description: Interventions:  - Monitor patient closely, per order   - Supervise medication ingestion, monitor effects and side effects   Outcome: Progressing   See chart note

## 2024-10-01 NOTE — PROGRESS NOTES
Psycho Social  Patient was admitted to [facility] on 9/30/24 under a 201, Voluntary Commitment with reported increase in severe panic with subsequent visits to the ED on 3 separate days within the week.    Information obtained during pre-admission [facility] ED eval is documented as follows:  Pt presents to the ED with her  on the recommendation of her psychiatrist due to increased anxiety and panic attacks over the past week. This is pt's 3rd ED visit in so many days for same complaints. Pt reports not knowing what triggers these panic attacks, but states they manifest in stomach upset, a sensation of gas bubbles in her chest and stomach, nausea, forced vomiting, fingers tingling and hyperventilating. Pt reports these panic attacks and anxiety have been occurring inermittently since age 19. Pt adds she has not suffered from such a panic attack for the past 2 yrs. Recently, pt notes she has been taking care of her  for the past 4-5 weeks since he had rotator cuff surgery, and also drove him to [place] recently for a work-related spech he had to give. However, pt notes these were not stressful events for her, even adding that she went sight seeing while in [place]. Pt denies any suicidal or homicidal ideations, nor any auditory or visual hallucinations at this time. Pt denies any prior suicide attempts, nor any self injurious behaviors. Pt denies any D & A problems, though admits to using Marijuana x2 weekly since age 18, and having 2 drinks once or twice weekly of either beer, wine or gin. Pt denies any legal issues nor any Hx of abuse or neglect. Pt reports s;leeping 8 hrs nightly, but notes this week her appetite has been poor and she mainly drinks fluids and maybe has a roll at some point during the day. Her  does report cooking for her chicken and rice and other such nutritious meals. Pt reports her  is highly supportive of her and is very attentive. She has both out-pt psychiatry and  therapy. Pt has been hospitalized previously x2. Pt reports being prescribed Prozac 30 mg q d and Klonopin 1 mg BID for OCD and ELOY.     CM met with patient to complete the psychosocial information documented as follows:      Current SI: Denied  Current HI: Denied  AVH: Denied  Depression: Mild currently  Anxiety:        Mild currently  Strengths:   Cooperative, physically healthy, reasoning ability, housing, family ties, ability to negotiate needs  Stressors/Limitations: Recurrent sx with unknown triggers, limited past treatment response  Coping skills: Involvement with daughter, skiing, exercise, shopping  HX Mental Health: Dx of Severe Panic, OCD, ELOY  Past Hospitalizations: 1 AIP at age 22; 1 more than 2 years ago  Medication Compliance:  Reported compliance  SA/SI in last 12 months: Denied  HI/violence towards others in last 12  months: Denied  Access to Firearms: 's firearm locked. He has only key  Hx abuse/trauma: Denied  Family HX Mental Health: Denied  Family HX Suicide/Homicide: Denied  Family HX Substance Abuse: Denied  Family HX Dementia: Grandfather  Substance Abuse: Reported smoking Marijuana 2 x/wk since age 18; 2 drinks of either beer, wine or liquor 1 or 2 x's/week  Smoking Cessation:  Denied  Legal Issues: Denied  Marital Status:  for the past 15 years. Reports a very close and supportive relationship  Sexual Preference: Heterosexual  Children: 1 daughter, age 8  Parents: Father  when patient was 16. Reports a very close relationship with her mother who lives a block away.  Siblings: Reports contact with her only brother  Pets: None  Education HX: BA Degree in  Speech  Type of Work: Worked for a [employer] for 15 years prior to termination due to request for Long Term Disability following a 6 month period of Short Term Disability from her employer.    HX: None  Caodaism Preference: Jehovah's witness  Cultural needs: None reported  Financial: No personal income.    POA/guardianship/advanced: directives:  None  Pharmacy:  [facility] [place] [facility]    Housing Stability-Dispo/211: No issue  Transportation:  Drives  Food Insecurity: None  Intimate Partner Violence:  No  Utilities: No issue    Psychiatrist: Dr. Ross, [facility]; next Med Mgmt appt. 11/5/24  Therapist: Dr. Foster; not seen in 1 month; previously seen weekly  PCP: Dr. Poe  D&A: Declined referral  Case Management: None   Family Contact: Spouse, Nicole: [phone]     10/01/24 1328   Patient Intake   Living Arrangement House;Lives with someone   Can patient return home? Yes   Address to be Discharge to: See Facesheet   Patient's Telephone Number See Facesheet   Access to Firearms No   Type of Work Unemployed   Work History Unemployed   School Grade/Year College senior   Admission Status   Status of Admission 201   [facility]   Patient History   Presenting Problems Increased anxiety to pt of severe panic and visits to ED on 3 consecutive days   Treatment History 2 previous AIP treatments, most recently more than 2 years ago. Currently in OP treatment for Psych Med Mgmt and Therapy   Currently in Treatment Yes   Current Psychiatrist/Therapist Dr. Monroe for Med Mgmt; Dr. Foster for therapy   Community Agency Supports  PA and OP Therapy   Medical Problems See Medical H&P   Legal Issues Denied   Probation/ Name (if applicable) Denied   Substance Abuse Yes (See BH History section for detail)   Crisis Info   Release of Information Signed Yes  (Psych, PCP, Spouse)

## 2024-10-01 NOTE — NURSING NOTE
Patient was pleasant and cooperative. Patient social with staff and peers. Staff support provided. Q 7 minute safety checks maintained. Patient denied SI/HI. Patient compliant with medications and groups. Patient was less anxious today. Staff will continue to monitor and support.

## 2024-10-01 NOTE — PROGRESS NOTES
"Progress Note - Behavioral Health   Name: Melody Huynh 43 y.o. female I MRN: [id number]  Unit/Bed#: -02 I Date of Admission: 9/27/2024   Date of Service: 10/1/2024 I Hospital Day: 4    Assessment & Plan  Panic disorder without agoraphobia with severe panic attacks  Continue Prozac 40 mg daily for depression/anxiety.  Continue Klonopin to 1 mg PO BID daily for chronic anxiety/panic  Change Xanax to Ativan 1 mg q6h as needed for breakthrough anxiety not responsive to hydroxyzine.  Continue Clonidine 0.05 PO TID for anxiety/potential substance use withdrawal.    Continue Atarax prn for mild/moderate/severe anxiety.  Unspecified mood (affective) disorder (HCC)  Continue Prozac to 40 mg daily for depression and anxiety.    Progress Toward Goals: Improving.  Anxiety improving, denies panic.  Tolerating addition of clonidine 0.05 mg TID well. Klonopin increased 1mg PO BID yesterday for anxiety/panic.  Sleep and appetite improved.  Will continue with current psychotropic regimen; patient tolerating well.  No discharge date at this time.    Recommended Treatment: Continue with group therapy, milieu therapy and occupational therapy.      Risks, benefits and possible side effects of Medications:   Risks, benefits, and possible side effects of medications explained to patient and patient verbalizes understanding.      History of Present Illness   Behavior over the last 24 hours:  improved  Sleep: Improved, PRN trazodone effective  Appetite: Improving  Medication side effects: No  ROS: no complaints and all other systems are negative    Subjective: Melody has been visible and participatory in the milieu.  Presents as significantly less anxious today and describes \"I am feeling better; less panicky.\"  Affect was calm and cooperative with good eye contact.  Also endorses improving appetite and sleep.  No PRNs utilized for anxiety or agitation today.  Exhibits no signs or symptoms of benzodiazepine withdrawal.  " "Thoughts were organized and forward thinking with no delusional content verbalized.  Denies depression.  Maintaining safety with no thoughts of SI/HI.  Appreciative of medication changes.  Hopeful she will be able to discharge by end of week.  Requested her  be contacted for update.    Objective   Mental Status Evaluation:  Appearance:  age appropriate and casually dressed   Behavior:  Cooperative, calm, good eye contact   Speech:  normal pitch and normal volume   Mood:  \"Much better; less panicky.\"   Affect:  mood-congruent   Thought Process:  goal directed and linear, forward thinking   Associations: intact associations   Thought Content:  No overt delusions or paranoia verbalized   Perceptual Disturbances: Denied AVH, did not appear internally preoccupied   Risk Potential: Suicidal Ideations none  Homicidal Ideations none  Potential for Aggression No   Sensorium:  person, place, time/date, and situation   Memory:  recent and remote memory grossly intact   Consciousness:  alert and awake    Attention: attention span and concentration were age appropriate   Insight:  fair   Judgment: fair   Gait/Station: normal gait/station and normal balance   Motor Activity: no abnormal movements     Medications: all current active meds have been reviewed and continue current psychiatric medications.      Lab Results: I have reviewed the following results: Drug Screen:   Results from last 7 days   Lab Units 09/27/24  1620 09/26/24  0502   BARBITURATE UR  Negative Negative   BENZODIAZEPINE UR  Positive* Negative   THC UR  Positive* Positive*   COCAINE UR  Negative Negative   METHADONE URINE  Negative Negative   OPIATE UR  Negative Negative   PCP UR  Negative Negative     CMP:   Lab Results   Component Value Date    SODIUM 140 09/28/2024    K 4.3 09/30/2024     09/28/2024    CO2 24 09/28/2024    AGAP 8 09/28/2024    BUN 19 09/28/2024    CREATININE 1.09 09/28/2024    GLUC 93 09/28/2024    GLUF 93 09/28/2024    CALCIUM " 9.4 09/28/2024    AST 12 (L) 09/28/2024    ALT 11 09/28/2024    ALKPHOS 50 09/28/2024    TP 7.1 09/28/2024    ALB 4.4 09/28/2024    TBILI 1.73 (H) 09/28/2024    EGFR 62 09/28/2024       Administrative Statements   I have spent a total time of 35 minutes in caring for this patient on the day of the visit/encounter including medication education, treatment plan, safety planning.

## 2024-10-02 PROCEDURE — 99232 SBSQ HOSP IP/OBS MODERATE 35: CPT | Performed by: PSYCHIATRY & NEUROLOGY

## 2024-10-02 RX ADMIN — Medication 3 MG: at 21:09

## 2024-10-02 RX ADMIN — TRAZODONE HYDROCHLORIDE 50 MG: 50 TABLET ORAL at 21:11

## 2024-10-02 RX ADMIN — CLONAZEPAM 1 MG: 1 TABLET ORAL at 08:13

## 2024-10-02 RX ADMIN — FLUOXETINE HYDROCHLORIDE 40 MG: 20 CAPSULE ORAL at 08:13

## 2024-10-02 RX ADMIN — CLONAZEPAM 1 MG: 1 TABLET ORAL at 17:30

## 2024-10-02 NOTE — PROGRESS NOTES
"  Progress Note - Melody Huynh 43 y.o. female MRN: [id number]    Unit/Bed#: [facility] 218-02 Encounter: [id number]        Subjective:   Patient seen and examined at bedside after reviewing the chart and discussing the case with the caring staff.      Patient examined at bedside.  Patient reports no acute symptoms.    Physical Exam   Vitals: Blood pressure 96/55, pulse 79, temperature 97.9 °F (36.6 °C), temperature source Temporal, resp. rate 16, height 5' 3\" (1.6 m), weight 62.5 kg (137 lb 12.8 oz), SpO2 98%, not currently breastfeeding.,Body mass index is 24.41 kg/m².  Constitutional: Patient in no acute distress.  HEENT: PERR, EOMI, MMM.  Cardiovascular: Normal rate and regular rhythm.    Pulmonary/Chest: Effort normal and breath sounds normal.   Abdomen: Soft, + BS, NT.    Assessment/Plan:  Melody Huynh is a(n) 43 y.o. female with panic disorder.     Migraine headaches.  Tylenol as needed.  GERD.  Maalox as needed.  N/V/abd pain.  CT abd/pelv 9/22 with no acute abnormality.  Afebrile.  Zofran and Bentyl as needed.  Fluids encouraged.   Hypokalemia.  Resolved.  Level 2.8 -> 3.3 -> 4.3 on 9/30.  Ordered potassium chloride 40 mEq x1 dose 9/28.    Vitamin B12 deficiency.  Patient started on monthly B12 injections.    The patient was discussed with Dr. Downey and he is in agreement with the above note.     "

## 2024-10-02 NOTE — ASSESSMENT & PLAN NOTE
Continue Prozac 40 mg PO QD for depression/anxiety.  Continue Klonopin to 1 mg PO BID daily for chronic anxiety/panic attacks.  Continue Ativan 1mg PO prn q6h for continued anxiety/panic attacks not responsive to Atarax.  Continue Clonidine 0.05 PO TID for anxiety/potential substance use withdrawal.    Continue Atarax prn for mild/moderate/severe anxiety.

## 2024-10-02 NOTE — PLAN OF CARE
Problem: Ineffective Coping  Goal: Participates in unit activities  Description: Interventions:  - Provide therapeutic environment   - Provide required programming   - Redirect inappropriate behaviors   Outcome: Progressing   Has begun to attend groups

## 2024-10-02 NOTE — PROGRESS NOTES
Met with Melody completed her relapse prevention. She was able to identify her protective factors, warning signs, stressors, coping skills, and support people. We reviewed the community supports. She signed her 72 hour notice.

## 2024-10-02 NOTE — NURSING NOTE
Patient visible on unit. Pleasant and cooperative. Social with staff and peers. Denies SI,HI,AVH. Safety checks continue Q 7 minutes.

## 2024-10-02 NOTE — CASE MANAGEMENT
STARR placed call to patient's Psych Med Mgmt provider: [phone] to schedule a d/c f/u appt, Spoke with office staff who rescheduled her current appt on 11/5/24 to a ROXANNE appt on Tuesday, 10/22/24 at 11 AM with Dr. Ross.    STARR spoke with patient's PCP office: [phone] to schedule a ROXANNE appt.   Appt. Scheduled for Monday, 10/7/24 at 2:20 PM; arrival time 2:05 PM     STARR left a VM for the office of patient's therapy provider: [phone] to schedule a ROXANNE appt. Will await return call to complete scheduling.

## 2024-10-02 NOTE — PROGRESS NOTES
10/02/24    Team Meeting   Meeting Type Daily Rounds   Team Members Present   Team Members Present Physician;Nurse;;Occupational Therapist   Physician Team Member Alfred JUDD, Charlie JUDD, Chas JUDD, Edda HANDY, Nancy OLVERAP   Nursing Team Member Adal GILLETTE   Social Work Team Member Yury ZARATE   OT Team Member Pope NICK, Intern   Patient/Family Present   Patient Present No   Patient's Family Present No     201, doing well, less anxiety, dc Friday home

## 2024-10-02 NOTE — NURSING NOTE
Patient calm and cooperative with staff; brightens with conversation. Denies SI/HI or AVH. Reports no anxiety or depression. Patient visible on the unit for needs, selectively social with peers, and attended groups. Patient is med compliant and maintaining proper hygiene. Will continue to monitor. Continual q7 min rounding and safety checks in place.

## 2024-10-02 NOTE — PROGRESS NOTES
Progress Note - Behavioral Health   Name: Melody Huynh 43 y.o. female I MRN: [id number]  Unit/Bed#: -02 I Date of Admission: 9/27/2024   Date of Service: 10/2/2024 I Hospital Day: 5     Assessment & Plan  Panic disorder without agoraphobia with severe panic attacks  Continue Prozac 40 mg PO QD for depression/anxiety.  Continue Klonopin to 1 mg PO BID daily for chronic anxiety/panic attacks.  Continue Ativan 1mg PO prn q6h for continued anxiety/panic attacks not responsive to Atarax.  Continue Clonidine 0.05 PO TID for anxiety/potential substance use withdrawal.    Continue Atarax prn for mild/moderate/severe anxiety.  Unspecified mood (affective) disorder (HCC)  Continue Prozac to 40 mg daily for depression and anxiety.    Planned medication and treatment changes:    All current active medications have been reviewed  Encourage group therapy, milieu therapy and occupational therapy  Behavioral Health checks every 7 minutes  Continue current medications as prescribed.  Patient Signed a 72 hour notice this morning, discharge disposition ongoing.    Progress Note - Behavioral Health     Melody Huynh 43 y.o. female MRN: [id number]   Unit/Bed#: -02 Encounter: [id number]    Behavior over the last 24 hours:  Ongoing anxiety .     Melody is seen today for psychiatric follow up. Per nursing notes,  pleasant, cooperative, social, less anxious, signed a  72 hour notice today at 933. Received Trazodone 50mg PO prn for insomnia last night, effective. No other psych prns in last 24 hours.    Today Melody is calm and cooperative, not anxious appearing. She appears much improved from when she was seen by this writer on 9/30/2024. She is pleasant and in no acute distress. She denies panic and anxiety in the past 24 hours. Has not used any prn anxiolytic medication in the last 24 hours. Notes some anxiety related to food, however has been eating adequately without issue. Slept well last night. Denies any  suicidal or homicidal ideations. Notes she is feeling better and is hopeful for discharge as she signed a 72 hour. No tremor or psychomotor agitation noted. Patient educated on risks/benefits of long term benzo usage and patient verbalized understanding and agreed with treatment plan, and to continue with further management with her outpatient psychiatrist.     Denies medication side effects at this time, and notes good effect.    Sleep:  adequate  Appetite:  Adequate  Medication side effects: No   ROS: no complaints    Mental Status Evaluation:    Appearance:  age appropriate, dressed appropriately, adequate grooming, looks stated age, no psychomotor agitation noted   Behavior:  cooperative, calm, not restless and fidgety, standing calmly throughout assessment   Speech:  normal rate and volume, clear   Mood:  euthymic   Affect:  appropriate, mood-congruent   Thought Process:  goal directed   Associations: intact associations   Thought Content:  no overt delusions, less ruminations, no panic attacks, 24 hours   Perceptual Disturbances: denies auditory hallucinations when asked, does not appear responding to internal stimuli, denies visual hallucinations when asked   Risk Potential: Suicidal ideation - None at present, contracts for safety on the unit  Homicidal ideation - None  Potential for aggression - No   Sensorium:  oriented to person, place, and time/date   Memory:  recent and remote memory grossly intact   Consciousness:  alert and awake   Attention/Concentration: attention span and concentration are age appropriate   Insight:  fair   Judgment: fair   Gait/Station: normal gait/station   Motor Activity: No abnormal movements noted     Vital signs in last 24 hours:    Temp:  [97.5 °F (36.4 °C)-97.9 °F (36.6 °C)] 97.9 °F (36.6 °C)  HR:  [79-85] 79  BP: ()/(55-76) 96/55  Resp:  [16] 16  SpO2:  [98 %-99 %] 98 %  O2 Device: None (Room air)    Laboratory results: I have personally reviewed all pertinent  laboratory/tests results    Results from the past 24 hours:   No results found for this or any previous visit (from the past 24 hour(s)).      Progress Toward Goals: Patient appears much improved compared to 2 days ago, no psychomotor agitation, no tremor denies depression anxiety. No recent panic attacks, tolerating medication. Signed 72 hour notice today.     Assessment & Plan   Principal Problem:    Panic disorder without agoraphobia with severe panic attacks  Active Problems:    Unspecified mood (affective) disorder (HCC)        Current Facility-Administered Medications   Medication Dose Route Frequency Provider Last Rate    aluminum-magnesium hydroxide-simethicone  30 mL Oral Q4H PRN Francisca Stevens MD      haloperidol lactate  2.5 mg Intramuscular Q4H PRN Max 4/day Francisca Stevens MD      And    LORazepam  1 mg Intramuscular Q4H PRN Max 4/day Francisca Stevens MD      And    benztropine  0.5 mg Intramuscular Q4H PRN Max 4/day Francisca Stevens MD      haloperidol lactate  5 mg Intramuscular Q4H PRN Max 4/day Francisca Stevens MD      And    LORazepam  2 mg Intramuscular Q4H PRN Max 4/day Francisca Stevens MD      And    benztropine  1 mg Intramuscular Q4H PRN Max 4/day Francisca Stevens MD      benztropine  1 mg Intramuscular Q4H PRN Max 6/day Francisca Stevens MD      benztropine  1 mg Oral Q4H PRN Max 6/day Francisca Stevens MD      bisacodyl  10 mg Rectal Daily PRN Francisca Stevens MD      clonazePAM  1 mg Oral BID Jaswant Cruz PA-C      cloNIDine  0.05 mg Oral TID Jaswant Cruz PA-C      cyanocobalamin  1,000 mcg Intramuscular Q30 Days Emi Madden PA-C      dicyclomine  10 mg Oral Q6H PRN Emi Madden PA-C      hydrOXYzine HCL  50 mg Oral Q6H PRN Max 4/day Francisca Stevens MD      Or    diphenhydrAMINE  50 mg Intramuscular Q6H PRN Francisca Stevens MD      FLUoxetine  40 mg Oral Daily Olayinka Palacios MD      haloperidol  1 mg Oral Q6H PRN Francisca Stevens MD       haloperidol  2.5 mg Oral Q4H PRN Max 4/day Francisca Stevens MD      haloperidol  5 mg Oral Q4H PRN Max 4/day Francisca Stevens MD      hydrOXYzine HCL  100 mg Oral Q6H PRN Max 4/day Francisca Stevens MD      Or    LORazepam  2 mg Intramuscular Q6H PRN Francisca Stevens MD      hydrOXYzine HCL  25 mg Oral Q6H PRN Max 4/day Francisca Stevens MD      ibuprofen  400 mg Oral Q4H PRN Francisca Stevens MD      ibuprofen  600 mg Oral Q6H PRN Francisca Stevens MD      ibuprofen  800 mg Oral Q8H PRN Francisca Stevens MD      LORazepam  1 mg Oral Q6H PRN Jaswant Cruz PA-C      melatonin  3 mg Oral HS Francisca Stevens MD      ondansetron  4 mg Oral Q6H PRN LUISA SainzC      polyethylene glycol  17 g Oral Daily PRN Francisca Stevens MD      propranolol  10 mg Oral Q8H PRN Francisca Stevens MD      senna-docusate sodium  1 tablet Oral Daily PRN Francisca Stevens MD      traZODone  50 mg Oral HS PRN Francisca Stevens MD       Risks / Benefits of Treatment:    Risks, benefits, and possible side effects of medications explained to patient and patient verbalizes understanding and agreement for treatment.    Counseling / Coordination of Care:    Patient's progress discussed with staff in treatment team meeting.  Medication changes discussed with patient.  Medication education provided to patient.  Educated on importance of medication and treatment compliance.  Group attendance encouraged.    Jaswant Cruz PA-C 10/02/24

## 2024-10-02 NOTE — SOCIAL WORK
"SW returned call to  German. German requested update on pt.  reported that pt had \"3 good days\" prior to admission.  feels comfortable with discharge plan and seeing multiple improved days. Has no safety concerns at this time.  will pickup patient at 10:00.       "

## 2024-10-03 ENCOUNTER — HOSPITAL ENCOUNTER (EMERGENCY)
Facility: HOSPITAL | Age: 44
Discharge: HOME | End: 2024-10-04
Attending: EMERGENCY MEDICINE
Payer: COMMERCIAL

## 2024-10-03 DIAGNOSIS — R10.9 ABDOMINAL PAIN: Primary | ICD-10-CM

## 2024-10-03 DIAGNOSIS — F41.9 ANXIETY: ICD-10-CM

## 2024-10-03 LAB
ALBUMIN SERPL BCG-MCNC: 4.7 G/DL (ref 3.5–5)
ALP SERPL-CCNC: 64 U/L (ref 34–104)
ALT SERPL W P-5'-P-CCNC: 13 U/L (ref 7–52)
ANION GAP SERPL CALCULATED.3IONS-SCNC: 12 MMOL/L (ref 4–13)
AST SERPL W P-5'-P-CCNC: 14 U/L (ref 13–39)
BASOPHILS # BLD AUTO: 0.02 THOUSANDS/ÂΜL (ref 0–0.1)
BASOPHILS NFR BLD AUTO: 0 % (ref 0–1)
BILIRUB SERPL-MCNC: 1.34 MG/DL (ref 0.2–1)
BUN SERPL-MCNC: 11 MG/DL (ref 5–25)
CALCIUM SERPL-MCNC: 10 MG/DL (ref 8.4–10.2)
CHLORIDE SERPL-SCNC: 102 MMOL/L (ref 96–108)
CO2 SERPL-SCNC: 22 MMOL/L (ref 21–32)
CREAT SERPL-MCNC: 0.93 MG/DL (ref 0.6–1.3)
EOSINOPHIL # BLD AUTO: 0.06 THOUSAND/ÂΜL (ref 0–0.61)
EOSINOPHIL NFR BLD AUTO: 1 % (ref 0–6)
ERYTHROCYTE [DISTWIDTH] IN BLOOD BY AUTOMATED COUNT: 11.7 % (ref 11.6–15.1)
GFR SERPL CREATININE-BSD FRML MDRD: 76 ML/MIN/1.73SQ M
GLUCOSE SERPL-MCNC: 104 MG/DL (ref 65–140)
HCT VFR BLD AUTO: 37.7 % (ref 34.8–46.1)
HGB BLD-MCNC: 13.5 G/DL (ref 11.5–15.4)
IMM GRANULOCYTES # BLD AUTO: 0.03 THOUSAND/UL (ref 0–0.2)
IMM GRANULOCYTES NFR BLD AUTO: 0 % (ref 0–2)
LIPASE SERPL-CCNC: 51 U/L (ref 11–82)
LYMPHOCYTES # BLD AUTO: 1.59 THOUSANDS/ÂΜL (ref 0.6–4.47)
LYMPHOCYTES NFR BLD AUTO: 19 % (ref 14–44)
MCH RBC QN AUTO: 29.7 PG (ref 26.8–34.3)
MCHC RBC AUTO-ENTMCNC: 35.8 G/DL (ref 31.4–37.4)
MCV RBC AUTO: 83 FL (ref 82–98)
MONOCYTES # BLD AUTO: 0.4 THOUSAND/ÂΜL (ref 0.17–1.22)
MONOCYTES NFR BLD AUTO: 5 % (ref 4–12)
NEUTROPHILS # BLD AUTO: 6.17 THOUSANDS/ÂΜL (ref 1.85–7.62)
NEUTS SEG NFR BLD AUTO: 75 % (ref 43–75)
NRBC BLD AUTO-RTO: 0 /100 WBCS
PLATELET # BLD AUTO: 308 THOUSANDS/UL (ref 149–390)
PMV BLD AUTO: 8.9 FL (ref 8.9–12.7)
POTASSIUM SERPL-SCNC: 3.5 MMOL/L (ref 3.5–5.3)
PROT SERPL-MCNC: 7.8 G/DL (ref 6.4–8.4)
RBC # BLD AUTO: 4.54 MILLION/UL (ref 3.81–5.12)
SODIUM SERPL-SCNC: 136 MMOL/L (ref 135–147)
WBC # BLD AUTO: 8.27 THOUSAND/UL (ref 4.31–10.16)

## 2024-10-03 PROCEDURE — 36415 COLL VENOUS BLD VENIPUNCTURE: CPT

## 2024-10-03 PROCEDURE — 99232 SBSQ HOSP IP/OBS MODERATE 35: CPT | Performed by: PSYCHIATRY & NEUROLOGY

## 2024-10-03 PROCEDURE — 99284 EMERGENCY DEPT VISIT MOD MDM: CPT

## 2024-10-03 PROCEDURE — 83690 ASSAY OF LIPASE: CPT | Performed by: EMERGENCY MEDICINE

## 2024-10-03 PROCEDURE — 80053 COMPREHEN METABOLIC PANEL: CPT | Performed by: EMERGENCY MEDICINE

## 2024-10-03 PROCEDURE — 99285 EMERGENCY DEPT VISIT HI MDM: CPT | Performed by: EMERGENCY MEDICINE

## 2024-10-03 PROCEDURE — 96375 TX/PRO/DX INJ NEW DRUG ADDON: CPT

## 2024-10-03 PROCEDURE — 85025 COMPLETE CBC W/AUTO DIFF WBC: CPT | Performed by: EMERGENCY MEDICINE

## 2024-10-03 PROCEDURE — 96374 THER/PROPH/DIAG INJ IV PUSH: CPT

## 2024-10-03 PROCEDURE — 93005 ELECTROCARDIOGRAM TRACING: CPT

## 2024-10-03 RX ORDER — KETOROLAC TROMETHAMINE 30 MG/ML
15 INJECTION, SOLUTION INTRAMUSCULAR; INTRAVENOUS ONCE
Status: COMPLETED | OUTPATIENT
Start: 2024-10-03 | End: 2024-10-03

## 2024-10-03 RX ORDER — LORAZEPAM 2 MG/ML
2 INJECTION INTRAMUSCULAR ONCE
Status: COMPLETED | OUTPATIENT
Start: 2024-10-03 | End: 2024-10-03

## 2024-10-03 RX ORDER — CLONAZEPAM 1 MG/1
1 TABLET ORAL 2 TIMES DAILY
Qty: 20 TABLET | Refills: 0 | Status: SHIPPED | OUTPATIENT
Start: 2024-10-03 | End: 2024-10-13

## 2024-10-03 RX ORDER — CYANOCOBALAMIN 1000 UG/ML
1000 INJECTION, SOLUTION INTRAMUSCULAR; SUBCUTANEOUS
Qty: 1 ML | Refills: 0 | Status: SHIPPED | OUTPATIENT
Start: 2024-10-30

## 2024-10-03 RX ORDER — DROPERIDOL 2.5 MG/ML
0.62 INJECTION, SOLUTION INTRAMUSCULAR; INTRAVENOUS ONCE
Status: COMPLETED | OUTPATIENT
Start: 2024-10-03 | End: 2024-10-03

## 2024-10-03 RX ORDER — CLONIDINE HYDROCHLORIDE 0.1 MG/1
0.05 TABLET ORAL EVERY 12 HOURS SCHEDULED
Status: DISCONTINUED | OUTPATIENT
Start: 2024-10-03 | End: 2024-10-07

## 2024-10-03 RX ORDER — FLUOXETINE 40 MG/1
40 CAPSULE ORAL DAILY
Qty: 30 CAPSULE | Refills: 0 | Status: SHIPPED | OUTPATIENT
Start: 2024-10-04 | End: 2024-10-07

## 2024-10-03 RX ADMIN — FLUOXETINE HYDROCHLORIDE 40 MG: 20 CAPSULE ORAL at 08:35

## 2024-10-03 RX ADMIN — HALOPERIDOL LACTATE 2.5 MG: 5 INJECTION, SOLUTION INTRAMUSCULAR at 19:33

## 2024-10-03 RX ADMIN — CLONAZEPAM 1 MG: 1 TABLET ORAL at 17:23

## 2024-10-03 RX ADMIN — LORAZEPAM 2 MG: 2 INJECTION INTRAMUSCULAR; INTRAVENOUS at 21:52

## 2024-10-03 RX ADMIN — BENZTROPINE MESYLATE 0.5 MG: 1 INJECTION INTRAMUSCULAR; INTRAVENOUS at 19:33

## 2024-10-03 RX ADMIN — DICYCLOMINE HYDROCHLORIDE 10 MG: 10 CAPSULE ORAL at 18:30

## 2024-10-03 RX ADMIN — LORAZEPAM 1 MG: 2 INJECTION INTRAMUSCULAR; INTRAVENOUS at 19:33

## 2024-10-03 RX ADMIN — ONDANSETRON 4 MG: 4 TABLET, ORALLY DISINTEGRATING ORAL at 19:01

## 2024-10-03 RX ADMIN — DROPERIDOL 0.62 MG: 2.5 INJECTION, SOLUTION INTRAMUSCULAR; INTRAVENOUS at 21:50

## 2024-10-03 RX ADMIN — KETOROLAC TROMETHAMINE 15 MG: 30 INJECTION, SOLUTION INTRAMUSCULAR at 21:51

## 2024-10-03 RX ADMIN — ALUMINUM HYDROXIDE, MAGNESIUM HYDROXIDE, AND DIMETHICONE 30 ML: 200; 20; 200 SUSPENSION ORAL at 17:23

## 2024-10-03 RX ADMIN — HYDROXYZINE HYDROCHLORIDE 50 MG: 50 TABLET, FILM COATED ORAL at 18:30

## 2024-10-03 RX ADMIN — CLONAZEPAM 1 MG: 1 TABLET ORAL at 08:35

## 2024-10-03 NOTE — PROGRESS NOTES
Progress Note - Behavioral Health   Name: Melody Huynh 43 y.o. female I MRN: [id number]  Unit/Bed#: -02 I Date of Admission: 9/27/2024   Date of Service: 10/3/2024 I Hospital Day: 6    Assessment & Plan  Panic disorder without agoraphobia with severe panic attacks  Continue Prozac 40 mg PO QD for depression/anxiety.  Continue Klonopin to 1 mg PO BID daily for chronic anxiety/panic attacks.  Continue Ativan 1mg PO prn q6h for continued anxiety/panic attacks not responsive to Atarax.  Decreased clonidine 0.05 PO BID for anxiety/potential substance use withdrawal; not been receiving due to parameters not being met, will taper    Unspecified mood (affective) disorder (HCC)  Continue Prozac to 40 mg daily for depression and anxiety.    Progress Toward Goals: Improving.  Maintaining mood control and endorses significant improvement with anxiety; affect congruent.  No utilization of PRN anxiolytic or antipsychotics over the past 3 days.  Has not received routine clonidine over the past 2 days due to not meeting blood pressure parameters however still maintaining significant improvement and anxiety control.  Plan is to taper clonidine 0.05 mg PO BID with potential discontinuation prior to discharge since patient has been maintaining improvement.  Signed 72-hour notice 10/2/24 at 9:33 AM.  Plan is to discharge home 10/4/2024 and follow up with established outpatient psychiatrist.    Recommended Treatment: Continue with group therapy, milieu therapy and occupational therapy.      Risks, benefits and possible side effects of Medications:   Risks, benefits, and possible side effects of medications explained to patient and patient verbalizes understanding.      History of Present Illness   Behavior over the last 24 hours:  improved  Sleep: normal  Appetite: normal  Medication side effects: No  ROS: no complaints and all other systems are negative    Subjective: Melody continues to exhibit improvement with anxiety  "and mood control. Visible, social, and engaged in milieu activities. No longer endorsing panic and describes her anxiety as being \"under control.\" Does not appear anxious . Denies depression. Thoughts are organized, optimistic, and forward thinking with no delusional content. Maintaining safety. Appetite and sleep also improving. Verbalizes readiness for discharge.     Spoke with patient's  (German) regarding clinical update.   states Melody \"sounds like herself\" over the phone and feels patient is at baseline; no safety concerns verbalized.  Educated regarding medications and utilization of coping skills to assist in decreasing anxiety in the outpatient setting.  All questions answered prior to termination of phone call.   appreciative of conversation.    Objective   Mental Status Evaluation:  Appearance:  age appropriate, casually dressed, and well-groomed   Behavior:  Pleasant, cooperative, calm   Speech:  normal pitch and normal volume   Mood:  \"I feel good,\" less anxious   Affect:  mood-congruent   Thought Process:  goal directed, logical, and forward thinking   Associations: intact associations   Thought Content:  No overt delusions or paranoia verbalized   Perceptual Disturbances: Denies AVH, did not appear internally preoccupied   Risk Potential: Suicidal Ideations none  Homicidal Ideations none  Potential for Aggression No   Sensorium:  person, place, time/date, and situation   Memory:  recent and remote memory grossly intact   Consciousness:  alert and awake    Attention: attention span and concentration were age appropriate   Insight:  fair   Judgment: fair   Gait/Station: normal gait/station and normal balance   Motor Activity: no abnormal movements     Medications: all current active meds have been reviewed, continue current psychiatric medications, and planned medication changes: Decrease clonidine 0.05 mg PO BID .      Lab Results: I have reviewed the following results:  CMP: "   Lab Results   Component Value Date    SODIUM 140 09/28/2024    K 4.3 09/30/2024     09/28/2024    CO2 24 09/28/2024    AGAP 8 09/28/2024    BUN 19 09/28/2024    CREATININE 1.09 09/28/2024    GLUC 93 09/28/2024    GLUF 93 09/28/2024    CALCIUM 9.4 09/28/2024    AST 12 (L) 09/28/2024    ALT 11 09/28/2024    ALKPHOS 50 09/28/2024    TP 7.1 09/28/2024    ALB 4.4 09/28/2024    TBILI 1.73 (H) 09/28/2024    EGFR 62 09/28/2024     Drug Screen:   Lab Results   Component Value Date    AMPMETHUR Negative 09/27/2024    BARBTUR Negative 09/27/2024    BDZUR Positive (A) 09/27/2024    THCUR Positive (A) 09/27/2024    COCAINEUR Negative 09/27/2024    METHADONEUR Negative 09/27/2024    OPIATEUR Negative 09/27/2024    PCPUR Negative 09/27/2024       Administrative Statements   I have spent a total time of 35 minutes in caring for this patient on the day of the visit/encounter including medication education, treatment plan, safety/discharge planning, phone call to patient's  regarding clinical update/disposition planning.

## 2024-10-03 NOTE — ASSESSMENT & PLAN NOTE
Continue Prozac 40 mg PO QD for depression/anxiety.  Continue Klonopin to 1 mg PO BID daily for chronic anxiety/panic attacks.  Continue Ativan 1mg PO prn q6h for continued anxiety/panic attacks not responsive to Atarax.  Decreased clonidine 0.05 PO BID for anxiety/potential substance use withdrawal; not been receiving due to parameters not being met, will taper

## 2024-10-03 NOTE — PLAN OF CARE
Adolescent Privilege Time    Date: October 3, 2024    Time: 1230 - 1300    Skills Taught: How to enjoy leisure activities    Behaviors Noted: Inactive    Explanation:  Pt slept during privilege time. MHT made several attempts to wake patient.    Problem: Ineffective Coping  Goal: Identifies ineffective coping skills  Outcome: Progressing  Goal: Identifies healthy coping skills  Outcome: Progressing  Goal: Demonstrates healthy coping skills  Outcome: Progressing  Goal: Participates in unit activities  Description: Interventions:  - Provide therapeutic environment   - Provide required programming   - Redirect inappropriate behaviors   Outcome: Progressing  Goal: Patient/Family participate in treatment and DC plans  Description: Interventions:  - Provide therapeutic environment  Outcome: Progressing  Goal: Patient/Family verbalizes awareness of resources  Outcome: Progressing  Goal: Understands least restrictive measures  Description: Interventions:  - Utilize least restrictive behavior  Outcome: Progressing  Goal: Free from restraint events  Description: - Utilize least restrictive measures   - Provide behavioral interventions   - Redirect inappropriate behaviors   Outcome: Progressing

## 2024-10-03 NOTE — PROGRESS NOTES
10/03/24    Team Meeting   Meeting Type Daily Rounds   Team Members Present   Team Members Present Physician;;Nurse;Occupational Therapist   Physician Team Member Alfred JUDD, Charlie JUDD, Chas JUDD, Edda HANDY   Nursing Team Member Kennedy RN   Social Work Team Member Yury ZARATE   OT Team Member Pope NICK   Patient/Family Present   Patient Present No   Patient's Family Present No     201, doing well, denies all, brightens, sleeping, dc home tomorrow with OP psych, calm, pleasant and cooperative

## 2024-10-03 NOTE — DISCHARGE INSTR - OTHER ORDERS
You are being discharged to [facility] [address] [address]. Patient phone [phone]     Triggers you have identified during your hospitalization that led to your admission including a distressed mood and ineffective coping skills. Coping skills you have identified during your hospitalization include music and art therapy. If you are unable to deal with your distressed mood alone please contact German ([phone]). If that is not effective and you continue to have a distressed mood, are overwhelmed, or are in crisis please contact (Crisis #) [facility] 1 [phone], dial 911 or go to the nearest emergency center.      *[facility] Crisis Hotline: 1 421.782.7563  *National Suicide Prevention Lifeline:  1-425.373.9849  *Alcohol Anonymous: 445.638.7064  *[place] Drug & Alcohol Commission: (650) 148-3037  *National [place] on Mental Illness (RAQUEL) HELPLINE: 622.320.6932/Website: www.raquel.org  *Substance Abuse and Mental Health Services Administration([facility]) National Helpline, which is a confidential, free, 24-hour-a-day, 365-day-a-year, information service for individuals and family members facing mental health and/or substance use disorders. This service provides referrals to local treatment facilities, support groups, and community-based organizations. Callers can also order free publications and other information.  Call 1-211.792.3968/Website: www.[facility].gov  *United Way 2-1-1: This is a toll free, confidential, 24-hour-a-day service which connects you to a community  in your area who can help you find services and resources that are available to you locally and provide critical services that can improve and save lives.  Call: 211  /Website: http://www.[employer].TVorg/       Mouna, or Lyn, our Behavioral Health Nurse Navigators, will be calling you after your discharge, on the phone number that you provided.  They will be available as an additional support, if needed.    If you wish to speak with one of them, you may contact Mouna at [phone] or Lyn at [phone]    ** The treatment team recommends participation in a partial hospitalization program upon discharge; you declined a referral at this time.  If you wish to seek this service in the future, please call [facility] Partial Hospitalization Program at [phone] to schedule an intake appointment.          For Substance Abuse treatment Assessment:  [facility] Providers    [facility] ([facility])  [address] [place] [address]  Phone: [phone]      [facility] Outpatient  [facility], [address]  [address]  [place] [address]  New Admissions [phone]  Local office [phone]    Outpatient Drug & Alcohol Treatment   The [facility] currently operates an outpatient treatment unit:  [facility] [place] as a functional unit of the [facility]  The Outpatient treatment units are licensed by the [place] Department of Drug & Alcohol Programs to provide individual and group counseling for those with substance abuse and dependency problems. The clinical staff is experienced in a variety of therapeutic modalities and provides treatment that is individualized to meet the particular needs of each person. These units are drug-free treatment programs.  The [facility] accepts most major healthcare insurance coverage plans, [place] Medical Assistance and in those cases where the consumer has no third party healthcare coverage, a liberal sliding fee schedule is utilized. The length of service and type of outpatient service provided is based on the results of the Level of Care Assessment            There are currently three treatment protocols available:  Outpatient  Intensive Outpatient  Contracted services for Partial Hospitalization  Therapy is provided in both Individual and Group counseling formats. The  Outpatient department offers individual counseling for the family members of substance abusers to address co-dependent and enabling behaviors.    Outpatient treatment services in [facility] are purchased through a fee-for-service subcontract with:  [place] [facility]  [address] [place] [address]   Phone: [phone]    [facility] Outpatient  [facility], [address]  [address]  [place] [address]   New Admissions [phone]  Local office [phone]    Outpatient treatment services in [facility] are purchased through fee-for-service subcontracts with:  [facility]   [address]  [place], [place] [address]  Phone: [phone]  [facility] Outpatient  [address]  [place] [address]  Phone: New Admissions [phone] / Local Office [phone]  Outpatient treatment services are also available to [facility] residents in [facility] Office.       March 24, 2020   [place] Food Pantries/Services   [facility]   **EMERGENCY FOOD PANTRY HOURS**   The [employer] food pantry will be open Mondays, Wednesdays, and Fridays from 12-1pm for a drive thru food distribution effective immediately.   Additional Food Give-Aways:   Monday, Wednesday, and Friday from noon - 1 PM. Drive through on the gym side of the building. Also, for the older or more at risk, please contact us at [phone] and we will arrange a home delivery.   [email or web address],   [facility]    Food Access (P[employer], community meals and several restaurants that are offering free food)    Utilities ([facility] has issued a moratorium)    Housing Assistance    Case Management Support    Healthcare Systems & their Protocol    Educational Resources for Students     [email or web address]   [facility] School Meals   [facility] [employer]-   Lunches will be given out between 11:30-1pm at both [employer]  schools and [employer] every weekday , starting Monday, March 16. Meals are for anyone 18 and younger.   [facility] [employer]-   [facility] is providing meals between 9am-12pm starting Tuesday, March 17 at both high schools and at [employer]. Bagged lunches and breakfast items will be provided for each child while supplies last. Families can drive-up, and food will be brought to the vehicles. They can visit any school regardless of which school their child attends. Visitors will not be allowed to enter the school buildings.   [place] [facility] [place] [employer]-   Information will be provided as information becomes available   [employer]   The [place] restaurant is providing free meals to those in need, with the help of the [facility] .

## 2024-10-03 NOTE — QUICK NOTE
Patient's  requesting clinical update from provider. VM left with patient's ; will re-attempt contact later.

## 2024-10-03 NOTE — NURSING NOTE
Patient visible on unit. Pleasant and cooperative. Social with staff and peers. Medication compliant. Denies SI,HI,AVH. Safety checks continue Q 7 minutes.

## 2024-10-03 NOTE — TREATMENT TEAM
10/03/24 1933   Armijo Anxiety Scale   Tension 2   Fears 2   Insomnia 0   Intellectual 2   Depressed Mood 2   Somatic Complaints: Muscular 2   Somatic Complaints: Sensory 2   Cardiovascular Symptoms 2   Respiratory Symptoms 2   Gastrointestinal Symptoms 2   Genitourinary Symptoms 1   Autonomic Symptoms 1   Behavior at Interview 2   Armijo Anxiety Score 24   Broset Violence Checklist   Assessment type Shift   Irritability 1   Confusion 0   Boisterousness 0   Threatening physical violence 0   Verbal threats 0   Violence 0   Broset score 1     Patient witnessed getting sick after dinner, triggering an anxiety attack. Patient given IM Haldol 2.5, Ativan 1, and Cogentin 0.5 at 1933 for armijo of 24 and broset of 1. Patient tolerated IM's well. Will continue to monitor.

## 2024-10-03 NOTE — PROGRESS NOTES
"  Progress Note - Melody Huynh 43 y.o. female MRN: [id number]    Unit/Bed#: [facility] 218-02 Encounter: [id number]        Subjective:   Patient seen and examined at bedside after reviewing the chart and discussing the case with the caring staff.      Patient examined at bedside.  Patient reports no acute symptoms.    Patient is being discharged tomorrow, Friday. 10/4/24.     Physical Exam   Vitals: Blood pressure 96/52, pulse 73, temperature 97.9 °F (36.6 °C), temperature source Temporal, resp. rate 18, height 5' 3\" (1.6 m), weight 62.5 kg (137 lb 12.8 oz), SpO2 99%, not currently breastfeeding.,Body mass index is 24.41 kg/m².  Constitutional: Patient in no acute distress.  HEENT: PERR, EOMI, MMM.  Cardiovascular: Normal rate and regular rhythm.    Pulmonary/Chest: Effort normal and breath sounds normal.   Abdomen: Soft, + BS, NT.    Assessment/Plan:  Melody Huynh is a(n) 43 y.o. female with panic disorder.     Medical Clearance: Patient is medically cleared for discharge. All prescriptions have been sent to pharmacy.     Migraine headaches.  Tylenol as needed.  GERD.  Maalox as needed.  N/V/abd pain.  CT abd/pelv 9/22 with no acute abnormality.  Afebrile.  Zofran and Bentyl as needed.  Fluids encouraged.   Hypokalemia.  Resolved.  Level 2.8 -> 3.3 -> 4.3 on 9/30.  Ordered potassium chloride 40 mEq x1 dose 9/28.    Vitamin B12 deficiency.  Patient started on monthly B12 injections.    The patient was discussed with Dr. Downey and he is in agreement with the above note.     "

## 2024-10-03 NOTE — NURSING NOTE
Pt visible on unit and social with peers. Pt is calm, pleasant, and cooperative. Pt brightens on approach and is interactive in conversation. Pt endorses mild anxiety and denies depression, SI/HI/AVH. Pt verbalizes prozac and klonopin have improved her anxiety significantly. Pt states she is looking forward to seeing her daughter tomorrow and believes she is ready to leave. Pt states she will be following up with her therapist and psychiatrist post discharge. Continuous monitoring maintained.

## 2024-10-03 NOTE — DISCHARGE SUMMARY
Discharge Summary - Behavioral Health   Melody Huynh 43 y.o. female MRN: [id number]  Unit/Bed#: -02 Encounter: [id number]     Admission Date: 9/27/2024         Discharge Date: 10/7/24    Attending Psychiatrist: Lesli Rosales MD    Reason for Admission/HPI: Major depressive disorder, single episode, unspecified [F32.9]  Major depressive disorder [F32.9]      According to H&P by Dr. Palacios 9/28/24:  History of Present Illness  Reason for Consult: Patient was admitted to the hospital on a voluntary commitment basis due to panic attacks that have been occurring consistently since 9/22/2024 and have had limited response to coping skills as well as outpatient medication regimen.     This is a comprehensive psychiatric assessment for the patient Melody Huynh, who is being admitted to [facility] inpatient behavioral health unit on a voluntary 201 commitment basis. Patient was admitted to the hospital on a voluntary commitment basis due to panic attacks that have been occurring consistently since 9/22/2024 and have had limited response to coping skills as well as outpatient medication regimen. Melody is a 43-year-old female, , one 8-year-old daughter, currently unemployed (patient worked for 15 years in [employer]), currently living with her  and daughter in [facility]. Melody has a significant past medical history that includes gastroesophageal reflux disease, history of cholecystectomy in 2006, history of perineorraphy and colporrhapy in 2016, and leiomyoma of the uterus. Melody has a past psychiatric history that includes unspecified mood disorder as well as panic disorder.     The following italicized information is copied from the telephone encounter on 9/26/2024 from Dr. Vicki DO:  Returned call to patient and her , German ([phone]), patient experienced gas pains, nausea, followed by emesis overnight. She entered in a panic attack and  she utilized coping skills for 2 hours, however, panic attack escalated and patient presented to the emergency room. She received fluids, repletion of potassium, Zofran, and Ativan x3. EKG WNL.  Patient is currently experiencing anxiety related symptoms of increased rate of thoughts, negative thinking, restlessness, and intermittent tearfulness.     Patient was seen in office yesterday - recent ED visit from 09/22/24 was reviewed and discussed. Medications were reviewed. Recommendation to continue Prozac and Klonopin made as well as to increase frequency of psychotherapy sessions.  Patient denied SI, passive death wishes, thoughts to harm self or others at time of office visit and at time of today's phone encounter. Patient has familial support of  and her mother at this time. Family feels safe to have patient at home at this time.      Discussed with patient and  recommendation for PHP referral at this time given increase in anxiety resulting in ED presentations in setting of psychosocial stressors. Patient agreeable to referral.  Discussed increasing Klonopin to 1 mg TID x3 days to help bridge to care and in setting of anxiety related symptoms and history of positive response to PRN Klonopin. Patient to continue Prozac at current dose.     Return precautions for ED presentation reviewed with patient and German who expressed understanding.  Patient and family aware of 24/7 coverage for urgent needs through [facility] main line.     The following italicized information is copied from the telephone encounter on 9/27/2024 from Dr. Vicki DO:  Messages received and reviewed. Returned call to patient, Melody, and , German, ([phone]).  Patient had improvement in anxiety related symptoms last night and was able to sleep, however, symptoms escalated as noted above. Patient with impairment in functioning at this time necessitating recommendation for inpatient psychiatric admission. Patient and   agreeable to present to emergency room and crisis team at [facility] was made aware by this writer of patient's plan and treatment recommendations.     Patient received PHP referral yesterday and is scheduled for intake on 09/30/24 - this can be adjusted if inpatient admission is pursued.     The following italicized information is copied from the ED crisis note 9/27/24:  Pt presents to the ED with her  on the recommendation of her psychiatrist due to increased anxiety and panic attacks over the past week. This is pt's 3rd ED visit in so many days for same complaints. Pt reports not knowing what triggers these panic attacks, but states they manifest in stomach upset, a sensation of gas bubbles in her chest and stomach, nausea, forced vomiting, fingers tingling and hyperventilating. Pt reports these panic attacks and anxiety have been occurring inermittently since age 19. Pt adds she has not suffered from such a panic attack for the past 2 yrs. Recently, pt notes she has been taking care of her  for the past 4-5 weeks since he had rotator cuff surgery, and also drove him to [place] recently for a work-related spech he had to give. However, pt notes these were not stressful events for her, even adding that she went sight seeing while in [place]. Pt denies any suicidal or homicidal ideations, nor any auditory or visual hallucinations at this time. Pt denies any prior suicide attempts, nor any self injurious behaviors. Pt denies any D & A problems, though admits to using Marijuana x2 weekly since age 18, and having 2 drinks once or twice weekly of either beer, wine or gin. Pt denies any legal issues nor any Hx of abuse or neglect. Pt reports s;leeping 8 hrs nightly, but notes this week her appetite has been poor and she mainly drinks fluids and maybe has a roll at some point during the day. Her  does report cooking for her chicken and rice and other such nutritious meals. Pt reports her  " is highly supportive of her and is very attentive. She has both out-pt psychiatry and therapy. Pt has been hospitalized previously x2. Pt reports being prescribed Prozac 30 mg q d and Klonopin 1 mg BID for OCD and ELOY. CW discussed Tx options with pt and her , including the benefits of 201, and pt ultimately agreed to sign a 201. Dr Anton is in agreement with this Tx plan.      Melody was seen today for comprehensive psychiatric interview.  At the time of interview Melody is in her bed, appearing fatigued following morning medication administration.  In discussion with nursing she received as needed of hydroxyzine 100 mg this morning at 5:34 AM, subsequently received an injection of Haldol 2 mg IM at 6:06 AM, and then subsequently received an injection of Haldol 5 mg IM and an injection of Cogentin 1 mg IM at 7:40 AM. At the time of interview, Mi is calm, polite, and cooperative with interview.  Overall, she appears mildly irritable in terms of her affect. During today's examination, Melody does not exhibit objective evidence of ghada/hypomania or psychosis. Melody is not currently grandiose, labile, or pathologically euphoric. Melody denies perceptual disturbances, such as A/V hallucinations, paranoia, ideas of reference, or delusional beliefs.     Today, Melody reports that she feels \"tired.\"  She confirms the aforementioned information and reports panic attacks that have been occurring consistently since 9/22/2024 and have had limited response to coping skills as well as outpatient medication regimen. As detailed above, Melody has presented multiple times to the emergency department for prominent symptoms of anxiety and panic attacks.  As noted above, Melody reports no specific triggers to these anxiety episodes.  As noted above, she reports these panic attacks to manifest as symptoms of feelings of impending doom, nausea, vomiting, generalized numbness and tingling, " "hyperventilation, palpitations. Melody reports that she has struggled with these panic attacks off and on since the age of 19 and she reports that she last experienced panic attacks at this level of severity approximately 3 years ago, when she was hospitalized at [facility].     As detailed in the patient's most recent visit with her outpatient psychiatrist Dr. Cohen, Melody reports that recent life changes include recent struggles with physical illness and routine change (as detailed in outpatient notes, she reported her anxiety and mood related symptoms had been well controlled until recently when she had a vacation and experienced gastrointestinal upset once at home, prompting an emergency department visit 9/22/2024). She has also not seen her outpatient therapist Dr. Foster in several weeks. As documented during hospitalization at [facility], returning from vacation appears to be a notable stressor for this patient. In addition to this acute stressor, there is also a question of the patient's current marijuana use is contributing to her gastrointestinal issues.  Patient reports a history of daily marijuana use via smoking and she reports she has a medical marijuana card.     Today, with regards to symptoms of depression, Melody reports that she has been struggling with issues that include decreased energy, decreased concentration, decreased appetite.  In the midst of these severe anxiety symptoms, she has been endorsing passive suicidal ideation \"that I would be better off dead.\"  She denies any active suicidal ideation, homicidal ideation, plan or intent to harm herself or others.  She denies issues with sleep, denies any changes in life interest, and denies feelings of hopelessness.     Melody denies any acute or chronic history suggestive of an underlying affective (bipolar) organization. Melody denies previous episodes of elevated/expansive mood, lengthy periods without " sleep, grandiosity, or intense and prolonged irritability. Melody denies atypical periods of increased goal-directed behavior, excessive spending, or sexual promiscuity. The patient has no history of pathologic impulsivity or extreme mood lability.      Melody denies past or present visual and auditory hallucinations.     Medication management options were discussed in detail with Melody.  She has been adherent to her outpatient psychiatric medication regimen which includes Prozac 30 mg daily as well as Klonopin 1 mg 3 times daily as needed for increased anxiety.  Following a thorough conversation, Rezek was increased to 40 mg daily for symptoms of depression and anxiety.  Klonopin was restarted at 1 mg daily scheduled for chronic anxiety.  The patient was started on Xanax 1 mg daily as needed for breakthrough anxiety not responsive to hydroxyzine well as panic attacks.    Hospital Course: The patient was admitted to the inpatient psychiatric unit and started on every 7 minutes precautions. During the hospitalization the patient was attending individual therapy, group therapy, milieu therapy and occupational therapy.    Psychiatric medications were titrated over the hospital stay to address anxiety symptoms, insomnia, and poor appetite, panic attacks.  Melody was continued on antidepressant Prozac and anxiolytic medication Klonopin.  Low dose Clonidine was also added to assist anxiety management. Medication doses were titrated during the hospital course. Prior to beginning of treatment medications risks and benefits and possible side effects including risk of suicidality and serotonin syndrome related to treatment with antidepressants and risks of misuse, abuse or dependence, sedation and respiratory depression related to treatment with benzodiazepine medications were reviewed with the patient. Melody verbalized understanding and agreement for treatment.  Medication education remained ongoing throughout  inpatient stay.    Upon admission, patient was extremely anxious, withdrawn, and somatically preoccupied.  Utilized multiple PRNs for anxiety and ruminations.  Prozac was titrated 40mg daily for anxiety managment and low dose of clonidine 0.05 mg PO TID was added to assist with anxiety.  Klonopin 1 mg PO BID was continued as ordered by outpatient.  With the preceding medication changes, Melody exhibited improvement and signed 72 hour notice. At time of signing 72-hour notice, patient was visible, social, and engaged in milieu activities.  Reported significant improvement with anxiety and presented with congruent affect.  Maintained safety with no SI/HI.  Since patient was maintaining safety and ongoing improvement, decision was made to discharge prior to expiration of 72-hour notice;  (German) aware and in agreement with plan.  The night before discharge, patient endorsed complaints of nausea with return of intense ruminations and anxiety prompting visit to the ED for panic despite receiving multiple PRNs.  Patient was then discharged back to the behavioral health unit.  Discussion held with patient and her  who believed patient required further medication adjustment and was not ready to come home.  Patient rescinded her 72-hour notice 10/4/2024 for ongoing medication management.  Prozac was titrated to 60 mg daily for anxiety control and low dose Haldol 2.5 mg PO BID added to alleviate nausea and ruminations.  Clonidine 0.05 mg was discontinued due to parameters not being met to receive    Over the weekend, patient was able to maintain mood control with no return of anxiety or panic.  Reported significant decrease with ruminations and no longer somatically preoccupied; no PRN psychotropics utilized. Able to maintain adequate sleep, ADLs, and appetite.  Decision was made to proceed with discharge planning which patient verbalized her readiness for.    Prior to discharge, Melody identified an  adequate safety plan to utilize should she feel she become a danger to herself, others, or experience of mental health crisis.  This plan includes talking with her , contacting her outpatient therapist/psychiatrist, utilizing crisis hotline, or returning to the nearest emergency department. Melody also identified her family and daughter as strong protective factors against suicide.  Forward thinking with plan to continue medication compliance, continue working on coping skills, and attend outpatient psychiatric follow-up.    We felt that Melody achieved the maximum benefit of inpatient stay at that point and could now follow up with outpatient treatment. Prior to discharge  spoke with Melody's  (German) to address support and Melody's readiness for discharge. German felt that the Melody was at baseline and was ready for discharge. Melody also felt stable and ready for discharge at the end of the hospital stay.     The outpatient follow up with established psychiatrist Dr. Alicea at [facility] 10/22/24 @ 11AM  and PCP (PAULINA Billy PA-C) 10/7/24 @ 2:05 PM was arranged by the unit  upon discharge.  A 30-day supply of psychotropic medication was submitted to patient's pharmacy 10/3/2024 with the exception of Klonopin 1 mg tablets which was submitted for 10-day supply (total #20/twenty) tablets to cover until next psychiatric medication management appointment should patient run out of klonopin 0.5 mg tablets that were last filled 9/19/2024 for quantity of (#90/ninety) tablets.     Melody was stabilized and discharged on the following psychotropic regimen: Klonopin 1 mg PO BID, Prozac 60mg PO QD, and haldol 2.5mg PO BID. She was tolerating medications well and denied any side effects at time of discharge.    A prescription for Cogentin 1 mg PO BID PRN was also submitted to patient's pharmacy should she encounter side effects associated with  "antipsychotic therapy; although none reported at time of discharge.     Mental Status at time of Discharge:     Appearance:  age appropriate and casually dressed   Behavior:  Cooperative, good eye contact   Speech:  normal pitch and normal volume   Mood:  \"Excited to go home\"   Affect:  mood-congruent, mildly anxious   Thought Process:  goal directed and linear, forward thinking   Thought Content:  No overt delusions or paranoia verbalized   Perceptual Disturbances: Denies AVH, did not appear internally preoccupied   Risk Potential: Suicidal Ideations none, Homicidal Ideations none, and Potential for Aggression No   Sensorium:  person, place, time/date, and situation   Cognition:  recent and remote memory grossly intact   Consciousness:  alert and awake    Attention: attention span and concentration were age appropriate   Insight:  fair   Judgment: fair   Gait/Station: normal gait/station and normal balance   Motor Activity: no abnormal movements     Admission Diagnosis:Major depressive disorder, single episode, unspecified [F32.9]  Major depressive disorder [F32.9]    Discharge Diagnosis:   Principal Problem:    Panic disorder without agoraphobia with severe panic attacks  Active Problems:    Unspecified mood (affective) disorder (HCC)  Resolved Problems:    * No resolved hospital problems. *    Lab results:  Admission on 09/27/2024   Component Date Value    Color, UA 09/28/2024 Yellow     Clarity, UA 09/28/2024 Cloudy (A)     Specific Gravity, UA 09/28/2024 >=1.030 (H)     pH, UA 09/28/2024 6.0     Leukocytes, UA 09/28/2024 Trace (A)     Nitrite, UA 09/28/2024 Negative     Protein, UA 09/28/2024 1+ (A)     Glucose, UA 09/28/2024 Negative     Ketones, UA 09/28/2024 Trace (A)     Urobilinogen, UA 09/28/2024 1.0     Bilirubin, UA 09/28/2024 2+ (A)     Occult Blood, UA 09/28/2024 Negative     RBC, UA 09/28/2024 0-1 (A)     WBC, UA 09/28/2024 10-20 (A)     Epithelial Cells 09/28/2024 Moderate (A)     Bacteria, UA " 09/28/2024 Moderate (A)     Ca Oxalate Mini, UA 09/28/2024 Occasional (A)     Sodium 09/28/2024 140     Potassium 09/28/2024 3.3 (L)     Chloride 09/28/2024 108     CO2 09/28/2024 24     ANION GAP 09/28/2024 8     BUN 09/28/2024 19     Creatinine 09/28/2024 1.09     Glucose 09/28/2024 93     Glucose, Fasting 09/28/2024 93     Calcium 09/28/2024 9.4     AST 09/28/2024 12 (L)     ALT 09/28/2024 11     Alkaline Phosphatase 09/28/2024 50     Total Protein 09/28/2024 7.1     Albumin 09/28/2024 4.4     Total Bilirubin 09/28/2024 1.73 (H)     eGFR 09/28/2024 62     WBC 09/28/2024 8.12     RBC 09/28/2024 4.45     Hemoglobin 09/28/2024 13.2     Hematocrit 09/28/2024 39.1     MCV 09/28/2024 88     MCH 09/28/2024 29.7     MCHC 09/28/2024 33.8     RDW 09/28/2024 12.2     MPV 09/28/2024 10.1     Platelets 09/28/2024 244     nRBC 09/28/2024 0     Segmented % 09/28/2024 41 (L)     Immature Grans % 09/28/2024 0     Lymphocytes % 09/28/2024 46 (H)     Monocytes % 09/28/2024 9     Eosinophils Relative 09/28/2024 3     Basophils Relative 09/28/2024 1     Absolute Neutrophils 09/28/2024 3.34     Absolute Immature Grans 09/28/2024 0.01     Absolute Lymphocytes 09/28/2024 3.74     Absolute Monocytes 09/28/2024 0.71     Eosinophils Absolute 09/28/2024 0.26     Basophils Absolute 09/28/2024 0.06     Preg, Serum 09/28/2024 Negative     TSH 3RD GENERATON 09/28/2024 1.689     Vitamin B-12 09/28/2024 279     Folate 09/28/2024 11.9     Vit D, 25-Hydroxy 09/28/2024 38.6     Cholesterol 09/28/2024 147     Triglycerides 09/28/2024 124     HDL, Direct 09/28/2024 38 (L)     LDL Calculated 09/28/2024 84     Non-HDL-Chol (CHOL-HDL) 09/28/2024 109     Syphilis Total Antibody 09/28/2024 Non-reactive     Ventricular Rate 09/27/2024 67     Atrial Rate 09/27/2024 67     MT Interval 09/27/2024 144     QRSD Interval 09/27/2024 92     QT Interval 09/27/2024 450     QTC Interval 09/27/2024 475     P Axis 09/27/2024 40     QRS Axis 09/27/2024 80     T Wave Axis  09/27/2024 69     Potassium 09/30/2024 4.3        Discharge Medications:  Current Discharge Medication List        START taking these medications    Details   cyanocobalamin 1,000 mcg/mL Inject 1 mL (1,000 mcg total) into a muscle every 30 (thirty) days  Qty: 1 mL, Refills: 0    Associated Diagnoses: Vitamin B12 deficiency              Current Discharge Medication List           Current Discharge Medication List        CONTINUE these medications which have CHANGED    Details   clonazePAM (KlonoPIN) 1 mg tablet Take 1 tablet (1 mg total) by mouth 2 (two) times a day for 10 days  Qty: 20 tablet, Refills: 0    Associated Diagnoses: Panic disorder without agoraphobia with severe panic attacks      FLUoxetine (PROzac) 40 MG capsule Take 1 capsule (40 mg total) by mouth daily  Qty: 30 capsule, Refills: 0    Associated Diagnoses: Anxiety; Panic disorder without agoraphobia with severe panic attacks              Current Discharge Medication List        CONTINUE these medications which have NOT CHANGED    Details   ondansetron (ZOFRAN-ODT) 4 mg disintegrating tablet Take 1 tablet (4 mg total) by mouth every 6 (six) hours as needed for nausea or vomiting  Qty: 12 tablet, Refills: 0    Associated Diagnoses: Nausea      propranolol (INDERAL) 10 mg tablet Take 0.5 tablet (5 mg total) by mouth 2 (two) times a day    Associated Diagnoses: Akathisia              Discharge instructions/Information to patient and family:   See after visit summary for information provided to patient and family.      Provisions for Follow-Up Care:  See after visit summary for information related to follow-up care and any pertinent home health orders.      Discharge Statement:    I spent 25 minutes discharging the patient. This time was spent on the day of discharge. I had direct contact with the patient on the day of discharge.     I reviewed with Melody importance of compliance with medications and outpatient treatment after discharge.  I discussed  the medication regimen and possible side effects of the medications with Melody prior to discharge. At the time of discharge she was tolerating psychiatric medications.  I discussed outpatient follow up with Melody.  I reviewed with Melody crisis plan and safety plan upon discharge.  Melody agreed to abstain from drug and alcohol use after discharge.  Melody has been filing controlled prescriptions on time as prescribed according to [place] Prescription Drug Monitoring Program.    OLE Wiggins 10/07/24

## 2024-10-03 NOTE — PLAN OF CARE
Problem: Depression  Goal: Treatment Goal: Demonstrate behavioral control of depressive symptoms, verbalize feelings of improved mood/affect, and adopt new coping skills prior to discharge  Outcome: Progressing  Goal: Verbalize thoughts and feelings  Description: Interventions:  - Assess and re-assess patient's level of risk   - Engage patient in 1:1 interactions, daily, for a minimum of 15 minutes   - Encourage patient to express feelings, fears, frustrations, hopes   Outcome: Progressing  Goal: Refrain from harming self  Description: Interventions:  - Monitor patient closely, per order   - Supervise medication ingestion, monitor effects and side effects   Outcome: Progressing     Problem: Anxiety  Goal: Anxiety is at manageable level  Description: Interventions:  - Assess and monitor patient's anxiety level.   - Monitor for signs and symptoms (heart palpitations, chest pain, shortness of breath, headaches, nausea, feeling jumpy, restlessness, irritable, apprehensive).   - Collaborate with interdisciplinary team and initiate plan and interventions as ordered.  - [place] patient to unit/surroundings  - Explain treatment plan  - Encourage participation in care  - Encourage verbalization of concerns/fears  - Identify coping mechanisms  - Assist in developing anxiety-reducing skills  - Administer/offer alternative therapies  - Limit or eliminate stimulants  Outcome: Progressing     Problem: DISCHARGE PLANNING - CARE MANAGEMENT  Goal: Discharge to post-acute care or home with appropriate resources  Description: INTERVENTIONS:  - Conduct assessment to determine patient/family and health care team treatment goals, and need for post-acute services based on payer coverage, community resources, and patient preferences, and barriers to discharge  - Address psychosocial, clinical, and financial barriers to discharge as identified in assessment in conjunction with the patient/family and health care team  - Arrange appropriate  level of post-acute services according to patient’s   needs and preference and payer coverage in collaboration with the physician and health care team  - Communicate with and update the patient/family, physician, and health care team regarding progress on the discharge plan  - Arrange appropriate transportation to post-acute venues  Outcome: Progressing

## 2024-10-04 ENCOUNTER — TELEPHONE (OUTPATIENT)
Age: 44
End: 2024-10-04

## 2024-10-04 VITALS
WEIGHT: 137 LBS | BODY MASS INDEX: 24.27 KG/M2 | RESPIRATION RATE: 21 BRPM | OXYGEN SATURATION: 98 % | HEIGHT: 63 IN | SYSTOLIC BLOOD PRESSURE: 185 MMHG | HEART RATE: 101 BPM | DIASTOLIC BLOOD PRESSURE: 94 MMHG | TEMPERATURE: 98.5 F

## 2024-10-04 PROCEDURE — 99232 SBSQ HOSP IP/OBS MODERATE 35: CPT | Performed by: PSYCHIATRY & NEUROLOGY

## 2024-10-04 RX ORDER — CLONAZEPAM 0.5 MG/1
0.5 TABLET ORAL DAILY PRN
Start: 2024-10-04 | End: 2024-10-08

## 2024-10-04 RX ORDER — ONDANSETRON 4 MG/1
4 TABLET, ORALLY DISINTEGRATING ORAL ONCE
Status: COMPLETED | OUTPATIENT
Start: 2024-10-04 | End: 2024-10-04

## 2024-10-04 RX ORDER — LORAZEPAM 1 MG/1
1 TABLET ORAL ONCE
Status: COMPLETED | OUTPATIENT
Start: 2024-10-04 | End: 2024-10-04

## 2024-10-04 RX ADMIN — HALOPERIDOL 2.5 MG: 2 TABLET ORAL at 18:41

## 2024-10-04 RX ADMIN — FLUOXETINE HYDROCHLORIDE 40 MG: 20 CAPSULE ORAL at 08:10

## 2024-10-04 RX ADMIN — ALUMINUM HYDROXIDE, MAGNESIUM HYDROXIDE, AND DIMETHICONE 30 ML: 200; 20; 200 SUSPENSION ORAL at 08:16

## 2024-10-04 RX ADMIN — HALOPERIDOL 2.5 MG: 2 TABLET ORAL at 12:40

## 2024-10-04 RX ADMIN — LORAZEPAM 1 MG: 1 TABLET ORAL at 00:29

## 2024-10-04 RX ADMIN — ONDANSETRON 4 MG: 4 TABLET, ORALLY DISINTEGRATING ORAL at 00:15

## 2024-10-04 RX ADMIN — CLONAZEPAM 1 MG: 1 TABLET ORAL at 08:10

## 2024-10-04 RX ADMIN — CLONAZEPAM 1 MG: 1 TABLET ORAL at 18:41

## 2024-10-04 RX ADMIN — CLONIDINE HYDROCHLORIDE 0.05 MG: 0.1 TABLET ORAL at 08:10

## 2024-10-04 NOTE — NURSING NOTE
Patient arrived back to unit from [facility]. Still endorsing mild abdominal pain, denies severe anxiety. Patient resting in bed. Continuous q7 minute checks ongoing.

## 2024-10-04 NOTE — PROGRESS NOTES
"  Progress Note - Melody Huynh 43 y.o. female MRN: [id number]    Unit/Bed#: [facility] 218-02 Encounter: [id number]        Subjective:   Patient seen and examined at bedside after reviewing the chart and discussing the case with the caring staff.      Patient examined at bedside.  Patient reports no acute symptoms.      Last night, patient was sent to ED for a panic attack.  No longer being discharged today.     Physical Exam   Vitals: Blood pressure 142/86, pulse (!) 111, temperature 99.6 °F (37.6 °C), temperature source Temporal, resp. rate 18, height 5' 3\" (1.6 m), weight 62.5 kg (137 lb 12.8 oz), SpO2 94%, not currently breastfeeding.,Body mass index is 24.41 kg/m².  Constitutional: Patient in no acute distress.  HEENT: PERR, EOMI, MMM.  Cardiovascular: Normal rate and regular rhythm.    Pulmonary/Chest: Effort normal and breath sounds normal.   Abdomen: Soft, + BS, NT.    Assessment/Plan:  Melody Huynh is a(n) 43 y.o. female with panic disorder.     Medical Clearance: Patient is medically cleared for discharge. All prescriptions have been sent to pharmacy.     Migraine headaches.  Tylenol as needed.  GERD.  Maalox as needed.  N/V/abd pain.  CT abd/pelv 9/22 with no acute abnormality.  Afebrile.  Zofran and Bentyl as needed.  Tigan if unable to take oral.  Fluids encouraged.   Hypokalemia.  Resolved.  Level 2.8 -> 3.3 -> 4.3 on 9/30.  Ordered potassium chloride 40 mEq x1 dose 9/28.    Vitamin B12 deficiency.  Patient started on monthly B12 injections.     The patient was discussed with Dr. Downey and he is in agreement with the above note.     "

## 2024-10-04 NOTE — TREATMENT TEAM
10/03/24 1830   Lisa Anxiety Scale   Anxious Mood 3   Tension 2   Fears 2   Insomnia 0   Intellectual 2   Depressed Mood 3   Somatic Complaints: Muscular 1   Somatic Complaints: Sensory 2   Cardiovascular Symptoms 2   Respiratory Symptoms 0   Gastrointestinal Symptoms 2   Genitourinary Symptoms 0   Autonomic Symptoms 2   Behavior at Interview 2   Lisa Anxiety Score 23     Pt administered atarax 50 mg for moderate anxiety. Pt states she felt anxious and restless.

## 2024-10-04 NOTE — NUTRITION
10/04/24 1434   Biochemical Data,Medical Tests, and Procedures   Biochemical Data/Medical Tests/Procedures Lab values reviewed;Meds reviewed   Labs (Comment) 10/3/2024 total bilirubin 1.34   Meds (Comment) Klonopin, Catapres, Haldol, Maalox, Zofran, Ativan, Atarax   Nutrition-Focused Physical Exam   Nutrition-Focused Physical Exam Findings RN skin assessment reviewed;No edema documented;No skin issues documented   Medical-Related Concerns panic disorder, unspecified mood disorder, fibroid uterus   Adequacy of Intake   Nutrition Modality PO   Feeding Route   PO Independent   Current PO Intake   Current Diet Order Regular diet, thin liquids   Current Meal Intake 0-25%;25-50%;50-75%;%   Estimated calorie intake compared to estimated need Intakes often >50%.  Anticipate nutrient needs are met on average.   PES Statement   Problem Intake   Energy Balance (1) Predicted suboptimal energy intake NI-1.4   Related to Nausea;Vomiting   As evidenced by: I/O's   Recommendations/Interventions   Malnutrition/BMI Present No  (Does not meet 2 criteria)   Summary Length of stay.  Past medical history significant for panic disorder, unspecified mood disorder, fibroid uterus.  Weight history reviewed.  10/11/2023 127#, 12/18/2024 130#, 9/27/2024 136#, 10/3/2024 137#.  No significant changes.  No edema.  No pressure areas.  Prescribed a regular diet, thin liquids.  Meal completion varies 0-100%.  Instance of panic attack with nausea and vomiting noted.  Intakes often >50%.  Anticipate nutrient needs are met on average.  Unable to obtain nutrition history at this time.  Chart utilized.  Medications reviewed.  Labs reviewed.  Recommend continuing diet as prescribed.  RD to follow.   Interventions/Recommendations Continue current diet order;Monitor I & O's   Education Assessment   Education Patient/caregiver not appropriate for education at this time   Patient Nutrition Goals   Goal Meet PO needs;Increase PO intake   Goal Status  Initiated   Timeframe to complete goal by next f/u   Nutrition Complexity Risk   Nutrition complexity level Moderate risk   Follow up date 10/14/24

## 2024-10-04 NOTE — DISCHARGE INSTRUCTIONS
Return for any worsening symptoms including if you develop fevers, worsening abdominal pain, uncontrollable vomiting, or any other concerning symptoms    Follow-up with primary care physician and psychiatrist

## 2024-10-04 NOTE — TELEPHONE ENCOUNTER
Patients  German called and  requested a call back to discuss the patient. She is still at the [facility] in [place] in patient. .Patients  wants to make sure everyone is on the same page with her care and medications.    They can be reached at P# [phone].       Thank you.

## 2024-10-04 NOTE — PROGRESS NOTES
Progress Note - Behavioral Health   Name: Melody Huynh 43 y.o. female I MRN: [id number]  Unit/Bed#: -02 I Date of Admission: 9/27/2024   Date of Service: 10/4/2024 I Hospital Day: 7    Assessment & Plan  Panic disorder without agoraphobia with severe panic attacks  Increase Prozac 60 mg PO QD for depression/anxiety.  Add Haldol 2.5 mg PO BID for nausea and ruminations  Continue Klonopin to 1 mg PO BID daily for chronic anxiety/panic attacks.  Continue Ativan 1mg PO prn q6h for continued anxiety/panic attacks not responsive to Atarax.  Continue clonidine 0.05 PO BID for anxiety/potential substance use withdrawal; not been receiving due to parameters not being met, will taper    Unspecified mood (affective) disorder (HCC)  Increase Prozac to 60 mg daily for depression and anxiety.    Progress Toward Goals: Postpone discharge due to reemergence of severe anxiety and ruminations.  Plan is to optimize Prozac 60 mg daily for anxiety management.  Will also add low-dose Haldol 2.5 mg PO BID to assist with nausea and  ruminating thoughts.  Continue remainder psychotropic regimen as ordered.  Patient rescinded 72-hour notice 10/4/2024 at 1023.  No discharge date at this time.    Recommended Treatment: Continue with group therapy, milieu therapy and occupational therapy.      Risks, benefits and possible side effects of Medications:   Risks, benefits, and possible side effects of medications explained to patient and patient verbalizes understanding.      History of Present Illness   Behavior over the last 24 hours: Regressed  Sleep: Intermittent  Appetite: Decreased  Medication side effects: No  ROS: nausea and all other systems are negative    Subjective: Melody presents as withdrawn and anxious today.  Upset she had a panic attack last evening requiring a trip to the ER.  Received multiple PRN medications for agitation, anxiety, nausea, and abdominal pain yesterday before being sent to ER.  Tearful throughout  "encounter.  Patient requested to this provider speak with her .  Phone call placed to  German who feels patient needs further medication adjustments and does not feel patient is ready for discharge; he spoke with her on the phone as well and patient agreeable to rescind 72-hour notice for further medication adjustment.  Maintaining safety with no SI/HI.  Thoughts are perseverative and ruminating.    Objective   Mental Status Evaluation:  Appearance:  age appropriate and disheveled   Behavior:  Tearful, withdrawn   Speech:  normal pitch and normal volume   Mood:  \"Upset,\" anxious and dysphoric   Affect:  mood-congruent   Thought Process:  perserverative   Associations: perseverative   Thought Content:  Ruminating   Perceptual Disturbances: Denied AVH, did not appear internally preoccupied   Risk Potential: Suicidal Ideations none  Homicidal Ideations none  Potential for Aggression No   Sensorium:  person, place, time/date, and situation   Memory:  recent and remote memory grossly intact   Consciousness:  alert and awake    Attention: attention span appeared shorter than expected for age   Insight:  fair   Judgment: fair   Gait/Station: normal gait/station and normal balance   Motor Activity: no abnormal movements     Medications: all current active meds have been reviewed, continue current psychiatric medications, and planned medication changes: Increase Prozac 60 mg PO QD. Add haldol 2.5mg PO BID .      Lab Results: I have reviewed the following results:  Drug Screen:   Lab Results   Component Value Date    AMPMETHUR Negative 09/27/2024    BARBTUR Negative 09/27/2024    BDZUR Positive (A) 09/27/2024    THCUR Positive (A) 09/27/2024    COCAINEUR Negative 09/27/2024    METHADONEUR Negative 09/27/2024    OPIATEUR Negative 09/27/2024    PCPUR Negative 09/27/2024       Administrative Statements   I have spent a total time of 45 minutes in caring for this patient on the day of the visit/encounter including " medication education, treatment plan, supportive therapy, safety planning, phone call the patient's  regarding clinical update.

## 2024-10-04 NOTE — BH TRANSITION RECORD
Contact Information: If you have any questions, concerns, pended studies, tests and/or procedures, or emergencies regarding your inpatient behavioral health visit. Please contact [facility] # [phone] and ask to speak to a , nurse or physician. A contact is available 24 hours/ 7 days a week at this number.     Summary of Procedures Performed During your Stay:  Below is a list of major procedures performed during your hospital stay and a summary of results:  - No major procedures performed.    Pending Studies (From admission, onward)      None          Please follow up on the above pending studies with your PCP and/or referring provider.

## 2024-10-04 NOTE — PLAN OF CARE
Problem: Ineffective Coping  Goal: Participates in unit activities  Description: Interventions:  - Provide therapeutic environment   - Provide required programming   - Redirect inappropriate behaviors   Outcome: Progressing   Patient has been active in group therapy.

## 2024-10-04 NOTE — ED PROVIDER NOTES
Final diagnoses:   Abdominal pain   Anxiety     ED Disposition       ED Disposition   Discharge    Condition   Stable    Date/Time   u Oct 3, 2024 10:19 PM    Comment   Melodylisa Huynh discharge to home/self care.                   Assessment & Plan       Medical Decision Making  43-year-old female severe anxiety presents to the emergency department for what she describes as panic attack.  She reports epigastric abdominal pain that radiates into the chest and back which is how her panic attacks present.  She received Zofran, Bentyl, Haldol, and Ativan prior to arrival without improvement of her symptoms.  On exam, patient is anxious and hyperventilating, but in no acute distress.  Abdomen is soft, nontender nondistended without rebound or guarding.  Suspect symptoms likely secondary to anxiety/panic attack however differential diagnosis also includes but is not limited to GERD, gastritis, peptic ulcer disease, pancreatitis, unlikely to represent biliary disease given history of cholecystectomy.  Will plan to check abdominal labs, EKG, treat symptomatically and reassess.  Given reassuring abdominal examination, do not believe CT imaging is necessary at this time.    On reassessment, symptoms significantly improved, she remains hemodynamically stable, no abdominal tenderness on exam.  Again do not believe any imaging is necessary at this time, this does appear to be related to anxiety/panic attack.  As her symptoms improved, plan to discharge back to behavioral health.  Repeat EKG her QTc normalized.    Problems Addressed:  Abdominal pain: acute illness or injury  Anxiety: acute illness or injury    Amount and/or Complexity of Data Reviewed  Independent Historian: EMS  External Data Reviewed: labs, radiology, ECG and notes.  Labs: ordered. Decision-making details documented in ED Course.  ECG/medicine tests: ordered and independent interpretation performed.    Risk  OTC drugs.  Prescription drug  management.        ED Course as of 10/03/24 2320   Thu Oct 03, 2024   2145 Hemoglobin: 13.5   2145 WBC: 8.27   2150 EKG interpreted by myself demonstrates normal sinus rhythm with a rate of 94, right axis deviation, prolonged QT with a QTc of 517, nonspecific ST segment and T waves   2217 Patient updated on reassuring lab results, symptoms or improving, will reassess   2303 Repeat EKG interpreted by myself demonstrates sinus tachycardia with a rate of 103, right axis deviation, normal intervals, normal ST segment and T waves       Medications   ketorolac (TORADOL) injection 15 mg (15 mg Intravenous Given 10/3/24 2151)   droperidol (INAPSINE) injection 0.625 mg (0.625 mg Intravenous Given 10/3/24 2150)   LORazepam (ATIVAN) injection 2 mg (2 mg Intravenous Given 10/3/24 2152)       ED Risk Strat Scores                           SBIRT 20yo+      Flowsheet Row Most Recent Value   Initial Alcohol Screen: US AUDIT-C     1. How often do you have a drink containing alcohol? 0 Filed at: 10/03/2024 2130   2. How many drinks containing alcohol do you have on a typical day you are drinking?  0 Filed at: 10/03/2024 2130   3b. FEMALE Any Age, or MALE 65+: How often do you have 4 or more drinks on one occassion? 0 Filed at: 10/03/2024 2130   Audit-C Score 0 Filed at: 10/03/2024 2130   BULMARO: How many times in the past year have you...    Used an illegal drug or used a prescription medication for non-medical reasons? Never Filed at: 10/03/2024 2130                            History of Present Illness       Chief Complaint   Patient presents with    Abdominal Pain     Pt reports upper gi /abd pain for the last three hours        Past Medical History:   Diagnosis Date    Anxiety     Depression     Disruption of episiotomy wound in the puerperium     Last assessed 06/22/16    External hemorrhoids     Last assessed 02/05/16    Fibroid uterus     GERD (gastroesophageal reflux disease)     Headache     Leiomyoma of uterus     Migraine      Migraine     Polyhydramnios in third trimester, not applicable or unspecified fetus     Last assessed 01/25/16    Pregnancy headache in third trimester     Resolved 02/05/16    Protein in urine     Last assessed 01/25/16    Sciatica of right side     Third degree laceration of perineum, type 3b 1/26/2016    Unspecified mood (affective) disorder (HCC) 9/28/2024    Varicella       Past Surgical History:   Procedure Laterality Date    CHOLECYSTECTOMY  2006    MYOMECTOMY      IA POST COLPORRHAPHY RECTOCELE W/WO PERINEORRHAPHY N/A 3/24/2016    Procedure: PERINEORRAPHY ;  Surgeon: Sallie Flower MD;  Location: [facility];  Service: Gynecology    WISDOM TOOTH EXTRACTION        Family History   Problem Relation Age of Onset    Breast cancer Mother 58    Cancer Mother     No Known Problems Father     No Known Problems Daughter     No Known Problems Maternal Grandmother     No Known Problems Maternal Grandfather     No Known Problems Paternal Grandmother     No Known Problems Paternal Grandfather     Breast cancer Maternal Aunt         unknown age      Social History     Tobacco Use    Smoking status: Never    Smokeless tobacco: Never   Vaping Use    Vaping status: Never Used   Substance Use Topics    Alcohol use: Yes     Alcohol/week: 1.0 standard drink of alcohol     Types: 1 Glasses of wine per week     Comment: socially; 3-4 drinks/week (8/13)    Drug use: Yes     Types: Marijuana     Comment: Pt has a medical marijuana card (8/13)      E-Cigarette/Vaping    E-Cigarette Use Never User       E-Cigarette/Vaping Substances    Nicotine No     THC No     CBD No     Flavoring No     Other No     Unknown No       I have reviewed and agree with the history as documented.     43-year-old female with a history of anxiety, GERD presents to the emergency department for evaluation of epigastric abdominal pain that radiates to the back.  She does have a history of cholecystectomy.  She presents from inpatient behavioral  health, they tried treating her with Bentyl, Zofran, Ativan, and Haldol without improvement and sent her in for evaluation.  She denies any fevers or chills.  No shortness of breath, constipation or diarrhea.  No urinary symptoms.        Review of Systems   Constitutional:  Negative for chills and fever.   HENT:  Negative for ear pain and sore throat.    Eyes:  Negative for pain and visual disturbance.   Respiratory:  Negative for cough and shortness of breath.    Cardiovascular:  Negative for chest pain and palpitations.   Gastrointestinal:  Positive for abdominal pain, nausea and vomiting. Negative for constipation and diarrhea.   Genitourinary:  Negative for dysuria and hematuria.   Musculoskeletal:  Negative for arthralgias and back pain.   Skin:  Negative for color change and rash.   Neurological:  Negative for seizures and syncope.   Psychiatric/Behavioral:  The patient is nervous/anxious.    All other systems reviewed and are negative.          Objective       ED Triage Vitals [10/03/24 2128]   Temperature Pulse Blood Pressure Respirations SpO2 Patient Position - Orthostatic VS   98.5 °F (36.9 °C) 91 (!) 183/96 18 97 % Lying      Temp Source Heart Rate Source BP Location FiO2 (%) Pain Score    Tympanic Monitor Right arm -- 10 - Worst Possible Pain      Vitals      Date and Time Temp Pulse SpO2 Resp BP Pain Score FACES Pain Rating User   10/03/24 2151 -- -- -- -- -- 10 - Worst Possible Pain -- JR   10/03/24 2130 -- -- 97 % -- -- -- --    10/03/24 2128 98.5 °F (36.9 °C) 91 97 % 18 183/96 10 - Worst Possible Pain --             Physical Exam  Vitals and nursing note reviewed.   Constitutional:       General: She is not in acute distress.  HENT:      Head: Normocephalic and atraumatic.      Nose: Nose normal.      Mouth/Throat:      Mouth: Mucous membranes are moist.   Eyes:      General: No scleral icterus.     Extraocular Movements: Extraocular movements intact.      Pupils: Pupils are equal, round, and  reactive to light.   Cardiovascular:      Rate and Rhythm: Normal rate and regular rhythm.      Heart sounds: Normal heart sounds. No murmur heard.  Pulmonary:      Effort: Pulmonary effort is normal. No respiratory distress.      Breath sounds: Normal breath sounds. No stridor. No wheezing.   Abdominal:      General: Abdomen is flat. Bowel sounds are normal.      Palpations: Abdomen is soft.      Tenderness: There is no abdominal tenderness.   Musculoskeletal:         General: No deformity. Normal range of motion.      Cervical back: Normal range of motion and neck supple.   Skin:     General: Skin is warm and dry.      Capillary Refill: Capillary refill takes less than 2 seconds.   Neurological:      General: No focal deficit present.      Mental Status: She is alert and oriented to person, place, and time.   Psychiatric:         Behavior: Behavior normal.      Comments: anxious         Results Reviewed       Procedure Component Value Units Date/Time    Comprehensive metabolic panel [[id number]]  (Abnormal) Collected: 10/03/24 2135    Lab Status: Final result Specimen: Blood from Arm, Right Updated: 10/03/24 2202     Sodium 136 mmol/L      Potassium 3.5 mmol/L      Chloride 102 mmol/L      CO2 22 mmol/L      ANION GAP 12 mmol/L      BUN 11 mg/dL      Creatinine 0.93 mg/dL      Glucose 104 mg/dL      Calcium 10.0 mg/dL      AST 14 U/L      ALT 13 U/L      Alkaline Phosphatase 64 U/L      Total Protein 7.8 g/dL      Albumin 4.7 g/dL      Total Bilirubin 1.34 mg/dL      eGFR 76 ml/min/1.73sq m     Narrative:      National Kid[employer] Foundation guidelines for Chronic Kidney Disease (CKD):     Stage 1 with normal or high GFR (GFR > 90 mL/min/1.73 square meters)    Stage 2 Mild CKD (GFR = 60-89 mL/min/1.73 square meters)    Stage 3A Moderate CKD (GFR = 45-59 mL/min/1.73 square meters)    Stage 3B Moderate CKD (GFR = 30-44 mL/min/1.73 square meters)    Stage 4 Severe CKD (GFR = 15-29 mL/min/1.73 square meters)     Stage 5 End Stage CKD (GFR <15 mL/min/1.73 square meters)  Note: GFR calculation is accurate only with a steady state creatinine    Lipase [[id number]]  (Normal) Collected: 10/03/24 2135    Lab Status: Final result Specimen: Blood from Arm, Right Updated: 10/03/24 2202     Lipase 51 u/L     CBC and differential [[id number]] Collected: 10/03/24 2135    Lab Status: Final result Specimen: Blood from Arm, Right Updated: 10/03/24 2143     WBC 8.27 Thousand/uL      RBC 4.54 Million/uL      Hemoglobin 13.5 g/dL      Hematocrit 37.7 %      MCV 83 fL      MCH 29.7 pg      MCHC 35.8 g/dL      RDW 11.7 %      MPV 8.9 fL      Platelets 308 Thousands/uL      nRBC 0 /100 WBCs      Segmented % 75 %      Immature Grans % 0 %      Lymphocytes % 19 %      Monocytes % 5 %      Eosinophils Relative 1 %      Basophils Relative 0 %      Absolute Neutrophils 6.17 Thousands/µL      Absolute Immature Grans 0.03 Thousand/uL      Absolute Lymphocytes 1.59 Thousands/µL      Absolute Monocytes 0.40 Thousand/µL      Eosinophils Absolute 0.06 Thousand/µL      Basophils Absolute 0.02 Thousands/µL             No orders to display       Procedures    ED Medication and Procedure Management   Prior to Admission Medications   Prescriptions Last Dose Informant Patient Reported? Taking?   FLUoxetine (PROzac) 10 mg capsule   No No   Sig: TAKE 1 CAPSULE (10 MG TOTAL) BY MOUTH DAILY TAKE WITH 20 MG CAPSULE FOR TOTAL OF 30 MG DAILY   FLUoxetine (PROzac) 20 mg capsule   No No   Sig: TAKE 1 CAPSULE (20 MG TOTAL) BY MOUTH DAILY TAKE WITH 10 MG CAPSULE FOR TOTAL OF 30 MG DAILY   FLUoxetine (PROzac) 40 MG capsule   No No   Sig: Take 1 capsule (40 mg total) by mouth daily   clonazePAM (KlonoPIN) 0.5 mg tablet   No No   Sig: Take 2 tablets (1 mg total) by mouth 2 (two) times a day as needed for anxiety   clonazePAM (KlonoPIN) 1 mg tablet   No No   Sig: Take 1 tablet (1 mg total) by mouth 2 (two) times a day for 10 days   cyanocobalamin 1,000 mcg/mL   No No   Sig:  Inject 1 mL (1,000 mcg total) into a muscle every 30 (thirty) days   ondansetron (ZOFRAN-ODT) 4 mg disintegrating tablet   No No   Sig: Take 1 tablet (4 mg total) by mouth every 8 (eight) hours as needed for nausea   ondansetron (ZOFRAN-ODT) 4 mg disintegrating tablet   No No   Sig: Take 1 tablet (4 mg total) by mouth every 6 (six) hours as needed for nausea or vomiting   propranolol (INDERAL) 10 mg tablet   No No   Sig: Take 0.5 tablet (5 mg total) by mouth 2 (two) times a day      Facility-Administered Medications: None     Patient's Medications   Discharge Prescriptions    No medications on file     No discharge procedures on file.  ED SEPSIS DOCUMENTATION   Time reflects when diagnosis was documented in both MDM as applicable and the Disposition within this note       Time User Action Codes Description Comment    10/3/2024 10:19 PM Tommy Cifuentes [R10.9] Abdominal pain     10/3/2024 10:19 PM Tommy Cifuentes [F41.9] Anxiety                  Tommy Cifuentes MD  10/03/24 6821

## 2024-10-04 NOTE — ASSESSMENT & PLAN NOTE
Increase Prozac 60 mg PO QD for depression/anxiety.  Add Haldol 2.5 mg PO BID for nausea and ruminations  Continue Klonopin to 1 mg PO BID daily for chronic anxiety/panic attacks.  Continue Ativan 1mg PO prn q6h for continued anxiety/panic attacks not responsive to Atarax.  Continue clonidine 0.05 PO BID for anxiety/potential substance use withdrawal; not been receiving due to parameters not being met, will taper

## 2024-10-04 NOTE — NURSING NOTE
Pt was transported to the ED for severe abdominal pain and vomiting. Pt was witnessed vomiting after dinner and stated she was having a panic attack. Pt received maalox, zofran, atarax 50 mg for anxiety and stomach pain. Pt continued to have abdominal pain and vomiting. Provider notified.

## 2024-10-04 NOTE — NURSING NOTE
Patient has been sleeping since returned from the ED. Non labored breathing noted. No signs or symptoms of distress. Continuous q7 minute checks ongoing.

## 2024-10-04 NOTE — NURSING NOTE
Pt visible on unit.Tearful episodes. Complains of severe anxiety. Somatically preoccupied with GI symptoms with severe anxiety. New orders for scheduled haldol 2.5 mg po prn. Safety precautions maintained. Will continue to monitor and assess.

## 2024-10-05 PROCEDURE — 99232 SBSQ HOSP IP/OBS MODERATE 35: CPT

## 2024-10-05 RX ADMIN — FLUOXETINE HYDROCHLORIDE 60 MG: 20 CAPSULE ORAL at 08:38

## 2024-10-05 RX ADMIN — CLONAZEPAM 1 MG: 1 TABLET ORAL at 17:16

## 2024-10-05 RX ADMIN — CLONAZEPAM 1 MG: 1 TABLET ORAL at 08:39

## 2024-10-05 RX ADMIN — CLONIDINE HYDROCHLORIDE 0.05 MG: 0.1 TABLET ORAL at 08:39

## 2024-10-05 RX ADMIN — CLONIDINE HYDROCHLORIDE 0.05 MG: 0.1 TABLET ORAL at 20:48

## 2024-10-05 RX ADMIN — HALOPERIDOL 2.5 MG: 2 TABLET ORAL at 08:38

## 2024-10-05 RX ADMIN — Medication 1000 UNITS: at 17:16

## 2024-10-05 RX ADMIN — HALOPERIDOL 2.5 MG: 2 TABLET ORAL at 17:16

## 2024-10-05 NOTE — PROGRESS NOTES
Progress Note - Behavioral Health   Name: Melody Huynh 43 y.o. female I MRN: [id number]   Unit/Bed#: U 218-02 I Date of Admission: 9/27/2024   Date of Service: 10/5/2024 I Hospital Day: 8         Assessment & Plan  Panic disorder without agoraphobia with severe panic attacks  Continue Prozac 60 mg PO QD for depression/anxiety.  Continue Haldol 2.5 mg PO BID for nausea and ruminations  Continue Klonopin to 1 mg PO BID daily for chronic anxiety/panic attacks.  Continue Ativan 1mg PO prn q6h for continued anxiety/panic attacks not responsive to Atarax.  Continue clonidine 0.05 PO BID for anxiety/potential substance use withdrawal; not been receiving due to parameters not being met, will taper    Unspecified mood (affective) disorder (HCC)  Prozac increased this morning to 60 mg daily for depression and anxiety by primary team        Recommended Treatment:     Treatment plan and medication changes discussed and per the attending physician the plan is:     1.Continue with group therapy, milieu therapy and occupational therapy  2.Behavioral Health checks every 7 minutes  3.Continue frequent safety checks and vitals per unit protocol  4.Continue with SLIM medical management as indicated  5.Continue with current medication regimen  6.Will review labs in the a.m.  7.Disposition Planning: Discharge planning and efforts remain ongoing     Planned medication and treatment changes:    All current active medications have been reviewed  Behavioral Health checks for safety monitoring  Continue treatment with group therapy, milieu therapy and occupational therapy  Discharge planning  Continue current medications:    Current medications:  Current Facility-Administered Medications   Medication Dose Route Frequency Provider Last Rate    aluminum-magnesium hydroxide-simethicone  30 mL Oral Q4H PRN Francisca Stevens MD      haloperidol lactate  2.5 mg Intramuscular Q4H PRN Max 4/day Francisca Stevens MD      And    LORazepam  1 mg  Intramuscular Q4H PRN Max 4/day Francisca Stevens MD      And    benztropine  0.5 mg Intramuscular Q4H PRN Max 4/day Francisca Stevens MD      haloperidol lactate  5 mg Intramuscular Q4H PRN Max 4/day Francisca Stevens MD      And    LORazepam  2 mg Intramuscular Q4H PRN Max 4/day Francisca Stevens MD      And    benztropine  1 mg Intramuscular Q4H PRN Max 4/day Francisca Stevens MD      benztropine  1 mg Intramuscular Q4H PRN Max 6/day Francisca Stevens MD      benztropine  1 mg Oral Q4H PRN Max 6/day Francisca Stevens MD      bisacodyl  10 mg Rectal Daily PRN Francisca Stevens MD      clonazePAM  1 mg Oral BID Jaswant SHAWNA Cruz-C      cloNIDine  0.05 mg Oral Q12H BELIA Belem Bañuelos, OLE      cyanocobalamin  1,000 mcg Intramuscular Q30 Days Emi Madden PA-C      dicyclomine  10 mg Oral Q6H PRN Emi Madden PA-C      hydrOXYzine HCL  50 mg Oral Q6H PRN Max 4/day Francisca Stevens MD      Or    diphenhydrAMINE  50 mg Intramuscular Q6H PRN Francisca Stevens MD      FLUoxetine  60 mg Oral Daily Belem Bañuelos, OLE      haloperidol  1 mg Oral Q6H PRN Francisca Stevens MD      haloperidol  2.5 mg Oral Q4H PRN Max 4/day Francisca Stevens MD      haloperidol  2.5 mg Oral BID Belem Bañuelos, OLE      haloperidol  5 mg Oral Q4H PRN Max 4/day Francisca Stevens MD      hydrOXYzine HCL  100 mg Oral Q6H PRN Max 4/day Francisca Stevens MD      Or    LORazepam  2 mg Intramuscular Q6H PRN Francisca Stevens MD      hydrOXYzine HCL  25 mg Oral Q6H PRN Max 4/day Francisca Stevens MD      ibuprofen  400 mg Oral Q4H PRN Francisca Stevens MD      ibuprofen  600 mg Oral Q6H PRN Francisca Stevens MD      ibuprofen  800 mg Oral Q8H PRN Francisca Stevens MD      LORazepam  1 mg Oral Q6H PRN Jaswant Cruz PA-C      ondansetron  4 mg Oral Q6H PRN Emi Madden PA-C      polyethylene glycol  17 g Oral Daily PRN Francisca Stevens MD      propranolol  10 mg Oral Q8H PRN Francisca Stevens MD      senna-docusate sodium  1  tablet Oral Daily PRN Francisca Stevens MD      traZODone  50 mg Oral HS PRN Francisca Stevens MD      trimethobenzamide  200 mg Intramuscular Q6H PRN Emi Madden PA-C         Risks / Benefits of Treatment:    Risks, benefits, and possible side effects of medications explained to patient and patient verbalizes understanding and agreement for treatment.    Subjective:    Behavior over the last 24 hours: slowly improving.     Per staff, Mi appeared less anxious throughout the day yesterday.  She was intermittently visible on the unit.  She continues to be somatic at times.     Mi was seen in the hallway away from peers today for psychiatric follow up.  She is calm and cooperative with the interview.  She appears only mildly anxious this morning.  She reports some improvement in her anxiety and ruminations since starting the Haldol yesterday.  She was inquiring about discharge and encouraged her to discuss with primary team on Monday as she just had an adjustment in medications.  She was agreeable.  She denies any current suicidal or homicidal ideation plan or intent.  She reports improved sleep last night.  She denies any hallucinations and did not present as internally preoccupied.     Sleep: slept better  Appetite: normal  Medication side effects: No   ROS: no complaints    Mental Status Evaluation:    Appearance:  casually dressed, adequate grooming, looks stated age   Behavior:  cooperative, calm   Speech:  normal rate and volume, fluent, clear   Mood:  mildly anxious   Affect:  constricted   Thought Process:  goal directed   Associations: intact associations   Thought Content:  no overt delusions, negative thinking, ruminating thoughts   Perceptual Disturbances: none   Risk Potential: Suicidal ideation - None  Homicidal ideation - None  Potential for aggression - No   Sensorium:  oriented to person, place, time/date, and situation   Memory:  recent memory intact   Consciousness:  alert and  awake   Attention/Concentration: attention span and concentration are age appropriate   Insight:  limited   Judgment: limited   Gait/Station: normal gait/station   Motor Activity: no abnormal movements     Vital signs in last 24 hours:    Temp:  [97.6 °F (36.4 °C)-97.7 °F (36.5 °C)] 97.7 °F (36.5 °C)  HR:  [72-89] 72  BP: (104-117)/(65-83) 117/65  Resp:  [17] 17  SpO2:  [95 %-98 %] 98 %  O2 Device: None (Room air)         Laboratory results: I have personally reviewed all pertinent laboratory/tests results    Results from the past 24 hours: No results found for this or any previous visit (from the past 24 hour(s)).  Most Recent Labs:   Lab Results   Component Value Date    WBC 8.27 10/03/2024    RBC 4.54 10/03/2024    HGB 13.5 10/03/2024    HCT 37.7 10/03/2024     10/03/2024    RDW 11.7 10/03/2024    NEUTROABS 6.17 10/03/2024    TOTANEUTABS 9.27 (H) 03/21/2018    SODIUM 136 10/03/2024    K 3.5 10/03/2024     10/03/2024    CO2 22 10/03/2024    BUN 11 10/03/2024    CREATININE 0.93 10/03/2024    GLUC 104 10/03/2024    CALCIUM 10.0 10/03/2024    AST 14 10/03/2024    ALT 13 10/03/2024    ALKPHOS 64 10/03/2024    TP 7.8 10/03/2024    ALB 4.7 10/03/2024    TBILI 1.34 (H) 10/03/2024    CHOLESTEROL 147 09/28/2024    HDL 38 (L) 09/28/2024    TRIG 124 09/28/2024    LDLCALC 84 09/28/2024    NONHDLC 109 09/28/2024    [id number] 1.689 09/28/2024    PREGUR NEGATIVE 08/12/2021    PREGSERUM Negative 09/28/2024    SYPHILISAB Non-reactive 09/28/2024    HGBA1C 4.9 04/26/2023    EAG 94 04/26/2023       Suicide/Homicide Risk Assessment:    Risk of Harm to Self:   Nursing Suicide Risk Assessment Last 24 hours: C-SSRS Risk (Since Last Contact)  Calculated C-SSRS Risk Score (Since Last Contact): No Risk Indicated  Current Specific Risk Factors include: diagnosis of mood disorder  Protective Factors: no current suicidal ideation, ability to communicate with staff on the unit, improved anxiety symptoms, responds to redirection,  being   Based on today's assessment, Melody presents the following risk of harm to self: low    Risk of Harm to Others:  Nursing Homicide Risk Assessment: Violence Risk to Others: Denies within past 6 months  Current Specific Risk Factors include: unstable mood disorder  Protective Factors: no current homicidal ideation, no current psychotic symptoms, compliant with medications on the unit as ordered, compliant with unit milieu, follows staff redirection  Based on today's assessment, Melody presents the following risk of harm to others: low    The following interventions are recommended: Behavioral Health checks for safety monitoring, continued hospitalization on locked unit    Progress Toward Goals: progressing    Counseling / Coordination of Care:    Total floor / unit time spent today 15 minutes. Greater than 50% of total time was spent with the patient and / or family counseling and / or coordination of care. A description of counseling / coordination of care:    Administrative Statements   I have spent a total time of 30 minutes in caring for this patient on the day of the visit/encounter including Documenting in the medical record, Reviewing / ordering tests, medicine, procedures  , Obtaining or reviewing history  , and Communicating with other healthcare professionals .    OLE Goss 10/05/24

## 2024-10-05 NOTE — NURSING NOTE
Pt visible on unit. No tearful episodes. Mood and affect congruent. No PRN medications administered. Safety precautions maintained. Will continue to monitor and assess.

## 2024-10-05 NOTE — NURSING NOTE
Patient is intermittently seen within the milieu, periodically withdrawn to room. Patient reports feeling better today and having less anxiety. Patient is pleasant and brightens on approach. No tearful episodes or somatic GI symptoms. Scheduled medication held for low BP. No PRNs given. Patient is able to make her needs known. Continuous q7 minute checks ongoing.

## 2024-10-05 NOTE — ASSESSMENT & PLAN NOTE
Continue Prozac 60 mg PO QD for depression/anxiety.  Continue Haldol 2.5 mg PO BID for nausea and ruminations  Continue Klonopin to 1 mg PO BID daily for chronic anxiety/panic attacks.  Continue Ativan 1mg PO prn q6h for continued anxiety/panic attacks not responsive to Atarax.  Continue clonidine 0.05 PO BID for anxiety/potential substance use withdrawal; not been receiving due to parameters not being met, will taper

## 2024-10-05 NOTE — PROGRESS NOTES
"  Progress Note - Melody Huynh 43 y.o. female MRN: [id number]    Unit/Bed#: [facility] 218-02 Encounter: [id number]        Subjective:   Patient seen and examined at bedside after reviewing the chart and discussing the case with the caring staff.      Patient examined at bedside.  Patient reports no acute symptoms.      On review of patient's labs patient's vitamin D level was found to be low 38.6 and vitamin B12 level was found to be low 279.    Physical Exam   Vitals: Blood pressure 117/65, pulse 72, temperature 97.7 °F (36.5 °C), temperature source Temporal, resp. rate 17, height 5' 3\" (1.6 m), weight 62.5 kg (137 lb 12.8 oz), SpO2 98%, not currently breastfeeding.,Body mass index is 24.41 kg/m².  Constitutional: Patient in no acute distress.  HEENT: PERR, EOMI, MMM.  Cardiovascular: Normal rate and regular rhythm.    Pulmonary/Chest: Effort normal and breath sounds normal.   Abdomen: Soft, + BS, NT.    Assessment/Plan:  Melody Huynh is a(n) 43 y.o. female with panic disorder.     Medical Clearance: Patient is medically cleared for discharge. All prescriptions have been sent to pharmacy.     Migraine headaches.  Tylenol as needed.  GERD.  Maalox as needed.  N/V/abd pain.  CT abd/pelv 9/22 with no acute abnormality.  Afebrile.  Zofran and Bentyl as needed.  Tigan if unable to take oral.  Fluids encouraged.   Hypokalemia.  Resolved.  Level 2.8 -> 3.3 -> 4.3 on 9/30.  Ordered potassium chloride 40 mEq x1 dose 9/28.    Vitamin B12 deficiency.  Patient started on monthly B12 injections.   Vitamin D deficiency.  I will put the patient on vitamin D supplements.  "

## 2024-10-05 NOTE — PLAN OF CARE
Problem: Anxiety  Goal: Anxiety is at manageable level  Description: Interventions:  - Assess and monitor patient's anxiety level.   - Monitor for signs and symptoms (heart palpitations, chest pain, shortness of breath, headaches, nausea, feeling jumpy, restlessness, irritable, apprehensive).   - Collaborate with interdisciplinary team and initiate plan and interventions as ordered.  - [place] patient to unit/surroundings  - Explain treatment plan  - Encourage participation in care  - Encourage verbalization of concerns/fears  - Identify coping mechanisms  - Assist in developing anxiety-reducing skills  - Administer/offer alternative therapies  - Limit or eliminate stimulants  Outcome: Progressing

## 2024-10-06 PROCEDURE — 99232 SBSQ HOSP IP/OBS MODERATE 35: CPT

## 2024-10-06 RX ADMIN — CLONAZEPAM 1 MG: 1 TABLET ORAL at 17:08

## 2024-10-06 RX ADMIN — FLUOXETINE HYDROCHLORIDE 60 MG: 20 CAPSULE ORAL at 08:57

## 2024-10-06 RX ADMIN — CLONAZEPAM 1 MG: 1 TABLET ORAL at 08:56

## 2024-10-06 RX ADMIN — HALOPERIDOL 2.5 MG: 2 TABLET ORAL at 17:08

## 2024-10-06 RX ADMIN — Medication 1000 UNITS: at 08:57

## 2024-10-06 RX ADMIN — HALOPERIDOL 2.5 MG: 2 TABLET ORAL at 08:56

## 2024-10-06 NOTE — NURSING NOTE
Patient observed in the milieu early in the evening but quickly retired to her bedroom after medication administration. She reports drastic improvement in mood and was happy to report that she hasn't had a panic attack since Thursday. She denies SI/HI and hallucinations. Appropriate amongst peers. Social with select peers. Q7 minute checks ongoing.

## 2024-10-06 NOTE — NURSING NOTE
Pt visible on unit. No tearful episodes. Mood and affect congruent. Stable. No cute behaviors. No PRN medications administered. Pt verbalizes improvement and relief of panic attack symptoms. Denies SI, HI, or hallucinations. Safety precautions maintained. Will continue to monitor and assess.

## 2024-10-06 NOTE — PROGRESS NOTES
"  Progress Note - Melody Huynh 43 y.o. female MRN: [id number]    Unit/Bed#: [facility] 218-02 Encounter: [id number]        Subjective:   Patient seen and examined at bedside after reviewing the chart and discussing the case with the caring staff.      Patient examined at bedside.  Patient reports no acute symptoms.      On review of patient's labs patient's vitamin D level was found to be low 38.6 and vitamin B12 level was found to be low 279.    Physical Exam   Vitals: Blood pressure (!) 83/62, pulse 78, temperature 98.3 °F (36.8 °C), temperature source Temporal, resp. rate 18, height 5' 3\" (1.6 m), weight 62.5 kg (137 lb 12.8 oz), SpO2 94%, not currently breastfeeding.,Body mass index is 24.41 kg/m².  Constitutional: Patient in no acute distress.  HEENT: PERR, EOMI, MMM.  Cardiovascular: Normal rate and regular rhythm.    Pulmonary/Chest: Effort normal and breath sounds normal.   Abdomen: Soft, + BS, NT.    Assessment/Plan:  Melody Huynh is a(n) 43 y.o. female with panic disorder.     Medical Clearance: Patient is medically cleared for discharge. All prescriptions have been sent to pharmacy.     Migraine headaches.  Tylenol as needed.  GERD.  Maalox as needed.  N/V/abd pain.  CT abd/pelv 9/22 with no acute abnormality.  Afebrile.  Zofran and Bentyl as needed.  Tigan if unable to take oral.  Fluids encouraged.   Hypokalemia.  Resolved.  Level 2.8 -> 3.3 -> 4.3 on 9/30.  Ordered potassium chloride 40 mEq x1 dose 9/28.    Vitamin B12 deficiency.  Patient started on monthly B12 injections.   Vitamin D deficiency.  I will put the patient on vitamin D supplements.  "

## 2024-10-06 NOTE — PROGRESS NOTES
Progress Note - Behavioral Health   Name: Melody Huynh 43 y.o. female I MRN: [id number]   Unit/Bed#: [facility] 218-02 I Date of Admission: 9/27/2024   Date of Service: 10/6/2024 I Hospital Day: 9         Assessment & Plan  Panic disorder without agoraphobia with severe panic attacks  Continue Prozac 60 mg PO QD for depression/anxiety.  Continue Haldol 2.5 mg PO BID for nausea and ruminations  Continue Klonopin to 1 mg PO BID daily for chronic anxiety/panic attacks.  Continue Ativan 1mg PO prn q6h for continued anxiety/panic attacks not responsive to Atarax.  Continue clonidine 0.05 PO BID for anxiety/potential substance use withdrawal; not been receiving due to parameters not being met, will taper    Unspecified mood (affective) disorder (HCC)  Prozac increased this morning to 60 mg daily for depression and anxiety by primary team        Recommended Treatment:     Treatment plan and medication changes discussed and per the attending physician the plan is:     1.Continue with group therapy, milieu therapy and occupational therapy  2.Behavioral Health checks every 7 minutes  3.Continue frequent safety checks and vitals per unit protocol  4.Continue with [facility] medical management as indicated  5.Continue with current medication regimen  6.Will review labs in the a.m.  7.Disposition Planning: Discharge planning and efforts remain ongoing     Planned medication and treatment changes:    All current active medications have been reviewed  Behavioral Health checks for safety monitoring  Continue treatment with group therapy, milieu therapy and occupational therapy  Discharge planning  Continue current medications    Current medications:  Current Facility-Administered Medications   Medication Dose Route Frequency Provider Last Rate    aluminum-magnesium hydroxide-simethicone  30 mL Oral Q4H PRN Francisca Stevens MD      haloperidol lactate  2.5 mg Intramuscular Q4H PRN Max 4/day Francisca Stevens MD      And    LORazepam  1 mg  Intramuscular Q4H PRN Max 4/day Francisca Stevens MD      And    benztropine  0.5 mg Intramuscular Q4H PRN Max 4/day Francisca Stevens MD      haloperidol lactate  5 mg Intramuscular Q4H PRN Max 4/day Francisca Stevens MD      And    LORazepam  2 mg Intramuscular Q4H PRN Max 4/day Francisca Stevens MD      And    benztropine  1 mg Intramuscular Q4H PRN Max 4/day Francisca Stevens MD      benztropine  1 mg Intramuscular Q4H PRN Max 6/day Francisca Stevens MD      benztropine  1 mg Oral Q4H PRN Max 6/day Francisca Stevens MD      bisacodyl  10 mg Rectal Daily PRN Francisca Stevens MD      cholecalciferol  1,000 Units Oral Daily Brian Downey MD      clonazePAM  1 mg Oral BID JaswantSHAWNA Camargo-C      cloNIDine  0.05 mg Oral Q12H BELIA Belem Bañuelos, OLE      cyanocobalamin  1,000 mcg Intramuscular Q30 Days Emi Madden PA-C      dicyclomine  10 mg Oral Q6H PRN Emi Madden PA-C      hydrOXYzine HCL  50 mg Oral Q6H PRN Max 4/day Francisca Stevens MD      Or    diphenhydrAMINE  50 mg Intramuscular Q6H PRN Francisca Stevens MD      FLUoxetine  60 mg Oral Daily Belem Bañuelos, OLE      haloperidol  1 mg Oral Q6H PRN Francisca Stevens MD      haloperidol  2.5 mg Oral Q4H PRN Max 4/day Francisca Stevens MD      haloperidol  2.5 mg Oral BID Belem Bañuelos, OLE      haloperidol  5 mg Oral Q4H PRN Max 4/day Francisca Stevens MD      hydrOXYzine HCL  100 mg Oral Q6H PRN Max 4/day Francisca Stevens MD      Or    LORazepam  2 mg Intramuscular Q6H PRN Francisca Stevens MD      hydrOXYzine HCL  25 mg Oral Q6H PRN Max 4/day Francisca Stevens MD      ibuprofen  400 mg Oral Q4H PRN Francisca Stevens MD      ibuprofen  600 mg Oral Q6H PRN Francisca Stevens MD      ibuprofen  800 mg Oral Q8H PRN Francisca Stevens MD      LORazepam  1 mg Oral Q6H PRN Jaswant Cruz PA-C      ondansetron  4 mg Oral Q6H PRN Emi Madden PA-C      polyethylene glycol  17 g Oral Daily PRN Francisca Stevens MD      propranolol  10 mg Oral  Q8H PRN Francisca Stevens MD      senna-docusate sodium  1 tablet Oral Daily PRN Francisca Stevens MD      traZODone  50 mg Oral HS PRN Francisca Stevens MD      trimethobenzamide  200 mg Intramuscular Q6H PRN Emi Madden PA-C         Risks / Benefits of Treatment:    Risks, benefits, and possible side effects of medications explained to patient and patient verbalizes understanding and agreement for treatment.    Subjective:    Behavior over the last 24 hours: some improvement.     Per staff,  patient was visible on the unit yesterday with no tearful episodes.  She reports improved mood and denies any panic attacks since Thursday.  She is social with select peers.      Mi was seen in her room today for psychiatric follow up.  She reports that she slept well last night.  She remains focused on discharge.  She states since starting the Haldol her anxiety has improved and hopes to go home tomorrow.  I advised there is no discharge date listed and will need to discuss with primary team tomorrow.  She denies any suicidal or homicidal ideation intent or plan.  She denies hallucinations and does not appear internally preoccupied.     Sleep: normal  Appetite: normal  Medication side effects: No   ROS: no complaints    Mental Status Evaluation:    Appearance:  casually dressed, adequate grooming   Behavior:  cooperative, calm   Speech:  normal rate and volume, fluent, clear   Mood:  mildly anxious   Affect:  constricted   Thought Process:  goal directed, linear   Associations: intact associations   Thought Content:  no overt delusions, preoccupied with discharge   Perceptual Disturbances: none   Risk Potential: Suicidal ideation - None  Homicidal ideation - None  Potential for aggression - No   Sensorium:  oriented to person, place, and situation   Memory:  recent memory intact   Consciousness:  alert and awake   Attention/Concentration: attention span and concentration are age appropriate   Insight:  partial    Judgment: partial   Gait/Station: normal gait/station   Motor Activity: no abnormal movements     Vital signs in last 24 hours:    Temp:  [98.3 °F (36.8 °C)-99.6 °F (37.6 °C)] 98.3 °F (36.8 °C)  HR:  [63-78] 78  BP: ()/(61-72) 83/62  Resp:  [18] 18  SpO2:  [94 %-99 %] 94 %  O2 Device: None (Room air)         Laboratory results: I have personally reviewed all pertinent laboratory/tests results    Results from the past 24 hours: No results found for this or any previous visit (from the past 24 hour(s)).  Most Recent Labs:   Lab Results   Component Value Date    WBC 8.27 10/03/2024    RBC 4.54 10/03/2024    HGB 13.5 10/03/2024    HCT 37.7 10/03/2024     10/03/2024    RDW 11.7 10/03/2024    NEUTROABS 6.17 10/03/2024    TOTANEUTABS 9.27 (H) 03/21/2018    SODIUM 136 10/03/2024    K 3.5 10/03/2024     10/03/2024    CO2 22 10/03/2024    BUN 11 10/03/2024    CREATININE 0.93 10/03/2024    GLUC 104 10/03/2024    CALCIUM 10.0 10/03/2024    AST 14 10/03/2024    ALT 13 10/03/2024    ALKPHOS 64 10/03/2024    TP 7.8 10/03/2024    ALB 4.7 10/03/2024    TBILI 1.34 (H) 10/03/2024    CHOLESTEROL 147 09/28/2024    HDL 38 (L) 09/28/2024    TRIG 124 09/28/2024    LDLCALC 84 09/28/2024    NONHDLC 109 09/28/2024    TJX4YORVGVKG 1.689 09/28/2024    PREGUR NEGATIVE 08/12/2021    PREGSERUM Negative 09/28/2024    SYPHILISAB Non-reactive 09/28/2024    HGBA1C 4.9 04/26/2023    EAG 94 04/26/2023       Suicide/Homicide Risk Assessment:    Risk of Harm to Self:   Nursing Suicide Risk Assessment Last 24 hours: C-SSRS Risk (Since Last Contact)  Calculated C-SSRS Risk Score (Since Last Contact): No Risk Indicated  Current Specific Risk Factors include: diagnosis of mood disorder  Protective Factors: no current suicidal ideation, ability to communicate with staff on the unit, able to contract for safety on the unit, taking medications as ordered on the unit, improved mood, improved anxiety symptoms, responds to redirection, being    Based on today's assessment, Melody presents the following risk of harm to self: low    Risk of Harm to Others:  Nursing Homicide Risk Assessment: Violence Risk to Others: Denies within past 6 months  Current Specific Risk Factors include: social difficulties  Protective Factors: no current homicidal ideation, able to communicate with staff on the unit, compliant with medications on the unit as ordered, compliant with unit milieu, follows staff redirection  Based on today's assessment, Melody presents the following risk of harm to others: low    The following interventions are recommended: Behavioral Health checks for safety monitoring, continued hospitalization on locked unit    Progress Toward Goals: progressing, participates in milieu therapy, mood is stabilizing    Counseling / Coordination of Care:    Total floor / unit time spent today 15 minutes. Greater than 50% of total time was spent with the patient and / or family counseling and / or coordination of care. A description of counseling / coordination of care:    Administrative Statements   I have spent a total time of 30 minutes in caring for this patient on the day of the visit/encounter including Documenting in the medical record, Reviewing / ordering tests, medicine, procedures  , and Obtaining or reviewing history  .    OLE Goss 10/06/24

## 2024-10-07 ENCOUNTER — TELEPHONE (OUTPATIENT)
Age: 44
End: 2024-10-07

## 2024-10-07 VITALS
BODY MASS INDEX: 24.77 KG/M2 | OXYGEN SATURATION: 97 % | RESPIRATION RATE: 16 BRPM | HEIGHT: 63 IN | HEART RATE: 90 BPM | WEIGHT: 139.8 LBS | SYSTOLIC BLOOD PRESSURE: 106 MMHG | DIASTOLIC BLOOD PRESSURE: 70 MMHG | TEMPERATURE: 98 F

## 2024-10-07 PROCEDURE — 99238 HOSP IP/OBS DSCHRG MGMT 30/<: CPT | Performed by: NURSE PRACTITIONER

## 2024-10-07 RX ORDER — HALOPERIDOL 5 MG/1
2.5 TABLET ORAL 2 TIMES DAILY
Qty: 30 TABLET | Refills: 0 | Status: SHIPPED | OUTPATIENT
Start: 2024-10-07 | End: 2024-11-06

## 2024-10-07 RX ORDER — BENZTROPINE MESYLATE 1 MG/1
1 TABLET ORAL 2 TIMES DAILY PRN
Qty: 60 TABLET | Refills: 0 | Status: SHIPPED | OUTPATIENT
Start: 2024-10-07 | End: 2024-11-06

## 2024-10-07 RX ADMIN — FLUOXETINE HYDROCHLORIDE 60 MG: 20 CAPSULE ORAL at 08:14

## 2024-10-07 RX ADMIN — Medication 1000 UNITS: at 08:14

## 2024-10-07 RX ADMIN — HALOPERIDOL 2.5 MG: 2 TABLET ORAL at 08:14

## 2024-10-07 RX ADMIN — HYDROXYZINE HYDROCHLORIDE 100 MG: 50 TABLET, FILM COATED ORAL at 11:00

## 2024-10-07 RX ADMIN — CLONAZEPAM 1 MG: 1 TABLET ORAL at 08:14

## 2024-10-07 NOTE — NURSING NOTE
Patient discharged from unit with all belongings including medications brought in, discharge instructions follow up appointments and medications reviewed Patient walked to lobby by staff and met by  to return home

## 2024-10-07 NOTE — PROGRESS NOTES
10/04/24 0900   Team Meeting   Meeting Type Daily Rounds   Team Members Present   Team Members Present Physician;Nurse;;Occupational Therapist   Physician Team Member trino medrano md   Nursing Team Member marissa khan   Social Work Team Member domenic grove   OT Team Member conner alvarez   Patient/Family Present   Patient Present No   Patient's Family Present No   201 speaking to , rescind 72 or 302

## 2024-10-07 NOTE — BH TRANSITION RECORD
Contact Information: If you have any questions, concerns, pended studies, tests and/or procedures, or emergencies regarding your inpatient behavioral health visit. Please contact [facility] # [phone] and ask to speak to a , nurse or physician. A contact is available 24 hours/ 7 days a week at this number.     Summary of Procedures Performed During your Stay:  Below is a list of major procedures performed during your hospital stay and a summary of results:  - No major procedures performed.    Pending Studies (From admission, onward)      None          Please follow up on the above pending studies with your PCP and/or referring provider.

## 2024-10-07 NOTE — PLAN OF CARE
Problem: Anxiety  Goal: Anxiety is at manageable level  Description: Interventions:  - Assess and monitor patient's anxiety level.   - Monitor for signs and symptoms (heart palpitations, chest pain, shortness of breath, headaches, nausea, feeling jumpy, restlessness, irritable, apprehensive).   - Collaborate with interdisciplinary team and initiate plan and interventions as ordered.  - [place] patient to unit/surroundings  - Explain treatment plan  - Encourage participation in care  - Encourage verbalization of concerns/fears  - Identify coping mechanisms  - Assist in developing anxiety-reducing skills  - Administer/offer alternative therapies  - Limit or eliminate stimulants  Outcome: Progressing     Problem: Depression  Goal: Verbalize thoughts and feelings  Description: Interventions:  - Assess and re-assess patient's level of risk   - Engage patient in 1:1 interactions, daily, for a minimum of 15 minutes   - Encourage patient to express feelings, fears, frustrations, hopes   Outcome: Progressing

## 2024-10-07 NOTE — TREATMENT TEAM
10/07/24 1101   Lisa Anxiety Scale   Anxious Mood 4   Tension 4   Fears 3   Insomnia 0   Intellectual 2   Depressed Mood 1   Somatic Complaints: Muscular 1   Somatic Complaints: Sensory 1   Cardiovascular Symptoms 1   Respiratory Symptoms 1   Gastrointestinal Symptoms 1   Genitourinary Symptoms 1   Autonomic Symptoms 1   Behavior at Interview 4   Lisa Anxiety Score 25     Patient given Atarax 100 mg for severe anxiety r/t discharge

## 2024-10-07 NOTE — SOCIAL WORK
Dakota called  ([phone]) to discuss dc today at 12:30.  has no safety concerns at this time.  will  pt.  requested call to therapist Dr. Rhodes. Call ended mutually.     DAKOTA called PCP ([phone]) to reschedule appointment     DAKOTA called [facility] to schedule TT appointment with Dr. Foster. 10/10/2024 at 4:00.    Patient is discharging today home. Pt will be following up with OP psych through [facility]. And TT with [facility]. Pt declined PHP referral at this time. Information provided in AVS.

## 2024-10-07 NOTE — TELEPHONE ENCOUNTER
Attempted call back to patient's , German Huynh ([phone]), unable to reach.  Confirmed with primary team that [facility] is on file for patient's  for current admission prior to call. Notified primary team of call.    Thank you,  Deysi Cohen, DO

## 2024-10-07 NOTE — PROGRESS NOTES
10/07/24    Team Meeting   Meeting Type Daily Rounds   Team Members Present   Team Members Present Physician;Nurse;;Occupational Therapist   Physician Team Member Bobby JUDD, Charlie JUDD, Chas JUDD, Edda RN   Nursing Team Member Adal GILLETTE   Social Work Team Member Yury ZARATE   Patient/Family Present   Patient Present No   Patient's Family Present No     201, started haldol over the weekend, reports no panic attacks, went to ER Thursday night, rescinded 72 Friday, meds were adjusted, will be reassessed today, dc date will be determined following assessment, PHP referral

## 2024-10-07 NOTE — NURSING NOTE
"Patient observed intermittently socializing amongst peers. She continues to report drastic improvement of mood and relief from panic attacks since starting haldol during the day. She believes that the haldol was the primary contributor to her success. She denies depression, anxiety, SI/HI and hallucinations. Reports \"I finally feel like myself again\". She hopes to hear about discharge tomorrow. Clonidine was held due to parameters not being met, patient reports feeling too tired to re-check. Q7 minute checks ongoing.   "

## 2024-10-07 NOTE — PROGRESS NOTES
"  Progress Note - Melody Huynh 43 y.o. female MRN: [id number]    Unit/Bed#: [facility] 218-02 Encounter: [id number]        Subjective:   Patient seen and examined at bedside after reviewing the chart and discussing the case with the caring staff.      Patient examined at bedside.  Patient reports no acute symptoms.      Physical Exam   Vitals: Blood pressure 106/70, pulse 90, temperature 98 °F (36.7 °C), temperature source Temporal, resp. rate 16, height 5' 3\" (1.6 m), weight 63.4 kg (139 lb 12.8 oz), SpO2 97%, not currently breastfeeding.,Body mass index is 24.76 kg/m².  Constitutional: Patient in no acute distress.  HEENT: PERR, EOMI, MMM.  Cardiovascular: Normal rate and regular rhythm.    Pulmonary/Chest: Effort normal and breath sounds normal.   Abdomen: Soft, + BS, NT.    Assessment/Plan:  Melody Huynh is a(n) 43 y.o. female with panic disorder.     Medical Clearance: Patient is medically cleared for discharge. All prescriptions have been sent to pharmacy.     Migraine headaches.  Tylenol as needed.  GERD.  Maalox as needed.  N/V/abd pain.  CT abd/pelv 9/22 with no acute abnormality.  Afebrile.  Zofran and Bentyl as needed.  Tigan if unable to take oral.  Fluids encouraged.   Hypokalemia.  Resolved.  Level 2.8 -> 3.3 -> 4.3 on 9/30.  Ordered potassium chloride 40 mEq x1 dose 9/28.    Vitamin B12 deficiency.  Patient started on monthly B12 injections.   Vitamin D deficiency.  Patient started on vitamin D supplements per Dr. Downey 10/5.    The patient was discussed with Dr. Downey and he is in agreement with the above note.    "

## 2024-10-07 NOTE — TELEPHONE ENCOUNTER
German was returning Dr Cohen's call. He wanted to inform Dr Cohen that he picked Melody up from the hospital. She was in the car with him when he called. He stated that the hospital increased her prozac and added Haloperidol. Writer informed German that all the medications were on a Hospital Medication list in Melody's file for Dr Cohen to see.  He states that Melody's appt is on 10/22/24 at 11 am. He said they are available sooner if needed. Writer informed will notify Provider and that the office will call if Dr Cohen wants a closer appt.  German wanted to make sure Dr Cohen had the information from the hospital so everyone is on the same page. Writer informed notification of his call and information would be forwarded to Provider.

## 2024-10-08 ENCOUNTER — OFFICE VISIT (OUTPATIENT)
Age: 44
End: 2024-10-08
Payer: COMMERCIAL

## 2024-10-08 ENCOUNTER — APPOINTMENT (OUTPATIENT)
Age: 44
End: 2024-10-08
Payer: COMMERCIAL

## 2024-10-08 VITALS
DIASTOLIC BLOOD PRESSURE: 80 MMHG | SYSTOLIC BLOOD PRESSURE: 110 MMHG | OXYGEN SATURATION: 98 % | TEMPERATURE: 97.6 F | BODY MASS INDEX: 24.59 KG/M2 | RESPIRATION RATE: 20 BRPM | WEIGHT: 138.8 LBS | HEIGHT: 63 IN | HEART RATE: 85 BPM

## 2024-10-08 DIAGNOSIS — F41.0 PANIC DISORDER WITHOUT AGORAPHOBIA WITH SEVERE PANIC ATTACKS: Primary | ICD-10-CM

## 2024-10-08 DIAGNOSIS — E87.6 HYPOKALEMIA: ICD-10-CM

## 2024-10-08 DIAGNOSIS — R82.90 ABNORMAL URINALYSIS: ICD-10-CM

## 2024-10-08 LAB
ANION GAP SERPL CALCULATED.3IONS-SCNC: 12 MMOL/L (ref 4–13)
BILIRUB UR QL STRIP: NEGATIVE
BUN SERPL-MCNC: 10 MG/DL (ref 5–25)
CALCIUM SERPL-MCNC: 9.3 MG/DL (ref 8.4–10.2)
CHLORIDE SERPL-SCNC: 102 MMOL/L (ref 96–108)
CLARITY UR: CLEAR
CO2 SERPL-SCNC: 24 MMOL/L (ref 21–32)
COLOR UR: NORMAL
CREAT SERPL-MCNC: 0.78 MG/DL (ref 0.6–1.3)
GFR SERPL CREATININE-BSD FRML MDRD: 94 ML/MIN/1.73SQ M
GLUCOSE SERPL-MCNC: 64 MG/DL (ref 65–140)
GLUCOSE UR STRIP-MCNC: NEGATIVE MG/DL
HGB UR QL STRIP.AUTO: NEGATIVE
KETONES UR STRIP-MCNC: NEGATIVE MG/DL
LEUKOCYTE ESTERASE UR QL STRIP: NEGATIVE
NITRITE UR QL STRIP: NEGATIVE
PH UR STRIP.AUTO: 6 [PH]
POTASSIUM SERPL-SCNC: 4 MMOL/L (ref 3.5–5.3)
PROT UR STRIP-MCNC: NEGATIVE MG/DL
SODIUM SERPL-SCNC: 138 MMOL/L (ref 135–147)
SP GR UR STRIP.AUTO: 1.02 (ref 1–1.03)
UROBILINOGEN UR STRIP-ACNC: <2 MG/DL

## 2024-10-08 PROCEDURE — 80048 BASIC METABOLIC PNL TOTAL CA: CPT

## 2024-10-08 PROCEDURE — 36415 COLL VENOUS BLD VENIPUNCTURE: CPT

## 2024-10-08 PROCEDURE — 99214 OFFICE O/P EST MOD 30 MIN: CPT

## 2024-10-08 PROCEDURE — 81003 URINALYSIS AUTO W/O SCOPE: CPT

## 2024-10-08 NOTE — PROGRESS NOTES
Ambulatory Visit  Name: Melody Huynh      : 1980      MRN: [id number]  Encounter Provider: Annel Emery PA-C  Encounter Date: 10/8/2024   Encounter department: [facility] [place]    Assessment & Plan  Panic disorder without agoraphobia with severe panic attacks  Advised patient to go back down to 40 mg of the Prozac.  Discussed that her psychiatrist may increase her again, but we will try to mitigate some of the excessive sleepiness that she is experiencing.  Will not change the Haldol at this time as she is on a low-dose, but this could be contributing to her excessive sleepiness as well.  She has an appointment with psychiatry in 2 weeks.  She also has an appointment with her therapist in 2 days.  Patient aware of resources and when to present to the emergency room.  Orders:    FLUoxetine (PROzac) 20 mg capsule; Take 2 capsules (40 mg total) by mouth daily    Hypokalemia  Was noted that patient was hypokalemic on her lab work which did return to normal on her latest lab work.  Will repeat a BMP today to ensure she remains normal.  Orders:    Basic metabolic panel; Future    Abnormal urinalysis  Patient's urinalysis done at the end of September showed bacteria, leukocytes, and nitrites.  Patient was not having symptoms, but possible that her abdominal pain could be related to this.  Will repeat a UA and if positive, will treat with antibiotics.  Orders:    UA/M w/rflx Culture, Routine; Future     Patient will return in 6 months for an annual physical.  Come back sooner if needed.  History of Present Illness     HPI    Pt is here after admission to Behavioral Health.  She has an appt with her therapist in two days.  She sees her psychiatrist on 10/22/2024.  Patient yawning during appointment states she is been very very tired.  Her Prozac was increased to 40 mg and then within days she was increased to 60 mg.  She also was started on Haldol which is not something she was on  "prior to the hospital minimalization as well as the Cogentin.  Her Klonopin was increased from 1 mg twice a day to 1 mg twice a day and 0.5 mg as needed on top of that.    Patient is following with GI for abdominal pain.  She has this abdominal pain with her severe anxiety.  She is not taking the Bentyl because she believes her abdominal pain is due to anxiety and not from a GI issue.      Review of Systems   Constitutional:  Positive for fatigue. Negative for chills, diaphoresis and fever.   Respiratory:  Negative for shortness of breath.    Cardiovascular:  Negative for chest pain.   Gastrointestinal:  Negative for abdominal pain, constipation and diarrhea.   Neurological:  Negative for dizziness, light-headedness and headaches.   Psychiatric/Behavioral:  The patient is nervous/anxious.            Objective     /80 (BP Location: Left arm, Patient Position: Standing, Cuff Size: Standard)   Pulse 85   Temp 97.6 °F (36.4 °C) (Tympanic)   Resp 20   Ht 5' 3\" (1.6 m)   Wt 63 kg (138 lb 12.8 oz)   LMP  (LMP Unknown)   SpO2 98%   BMI 24.59 kg/m²     Physical Exam  Vitals and nursing note reviewed.   Constitutional:       General: She is not in acute distress.     Appearance: Normal appearance.   Cardiovascular:      Rate and Rhythm: Normal rate and regular rhythm.      Heart sounds: Normal heart sounds.   Pulmonary:      Effort: Pulmonary effort is normal. No respiratory distress.      Breath sounds: Normal breath sounds.   Musculoskeletal:         General: No tenderness, deformity or signs of injury.   Skin:     General: Skin is warm and dry.   Neurological:      Mental Status: She is alert and oriented to person, place, and time.      Motor: No weakness or tremor.      Gait: Gait normal.   Psychiatric:         Attention and Perception: Attention and perception normal.         Mood and Affect: Affect is flat.         Speech: Speech normal.         Behavior: Behavior is slowed. Behavior is cooperative.       "   Thought Content: Thought content normal.         Cognition and Memory: Cognition and memory normal.

## 2024-10-08 NOTE — ASSESSMENT & PLAN NOTE
Advised patient to go back down to 40 mg of the Prozac.  Discussed that her psychiatrist may increase her again, but we will try to mitigate some of the excessive sleepiness that she is experiencing.  Will not change the Haldol at this time as she is on a low-dose, but this could be contributing to her excessive sleepiness as well.  She has an appointment with psychiatry in 2 weeks.  She also has an appointment with her therapist in 2 days.  Patient aware of resources and when to present to the emergency room.  Orders:    FLUoxetine (PROzac) 20 mg capsule; Take 2 capsules (40 mg total) by mouth daily

## 2024-10-10 ENCOUNTER — TELEPHONE (OUTPATIENT)
Age: 44
End: 2024-10-10

## 2024-10-10 ENCOUNTER — HOSPITAL ENCOUNTER (EMERGENCY)
Facility: HOSPITAL | Age: 44
Discharge: HOME | End: 2024-10-10
Attending: EMERGENCY MEDICINE
Payer: COMMERCIAL

## 2024-10-10 VITALS
OXYGEN SATURATION: 99 % | HEART RATE: 99 BPM | TEMPERATURE: 98.5 F | SYSTOLIC BLOOD PRESSURE: 162 MMHG | DIASTOLIC BLOOD PRESSURE: 84 MMHG | RESPIRATION RATE: 19 BRPM

## 2024-10-10 DIAGNOSIS — F41.9 ANXIETY: Primary | ICD-10-CM

## 2024-10-10 LAB
ATRIAL RATE: 103 BPM
P AXIS: 60 DEGREES
PR INTERVAL: 142 MS
QRS AXIS: 89 DEGREES
QRSD INTERVAL: 92 MS
QT INTERVAL: 384 MS
QTC INTERVAL: 503 MS
T WAVE AXIS: 62 DEGREES
VENTRICULAR RATE: 103 BPM

## 2024-10-10 PROCEDURE — 99283 EMERGENCY DEPT VISIT LOW MDM: CPT

## 2024-10-10 PROCEDURE — NC001 PR NO CHARGE: Performed by: STUDENT IN AN ORGANIZED HEALTH CARE EDUCATION/TRAINING PROGRAM

## 2024-10-10 PROCEDURE — 99284 EMERGENCY DEPT VISIT MOD MDM: CPT | Performed by: EMERGENCY MEDICINE

## 2024-10-10 PROCEDURE — 93010 ELECTROCARDIOGRAM REPORT: CPT | Performed by: INTERNAL MEDICINE

## 2024-10-10 RX ORDER — CLONAZEPAM 0.5 MG/1
1 TABLET ORAL 2 TIMES DAILY
Status: DISCONTINUED | OUTPATIENT
Start: 2024-10-10 | End: 2024-10-10 | Stop reason: HOSPADM

## 2024-10-10 RX ORDER — FLUOXETINE 20 MG/5ML
20 SOLUTION ORAL DAILY
Status: DISCONTINUED | OUTPATIENT
Start: 2024-10-10 | End: 2024-10-10

## 2024-10-10 RX ORDER — HALOPERIDOL 1 MG/1
2.5 TABLET ORAL 2 TIMES DAILY
Status: DISCONTINUED | OUTPATIENT
Start: 2024-10-10 | End: 2024-10-10 | Stop reason: HOSPADM

## 2024-10-10 RX ORDER — DICYCLOMINE HCL 20 MG
20 TABLET ORAL ONCE
Status: COMPLETED | OUTPATIENT
Start: 2024-10-10 | End: 2024-10-10

## 2024-10-10 RX ORDER — CALCIUM CARBONATE 500 MG/1
500 TABLET, CHEWABLE ORAL ONCE
Status: COMPLETED | OUTPATIENT
Start: 2024-10-10 | End: 2024-10-10

## 2024-10-10 RX ORDER — BENZTROPINE MESYLATE 1 MG/1
1 TABLET ORAL 2 TIMES DAILY PRN
Status: DISCONTINUED | OUTPATIENT
Start: 2024-10-10 | End: 2024-10-10 | Stop reason: HOSPADM

## 2024-10-10 RX ORDER — PYRIDOXINE HCL (VITAMIN B6) 50 MG
50 TABLET ORAL ONCE
Status: COMPLETED | OUTPATIENT
Start: 2024-10-10 | End: 2024-10-10

## 2024-10-10 RX ORDER — LORAZEPAM 1 MG/1
1 TABLET ORAL ONCE
Status: COMPLETED | OUTPATIENT
Start: 2024-10-10 | End: 2024-10-10

## 2024-10-10 RX ORDER — BENZTROPINE MESYLATE 1 MG/1
1 TABLET ORAL 2 TIMES DAILY
Status: DISCONTINUED | OUTPATIENT
Start: 2024-10-10 | End: 2024-10-10

## 2024-10-10 RX ORDER — FLUOXETINE 10 MG/1
40 CAPSULE ORAL DAILY
Status: DISCONTINUED | OUTPATIENT
Start: 2024-10-10 | End: 2024-10-10 | Stop reason: HOSPADM

## 2024-10-10 RX ADMIN — HALOPERIDOL 2.5 MG: 1 TABLET ORAL at 07:41

## 2024-10-10 RX ADMIN — DOXYLAMINE SUCCINATE 25 MG: 25 TABLET ORAL at 02:30

## 2024-10-10 RX ADMIN — PYRIDOXINE HCL TAB 50 MG 50 MG: 50 TAB at 03:02

## 2024-10-10 RX ADMIN — LORAZEPAM 1 MG: 1 TABLET ORAL at 00:53

## 2024-10-10 RX ADMIN — CLONAZEPAM 1 MG: 0.5 TABLET ORAL at 07:42

## 2024-10-10 RX ADMIN — FLUOXETINE HYDROCHLORIDE 40 MG: 10 CAPSULE ORAL at 07:42

## 2024-10-10 RX ADMIN — DICYCLOMINE HYDROCHLORIDE 20 MG: 20 TABLET ORAL at 00:53

## 2024-10-10 RX ADMIN — BENZTROPINE MESYLATE 1 MG: 1 TABLET ORAL at 07:43

## 2024-10-10 RX ADMIN — CALCIUM CARBONATE (ANTACID) CHEW TAB 500 MG 500 MG: 500 CHEW TAB at 07:19

## 2024-10-10 NOTE — TELEPHONE ENCOUNTER
The patient's spouse contacted the office, requesting to speak with the pt's care team. The spouse explained that they recently called to get an appointment with the provider to discuss plan of care after pt was discharged from the hospital. The patient is currently at the hospital again. The spouse would like to speak with the care teams to discuss plan of care.     Please review. Thank you.

## 2024-10-10 NOTE — ED NOTES
"Pt presents to the ED accompanied by  from home. Pt reports increased anxiety and a panic attack which began at aprox 1600. Pt was discharged from IP tx on Monday 10/7/24. Pt reports trigger / stressor was when pt's stomach started to hurt yesterday. Pt reports being compliant w/medications. Pt denies SI's / HI's and or AVH's. Pt reports appetite \"is less than okay\" and sleep is poor due to \"tossing and turning all night\". Pt reports current medications are okay and not a problem. Pt reports dx of anxiety, panic attacks, agoraphobia, and OCD. Pt does have an OP therapist which she states, \"Is not very helpful.\" Pt does have a scheduled appointment for this afternoon at 1600. Pt does have an upcoming OP scheduled psychiatry appointment. Pt states, \"I'll probably being seeing the psychiatrist once a month.\" CW attempts to speak w/pt directly; however, pt's  offers most of the information. CW continues to redirect the conversation towards the pt who will engage in conversation; however,  does continue to provide pt w/answers, etc.. Pt is calm, cooperative and maintains fair eye contact. Pt is not seeking IP tx at this time. CW and pt discussed IP vs OP tx. CW advised pt, if pt feels it is difficult to manage from an OP status, pt may return to the ED to sign in for tx. Pt is receptive to plan. Pt's  does not have any issues w/pt returning home at this time. CW provided Dr. Borjas w/updates and details.     TDS, KEKE  "

## 2024-10-10 NOTE — ED NOTES
This writer discussed the patients current presentation and recommended discharge plan with Dr. Borjas.  They agree with the patient being discharged at this time with referrals and/or information about out patient mental health providers. Patient is requesting to follow up with current providers in place.     The patient was Instructed to follow up with their psychiatrist and therapist.   The patient was provided with referral information for: seeking additional out patient therapists.      In addition, the patient was instructed to call local [facility] crisis, other crisis services, 911 or to go to the nearest ER immediately if their situation changes at any time.     This writer discussed discharge plans with the patient and family, who agrees with and understands the discharge plans.       SAFETY PLAN  Warning Signs (thoughts, images, mood, behavior, situations) of a potential crisis: situations     Coping Skills (what can I do to take my mind off the problem, or to keep myself safe): utilize coping skills, engage in healthy activities    Outside Support (who can I reach out to for support and help): therapist      National Suicide Prevention Hotline:  [address] [place] [address] [phone] - [facility] [phone] - LVF Crisis/Mobile Crisis   [phone] - S[facility] Crisis   [facility]: [phone]  [facility]: [phone]   [facility] [phone] - Crisis   [facility] [phone] - Crisis     [facility] [phone] - Crisis   [facility] [phone] - Crisis   [phone] - Peer Support Talk Line (1-9pm daily)  [phone] - Teen Support Talk Line (1-9pm daily)  [phone] - [facility]   [facility] [phone]- Crisis    [facility] [phone] - Crisis   [facility] [phone] - Crisis    [facility]) [phone] - [facility] [place] Crisis     TDS, CW

## 2024-10-10 NOTE — DISCHARGE INSTRUCTIONS
Keep your appointments as scheduled.  Continue taking all your medications as prescribed.    Return for any new or worsening symptoms.

## 2024-10-10 NOTE — TELEPHONE ENCOUNTER
Returned call to patient and , German ([phone]). Patient was engaging in therapy session at time of call. Patient has provided permission to speak with .  Recent ER visit discussed and chart reviewed.     Patient continues with intermittent nausea, panic attacks, increased rate of thoughts, and psychomotor agitation.  Discussed recommendation from PCP to lower Prozac back to 40 mg given psychomotor and anxiety related symptoms - given patient's past response to rapid titration, agree with decrease to 40 mg.  Discussed time of day of events and recommend Haldol 2.5 mg to be given at 7:30 am and 4:30 pm. Discussed Klonopin adjustment made in hospital to 1 mg BID and 0.5 mg PRN and recommend continuation. Instructed patient to space Haldol and Klonopin throughout the day for anxiolytic coverage.    Reviewed upcoming appointment date. Discussed option for virtual PHP through [facility] and group therapy options - will discuss further at next appointment. No acute concerns necessitating inpatient psychiatric admission recommendation reported or observed at time of phone call. Patient and  are aware of 24/7 coverage for urgent needs through [facility] main line and are aware of return precautions for emergency room.

## 2024-10-10 NOTE — ED PROVIDER NOTES
"Final diagnoses:   None     ED Disposition       None          Assessment & Plan   {Hyperlinks  Risk Stratification - NIHSS - HEART SCORE - Fill out sepsis note and make sure you call [phone] if severe or septic shock:[id number]}    Medical Decision Making  43-year-old female presents to the emergency room with a chief complaint of \"I'm having a panic attack, it started at 4 PM today.  I can't get myself out of it.\"    Patient has a long history of similar episodes and recent psychiatric admission for these episodes.  Following this, the patient was placed on haloperidol and according to her  she took this in the evening however following this she \"made herself throw up.\"  Patient has a history of these issues as well for which she has seen gastroenterology with extensive evaluation and thought to represent functional etiology.    Patient denies any thoughts of self-harm or harm to others.    Impression and plan: Reported panic attack.  Patient denies any clear inciting event and affirms compliance with her medications though she reportedly vomited after taking her haloperidol this evening.  Patient's SSRI was reduced by her primary care physician yesterday and they have been attempting to get in touch with her psychiatrist to discuss continued changes.  I discussed options with the patient and her  regarding treatment and patient is only requesting symptomatic management.  Patient declines laboratory evaluation or further imaging and she has had extensive workup for her GI symptoms previously.  Patient currently does not wish to stay for crisis evaluation.  Patient  states that her symptoms have typically improved with dicyclomine though the patient states the opposite of this and requests lorazepam.  I discussed treatment with them and will administer a dose of oral dicyclomine and lorazepam and reassess the patient.  I do not believe patient meets criteria for involuntary admission to the " hospital.  Will monitor the patient and reassess regarding continued evaluation and treatment.    Risk  OTC drugs.  Prescription drug management.             Medications - No data to display    ED Risk Strat Scores                                               History of Present Illness   {Hyperlinks  History (Med, Surg, Fam, Social) - Current Medications - Allergies  :[id number]}    Chief Complaint   Patient presents with    Panic Attack     Seen two weeks ago for same, was also at [place] - having increased panic attacks - SI/HI        Past Medical History:   Diagnosis Date    Anxiety     Depression     Disruption of episiotomy wound in the puerperium     Last assessed 06/22/16    External hemorrhoids     Last assessed 02/05/16    Fibroid uterus     GERD (gastroesophageal reflux disease)     Headache     Leiomyoma of uterus     Migraine     Migraine     Polyhydramnios in third trimester, not applicable or unspecified fetus     Last assessed 01/25/16    Pregnancy headache in third trimester     Resolved 02/05/16    Protein in urine     Last assessed 01/25/16    Sciatica of right side     Third degree laceration of perineum, type 3b 1/26/2016    Unspecified mood (affective) disorder (HCC) 9/28/2024    Varicella       Past Surgical History:   Procedure Laterality Date    CHOLECYSTECTOMY  2006    MYOMECTOMY      DE POST COLPORRHAPHY RECTOCELE W/WO PERINEORRHAPHY N/A 3/24/2016    Procedure: PERINEORRAPHY ;  Surgeon: Sallie Flower MD;  Location: [facility];  Service: Gynecology    WISDOM TOOTH EXTRACTION        Family History   Problem Relation Age of Onset    Breast cancer Mother 58    Cancer Mother     No Known Problems Father     No Known Problems Daughter     No Known Problems Maternal Grandmother     No Known Problems Maternal Grandfather     No Known Problems Paternal Grandmother     No Known Problems Paternal Grandfather     Breast cancer Maternal Aunt         unknown age      Social History      Tobacco Use    Smoking status: Never    Smokeless tobacco: Never   Vaping Use    Vaping status: Never Used   Substance Use Topics    Alcohol use: Yes     Alcohol/week: 1.0 standard drink of alcohol     Types: 1 Glasses of wine per week     Comment: socially; 3-4 drinks/week (8/13)    Drug use: Yes     Types: Marijuana     Comment: Pt has a medical marijuana card (8/13)      E-Cigarette/Vaping    E-Cigarette Use Never User       E-Cigarette/Vaping Substances    Nicotine No     THC No     CBD No     Flavoring No     Other No     Unknown No       I have reviewed and agree with the history as documented.     HPI    Review of Systems        Objective   {Hyperlinks  Historical Vitals - Historical Labs - Chart Review/Microbiology - Last Echo - Code Status  :[id number]}    ED Triage Vitals [10/10/24 0029]   Temperature Pulse Blood Pressure Respirations SpO2 Patient Position - Orthostatic VS   98.5 °F (36.9 °C) 94 (!) 189/90 18 100 % Sitting      Temp Source Heart Rate Source BP Location FiO2 (%) Pain Score    Oral Monitor Left arm -- --      Vitals      Date and Time Temp Pulse SpO2 Resp BP Pain Score FACES Pain Rating User   10/10/24 0029 98.5 °F (36.9 °C) 94 100 % 18 189/90 -- -- AM            Physical Exam  Constitutional:       General: She is not in acute distress.     Appearance: She is not toxic-appearing.   Eyes:      Conjunctiva/sclera: Conjunctivae normal.   Cardiovascular:      Rate and Rhythm: Normal rate and regular rhythm.   Pulmonary:      Effort: Pulmonary effort is normal.      Breath sounds: Normal breath sounds.   Abdominal:      Palpations: Abdomen is soft.      Tenderness: There is no abdominal tenderness. There is no guarding or rebound.   Skin:     General: Skin is warm and dry.   Neurological:      Mental Status: She is alert.   Psychiatric:         Mood and Affect: Mood is anxious. Affect is not flat.         Speech: Speech normal.         Behavior: Behavior is not withdrawn, hyperactive or  combative. Behavior is cooperative.         Thought Content: Thought content does not include homicidal or suicidal ideation. Thought content does not include homicidal or suicidal plan.         Results Reviewed       None            No orders to display       Procedures    ED Medication and Procedure Management   Prior to Admission Medications   Prescriptions Last Dose Informant Patient Reported? Taking?   FLUoxetine (PROzac) 20 mg capsule   No No   Sig: Take 2 capsules (40 mg total) by mouth daily   benztropine (COGENTIN) 1 mg tablet   No No   Sig: Take 1 tablet (1 mg total) by mouth 2 (two) times a day as needed for tremors (mild muscle stiffness, dystonic reaction, or rigidity if antipsychotics given in the past 24 hours)   clonazePAM (KlonoPIN) 0.5 mg tablet   No No   Sig: Take 1 tablet (0.5 mg total) by mouth daily as needed for anxiety for up to 1 day   clonazePAM (KlonoPIN) 1 mg tablet   No No   Sig: Take 1 tablet (1 mg total) by mouth 2 (two) times a day for 10 days   cyanocobalamin 1,000 mcg/mL   No No   Sig: Inject 1 mL (1,000 mcg total) into a muscle every 30 (thirty) days   haloperidol (HALDOL) 5 mg tablet   No No   Sig: Take 0.5 tablets (2.5 mg total) by mouth 2 (two) times a day   ondansetron (ZOFRAN-ODT) 4 mg disintegrating tablet   No No   Sig: Take 1 tablet (4 mg total) by mouth every 6 (six) hours as needed for nausea or vomiting      Facility-Administered Medications: None     Patient's Medications   Discharge Prescriptions    No medications on file     No discharge procedures on file.  ED SEPSIS DOCUMENTATION          (COGENTIN) 1 mg tablet   No No   Sig: Take 1 tablet (1 mg total) by mouth 2 (two) times a day as needed for tremors (mild muscle stiffness, dystonic reaction, or rigidity if antipsychotics given in the past 24 hours)   clonazePAM (KlonoPIN) 0.5 mg tablet   No No   Sig: Take 1 tablet (0.5 mg total) by mouth daily as needed for anxiety for up to 1 day   clonazePAM (KlonoPIN) 1 mg tablet   No No   Sig: Take 1 tablet (1 mg total) by mouth 2 (two) times a day for 10 days   cyanocobalamin 1,000 mcg/mL   No No   Sig: Inject 1 mL (1,000 mcg total) into a muscle every 30 (thirty) days   haloperidol (HALDOL) 5 mg tablet   No No   Sig: Take 0.5 tablets (2.5 mg total) by mouth 2 (two) times a day   ondansetron (ZOFRAN-ODT) 4 mg disintegrating tablet   No No   Sig: Take 1 tablet (4 mg total) by mouth every 6 (six) hours as needed for nausea or vomiting      Facility-Administered Medications: None     Discharge Medication List as of 10/10/2024  9:42 AM        CONTINUE these medications which have NOT CHANGED    Details   benztropine (COGENTIN) 1 mg tablet Take 1 tablet (1 mg total) by mouth 2 (two) times a day as needed for tremors (mild muscle stiffness, dystonic reaction, or rigidity if antipsychotics given in the past 24 hours), Starting Mon 10/7/2024, Until Wed 11/6/2024 at 2359, Normal      clonazePAM (KlonoPIN) 1 mg tablet Take 1 tablet (1 mg total) by mouth 2 (two) times a day for 10 days, Starting Thu 10/3/2024, Until Sun 10/13/2024, Normal      cyanocobalamin 1,000 mcg/mL Inject 1 mL (1,000 mcg total) into a muscle every 30 (thirty) days, Starting Wed 10/30/2024, Normal      FLUoxetine (PROzac) 20 mg capsule Take 2 capsules (40 mg total) by mouth daily, Starting Tue 10/8/2024, Until Thu 11/7/2024, No Print      haloperidol (HALDOL) 5 mg tablet Take 0.5 tablets (2.5 mg total) by mouth 2 (two) times a day, Starting Mon 10/7/2024, Until Wed 11/6/2024, Normal      ondansetron (ZOFRAN-ODT) 4 mg disintegrating tablet Take 1  tablet (4 mg total) by mouth every 6 (six) hours as needed for nausea or vomiting, Starting Thu 9/26/2024, Normal           No discharge procedures on file.  ED SEPSIS DOCUMENTATION   Time reflects when diagnosis was documented in both MDM as applicable and the Disposition within this note       Time User Action Codes Description Comment    10/10/2024  9:41 AM Kenisha Borjas Add [F41.9] Anxiety                  Nathan Hargrove MD  10/15/24 0458

## 2024-10-14 ENCOUNTER — TELEPHONE (OUTPATIENT)
Age: 44
End: 2024-10-14

## 2024-10-14 NOTE — TELEPHONE ENCOUNTER
----- Message from Annel Emery PA-C sent at 10/14/2024 10:40 AM EDT -----  Pt did not see her my chart message.  Please let her know the following:      Melody,     Your glucose was a little low, which is your blood sugar.  It was 64, normal is at 65.  You did not seem like you are having any symptoms yesterday, but it might be a good idea to keep some hard candies with you in case you are feeling jittery or other symptoms of low blood sugar.  Your kidney function and electrolytes are normal.  Your potassium is no longer on the low side.     Your urine came back completely normal.  There is no signs that you have an infection in your urine.

## 2024-10-18 NOTE — PSYCH
Psychiatric Follow Up Visit - Behavioral Health       [facility]  MRN: [id number]        Assessment/Plan:   Melody Rocha is a 43 y.o. 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  For review, on initial evaluation, patient presented with symptoms consistent with EPS and akathisia related adverse effects of psychotropic medications. Possible offending agents were previously discontinued (Vraylar, Zyprexa) with adjustments to Prozac and Klonopin dosing.  Patient had acute decompensation after most recent outpatient appointment requiring inpatient hospitalization. Patient was [facility] to [facility] where adjustments were made to Prozac and Haldol was added to medication regimen for psychomotor agitation, anxiety, and mood related symptoms.  Today, hospital admission was reviewed with patient and her supports. Patient endorses 1-2 panic episodes since discharge - see phone encounter from 10/10/24 where symptoms were discussed and adjustments to timing of medication was made to better address symptoms.  The patient presents with mild tremor in b/l hands, appears to be secondary to anxiety. Stiffness during gait is observed on evaluation.  Patient endorses  blurry vision as possible adverse effect to medication.   Discussed with patient reducing morning dose of Haldol in setting of EPS related adverse effects, continuing afternoon dose at current amount, and continuing Prozac and Klonopin at current amounts. Recommend close follow up in 2 weeks and slow medication adjustments in setting of ongoing anxiety relate symptoms of inner tension, restlessness, increased rate of thoughts, excessive worry, and intermittent negative thinking. Patient does not present with symptoms related to depression on evaluation today.  Discussed triggers and warning signs with patient and her  which would require increased level of care and/or emergency room presentation and they expressed understanding.  Discussed consistent psychotherapy, and cessation of THC use. Patient in agreement with plan.  Assessment & Plan  Anxiety disorder, unspecified type  Continue Prozac 40 mg daily for mood and anxiety - was increased during most recent admission  PARQ completed including serotonin syndrome, SIADH, worsening depression, suicidality, induction of ghada, GI upset, headaches, activation, sexual side effects, sedation, potential drug interactions, and others.  Decrease Haldol to 1 mg in the morning and 2.5 mg in the afternoon for anxiety, psychomotor agitation, and augmentation of antidepressant  PARQ of Haldol including EPS side effects suck as Tardive Dyskinesia, parkinsonism, and tardive dystonia in addition to prolactin increase/galactorrhea, amenorrhea, dizziness, sedation, hypotension, weight gain/metabolic complications, Akathisia, dry mouth, constipation, urinary retention, blurred vision, decreased sweating, tachycardia, hypertension, and rarely NMS, seizures, jaundice, agranulocytosis, leukopenia, and increased risk of CVA in elderly patient's with dementia-related psychosis.  Continue Cogentin 1 mg BID for EPS related adverse effects  PARQ completed including dry mouth, blurred  vision, diplopia, confusion, hallucinations, constipation, nausea, vomiting, dilation of colon/paralytic ileus/bowel obstruction, erectile dysfunction, angle-closure glaucoma, heat stroke (mostly in elderly), tachycardia, cardiac arrhymias, hypotension, urinary retention, and rarely tardive dyskinesia unmasking.  Continue Klonopin 1 mg twice daily as needed for anxiety  PDMP reviewed, last filled in 10/2024 after hospitalization  Discussed with patient the risks of sedation, respiratory depression, impairment in judgment and motor function. Depression, mood changes, GI changes, and others discussed. Patient informed of risks of being on or starting opioid medications due to drug interactions and potential for serious respiratory depression and death.   Do not restart Propranolol - tapered off at last outpatient visit  Continue psychotherapy with own therapist  Recommend increasing frequency of sessions  Unspecified mood (affective) disorder (HCC)  Continue Prozac as above  Continue Haldol as above  Continue psychotherapy with own therapist.     Most recent lab work previously reviewed.  Medication management every 2-4 weeks  Aware of need to follow up with family physician for medical issues  Aware of 24 hour and weekend coverage for urgent situations accessed by calling [facility] main practice number  Monitor CBC w/diff and CMP in setting of SSRI prescription.    Medication Risks/Benefits      Risks, Benefits And Possible Side Effects Of Medications:    Risks, benefits, and possible side effects of medications explained to Melody and she verbalizes understanding and agreement for treatment.    Controlled Medication Discussion:     Melody has been filling controlled prescriptions on time as prescribed according to [place] Prescription Drug Monitoring Program  Discussed with Melody the risks of sedation, respiratory depression, impairment of ability to drive and potential for abuse  and addiction related to treatment with benzodiazepine medications. She understands risk of treatment with benzodiazepine medications, agrees to not drive if feels impaired and agrees to take medications as prescribed  Discussed with Melody [employer] warning on concurrent use of benzodiazepines and opioid medications including sedation, respiratory depression, coma and death. She understands the risk of treatment with benzodiazepines in addition to opioids and wants to continue taking those medications  Discussed with Melody increased risk of impairment related to concurrent use of benzodiazepines and hypnotic medications including excessive sedation, psychomotor impairment and respiratory depression. She understands the risk of treatment with benzodiazepines in addition to hypnotic medications and wants to continue taking those medications  ------------------------------------  History of Present Illness   Melody Huynh is presenting to follow up for anxiety, depression, and EPS related symptoms and history of akathisia . Melody reports anxiety related symptoms of inner tension, restlessness, increased rate of thoughts, excessive worry, and intermittent negative thinking. Patient does not present with symptoms related to depression on evaluation today. She and her  endorse 1-2 panic attacks since discharge from the hospital - 1 resulting in ED presentation.  Patient denies a down, depressed mood. Patient denies SI, HI, passive death wishes, or thoughts to harm self or others at time of evaluation. Patient has been able to return to the gym and other self care activities, and has had no changes to sleep. Patient's appetite has recently been decreased.    The patient does not endorse or demonstrate symptoms consistent with ghada/hypomania or positive or negative symptoms of psychosis at time of evaluation.    Adverse effects of stiffness, blurry vision x 2 weeks, dry mouth, and possibly tremor -  though also related to increase in anxiety, present on evaluation today.     Discussed possibility of PHP for increased therapies at this time due to recent decompensation, patient declined though has increased her individual psychotherapy. Triggers and warning signs with patient and her  which would require increased level of care and/or emergency room presentation and they expressed understanding. A possible trigger discussed was time of year as  noted correlation to Fall season change and inpatient admissions.  Discussed cessation of THC use.     Discussed with patient reducing morning dose of Haldol in setting of EPS related adverse effects, continuing afternoon dose at current amount, and continuing Prozac and Klonopin at current amounts. Recommend close follow up in 2 weeks and slow medication adjustments in setting of ongoing anxiety relate symptoms and patient and supports are agreeable to plan.    Psychiatric Review Of Systems:  Melody reports Symptoms as described in HPI.  Melody denies Current suicidal thoughts, plan, or intent, Current thoughts of self-harm, or Current homicidal thoughts, plan, or intent.    Medical Review Of Systems:  restlessness, Complete review of systems is negative except as noted above.  [place] Akathisia Rating Scale (BARS) - Today's assessment on 10/22/24: 2 and Global Clinical Assessment score of 1  Prior score on 10/23/23: 8 and Global Clinical Assessment score:3, 11/14/23: 7 and Global Clinical Assessment score of 2  12/04/23: Global Clinical Assessment score: 3 due to distress to patient  02/29/24: 0 and Global Clinical Assessment score of 0  04/22/24: 0 and Global Clinical Assessment score of 0  09/25/24 0 and Global Clinical Assessment score of 0  Objective   0 Normal, occasional fidgety movements of the limbs  1 Presence of characteristic restless movements: shuffling or tramping movements of the legs/feet, or swinging of one leg while sitting, and/or  rocking from foot to foot or “walking on the spot” when standing, but movements present for less than half the time observed  2 Observed phenomena, as described in (1) above, which are present for at least half the observation period  3 Patient is constantly engaged in characteristic restless movements, and/or has the inability to remain seated or standing without walking or pacing, during the time observed  Subjective  0 Absence of inner restlessness  1 Non-specific sense of inner restlessness  2 The patient is aware of an inability to keep the legs still, or a desire to move the legs, and/or complains of inner restlessness aggravated specifically by being required to stand still  3 Awareness of intense compulsion to move most of the time and/or reports strong desire to walk or pace most of the time  Distress related to restlessness  0 No distress  1 Mild  2 Moderate  3 Severe  Global Clinical Assessment of Akathisia  0 Absent. No evidence of awareness of restlessness. Observation of characteristic movements of akathisia in the absence of a subjective report of inner restlessness or compulsive desire to move the legs should be classified as pseudoakathisia  1 Questionable. Non-specific inner tension and fidgety movements  2 Mild akathisia. Awareness of restlessness in the legs and/or inner restlessness worse when required to stand still. Fidgety movements present, but characteristic restless movements of akathisia not necessarily observed. Condition causes little or no distress.  3 Moderate akathisia. Awareness of restlessness as described for mild akathisia above, combined with characteristic restless movements such as rocking from foot to foot when standing. Patient finds the condition distressing.  4 Marked akathisia. Subjective experience of restlessness includes a compulsive desire to walk or pace. However, the patient is able to remain seated for at least five minutes. The condition is obviously  distressing.  5 Severe akathisia. The patient reports a strong compulsion to pace up and down most of the time. Unable to sit or lie down for more than a few minutes. Constant restlessness which is associated with intense distress and insomnia.  ------------------------------------  All italicized information has been copied from previous psychiatric evaluation. Information has been reviewed with the patient.     Past Psychiatric History:   Prior psychiatric diagnoses: anxiety, panic disorder, mood disorder, OCD  Inpatient hospitalizations: inpatient hospitalizations - first hospitalization 2001 - [facility], 2008 - [facility] - [place], 2021 - panic disorder, mood symptoms, most recent at [facility] in 09/2024  Emergency room visits often in-between for severe panic attacks - 2016, multiple recent ED visits in 09/2024 for severe panic attacks  Suicide attempts/self-harm: patient denies, patient denies self harm behaviors  Violent/aggressive behavior: patient denies   Patient did have some aggressive outbursts towards  and providers  Outpatient psychiatric providers: currently has outpatient psychiatric provider - [facility]   Was without a psychiatrist from [phone] (Lexapro from [facility] during that time), prior psychiatrist, Dr. Martines, retired  Past/current psychotherapy: patient has a therapist weekly, Dr. Foster, [facility]  Other Services: patient denies  Psychiatric medication trial:   Multiple medication trials - including below,  Antidepressants  Prozac (up to 40 mg), Zoloft (short period of time), Lexapro (multiple years), Luvox  Antipsychotics  Vraylar, Zyprexa, Seroquel, Haldol  Sedative hypnotics  Ativan, Klonopin (0.5 mg QID PRN)  Others  Hydroxyzine, Gabapentin, BuSpar (60 mg total daily), Cogentin     Substance Abuse History:  I have assessed this patient for substance use within the past 12 months.  Patient reports a few  cigarettes per day, quit 2007/2008. Patient reports history of daily marijuana use - had medical marijuana card - few months without smoking.   Denies history of other recreational use including opioids, methamphetamines/amphetamines, cocaine use. Denies past legal actions or arrests secondary to substance intoxication. The patient denies prior DWIs/DUIs. Melody does not exhibit objective evidence of substance withdrawal during today's examination nor does Melody appear under the influence of any psychoactive substance.       Family Psychiatric History:   Mother: anxiety (on antidepressant - Lexapro)  No family history of substance use disorder or suicide attempts or completions.     Social History  Early life/developmental: Denies a history of milestone/developmental delay. Denies a history of in-utero exposure to toxins/illicit substances. Denies ADHD history. No history of IEP/504, denies special supports in school.  Marital history: /Civil Union   Children: yes, 1 daughter (9 y/o - 2nd grade)  1 older brother   Living arrangement: Lives in a home with  and daughter (family lost their dog in May).  Support system: identifies  as the biggest source of support  Mother also a big support  Education: college graduate - communications ([employer])  Had to withdraw from college twice due to anxiety  Occupational History: unemployed since January 2023, applying for permanent disability - previously in advertising in 10-15 years  Other Pertinent History: no reported  or legal history  Access to firearms:  has firearm, locked and secured      Traumatic History:   Abuse:  patient denied  Other Traumatic Events:  medical trauma, father passed when she was 16 y/o    History Review: The following portions of the patient's history were reviewed and updated as appropriate: allergies, current medications, past family history, past medical history, past social history, past surgical  history, and problem list.    Visit Vitals  LMP  (LMP Unknown)   OB Status Implant   Smoking Status Never      Wt Readings from Last 6 Encounters:   10/08/24 63 kg (138 lb 12.8 oz)   10/06/24 63.4 kg (139 lb 12.8 oz)   10/03/24 62.1 kg (137 lb)   09/27/24 63.1 kg (139 lb 1.8 oz)   04/16/24 65.8 kg (145 lb)   12/18/23 59.3 kg (130 lb 11.2 oz)            Mental Status Exam:  Appearance:  alert, good eye contact, appears stated age, casually dressed, and appropriate grooming and hygiene   Behavior:  calm and cooperative   Motor: restless and fidgety and mildly stiff gait, arm swing intact, no hypomimia present, mild b/l tremor in hands   Speech:  spontaneous, clear, normal rate, not pressured, normal volume, not hyperverbal, and coherent   Mood:  anxious   Affect:  mood-congruent, anxious, and smiling at times   Thought Process:  organized, logical, goal directed, increased rate of thoughts   Thought Content: no verbalized delusions or overt paranoia, ruminating thoughts, negative thoughts   Perceptual disturbances: no reported hallucinations and does not appear to be responding to internal stimuli at this time   Risk Potential: No active or passive suicidal or homicidal ideation was verbalized during interview   Cognition: oriented to person, place, time, and situation, appears to be of average intelligence, age-appropriate attention span and concentration, and cognition not formally tested   Insight:  Good   Judgment: Good       Meds/Allergies    Allergies   Allergen Reactions    Demerol [Meperidine] Hyperactivity    Macrolides And Ketolides      Current Outpatient Medications   Medication Instructions    benztropine (COGENTIN) 1 mg, Oral, 2 times daily PRN    clonazePAM (KLONOPIN) 1 mg, Oral, 2 times daily    [START ON 10/30/2024] cyanocobalamin 1,000 mcg, Intramuscular, Every 30 days    FLUoxetine (PROZAC) 40 mg, Oral, Daily    haloperidol (HALDOL) 1 mg, Oral, Every morning    haloperidol (HALDOL) 2.5 mg, Oral,  Daily before dinner    ondansetron (ZOFRAN-ODT) 4 mg, Oral, Every 6 hours PRN        Labs & Imaging:  I have personally reviewed all pertinent laboratory tests and imaging results.   Appointment on 10/08/2024   Component Date Value Ref Range Status    Sodium 10/08/2024 138  135 - 147 mmol/L Final    Potassium 10/08/2024 4.0  3.5 - 5.3 mmol/L Final    Chloride 10/08/2024 102  96 - 108 mmol/L Final    CO2 10/08/2024 24  21 - 32 mmol/L Final    ANION GAP 10/08/2024 12  4 - 13 mmol/L Final    BUN 10/08/2024 10  5 - 25 mg/dL Final    Creatinine 10/08/2024 0.78  0.60 - 1.30 mg/dL Final    Standardized to IDMS reference method    Glucose 10/08/2024 64 (L)  65 - 140 mg/dL Final    If the patient is fasting, the ADA then defines impaired fasting glucose as > 100 mg/dL and diabetes as > or equal to 123 mg/dL.    Calcium 10/08/2024 9.3  8.4 - 10.2 mg/dL Final    eGFR 10/08/2024 94  ml/min/1.73sq m Final    Color, UA 10/08/2024 Light Yellow   Final    Clarity, UA 10/08/2024 Clear   Final    Specific Gravity, UA 10/08/2024 1.018  1.003 - 1.030 Final    pH, UA 10/08/2024 6.0  4.5, 5.0, 5.5, 6.0, 6.5, 7.0, 7.5, 8.0 Final    Leukocytes, UA 10/08/2024 Negative  Negative Final    Nitrite, UA 10/08/2024 Negative  Negative Final    Protein, UA 10/08/2024 Negative  Negative mg/dl Final    Glucose, UA 10/08/2024 Negative  Negative mg/dl Final    Ketones, UA 10/08/2024 Negative  Negative mg/dl Final    Urobilinogen, UA 10/08/2024 <2.0  <2.0 mg/dl mg/dl Final    Bilirubin, UA 10/08/2024 Negative  Negative Final    Occult Blood, UA 10/08/2024 Negative  Negative Final   Admission on 09/27/2024, Discharged on 10/07/2024   Component Date Value Ref Range Status    Color, UA 09/28/2024 Yellow  Yellow, Straw Final    Clarity,  09/28/2024 Cloudy (A)  Hazy, Clear Final    Specific Gravity,  09/28/2024 >=1.030 (H)  >1.005 - <1.030 Final    pH,  09/28/2024 6.0  5.0, 5.5, 6.0, 6.5, 7.0, 7.5 Final    Leukocytes,  09/28/2024 Trace (A)  Negative  Final    Nitrite, UA 09/28/2024 Negative  Negative Final    Protein, UA 09/28/2024 1+ (A)  Negative, Interference- unable to analyze mg/dl Final    Glucose, UA 09/28/2024 Negative  Negative mg/dl Final    Ketones, UA 09/28/2024 Trace (A)  Negative mg/dl Final    Urobilinogen, UA 09/28/2024 1.0  0.2, 1.0 E.U./dl E.U./dl Final    Bilirubin, UA 09/28/2024 2+ (A)  Negative Final    Occult Blood, UA 09/28/2024 Negative  Negative Final    RBC, UA 09/28/2024 0-1 (A)  None Seen, 2-4 /hpf Final    WBC, UA 09/28/2024 10-20 (A)  None Seen, 2-4, 5-60 /hpf Final    Epithelial Cells 09/28/2024 Moderate (A)  None Seen, Occasional /hpf Final    Bacteria, UA 09/28/2024 Moderate (A)  None Seen, Occasional /hpf Final    Ca Oxalate Mini, UA 09/28/2024 Occasional (A)  None Seen /hpf Final    Sodium 09/28/2024 140  135 - 147 mmol/L Final    Potassium 09/28/2024 3.3 (L)  3.5 - 5.3 mmol/L Final    Chloride 09/28/2024 108  96 - 108 mmol/L Final    CO2 09/28/2024 24  21 - 32 mmol/L Final    ANION GAP 09/28/2024 8  4 - 13 mmol/L Final    BUN 09/28/2024 19  5 - 25 mg/dL Final    Creatinine 09/28/2024 1.09  0.60 - 1.30 mg/dL Final    Standardized to IDMS reference method    Glucose 09/28/2024 93  65 - 140 mg/dL Final    If the patient is fasting, the ADA then defines impaired fasting glucose as > 100 mg/dL and diabetes as > or equal to 123 mg/dL.    Glucose, Fasting 09/28/2024 93  65 - 99 mg/dL Final    Calcium 09/28/2024 9.4  8.4 - 10.2 mg/dL Final    AST 09/28/2024 12 (L)  13 - 39 U/L Final    ALT 09/28/2024 11  7 - 52 U/L Final    Specimen collection should occur prior to Sulfasalazine administration due to the potential for falsely depressed results.     Alkaline Phosphatase 09/28/2024 50  34 - 104 U/L Final    Total Protein 09/28/2024 7.1  6.4 - 8.4 g/dL Final    Albumin 09/28/2024 4.4  3.5 - 5.0 g/dL Final    Total Bilirubin 09/28/2024 1.73 (H)  0.20 - 1.00 mg/dL Final    Use of this assay is not recommended for patients undergoing  treatment with eltrombopag due to the potential for falsely elevated results.  N-acetyl-p-benzoquinone imine (metabolite of Acetaminophen) will generate erroneously low results in samples for patients that have taken an overdose of Acetaminophen.    eGFR 09/28/2024 62  ml/min/1.73sq m Final    WBC 09/28/2024 8.12  4.31 - 10.16 Thousand/uL Final    RBC 09/28/2024 4.45  3.81 - 5.12 Million/uL Final    Hemoglobin 09/28/2024 13.2  11.5 - 15.4 g/dL Final    Hematocrit 09/28/2024 39.1  34.8 - 46.1 % Final    MCV 09/28/2024 88  82 - 98 fL Final    MCH 09/28/2024 29.7  26.8 - 34.3 pg Final    MCHC 09/28/2024 33.8  31.4 - 37.4 g/dL Final    RDW 09/28/2024 12.2  11.6 - 15.1 % Final    MPV 09/28/2024 10.1  8.9 - 12.7 fL Final    Platelets 09/28/2024 244  149 - 390 Thousands/uL Final    nRBC 09/28/2024 0  /100 WBCs Final    Segmented % 09/28/2024 41 (L)  43 - 75 % Final    Immature Grans % 09/28/2024 0  0 - 2 % Final    Lymphocytes % 09/28/2024 46 (H)  14 - 44 % Final    Monocytes % 09/28/2024 9  4 - 12 % Final    Eosinophils Relative 09/28/2024 3  0 - 6 % Final    Basophils Relative 09/28/2024 1  0 - 1 % Final    Absolute Neutrophils 09/28/2024 3.34  1.85 - 7.62 Thousands/µL Final    Absolute Immature Grans 09/28/2024 0.01  0.00 - 0.20 Thousand/uL Final    Absolute Lymphocytes 09/28/2024 3.74  0.60 - 4.47 Thousands/µL Final    Absolute Monocytes 09/28/2024 0.71  0.17 - 1.22 Thousand/µL Final    Eosinophils Absolute 09/28/2024 0.26  0.00 - 0.61 Thousand/µL Final    Basophils Absolute 09/28/2024 0.06  0.00 - 0.10 Thousands/µL Final    Preg, Serum 09/28/2024 Negative  Negative Final    TSH 3RD GENERATON 09/28/2024 1.689  0.450 - 4.500 uIU/mL Final    The recommended reference ranges for TSH during pregnancy are as follows:   First trimester 0.100 to 2.500 uIU/mL   Second trimester  0.200 to 3.000 uIU/mL   Third trimester 0.300 to 3.000 uIU/m    Note: Normal ranges may not apply to patients who are transgender, non-binary, or  whose legal sex, sex at birth, and gender identity differ.  Adult TSH (3rd generation) reference range follows the recommended guidelines of the American Thyroid Association, January, 2020.    Vitamin B-12 09/28/2024 279  180 - 914 pg/mL Final    Folate 09/28/2024 11.9  >5.9 ng/mL Final    The World Health Organization has determined deficient folate concentrations are considered to be <4.0 ng/mL.    Vit D, 25-Hydroxy 09/28/2024 38.6  30.0 - 100.0 ng/mL Final    Vitamin D guidelines established by Clinical Guidelines Subcommittee  of the Endocrine Society Task Force, 2011    Deficiency <20ng/ml   Insufficiency 20-30ng/ml   Sufficient  ng/ml     Cholesterol 09/28/2024 147  See Comment mg/dL Final    Cholesterol:         Pediatric <18 Years        Desirable          <170 mg/dL      Borderline High    170-199 mg/dL      High               >=200 mg/dL        Adult >=18 Years            Desirable         <200 mg/dL      Borderline High   200-239 mg/dL      High              >239 mg/dL      Triglycerides 09/28/2024 124  See Comment mg/dL Final    Triglyceride:     0-9Y            <75mg/dL     10Y-17Y         <90 mg/dL       >=18Y     Normal          <150 mg/dL     Borderline High 150-199 mg/dL     High            200-499 mg/dL        Very High       >499 mg/dL    Specimen collection should occur prior to Metamizole administration due to the potential for falsely depressed results.    HDL, Direct 09/28/2024 38 (L)  >=50 mg/dL Final    LDL Calculated 09/28/2024 84  0 - 100 mg/dL Final    LDL Cholesterol:     Optimal           <100 mg/dl     Near Optimal      100-129 mg/dl     Above Optimal       Borderline High 130-159 mg/dl       High            160-189 mg/dl       Very High       >189 mg/dl         This screening LDL is a calculated result.   It does not have the accuracy of the Direct Measured LDL in the monitoring of patients with hyperlipidemia and/or statin therapy.   Direct Measure LDL (XHX068) must be ordered  separately in these patients.    Non-HDL-Chol (CHOL-HDL) 09/28/2024 109  mg/dl Final    Syphilis Total Antibody 09/28/2024 Non-reactive  Non-Reactive Final    No serological evidence of infection with T. pallidum.  Early or incubating syphilis infection cannot be excluded.  Consider repeat testing based on clinical suspicion.    Ventricular Rate 09/27/2024 67  BPM Final    Atrial Rate 09/27/2024 67  BPM Final    SD Interval 09/27/2024 144  ms Final    QRSD Interval 09/27/2024 92  ms Final    QT Interval 09/27/2024 450  ms Final    QTC Interval 09/27/2024 475  ms Final    P Axis 09/27/2024 40  degrees Final    QRS [place] 09/27/2024 80  degrees Final    T Wave Axis 09/27/2024 69  degrees Final    Potassium 09/30/2024 4.3  3.5 - 5.3 mmol/L Final    Slightly Hemolyzed:Results may be affected.   Admission on 10/03/2024, Discharged on 10/04/2024   Component Date Value Ref Range Status    WBC 10/03/2024 8.27  4.31 - 10.16 Thousand/uL Final    RBC 10/03/2024 4.54  3.81 - 5.12 Million/uL Final    Hemoglobin 10/03/2024 13.5  11.5 - 15.4 g/dL Final    Hematocrit 10/03/2024 37.7  34.8 - 46.1 % Final    MCV 10/03/2024 83  82 - 98 fL Final    MCH 10/03/2024 29.7  26.8 - 34.3 pg Final    MCHC 10/03/2024 35.8  31.4 - 37.4 g/dL Final    RDW 10/03/2024 11.7  11.6 - 15.1 % Final    MPV 10/03/2024 8.9  8.9 - 12.7 fL Final    Platelets 10/03/2024 308  149 - 390 Thousands/uL Final    nRBC 10/03/2024 0  /100 WBCs Final    Segmented % 10/03/2024 75  43 - 75 % Final    Immature Grans % 10/03/2024 0  0 - 2 % Final    Lymphocytes % 10/03/2024 19  14 - 44 % Final    Monocytes % 10/03/2024 5  4 - 12 % Final    Eosinophils Relative 10/03/2024 1  0 - 6 % Final    Basophils Relative 10/03/2024 0  0 - 1 % Final    Absolute Neutrophils 10/03/2024 6.17  1.85 - 7.62 Thousands/µL Final    Absolute Immature Grans 10/03/2024 0.03  0.00 - 0.20 Thousand/uL Final    Absolute Lymphocytes 10/03/2024 1.59  0.60 - 4.47 Thousands/µL Final    Absolute Monocytes  10/03/2024 0.40  0.17 - 1.22 Thousand/µL Final    Eosinophils Absolute 10/03/2024 0.06  0.00 - 0.61 Thousand/µL Final    Basophils Absolute 10/03/2024 0.02  0.00 - 0.10 Thousands/µL Final    Sodium 10/03/2024 136  135 - 147 mmol/L Final    Potassium 10/03/2024 3.5  3.5 - 5.3 mmol/L Final    Chloride 10/03/2024 102  96 - 108 mmol/L Final    CO2 10/03/2024 22  21 - 32 mmol/L Final    ANION GAP 10/03/2024 12  4 - 13 mmol/L Final    BUN 10/03/2024 11  5 - 25 mg/dL Final    Creatinine 10/03/2024 0.93  0.60 - 1.30 mg/dL Final    Standardized to IDMS reference method    Glucose 10/03/2024 104  65 - 140 mg/dL Final    If the patient is fasting, the ADA then defines impaired fasting glucose as > 100 mg/dL and diabetes as > or equal to 123 mg/dL.    Calcium 10/03/2024 10.0  8.4 - 10.2 mg/dL Final    AST 10/03/2024 14  13 - 39 U/L Final    ALT 10/03/2024 13  7 - 52 U/L Final    Specimen collection should occur prior to Sulfasalazine administration due to the potential for falsely depressed results.     Alkaline Phosphatase 10/03/2024 64  34 - 104 U/L Final    Total Protein 10/03/2024 7.8  6.4 - 8.4 g/dL Final    Albumin 10/03/2024 4.7  3.5 - 5.0 g/dL Final    Total Bilirubin 10/03/2024 1.34 (H)  0.20 - 1.00 mg/dL Final    Use of this assay is not recommended for patients undergoing treatment with eltrombopag due to the potential for falsely elevated results.  N-acetyl-p-benzoquinone imine (metabolite of Acetaminophen) will generate erroneously low results in samples for patients that have taken an overdose of Acetaminophen.    eGFR 10/03/2024 76  ml/min/1.73sq m Final    Lipase 10/03/2024 51  11 - 82 u/L Final    Ventricular Rate 10/03/2024 103  BPM Final    Atrial Rate 10/03/2024 103  BPM Final    SD Interval 10/03/2024 142  ms Final    QRSD Interval 10/03/2024 92  ms Final    QT Interval 10/03/2024 384  ms Final    QTC Interval 10/03/2024 503  ms Final    P Axis 10/03/2024 60  degrees Final    QRS Axis 10/03/2024 89  degrees  "Final    T Wave [place] 10/03/2024 62  degrees Final   Admission on 09/27/2024, Discharged on 09/27/2024   Component Date Value Ref Range Status    Ventricular Rate 09/27/2024 78  BPM Final    Atrial Rate 09/27/2024 78  BPM Final    MI Interval 09/27/2024 118  ms Final    QRSD Interval 09/27/2024 90  ms Final    QT Interval 09/27/2024 428  ms Final    QTC Interval 09/27/2024 487  ms Final    P Axis 09/27/2024 77  degrees Final    QRS [place] 09/27/2024 95  degrees Final    T Wave [place] 09/27/2024 73  degrees Final    Amph/Meth UR 09/27/2024 Negative  Negative Final    Barbiturate Ur 09/27/2024 Negative  Negative Final    Benzodiazepine Urine 09/27/2024 Positive (A)  Negative Final    Cocaine Urine 09/27/2024 Negative  Negative Final    Methadone Urine 09/27/2024 Negative  Negative Final    Opiate Urine 09/27/2024 Negative  Negative Final    PCP Ur 09/27/2024 Negative  Negative Final    THC Urine 09/27/2024 Positive (A)  Negative Final    Oxycodone Urine 09/27/2024 Negative  Negative Final    Fentanyl Urine 09/27/2024 Negative  Negative Final    HYDROCODONE URINE 09/27/2024 Negative  Negative Final    EXTBreath Alcohol 09/27/2024 0.000   Corrected   Admission on 09/26/2024, Discharged on 09/26/2024   Component Date Value Ref Range Status    Ventricular Rate 09/26/2024 89  BPM Final    Atrial Rate 09/26/2024 89  BPM Final    MI Interval 09/26/2024 132  ms Final    QRSD Interval 09/26/2024 100  ms Final    QT Interval 09/26/2024 406  ms Final    QTC Interval 09/26/2024 493  ms Final    P Axis 09/26/2024 58  degrees Final    QRS [place] 09/26/2024 96  degrees Final    T Wave [place] 09/26/2024 48  degrees Final    hs TnI 0hr 09/26/2024 3  \"Refer to ACS Flowchart\"- see link ng/L Final    Comment:                                              Initial (time 0) result  If >=50 ng/L, Myocardial injury suggested ;  Type of myocardial injury and treatment strategy  to be determined.  If 5-49 ng/L, a delta result at 2 hours and or 4 hours " will be needed to further evaluate.  If <4 ng/L, and chest pain has been >3 hours since onset, patient may qualify for discharge based on the HEART score in the ED.  If <5 ng/L and <3hours since onset of chest pain, a delta result at 2 hours will be needed to further evaluate.    HS Troponin 99th Percentile URL of a Health Population=12 ng/L with a 95% Confidence Interval of 8-18 ng/L.    Second Troponin (time 2 hours)  If calculated delta >= 20 ng/L,  Myocardial injury suggested ; Type of myocardial injury and treatment strategy to be determined.  If 5-49 ng/L and the calculated delta is 5-19 ng/L, consult medical service for evaluation.  Continue evaluation for ischemia on ecg and other possible etiology and repeat hs troponin at 4 hours.  If delta                            is <5 ng/L at 2 hours, consider discharge based on risk stratification via the HEART score (if in ED), or SKIP risk score in IP/Observation.    HS Troponin 99th Percentile URL of a Health Population=12 ng/L with a 95% Confidence Interval of 8-18 ng/L.    WBC 09/26/2024 8.39  4.31 - 10.16 Thousand/uL Final    RBC 09/26/2024 4.61  3.81 - 5.12 Million/uL Final    Hemoglobin 09/26/2024 13.9  11.5 - 15.4 g/dL Final    Hematocrit 09/26/2024 38.7  34.8 - 46.1 % Final    MCV 09/26/2024 84  82 - 98 fL Final    MCH 09/26/2024 30.2  26.8 - 34.3 pg Final    MCHC 09/26/2024 35.9  31.4 - 37.4 g/dL Final    RDW 09/26/2024 11.9  11.6 - 15.1 % Final    MPV 09/26/2024 9.4  8.9 - 12.7 fL Final    Platelets 09/26/2024 253  149 - 390 Thousands/uL Final    nRBC 09/26/2024 0  /100 WBCs Final    Segmented % 09/26/2024 68  43 - 75 % Final    Immature Grans % 09/26/2024 0  0 - 2 % Final    Lymphocytes % 09/26/2024 25  14 - 44 % Final    Monocytes % 09/26/2024 5  4 - 12 % Final    Eosinophils Relative 09/26/2024 2  0 - 6 % Final    Basophils Relative 09/26/2024 0  0 - 1 % Final    Absolute Neutrophils 09/26/2024 5.59  1.85 - 7.62 Thousands/µL Final    Absolute Immature  Grans 09/26/2024 0.03  0.00 - 0.20 Thousand/uL Final    Absolute Lymphocytes 09/26/2024 2.13  0.60 - 4.47 Thousands/µL Final    Absolute Monocytes 09/26/2024 0.44  0.17 - 1.22 Thousand/µL Final    Eosinophils Absolute 09/26/2024 0.17  0.00 - 0.61 Thousand/µL Final    Basophils Absolute 09/26/2024 0.03  0.00 - 0.10 Thousands/µL Final    Sodium 09/26/2024 140  135 - 147 mmol/L Final    Potassium 09/26/2024 2.8 (L)  3.5 - 5.3 mmol/L Final    Chloride 09/26/2024 107  96 - 108 mmol/L Final    CO2 09/26/2024 19 (L)  21 - 32 mmol/L Final    ANION GAP 09/26/2024 14 (H)  4 - 13 mmol/L Final    BUN 09/26/2024 20  5 - 25 mg/dL Final    Creatinine 09/26/2024 0.97  0.60 - 1.30 mg/dL Final    Standardized to IDMS reference method    Glucose 09/26/2024 107  65 - 140 mg/dL Final    If the patient is fasting, the ADA then defines impaired fasting glucose as > 100 mg/dL and diabetes as > or equal to 123 mg/dL.    Calcium 09/26/2024 9.7  8.4 - 10.2 mg/dL Final    AST 09/26/2024 13  13 - 39 U/L Final    ALT 09/26/2024 13  7 - 52 U/L Final    Specimen collection should occur prior to Sulfasalazine administration due to the potential for falsely depressed results.     Alkaline Phosphatase 09/26/2024 61  34 - 104 U/L Final    Total Protein 09/26/2024 7.2  6.4 - 8.4 g/dL Final    Albumin 09/26/2024 4.6  3.5 - 5.0 g/dL Final    Total Bilirubin 09/26/2024 1.34 (H)  0.20 - 1.00 mg/dL Final    Use of this assay is not recommended for patients undergoing treatment with eltrombopag due to the potential for falsely elevated results.  N-acetyl-p-benzoquinone imine (metabolite of Acetaminophen) will generate erroneously low results in samples for patients that have taken an overdose of Acetaminophen.    eGFR 09/26/2024 72  ml/min/1.73sq m Final    Lipase 09/26/2024 37  11 - 82 u/L Final    SARS COV Rapid Antigen 09/26/2024 Negative  Negative Final    Influenza A Rapid Antigen 09/26/2024 Negative  Negative Final    Influenza B Rapid Antigen  09/26/2024 Negative  Negative Final    Color, UA 09/26/2024 Light Yellow   Final    Clarity, UA 09/26/2024 Turbid   Final    Specific Gravity, UA 09/26/2024 1.019  1.003 - 1.030 Final    pH, UA 09/26/2024 8.0  4.5, 5.0, 5.5, 6.0, 6.5, 7.0, 7.5, 8.0 Final    Leukocytes, UA 09/26/2024 Trace (A)  Negative Final    Nitrite, UA 09/26/2024 Negative  Negative Final    Protein, UA 09/26/2024 Negative  Negative mg/dl Final    Glucose, UA 09/26/2024 Negative  Negative mg/dl Final    Ketones, UA 09/26/2024 40 (2+) (A)  Negative mg/dl Final    Urobilinogen, UA 09/26/2024 <2.0  <2.0 mg/dl mg/dl Final    Bilirubin, UA 09/26/2024 Negative  Negative Final    Occult Blood, UA 09/26/2024 Negative  Negative Final    Preg, Serum 09/26/2024 Negative  Negative Final    Ventricular Rate 09/26/2024 89  BPM Final    Atrial Rate 09/26/2024 89  BPM Final    IA Interval 09/26/2024 132  ms Final    QRSD Interval 09/26/2024 98  ms Final    QT Interval 09/26/2024 434  ms Final    QTC Interval 09/26/2024 528  ms Final    P Axis 09/26/2024 57  degrees Final    QRS [place] 09/26/2024 101  degrees Final    T Wave [place] 09/26/2024 39  degrees Final    Amph/Meth UR 09/26/2024 Negative  Negative Final    Barbiturate Ur 09/26/2024 Negative  Negative Final    Benzodiazepine Urine 09/26/2024 Negative  Negative Final    Cocaine Urine 09/26/2024 Negative  Negative Final    Methadone Urine 09/26/2024 Negative  Negative Final    Opiate Urine 09/26/2024 Negative  Negative Final    PCP Ur 09/26/2024 Negative  Negative Final    THC Urine 09/26/2024 Positive (A)  Negative Final    Oxycodone Urine 09/26/2024 Negative  Negative Final    Fentanyl Urine 09/26/2024 Negative  Negative Final    HYDROCODONE URINE 09/26/2024 Negative  Negative Final    RBC, UA 09/26/2024 1-2  None Seen, 1-2 /hpf Final    WBC, UA 09/26/2024 None Seen  None Seen, 1-2 /hpf Final    Epithelial Cells 09/26/2024 Occasional  None Seen, Occasional /hpf Final    Bacteria, UA 09/26/2024 Occasional  None  Seen, Occasional /hpf Final    Amorphous Crystals, UA 09/26/2024 Occasional   Final   Admission on 09/22/2024, Discharged on 09/22/2024   Component Date Value Ref Range Status    Ventricular Rate 09/22/2024 75  BPM Final    Atrial Rate 09/22/2024 75  BPM Final    AL Interval 09/22/2024 132  ms Final    QRSD Interval 09/22/2024 92  ms Final    QT Interval 09/22/2024 438  ms Final    QTC Interval 09/22/2024 489  ms Final    P Axis 09/22/2024 78  degrees Final    QRS Axis 09/22/2024 94  degrees Final    T Wave [place] 09/22/2024 84  degrees Final    WBC 09/22/2024 11.56 (H)  4.31 - 10.16 Thousand/uL Final    RBC 09/22/2024 5.08  3.81 - 5.12 Million/uL Final    Hemoglobin 09/22/2024 15.2  11.5 - 15.4 g/dL Final    Hematocrit 09/22/2024 43.3  34.8 - 46.1 % Final    MCV 09/22/2024 85  82 - 98 fL Final    MCH 09/22/2024 29.9  26.8 - 34.3 pg Final    MCHC 09/22/2024 35.1  31.4 - 37.4 g/dL Final    RDW 09/22/2024 11.9  11.6 - 15.1 % Final    MPV 09/22/2024 9.5  8.9 - 12.7 fL Final    Platelets 09/22/2024 264  149 - 390 Thousands/uL Final    nRBC 09/22/2024 0  /100 WBCs Final    Segmented % 09/22/2024 80 (H)  43 - 75 % Final    Immature Grans % 09/22/2024 0  0 - 2 % Final    Lymphocytes % 09/22/2024 15  14 - 44 % Final    Monocytes % 09/22/2024 4  4 - 12 % Final    Eosinophils Relative 09/22/2024 1  0 - 6 % Final    Basophils Relative 09/22/2024 0  0 - 1 % Final    Absolute Neutrophils 09/22/2024 9.19 (H)  1.85 - 7.62 Thousands/µL Final    Absolute Immature Grans 09/22/2024 0.04  0.00 - 0.20 Thousand/uL Final    Absolute Lymphocytes 09/22/2024 1.73  0.60 - 4.47 Thousands/µL Final    Absolute Monocytes 09/22/2024 0.49  0.17 - 1.22 Thousand/µL Final    Eosinophils Absolute 09/22/2024 0.06  0.00 - 0.61 Thousand/µL Final    Basophils Absolute 09/22/2024 0.05  0.00 - 0.10 Thousands/µL Final    Sodium 09/22/2024 141  135 - 147 mmol/L Final    Potassium 09/22/2024 3.4 (L)  3.5 - 5.3 mmol/L Final    Chloride 09/22/2024 105  96 - 108  mmol/L Final    CO2 09/22/2024 17 (L)  21 - 32 mmol/L Final    ANION GAP 09/22/2024 19 (H)  4 - 13 mmol/L Final    BUN 09/22/2024 22  5 - 25 mg/dL Final    Creatinine 09/22/2024 1.23  0.60 - 1.30 mg/dL Final    Standardized to IDMS reference method    Glucose 09/22/2024 177 (H)  65 - 140 mg/dL Final    If the patient is fasting, the ADA then defines impaired fasting glucose as > 100 mg/dL and diabetes as > or equal to 123 mg/dL.    Calcium 09/22/2024 12.3 (H)  8.4 - 10.2 mg/dL Final    AST 09/22/2024 17  13 - 39 U/L Final    ALT 09/22/2024 19  7 - 52 U/L Final    Specimen collection should occur prior to Sulfasalazine administration due to the potential for falsely depressed results.     Alkaline Phosphatase 09/22/2024 61  34 - 104 U/L Final    Total Protein 09/22/2024 7.9  6.4 - 8.4 g/dL Final    Albumin 09/22/2024 4.9  3.5 - 5.0 g/dL Final    Total Bilirubin 09/22/2024 2.70 (H)  0.20 - 1.00 mg/dL Final    Use of this assay is not recommended for patients undergoing treatment with eltrombopag due to the potential for falsely elevated results.  N-acetyl-p-benzoquinone imine (metabolite of Acetaminophen) will generate erroneously low results in samples for patients that have taken an overdose of Acetaminophen.    eGFR 09/22/2024 54  ml/min/1.73sq m Final    Lipase 09/22/2024 21  11 - 82 u/L Final    Preg, Serum 09/22/2024 Negative  Negative Final     ---------------------------------------    Although patient's acute lethality risk is low, long-term/chronic lethality risk is mildly elevated in the presence of recent inpatient psychiatric admission, anxiety, history of depression, history of traumatic experiences, history of substance use, access to firearms (locked and secured). At the current moment, Melody is future-oriented, forward-thinking, and demonstrates ability to act in a self-preserving manner as evidenced by volitionally presenting to the clinic today, seeking treatment. At this juncture, inpatient  hospitalization is not currently warranted. To mitigate future risk, patient should adhere to the recommendations of this writer, avoid alcohol/illicit substance use, utilize community-based resources and familiar support and prioritize mental health treatment.     Based on today's assessment and clinical criteria, Melody Huynh contracts for safety and is not an imminent risk of harm to self or others. Outpatient level of care is deemed appropriate at this present time. Melody understands that if they are no longer able to contract for safety, they need to call/contact the outpatient office including this writer, call/contact crisis and/orattend to the nearest Emergency Department for immediate evaluation.      Risk of Harm to Self:  The following ratings are based on assessment at the time of the interview  Demographic risk factors include:   Historical Risk Factors include: history of depression, chronic anxiety symptoms, history of mood disorder, history of substance use, history of traumatic experiences  Recent Specific Risk Factors include: diagnosis of mood disorder, current depressive symptoms, current anxiety symptoms, unemployed, medication adverse effects, recent inpatient psychiatric admission  Protective Factors: no current suicidal ideation, access to mental health treatment, being a parent, being , compliant with medications, compliant with mental health treatment, connection to own children, having a desire to be alive, having a sense of purpose or meaning in life, opportunities to participate in community, responsibilities and duties to others, stable living environment, supportive family  Weapons: gun. The following steps have been taken to ensure weapons are properly secured: locked, secured, confirmed with   Based on today's assessment, Melody presents the following risk of harm to self: low     Risk of Harm to Others:  The following ratings are based on assessment  at the time of the interview  Demographic Risk Factors include: unemployed.  Historical Risk Factors include: history of substance use.  Recent Specific Risk Factors include: weapons or other means available, concomitant mood disorder, multiple stressors.  Protective Factors: no current homicidal ideation, access to mental health treatment, being a parent, being , compliant with medications, compliant with mental health treatment, opportunities to participate in community, resilience, responsibilities and duties to others, safe and stable living environment, supportive family  Weapons: gun. The following steps have been taken to ensure weapons are properly secured: locked, secured, confirmed by   Based on today's assessment, Melody presents the following risk of harm to others: low    Psychotherapy Provided:     Individual psychotherapy provided: Yes  Counseling was provided during the session today for 25 minutes.  Medications, treatment progress and treatment plan reviewed with Melody.    Treatment Plan:    Completed and signed during the session: Not applicable - Treatment Plan not due at this session    This note was shared with patient.    Visit Time:  Visit Start Time: 1100  Visit Stop Time: 1145  Total Visit Duration:  45 minutes    Deysi Cohen DO  Psychiatry Resident, PGY-IV    This note has been constructed using a voice recognition system. There may be translation, syntax, or grammatical errors. If you have any questions, please contact the dictating provider.

## 2024-10-22 ENCOUNTER — OFFICE VISIT (OUTPATIENT)
Dept: PSYCHIATRY | Facility: CLINIC | Age: 44
End: 2024-10-22

## 2024-10-22 DIAGNOSIS — F39 UNSPECIFIED MOOD (AFFECTIVE) DISORDER (HCC): ICD-10-CM

## 2024-10-22 DIAGNOSIS — F41.9 ANXIETY DISORDER, UNSPECIFIED TYPE: Primary | ICD-10-CM

## 2024-10-22 PROCEDURE — NC001 PR NO CHARGE

## 2024-10-22 RX ORDER — CLONAZEPAM 1 MG/1
1 TABLET ORAL 2 TIMES DAILY
Qty: 60 TABLET | Refills: 0 | Status: SHIPPED | OUTPATIENT
Start: 2024-10-22 | End: 2024-11-21

## 2024-10-22 RX ORDER — HALOPERIDOL 1 MG/1
1 TABLET ORAL EVERY MORNING
Qty: 30 TABLET | Refills: 2 | Status: SHIPPED | OUTPATIENT
Start: 2024-10-22 | End: 2025-01-20

## 2024-10-22 RX ORDER — HALOPERIDOL 5 MG/1
2.5 TABLET ORAL
Qty: 45 TABLET | Refills: 0 | Status: SHIPPED | OUTPATIENT
Start: 2024-10-22 | End: 2025-01-20

## 2024-10-22 RX ORDER — BENZTROPINE MESYLATE 1 MG/1
1 TABLET ORAL 2 TIMES DAILY PRN
Qty: 60 TABLET | Refills: 0 | Status: SHIPPED | OUTPATIENT
Start: 2024-10-22 | End: 2024-11-21

## 2024-10-22 NOTE — PATIENT INSTRUCTIONS
Decrease to Haldol 1 mg in the morning, continue Haldol 2.5 mg (1/2 of 5 mg tablet) in the afternoon  Continue Cogentin 1 mg twice daily with Haldol  Continue Prozac 40 mg (two 20 mg capsules) daily  Continue Klonopin as needed for anxiety    Please call the office nursing staff for medication issues including refills, problems obtaining medications, side effects, etc.  Please return for a follow up appointment as discussed. If you are running late or are unable to attend your appointment, please call our [place] office at [phone].    If you are in psychological crisis including not limited to suicidal/homicidal thoughts or thoughts of self-injury, do not hesitate to call/contact your County Crisis hotline (see below), call [phone], call [phone] (mental health crisis), or go to the nearest emergency department.  [facility] Crisis: [phone]  [facility] Crisis: [phone]  [facility] Crisis: [phone]  [facility] Crisis: [phone]  [facility] Crisis: [phone]  [facility] Crisis: [phone]  [facility] Crisis: [phone]  National Suicide Prevention Hotline: 1-654.121.5308    
no

## 2024-10-23 NOTE — ASSESSMENT & PLAN NOTE
Continue Prozac as above  Continue Haldol as above  Continue psychotherapy with own therapist.     Most recent lab work previously reviewed.  Medication management every 2-4 weeks  Aware of need to follow up with family physician for medical issues  Aware of 24 hour and weekend coverage for urgent situations accessed by calling [facility] main practice number  Monitor CBC w/diff and CMP in setting of SSRI prescription.

## 2024-11-05 ENCOUNTER — TELEMEDICINE (OUTPATIENT)
Dept: PSYCHIATRY | Facility: CLINIC | Age: 44
End: 2024-11-05

## 2024-11-05 ENCOUNTER — HOSPITAL ENCOUNTER (OUTPATIENT)
Dept: MAMMOGRAPHY | Facility: CLINIC | Age: 44
Discharge: HOME | End: 2024-11-05
Payer: COMMERCIAL

## 2024-11-05 DIAGNOSIS — F39 UNSPECIFIED MOOD (AFFECTIVE) DISORDER (HCC): ICD-10-CM

## 2024-11-05 DIAGNOSIS — F41.9 ANXIETY DISORDER, UNSPECIFIED TYPE: Primary | ICD-10-CM

## 2024-11-05 DIAGNOSIS — Z12.31 BREAST CANCER SCREENING BY MAMMOGRAM: ICD-10-CM

## 2024-11-05 PROCEDURE — PBNCHG PB NO CHARGE PLACEHOLDER

## 2024-11-05 PROCEDURE — 77063 BREAST TOMOSYNTHESIS BI: CPT

## 2024-11-05 PROCEDURE — 77067 SCR MAMMO BI INCL CAD: CPT

## 2024-11-05 RX ORDER — BENZTROPINE MESYLATE 1 MG/1
0.5 TABLET ORAL 2 TIMES DAILY
Start: 2024-11-05 | End: 2025-02-03

## 2024-11-05 RX ORDER — HALOPERIDOL 1 MG/1
1 TABLET ORAL 2 TIMES DAILY
Qty: 60 TABLET | Refills: 2 | Status: SHIPPED | OUTPATIENT
Start: 2024-11-05 | End: 2025-02-03

## 2024-11-05 NOTE — PATIENT INSTRUCTIONS
Decrease Haldol to 1 mg twice daily (one 1 mg tablet twice daily, no longer using 5 mg tablets)  Decrease Cogentin to 0.5 mg (1/2 tablet) twice daily with Haldol  Continue Prozac 40 mg (two 20 mg capsules) daily  Continue Klonopin as needed for anxiety    Please call the office nursing staff for medication issues including refills, problems obtaining medications, side effects, etc.  Please return for a follow up appointment as discussed. If you are running late or are unable to attend your appointment, please call our [place] office at [phone].    If you are in psychological crisis including not limited to suicidal/homicidal thoughts or thoughts of self-injury, do not hesitate to call/contact your County Crisis hotline (see below), call [phone], call [phone] (mental health crisis), or go to the nearest emergency department.  [facility] Crisis: [phone]  [facility] Crisis: [phone]  [facility] Crisis: [phone]  [facility] Crisis: [phone]  [facility] Crisis: [phone]  [facility] Crisis: [phone]  [facility] Crisis: [phone]  National Suicide Prevention Hotline: 1-173.638.8545

## 2024-11-05 NOTE — PSYCH
Psychiatric Follow Up Visit - Behavioral Health       [facility]  MRN: [id number]    Virtual Regular Visit    Verification of patient location:    Patient is located at Home in the following state in which I hold an active license [place]      Assessment/Plan:    Problem List Items Addressed This Visit       Unspecified mood (affective) disorder (HCC)     Continue Prozac as above  Continue Haldol as above  Continue psychotherapy with own therapist.         Relevant Medications    haloperidol (HALDOL) 1 mg tablet    benztropine (COGENTIN) 1 mg tablet     Other Visit Diagnoses       Anxiety disorder, unspecified type    -  Primary    Relevant Medications    haloperidol (HALDOL) 1 mg tablet             Reason for visit is   Chief Complaint   Patient presents with    Virtual Regular Visit          Encounter provider Deysi Cohen DO      Recent Visits  Date Type Provider Dept   11/05/24 Telemedicine Deysi Cohen DO  [facility]   Showing recent visits within past 7 days and meeting all other requirements  Future Appointments  No visits were found meeting these conditions.  Showing future appointments within next 150 days and meeting all other requirements       The patient was identified by name and date of birth. Melody Huynh was informed that this is a telemedicine visit and that the visit is being conducted throughthe Epic Embedded platform. She agrees to proceed..  My office door was closed. No one else was in the room.  She acknowledged consent and understanding of privacy and security of the video platform. The patient has agreed to participate and understands they can discontinue the visit at any time.    Patient is aware this is a billable service.     Assessment/Plan:   Melody Huynh is a 44 y.o. 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  For review, on initial evaluation, patient presented with symptoms consistent with EPS and akathisia related adverse effects of psychotropic medications. Possible offending agents were previously discontinued (Vraylar, Zyprexa) with adjustments to Prozac and Klonopin dosing.  Patient had acute decompensation after 09/25/24 outpatient appointment requiring inpatient hospitalization. Patient was admitted to [facility] where adjustments were made to Prozac and Haldol was added to medication regimen for psychomotor agitation, anxiety, and mood related symptoms.  Today, patient demonstrated improvement in anxiety related symptoms - rate of thoughts has improved, restlessness and inner tension have improved, and patient reports no panic attacks or panic related symptoms since last evaluation.  Patient presents with mild stiffness, she reports blurry vision for small and close lettering persists and she has been utilizing reader glasses. Patient went for a mammogram this morning and during imaging her breasts expressed milky substance - this was evaluated by physician at imaging office and is likely secondary to dopamine antagonist agent. The patient presents with dry mouth as well, likely secondary to Cogentin.  No tremor noted during evaluation or reported during evaluation in b/l hands.  Discussed  with patient reduction in Haldol to 1 mg BID and reduction of Cogentin to 0.5 mg BID to be taken with Haldol to decrease adverse effects while maintaining benefit from dopamine antagonist agent. Discussed continuation of Prozac and Klonopin at current doses. Patient does not demonstrate symptoms consistent with depression at time of evaluation.   Discussed continued cessation of marijuana use, patient agreeable. Patient and provider agreed on 4 week follow up for medication management and patient will continue individual psychotherapy.  Assessment & Plan  Anxiety disorder, unspecified type  Continue Prozac 40 mg daily for mood and anxiety  PARQ completed including serotonin syndrome, SIADH, worsening depression, suicidality, induction of ghada, GI upset, headaches, activation, sexual side effects, sedation, potential drug interactions, and others.  Decrease Haldol to 1 mg in the morning and 1 mg in the afternoon for anxiety, psychomotor agitation, and augmentation of antidepressant in setting of adverse effects  PARQ of Haldol including EPS side effects suck as Tardive Dyskinesia, parkinsonism, and tardive dystonia in addition to prolactin increase/galactorrhea, amenorrhea, dizziness, sedation, hypotension, weight gain/metabolic complications, Akathisia, dry mouth, constipation, urinary retention, blurred vision, decreased sweating, tachycardia, hypertension, and rarely NMS, seizures, jaundice, agranulocytosis, leukopenia, and increased risk of CVA in elderly patient's with dementia-related psychosis.   Decrease Cogentin to 0.5 mg BID for EPS related adverse effects in setting of lowering Haldol and anticholinergic adverse effects  PARQ completed including dry mouth, blurred vision, diplopia, confusion, hallucinations, constipation, nausea, vomiting, dilation of colon/paralytic ileus/bowel obstruction, erectile dysfunction, angle-closure glaucoma, heat stroke (mostly in elderly), tachycardia, cardiac arrhymias,  hypotension, urinary retention, and rarely tardive dyskinesia unmasking.  Continue Klonopin 1 mg twice daily as needed for anxiety  PDMP reviewed, last filled in 10/2024 after hospitalization  Discussed with patient the risks of sedation, respiratory depression, impairment in judgment and motor function. Depression, mood changes, GI changes, and others discussed. Patient informed of risks of being on or starting opioid medications due to drug interactions and potential for serious respiratory depression and death.  Continue psychotherapy with own therapist  Recommended increasing frequency of sessions  Unspecified mood (affective) disorder (HCC)  Continue Prozac as above  Continue Haldol as above  Continue psychotherapy with own therapist.    Aware of need to follow up with family physician for medical issues  Aware of 24 hour and weekend coverage for urgent situations accessed by calling [facility] main practice number  Medication management every 4 weeks    Medication Risks/Benefits      Risks, Benefits And Possible Side Effects Of Medications:    Risks, benefits, and possible side effects of medications explained to Melody and she verbalizes understanding and agreement for treatment.    Controlled Medication Discussion:     Melody has been filling controlled prescriptions on time as prescribed according to [place] Prescription Drug Monitoring Program  Discussed with Melody the risks of sedation, respiratory depression, impairment of ability to drive and potential for abuse and addiction related to treatment with benzodiazepine medications. She understands risk of treatment with benzodiazepine medications, agrees to not drive if feels impaired and agrees to take medications as prescribed  Discussed with Melody Black Box warning on concurrent use of benzodiazepines and opioid medications including sedation, respiratory depression, coma and death. She understands the risk of  "treatment with benzodiazepines in addition to opioids and wants to continue taking those medications  Discussed with Melody increased risk of impairment related to concurrent use of benzodiazepines and hypnotic medications including excessive sedation, psychomotor impairment and respiratory depression. She understands the risk of treatment with benzodiazepines in addition to hypnotic medications and wants to continue taking those medications  ------------------------------------  History of Present Illness   Melody Huynh is presenting to follow up for anxiety, depression, and EPS related symptoms . Melody demonstrates improvement in anxiety related symptoms - rate of thoughts has improved, restlessness and inner tension have improved, and patient reports no panic attacks or panic related symptoms since last evaluation. The patient has improvement in negative thinking and states, \"I'm feeling more like myself everyday.\"  Patient denies down, depressed mood. She endorses good motivation. She notes decreased concentration and feels she, \"looses my train of thought in conversation.\" The patient reports good sleep, and stable appetite with no unintentional weight loss or gain. The patient notes she has been getting tired around 8:30 pm and falling asleep earlier than intended - the patient has been utilizing Klonopin 1 mg at 7:30 pm - discussed with patient taking medication earlier in the evening and/or lowering dose back to 0.5 mg which was previously utilized. The patient expressed understanding. The patient denies SI, HI, passive death wishes, or thoughts to harm self or others at time of evaluation.    Regarding adverse effects in setting of Haldol prescription, patient presents with mild stiffness. She reports blurry vision for small and close lettering persists and she has been utilizing reader glasses. Patient went for a mammogram prior to today's evaluation and during imaging her breasts expressed milky " substance - this was evaluated by physician at imaging office and is likely secondary to dopamine antagonist agent. The patient presents with dry mouth as well, likely secondary to Cogentin.  No tremor noted during evaluation or reported during evaluation in b/l hands.    The patient does not endorse or demonstrate symptoms consistent with ghada/hypomania or positive or negative symptoms of psychosis at time of evaluation.    Discussed with patient reduction in Haldol to 1 mg BID and reduction of Cogentin to 0.5 mg BID to be taken with Haldol to decrease adverse effects while maintaining benefit from dopamine antagonist agent. Discussed continuation of Prozac and Klonopin at current doses. Discussed continued cessation of marijuana use. Patient agreeable to 4 week follow up for medication management and to continue individual psychotherapy.    Psychiatric Review Of Systems:  Melody reports Symptoms as described in HPI.  Melody denies Current suicidal thoughts, plan, or intent, Current thoughts of self-harm, or Current homicidal thoughts, plan, or intent.    Medical Review Of Systems:  Complete review of systems is negative except as noted above.  [place] Akathisia Rating Scale (BARS) - Today's assessment on   11/05/24: 0 and Global Clinical Assessment score of 1  Prior score on 10/23/23: 8 and Global Clinical Assessment score:3, 11/14/23: 7 and Global Clinical Assessment score of 2  12/04/23: Global Clinical Assessment score: 3 due to distress to patient  02/29/24: 0 and Global Clinical Assessment score of 0  04/22/24: 0 and Global Clinical Assessment score of 0  09/25/24 0 and Global Clinical Assessment score of 0  10/22/24 2 and Global Clinical Assessment score of 1  Objective   0 Normal, occasional fidgety movements of the limbs  1 Presence of characteristic restless movements: shuffling or tramping movements of the legs/feet, or swinging of one leg while sitting, and/or rocking from foot to foot or “walking  on the spot” when standing, but movements present for less than half the time observed  2 Observed phenomena, as described in (1) above, which are present for at least half the observation period  3 Patient is constantly engaged in characteristic restless movements, and/or has the inability to remain seated or standing without walking or pacing, during the time observed  Subjective  0 Absence of inner restlessness  1 Non-specific sense of inner restlessness  2 The patient is aware of an inability to keep the legs still, or a desire to move the legs, and/or complains of inner restlessness aggravated specifically by being required to stand still  3 Awareness of intense compulsion to move most of the time and/or reports strong desire to walk or pace most of the time  Distress related to restlessness  0 No distress  1 Mild  2 Moderate  3 Severe  Global Clinical Assessment of Akathisia  0 Absent. No evidence of awareness of restlessness. Observation of characteristic movements of akathisia in the absence of a subjective report of inner restlessness or compulsive desire to move the legs should be classified as pseudoakathisia  1 Questionable. Non-specific inner tension and fidgety movements  2 Mild akathisia. Awareness of restlessness in the legs and/or inner restlessness worse when required to stand still. Fidgety movements present, but characteristic restless movements of akathisia not necessarily observed. Condition causes little or no distress.  3 Moderate akathisia. Awareness of restlessness as described for mild akathisia above, combined with characteristic restless movements such as rocking from foot to foot when standing. Patient finds the condition distressing.  4 Marked akathisia. Subjective experience of restlessness includes a compulsive desire to walk or pace. However, the patient is able to remain seated for at least five minutes. The condition is obviously distressing.  5 Severe akathisia. The patient  reports a strong compulsion to pace up and down most of the time. Unable to sit or lie down for more than a few minutes. Constant restlessness which is associated with intense distress and insomnia.  ------------------------------------  All italicized information has been copied from previous psychiatric evaluation. Information has been reviewed with the patient.     Past Psychiatric History:   Prior psychiatric diagnoses: anxiety, panic disorder, mood disorder, OCD  Inpatient hospitalizations: inpatient hospitalizations - first hospitalization 2001 - [facility], 2008 - [facility], 2021 - panic disorder, mood symptoms, most recent at [facility] in 09/2024  Emergency room visits often in-between for severe panic attacks - 2016, multiple recent ED visits in 09/2024 for severe panic attacks  Suicide attempts/self-harm: patient denies, patient denies self harm behaviors  Violent/aggressive behavior: patient denies   Patient did have some aggressive outbursts towards  and providers  Outpatient psychiatric providers: currently has outpatient psychiatric provider - [facility]   Was without a psychiatrist from [id number] (Lexapro from [facility] during that time), prior psychiatrist, Dr. Martines, retired  Past/current psychotherapy: patient has a therapist weekly, Dr. Foster, [facility]  Other Services: patient denies  Psychiatric medication trial:   Multiple medication trials - including below,  Antidepressants  Prozac (up to 40 mg), Zoloft (short period of time), Lexapro (multiple years), Luvox  Antipsychotics  Vraylar, Zyprexa, Seroquel, Haldol  Sedative hypnotics  Ativan, Klonopin (0.5 mg QID PRN)  Others  Hydroxyzine, Gabapentin, BuSpar (60 mg total daily), Cogentin     Substance Abuse History:  I have assessed this patient for substance use within the past 12 months.  Patient reports a few cigarettes per day, quit 2007/2008. Patient reports  history of daily marijuana use - had medical marijuana card - few months without smoking.   Denies history of other recreational use including opioids, methamphetamines/amphetamines, cocaine use. Denies past legal actions or arrests secondary to substance intoxication. The patient denies prior DWIs/DUIs. Melody does not exhibit objective evidence of substance withdrawal during today's examination nor does Melody appear under the influence of any psychoactive substance.       Family Psychiatric History:   Mother: anxiety (on antidepressant - Lexapro)  No family history of substance use disorder or suicide attempts or completions.     Social History  Early life/developmental: Denies a history of milestone/developmental delay. Denies a history of in-utero exposure to toxins/illicit substances. Denies ADHD history. No history of IEP/504, denies special supports in school.  Marital history: /Civil Union   Children: yes, 1 daughter (9 y/o - 2nd grade)  1 older brother   Living arrangement: Lives in a home with  and daughter (family lost their dog in May).  Support system: identifies  as the biggest source of support  Mother also a big support  Education: college graduate - communications ([employer])  Had to withdraw from college twice due to anxiety  Occupational History: unemployed since January 2023, applying for permanent disability - previously in advertising in 10-15 years  Other Pertinent History: no reported  or legal history  Access to firearms:  has firearm, locked and secured      Traumatic History:   Abuse:  patient denied  Other Traumatic Events:  medical trauma, father passed when she was 16 y/o    History Review: The following portions of the patient's history were reviewed and updated as appropriate: allergies, current medications, past family history, past medical history, past social history, past surgical history, and problem list.    Visit Vitals  OB Status  "Implant   Smoking Status Never      Wt Readings from Last 6 Encounters:   10/08/24 63 kg (138 lb 12.8 oz)   10/06/24 63.4 kg (139 lb 12.8 oz)   10/03/24 62.1 kg (137 lb)   09/27/24 63.1 kg (139 lb 1.8 oz)   04/16/24 65.8 kg (145 lb)   12/18/23 59.3 kg (130 lb 11.2 oz)            Mental Status Exam:  Appearance:  alert, good eye contact, appears stated age, casually dressed, appropriate grooming and hygiene, and wearing glasses   Behavior:  calm, cooperative, and sitting comfortably   Motor: Limited assessment due to virtual visit - unable to assess gait and balance, no tremor noted in b/l hands   Speech:  spontaneous, clear, normal rate, not pressured, normal volume, not hyperverbal, and coherent   Mood:  \"Feeling more like myself everyday\"   Affect:  mood-congruent, anxious, and brighter than previous   Thought Process:  Organized, logical, goal-directed   Thought Content: no verbalized delusions or overt paranoia, negative thoughts   Perceptual disturbances: no reported hallucinations and does not appear to be responding to internal stimuli at this time   Risk Potential: No active or passive suicidal or homicidal ideation was verbalized during interview   Cognition: oriented to person, place, time, and situation, memory grossly intact, appears to be of average intelligence, age-appropriate attention span and concentration, and cognition not formally tested   Insight:  Good   Judgment: Good       Meds/Allergies    Allergies   Allergen Reactions    Demerol [Meperidine] Hyperactivity    Macrolides And Ketolides      Current Outpatient Medications   Medication Instructions    benztropine (COGENTIN) 1 mg, Oral, 2 times daily PRN    clonazePAM (KLONOPIN) 1 mg, Oral, 2 times daily    cyanocobalamin 1,000 mcg, Intramuscular, Every 30 days    FLUoxetine (PROZAC) 40 mg, Oral, Daily    haloperidol (HALDOL) 1 mg, Oral, Every morning    haloperidol (HALDOL) 2.5 mg, Oral, Daily before dinner    ondansetron (ZOFRAN-ODT) 4 mg, " Oral, Every 6 hours PRN        Labs & Imaging:  I have personally reviewed all pertinent laboratory tests and imaging results.   Appointment on 10/08/2024   Component Date Value Ref Range Status    Sodium 10/08/2024 138  135 - 147 mmol/L Final    Potassium 10/08/2024 4.0  3.5 - 5.3 mmol/L Final    Chloride 10/08/2024 102  96 - 108 mmol/L Final    CO2 10/08/2024 24  21 - 32 mmol/L Final    ANION GAP 10/08/2024 12  4 - 13 mmol/L Final    BUN 10/08/2024 10  5 - 25 mg/dL Final    Creatinine 10/08/2024 0.78  0.60 - 1.30 mg/dL Final    Standardized to IDMS reference method    Glucose 10/08/2024 64 (L)  65 - 140 mg/dL Final    If the patient is fasting, the ADA then defines impaired fasting glucose as > 100 mg/dL and diabetes as > or equal to 123 mg/dL.    Calcium 10/08/2024 9.3  8.4 - 10.2 mg/dL Final    eGFR 10/08/2024 94  ml/min/1.73sq m Final    Color, UA 10/08/2024 Light Yellow   Final    Clarity, UA 10/08/2024 Clear   Final    Specific Gravity, UA 10/08/2024 1.018  1.003 - 1.030 Final    pH, UA 10/08/2024 6.0  4.5, 5.0, 5.5, 6.0, 6.5, 7.0, 7.5, 8.0 Final    Leukocytes, UA 10/08/2024 Negative  Negative Final    Nitrite, UA 10/08/2024 Negative  Negative Final    Protein, UA 10/08/2024 Negative  Negative mg/dl Final    Glucose, UA 10/08/2024 Negative  Negative mg/dl Final    Ketones, UA 10/08/2024 Negative  Negative mg/dl Final    Urobilinogen, UA 10/08/2024 <2.0  <2.0 mg/dl mg/dl Final    Bilirubin, UA 10/08/2024 Negative  Negative Final    Occult Blood, UA 10/08/2024 Negative  Negative Final   Admission on 09/27/2024, Discharged on 10/07/2024   Component Date Value Ref Range Status    Color, UA 09/28/2024 Yellow  Yellow, Straw Final    Clarity, UA 09/28/2024 Cloudy (A)  Hazy, Clear Final    Specific Gravity, UA 09/28/2024 >=1.030 (H)  >1.005 - <1.030 Final    pH, UA 09/28/2024 6.0  5.0, 5.5, 6.0, 6.5, 7.0, 7.5 Final    Leukocytes, UA 09/28/2024 Trace (A)  Negative Final    Nitrite, UA 09/28/2024 Negative  Negative  Final    Protein, UA 09/28/2024 1+ (A)  Negative, Interference- unable to analyze mg/dl Final    Glucose, UA 09/28/2024 Negative  Negative mg/dl Final    Ketones, UA 09/28/2024 Trace (A)  Negative mg/dl Final    Urobilinogen, UA 09/28/2024 1.0  0.2, 1.0 E.U./dl E.U./dl Final    Bilirubin, UA 09/28/2024 2+ (A)  Negative Final    Occult Blood, UA 09/28/2024 Negative  Negative Final    RBC, UA 09/28/2024 0-1 (A)  None Seen, 2-4 /hpf Final    WBC, UA 09/28/2024 10-20 (A)  None Seen, 2-4, 5-60 /hpf Final    Epithelial Cells 09/28/2024 Moderate (A)  None Seen, Occasional /hpf Final    Bacteria, UA 09/28/2024 Moderate (A)  None Seen, Occasional /hpf Final    Ca Oxalate Mini, UA 09/28/2024 Occasional (A)  None Seen /hpf Final    Sodium 09/28/2024 140  135 - 147 mmol/L Final    Potassium 09/28/2024 3.3 (L)  3.5 - 5.3 mmol/L Final    Chloride 09/28/2024 108  96 - 108 mmol/L Final    CO2 09/28/2024 24  21 - 32 mmol/L Final    ANION GAP 09/28/2024 8  4 - 13 mmol/L Final    BUN 09/28/2024 19  5 - 25 mg/dL Final    Creatinine 09/28/2024 1.09  0.60 - 1.30 mg/dL Final    Standardized to IDMS reference method    Glucose 09/28/2024 93  65 - 140 mg/dL Final    If the patient is fasting, the ADA then defines impaired fasting glucose as > 100 mg/dL and diabetes as > or equal to 123 mg/dL.    Glucose, Fasting 09/28/2024 93  65 - 99 mg/dL Final    Calcium 09/28/2024 9.4  8.4 - 10.2 mg/dL Final    AST 09/28/2024 12 (L)  13 - 39 U/L Final    ALT 09/28/2024 11  7 - 52 U/L Final    Specimen collection should occur prior to Sulfasalazine administration due to the potential for falsely depressed results.     Alkaline Phosphatase 09/28/2024 50  34 - 104 U/L Final    Total Protein 09/28/2024 7.1  6.4 - 8.4 g/dL Final    Albumin 09/28/2024 4.4  3.5 - 5.0 g/dL Final    Total Bilirubin 09/28/2024 1.73 (H)  0.20 - 1.00 mg/dL Final    Use of this assay is not recommended for patients undergoing treatment with eltrombopag due to the potential for  falsely elevated results.  N-acetyl-p-benzoquinone imine (metabolite of Acetaminophen) will generate erroneously low results in samples for patients that have taken an overdose of Acetaminophen.    eGFR 09/28/2024 62  ml/min/1.73sq m Final    WBC 09/28/2024 8.12  4.31 - 10.16 Thousand/uL Final    RBC 09/28/2024 4.45  3.81 - 5.12 Million/uL Final    Hemoglobin 09/28/2024 13.2  11.5 - 15.4 g/dL Final    Hematocrit 09/28/2024 39.1  34.8 - 46.1 % Final    MCV 09/28/2024 88  82 - 98 fL Final    MCH 09/28/2024 29.7  26.8 - 34.3 pg Final    MCHC 09/28/2024 33.8  31.4 - 37.4 g/dL Final    RDW 09/28/2024 12.2  11.6 - 15.1 % Final    MPV 09/28/2024 10.1  8.9 - 12.7 fL Final    Platelets 09/28/2024 244  149 - 390 Thousands/uL Final    nRBC 09/28/2024 0  /100 WBCs Final    Segmented % 09/28/2024 41 (L)  43 - 75 % Final    Immature Grans % 09/28/2024 0  0 - 2 % Final    Lymphocytes % 09/28/2024 46 (H)  14 - 44 % Final    Monocytes % 09/28/2024 9  4 - 12 % Final    Eosinophils Relative 09/28/2024 3  0 - 6 % Final    Basophils Relative 09/28/2024 1  0 - 1 % Final    Absolute Neutrophils 09/28/2024 3.34  1.85 - 7.62 Thousands/µL Final    Absolute Immature Grans 09/28/2024 0.01  0.00 - 0.20 Thousand/uL Final    Absolute Lymphocytes 09/28/2024 3.74  0.60 - 4.47 Thousands/µL Final    Absolute Monocytes 09/28/2024 0.71  0.17 - 1.22 Thousand/µL Final    Eosinophils Absolute 09/28/2024 0.26  0.00 - 0.61 Thousand/µL Final    Basophils Absolute 09/28/2024 0.06  0.00 - 0.10 Thousands/µL Final    Preg, Serum 09/28/2024 Negative  Negative Final    TSH 3RD GENERATON 09/28/2024 1.689  0.450 - 4.500 uIU/mL Final    The recommended reference ranges for TSH during pregnancy are as follows:   First trimester 0.100 to 2.500 uIU/mL   Second trimester  0.200 to 3.000 uIU/mL   Third trimester 0.300 to 3.000 uIU/m    Note: Normal ranges may not apply to patients who are transgender, non-binary, or whose legal sex, sex at birth, and gender identity  differ.  Adult TSH (3rd generation) reference range follows the recommended guidelines of the American Thy[employer] Association, January, 2020.    Vitamin B-12 09/28/2024 279  180 - 914 pg/mL Final    Folate 09/28/2024 11.9  >5.9 ng/mL Final    The World Health Organization has determined deficient folate concentrations are considered to be <4.0 ng/mL.    Vit D, 25-Hydroxy 09/28/2024 38.6  30.0 - 100.0 ng/mL Final    Vitamin D guidelines established by Clinical Guidelines Subcommittee  of the Endocrine Society Task Force, 2011    Deficiency <20ng/ml   Insufficiency 20-30ng/ml   Sufficient  ng/ml     Cholesterol 09/28/2024 147  See Comment mg/dL Final    Cholesterol:         Pediatric <18 Years        Desirable          <170 mg/dL      Borderline High    170-199 mg/dL      High               >=200 mg/dL        Adult >=18 Years            Desirable         <200 mg/dL      Borderline High   200-239 mg/dL      High              >239 mg/dL      Triglycerides 09/28/2024 124  See Comment mg/dL Final    Triglyceride:     0-9Y            <75mg/dL     10Y-17Y         <90 mg/dL       >=18Y     Normal          <150 mg/dL     Borderline High 150-199 mg/dL     High            200-499 mg/dL        Very High       >499 mg/dL    Specimen collection should occur prior to Metamizole administration due to the potential for falsely depressed results.    HDL, Direct 09/28/2024 38 (L)  >=50 mg/dL Final    LDL Calculated 09/28/2024 84  0 - 100 mg/dL Final    LDL Cholesterol:     Optimal           <100 mg/dl     Near Optimal      100-129 mg/dl     Above Optimal       Borderline High 130-159 mg/dl       High            160-189 mg/dl       Very High       >189 mg/dl         This screening LDL is a calculated result.   It does not have the accuracy of the Direct Measured LDL in the monitoring of patients with hyperlipidemia and/or statin therapy.   Direct Measure LDL (VLM355) must be ordered separately in these patients.    Non-HDL-Chol  (CHOL-HDL) 09/28/2024 109  mg/dl Final    Syphilis Total Antibody 09/28/2024 Non-reactive  Non-Reactive Final    No serological evidence of infection with T. pallidum.  Early or incubating syphilis infection cannot be excluded.  Consider repeat testing based on clinical suspicion.    Ventricular Rate 09/27/2024 67  BPM Final    Atrial Rate 09/27/2024 67  BPM Final    [place] Interval 09/27/2024 144  ms Final    QRSD Interval 09/27/2024 92  ms Final    QT Interval 09/27/2024 450  ms Final    QTC Interval 09/27/2024 475  ms Final    P Axis 09/27/2024 40  degrees Final    QRS [place] 09/27/2024 80  degrees Final    T Wave Axis 09/27/2024 69  degrees Final    Potassium 09/30/2024 4.3  3.5 - 5.3 mmol/L Final    Slightly Hemolyzed:Results may be affected.   Admission on 10/03/2024, Discharged on 10/04/2024   Component Date Value Ref Range Status    WBC 10/03/2024 8.27  4.31 - 10.16 Thousand/uL Final    RBC 10/03/2024 4.54  3.81 - 5.12 Million/uL Final    Hemoglobin 10/03/2024 13.5  11.5 - 15.4 g/dL Final    Hematocrit 10/03/2024 37.7  34.8 - 46.1 % Final    MCV 10/03/2024 83  82 - 98 fL Final    MCH 10/03/2024 29.7  26.8 - 34.3 pg Final    MCHC 10/03/2024 35.8  31.4 - 37.4 g/dL Final    RDW 10/03/2024 11.7  11.6 - 15.1 % Final    MPV 10/03/2024 8.9  8.9 - 12.7 fL Final    Platelets 10/03/2024 308  149 - 390 Thousands/uL Final    nRBC 10/03/2024 0  /100 WBCs Final    Segmented % 10/03/2024 75  43 - 75 % Final    Immature Grans % 10/03/2024 0  0 - 2 % Final    Lymphocytes % 10/03/2024 19  14 - 44 % Final    Monocytes % 10/03/2024 5  4 - 12 % Final    Eosinophils Relative 10/03/2024 1  0 - 6 % Final    Basophils Relative 10/03/2024 0  0 - 1 % Final    Absolute Neutrophils 10/03/2024 6.17  1.85 - 7.62 Thousands/µL Final    Absolute Immature Grans 10/03/2024 0.03  0.00 - 0.20 Thousand/uL Final    Absolute Lymphocytes 10/03/2024 1.59  0.60 - 4.47 Thousands/µL Final    Absolute Monocytes 10/03/2024 0.40  0.17 - 1.22 Thousand/µL Final     Eosinophils Absolute 10/03/2024 0.06  0.00 - 0.61 Thousand/µL Final    Basophils Absolute 10/03/2024 0.02  0.00 - 0.10 Thousands/µL Final    Sodium 10/03/2024 136  135 - 147 mmol/L Final    Potassium 10/03/2024 3.5  3.5 - 5.3 mmol/L Final    Chloride 10/03/2024 102  96 - 108 mmol/L Final    CO2 10/03/2024 22  21 - 32 mmol/L Final    ANION GAP 10/03/2024 12  4 - 13 mmol/L Final    BUN 10/03/2024 11  5 - 25 mg/dL Final    Creatinine 10/03/2024 0.93  0.60 - 1.30 mg/dL Final    Standardized to IDMS reference method    Glucose 10/03/2024 104  65 - 140 mg/dL Final    If the patient is fasting, the ADA then defines impaired fasting glucose as > 100 mg/dL and diabetes as > or equal to 123 mg/dL.    Calcium 10/03/2024 10.0  8.4 - 10.2 mg/dL Final    AST 10/03/2024 14  13 - 39 U/L Final    ALT 10/03/2024 13  7 - 52 U/L Final    Specimen collection should occur prior to Sulfasalazine administration due to the potential for falsely depressed results.     Alkaline Phosphatase 10/03/2024 64  34 - 104 U/L Final    Total Protein 10/03/2024 7.8  6.4 - 8.4 g/dL Final    Albumin 10/03/2024 4.7  3.5 - 5.0 g/dL Final    Total Bilirubin 10/03/2024 1.34 (H)  0.20 - 1.00 mg/dL Final    Use of this assay is not recommended for patients undergoing treatment with eltrombopag due to the potential for falsely elevated results.  N-acetyl-p-benzoquinone imine (metabolite of Acetaminophen) will generate erroneously low results in samples for patients that have taken an overdose of Acetaminophen.    eGFR 10/03/2024 76  ml/min/1.73sq m Final    Lipase 10/03/2024 51  11 - 82 u/L Final    Ventricular Rate 10/03/2024 103  BPM Final    Atrial Rate 10/03/2024 103  BPM Final    [place] Interval 10/03/2024 142  ms Final    QRSD Interval 10/03/2024 92  ms Final    QT Interval 10/03/2024 384  ms Final    QTC Interval 10/03/2024 503  ms Final    P Axis 10/03/2024 60  degrees Final    QRS Axis 10/03/2024 89  degrees Final    T Wave Axis 10/03/2024 62  degrees Final  "  Admission on 09/27/2024, Discharged on 09/27/2024   Component Date Value Ref Range Status    Ventricular Rate 09/27/2024 78  BPM Final    Atrial Rate 09/27/2024 78  BPM Final    VT Interval 09/27/2024 118  ms Final    QRSD Interval 09/27/2024 90  ms Final    QT Interval 09/27/2024 428  ms Final    QTC Interval 09/27/2024 487  ms Final    P Axis 09/27/2024 77  degrees Final    QRS [place] 09/27/2024 95  degrees Final    T Wave [place] 09/27/2024 73  degrees Final    Amph/Meth UR 09/27/2024 Negative  Negative Final    Barbiturate Ur 09/27/2024 Negative  Negative Final    Benzodiazepine Urine 09/27/2024 Positive (A)  Negative Final    Cocaine Urine 09/27/2024 Negative  Negative Final    Methadone Urine 09/27/2024 Negative  Negative Final    Opiate Urine 09/27/2024 Negative  Negative Final    PCP Ur 09/27/2024 Negative  Negative Final    THC Urine 09/27/2024 Positive (A)  Negative Final    Oxycodone Urine 09/27/2024 Negative  Negative Final    Fentanyl Urine 09/27/2024 Negative  Negative Final    HYDROCODONE URINE 09/27/2024 Negative  Negative Final    EXTBreath Alcohol 09/27/2024 0.000   Corrected   Admission on 09/26/2024, Discharged on 09/26/2024   Component Date Value Ref Range Status    Ventricular Rate 09/26/2024 89  BPM Final    Atrial Rate 09/26/2024 89  BPM Final    VT Interval 09/26/2024 132  ms Final    QRSD Interval 09/26/2024 100  ms Final    QT Interval 09/26/2024 406  ms Final    QTC Interval 09/26/2024 493  ms Final    P Axis 09/26/2024 58  degrees Final    QRS [place] 09/26/2024 96  degrees Final    T Wave [place] 09/26/2024 48  degrees Final    hs TnI 0hr 09/26/2024 3  \"Refer to ACS Flowchart\"- see link ng/L Final    Comment:                                              Initial (time 0) result  If >=50 ng/L, Myocardial injury suggested ;  Type of myocardial injury and treatment strategy  to be determined.  If 5-49 ng/L, a delta result at 2 hours and or 4 hours will be needed to further evaluate.  If <4 ng/L, and " chest pain has been >3 hours since onset, patient may qualify for discharge based on the HEART score in the ED.  If <5 ng/L and <3hours since onset of chest pain, a delta result at 2 hours will be needed to further evaluate.    HS Troponin 99th Percentile URL of a Health Population=12 ng/L with a 95% Confidence Interval of 8-18 ng/L.    Second Troponin (time 2 hours)  If calculated delta >= 20 ng/L,  Myocardial injury suggested ; Type of myocardial injury and treatment strategy to be determined.  If 5-49 ng/L and the calculated delta is 5-19 ng/L, consult medical service for evaluation.  Continue evaluation for ischemia on ecg and other possible etiology and repeat hs troponin at 4 hours.  If delta                            is <5 ng/L at 2 hours, consider discharge based on risk stratification via the HEART score (if in ED), or SKIP risk score in IP/Observation.    HS Troponin 99th Percentile URL of a Health Population=12 ng/L with a 95% Confidence Interval of 8-18 ng/L.    WBC 09/26/2024 8.39  4.31 - 10.16 Thousand/uL Final    RBC 09/26/2024 4.61  3.81 - 5.12 Million/uL Final    Hemoglobin 09/26/2024 13.9  11.5 - 15.4 g/dL Final    Hematocrit 09/26/2024 38.7  34.8 - 46.1 % Final    MCV 09/26/2024 84  82 - 98 fL Final    MCH 09/26/2024 30.2  26.8 - 34.3 pg Final    MCHC 09/26/2024 35.9  31.4 - 37.4 g/dL Final    RDW 09/26/2024 11.9  11.6 - 15.1 % Final    MPV 09/26/2024 9.4  8.9 - 12.7 fL Final    Platelets 09/26/2024 253  149 - 390 Thousands/uL Final    nRBC 09/26/2024 0  /100 WBCs Final    Segmented % 09/26/2024 68  43 - 75 % Final    Immature Grans % 09/26/2024 0  0 - 2 % Final    Lymphocytes % 09/26/2024 25  14 - 44 % Final    Monocytes % 09/26/2024 5  4 - 12 % Final    Eosinophils Relative 09/26/2024 2  0 - 6 % Final    Basophils Relative 09/26/2024 0  0 - 1 % Final    Absolute Neutrophils 09/26/2024 5.59  1.85 - 7.62 Thousands/µL Final    Absolute Immature Grans 09/26/2024 0.03  0.00 - 0.20 Thousand/uL Final     Absolute Lymphocytes 09/26/2024 2.13  0.60 - 4.47 Thousands/µL Final    Absolute Monocytes 09/26/2024 0.44  0.17 - 1.22 Thousand/µL Final    Eosinophils Absolute 09/26/2024 0.17  0.00 - 0.61 Thousand/µL Final    Basophils Absolute 09/26/2024 0.03  0.00 - 0.10 Thousands/µL Final    Sodium 09/26/2024 140  135 - 147 mmol/L Final    Potassium 09/26/2024 2.8 (L)  3.5 - 5.3 mmol/L Final    Chloride 09/26/2024 107  96 - 108 mmol/L Final    CO2 09/26/2024 19 (L)  21 - 32 mmol/L Final    ANION GAP 09/26/2024 14 (H)  4 - 13 mmol/L Final    BUN 09/26/2024 20  5 - 25 mg/dL Final    Creatinine 09/26/2024 0.97  0.60 - 1.30 mg/dL Final    Standardized to IDMS reference method    Glucose 09/26/2024 107  65 - 140 mg/dL Final    If the patient is fasting, the ADA then defines impaired fasting glucose as > 100 mg/dL and diabetes as > or equal to 123 mg/dL.    Calcium 09/26/2024 9.7  8.4 - 10.2 mg/dL Final    AST 09/26/2024 13  13 - 39 U/L Final    ALT 09/26/2024 13  7 - 52 U/L Final    Specimen collection should occur prior to Sulfasalazine administration due to the potential for falsely depressed results.     Alkaline Phosphatase 09/26/2024 61  34 - 104 U/L Final    Total Protein 09/26/2024 7.2  6.4 - 8.4 g/dL Final    Albumin 09/26/2024 4.6  3.5 - 5.0 g/dL Final    Total Bilirubin 09/26/2024 1.34 (H)  0.20 - 1.00 mg/dL Final    Use of this assay is not recommended for patients undergoing treatment with eltrombopag due to the potential for falsely elevated results.  N-acetyl-p-benzoquinone imine (metabolite of Acetaminophen) will generate erroneously low results in samples for patients that have taken an overdose of Acetaminophen.    eGFR 09/26/2024 72  ml/min/1.73sq m Final    Lipase 09/26/2024 37  11 - 82 u/L Final    SARS COV Rapid Antigen 09/26/2024 Negative  Negative Final    Influenza A Rapid Antigen 09/26/2024 Negative  Negative Final    Influenza B Rapid Antigen 09/26/2024 Negative  Negative Final    Color, UA 09/26/2024  Light Yellow   Final    Clarity, UA 09/26/2024 Turbid   Final    Specific Gravity, UA 09/26/2024 1.019  1.003 - 1.030 Final    pH, UA 09/26/2024 8.0  4.5, 5.0, 5.5, 6.0, 6.5, 7.0, 7.5, 8.0 Final    Leukocytes, UA 09/26/2024 Trace (A)  Negative Final    Nitrite, UA 09/26/2024 Negative  Negative Final    Protein, UA 09/26/2024 Negative  Negative mg/dl Final    Glucose, UA 09/26/2024 Negative  Negative mg/dl Final    Ketones, UA 09/26/2024 40 (2+) (A)  Negative mg/dl Final    Urobilinogen, UA 09/26/2024 <2.0  <2.0 mg/dl mg/dl Final    Bilirubin, UA 09/26/2024 Negative  Negative Final    Occult Blood, UA 09/26/2024 Negative  Negative Final    Preg, Serum 09/26/2024 Negative  Negative Final    Ventricular Rate 09/26/2024 89  BPM Final    Atrial Rate 09/26/2024 89  BPM Final    MS Interval 09/26/2024 132  ms Final    QRSD Interval 09/26/2024 98  ms Final    QT Interval 09/26/2024 434  ms Final    QTC Interval 09/26/2024 528  ms Final    P Axis 09/26/2024 57  degrees Final    QRS [place] 09/26/2024 101  degrees Final    T Wave [place] 09/26/2024 39  degrees Final    Amph/Meth UR 09/26/2024 Negative  Negative Final    Barbiturate Ur 09/26/2024 Negative  Negative Final    Benzodiazepine Urine 09/26/2024 Negative  Negative Final    Cocaine Urine 09/26/2024 Negative  Negative Final    Methadone Urine 09/26/2024 Negative  Negative Final    Opiate Urine 09/26/2024 Negative  Negative Final    PCP Ur 09/26/2024 Negative  Negative Final    THC Urine 09/26/2024 Positive (A)  Negative Final    Oxycodone Urine 09/26/2024 Negative  Negative Final    Fentanyl Urine 09/26/2024 Negative  Negative Final    HYDROCODONE URINE 09/26/2024 Negative  Negative Final    RBC, UA 09/26/2024 1-2  None Seen, 1-2 /hpf Final    WBC, UA 09/26/2024 None Seen  None Seen, 1-2 /hpf Final    Epithelial Cells 09/26/2024 Occasional  None Seen, Occasional /hpf Final    Bacteria, UA 09/26/2024 Occasional  None Seen, Occasional /hpf Final    Amorphous Crystals, UA  09/26/2024 Occasional   Final   Admission on 09/22/2024, Discharged on 09/22/2024   Component Date Value Ref Range Status    Ventricular Rate 09/22/2024 75  BPM Final    Atrial Rate 09/22/2024 75  BPM Final    MD Interval 09/22/2024 132  ms Final    QRSD Interval 09/22/2024 92  ms Final    QT Interval 09/22/2024 438  ms Final    QTC Interval 09/22/2024 489  ms Final    P Axis 09/22/2024 78  degrees Final    QRS Axis 09/22/2024 94  degrees Final    T Wave [place] 09/22/2024 84  degrees Final    WBC 09/22/2024 11.56 (H)  4.31 - 10.16 Thousand/uL Final    RBC 09/22/2024 5.08  3.81 - 5.12 Million/uL Final    Hemoglobin 09/22/2024 15.2  11.5 - 15.4 g/dL Final    Hematocrit 09/22/2024 43.3  34.8 - 46.1 % Final    MCV 09/22/2024 85  82 - 98 fL Final    MCH 09/22/2024 29.9  26.8 - 34.3 pg Final    MCHC 09/22/2024 35.1  31.4 - 37.4 g/dL Final    RDW 09/22/2024 11.9  11.6 - 15.1 % Final    MPV 09/22/2024 9.5  8.9 - 12.7 fL Final    Platelets 09/22/2024 264  149 - 390 Thousands/uL Final    nRBC 09/22/2024 0  /100 WBCs Final    Segmented % 09/22/2024 80 (H)  43 - 75 % Final    Immature Grans % 09/22/2024 0  0 - 2 % Final    Lymphocytes % 09/22/2024 15  14 - 44 % Final    Monocytes % 09/22/2024 4  4 - 12 % Final    Eosinophils Relative 09/22/2024 1  0 - 6 % Final    Basophils Relative 09/22/2024 0  0 - 1 % Final    Absolute Neutrophils 09/22/2024 9.19 (H)  1.85 - 7.62 Thousands/µL Final    Absolute Immature Grans 09/22/2024 0.04  0.00 - 0.20 Thousand/uL Final    Absolute Lymphocytes 09/22/2024 1.73  0.60 - 4.47 Thousands/µL Final    Absolute Monocytes 09/22/2024 0.49  0.17 - 1.22 Thousand/µL Final    Eosinophils Absolute 09/22/2024 0.06  0.00 - 0.61 Thousand/µL Final    Basophils Absolute 09/22/2024 0.05  0.00 - 0.10 Thousands/µL Final    Sodium 09/22/2024 141  135 - 147 mmol/L Final    Potassium 09/22/2024 3.4 (L)  3.5 - 5.3 mmol/L Final    Chloride 09/22/2024 105  96 - 108 mmol/L Final    CO2 09/22/2024 17 (L)  21 - 32 mmol/L  Final    ANION GAP 09/22/2024 19 (H)  4 - 13 mmol/L Final    BUN 09/22/2024 22  5 - 25 mg/dL Final    Creatinine 09/22/2024 1.23  0.60 - 1.30 mg/dL Final    Standardized to IDMS reference method    Glucose 09/22/2024 177 (H)  65 - 140 mg/dL Final    If the patient is fasting, the ADA then defines impaired fasting glucose as > 100 mg/dL and diabetes as > or equal to 123 mg/dL.    Calcium 09/22/2024 12.3 (H)  8.4 - 10.2 mg/dL Final    AST 09/22/2024 17  13 - 39 U/L Final    ALT 09/22/2024 19  7 - 52 U/L Final    Specimen collection should occur prior to Sulfasalazine administration due to the potential for falsely depressed results.     Alkaline Phosphatase 09/22/2024 61  34 - 104 U/L Final    Total Protein 09/22/2024 7.9  6.4 - 8.4 g/dL Final    Albumin 09/22/2024 4.9  3.5 - 5.0 g/dL Final    Total Bilirubin 09/22/2024 2.70 (H)  0.20 - 1.00 mg/dL Final    Use of this assay is not recommended for patients undergoing treatment with eltrombopag due to the potential for falsely elevated results.  N-acetyl-p-benzoquinone imine (metabolite of Acetaminophen) will generate erroneously low results in samples for patients that have taken an overdose of Acetaminophen.    eGFR 09/22/2024 54  ml/min/1.73sq m Final    Lipase 09/22/2024 21  11 - 82 u/L Final    Preg, Serum 09/22/2024 Negative  Negative Final     ---------------------------------------    Although patient's acute lethality risk is low, long-term/chronic lethality risk is mildly elevated in the presence of recent inpatient psychiatric admission, anxiety, history of depression, history of substance use, history of traumatic experiences, access to firearms (locked and secured). At the current moment, Melody is future-oriented, forward-thinking, and demonstrates ability to act in a self-preserving manner as evidenced by volitionally presenting to the clinic today, seeking treatment. At this juncture, inpatient hospitalization is not currently warranted. To mitigate  future risk, patient should adhere to the recommendations of this writer, avoid alcohol/illicit substance use, utilize community-based resources and familiar support and prioritize mental health treatment.     Based on today's assessment and clinical criteria, Melody Huynh contracts for safety and is not an imminent risk of harm to self or others. Outpatient level of care is deemed appropriate at this present time. Melody understands that if they are no longer able to contract for safety, they need to call/contact the outpatient office including this writer, call/contact crisis and/orattend to the nearest Emergency Department for immediate evaluation.    Risk of Harm to Self:  The following ratings are based on assessment at the time of the interview  Demographic risk factors include:   Historical Risk Factors include: history of depression, chronic anxiety symptoms, history of mood disorder, history of substance use, history of traumatic experiences  Recent Specific Risk Factors include: diagnosis of mood disorder, current depressive symptoms, current anxiety symptoms, unemployed, medication adverse effects, recent inpatient psychiatric admission  Protective Factors: no current suicidal ideation, access to mental health treatment, being a parent, being , compliant with medications, compliant with mental health treatment, connection to own children, having a desire to be alive, having a sense of purpose or meaning in life, opportunities to participate in community, responsibilities and duties to others, stable living environment, supportive family  Weapons: gun. The following steps have been taken to ensure weapons are properly secured: locked, secured, confirmed with   Based on today's assessment, Melody presents the following risk of harm to self: low     Risk of Harm to Others:  The following ratings are based on assessment at the time of the interview  Demographic Risk Factors  include: unemployed.  Historical Risk Factors include: history of substance use.  Recent Specific Risk Factors include: weapons or other means available, concomitant mood disorder, multiple stressors.  Protective Factors: no current homicidal ideation, access to mental health treatment, being a parent, being , compliant with medications, compliant with mental health treatment, opportunities to participate in community, resilience, responsibilities and duties to others, safe and stable living environment, supportive family  Weapons: gun. The following steps have been taken to ensure weapons are properly secured: locked, secured, confirmed by   Based on today's assessment, Melody presents the following risk of harm to others: low      Psychotherapy Provided:     Individual psychotherapy provided: Yes  Counseling was provided during the session today for 10 minutes.  Medications, treatment progress and treatment plan reviewed with Melody.    Treatment Plan:    Completed and signed during the session: Not applicable - Treatment Plan not due at this session    This note was shared with patient.    Visit Time:  Visit Start Time: 1600  Visit Stop Time: 1635  Total Visit Duration:  35 minutes    Deysi Cohen DO  Psychiatry Resident, PGY-IV    This note has been constructed using a voice recognition system. There may be translation, syntax, or grammatical errors. If you have any questions, please contact the dictating provider.

## 2024-11-20 DIAGNOSIS — F41.9 ANXIETY DISORDER, UNSPECIFIED TYPE: ICD-10-CM

## 2024-11-20 RX ORDER — CLONAZEPAM 1 MG/1
1 TABLET ORAL 2 TIMES DAILY
Qty: 60 TABLET | Refills: 0 | Status: SHIPPED | OUTPATIENT
Start: 2024-11-20 | End: 2024-12-20

## 2024-11-20 NOTE — TELEPHONE ENCOUNTER
10/22/2024 10/22/2024 clonazePAM (Tablet) 60.0 30 1 MG NA CLAUDIA CARCAMO [facility] Medicaid 0 / 0 [place]   1 [id number] 10/03/2024 10/03/2024 clonazePAM (Tablet) 20.0 10 1 MG NA ANNY ALEXANDRE [facility] Medicaid 0 / 0 PA   1 [id number] 09/19/2024 09/19/2024 clonazePAM (Tablet) 90.0 22 0.5 MG NA KAITLYN DE LEON [facility] Medicaid 0 / 0 PA   1 [id number] 08/06/2024 08/06/2024 clonazePAM (Tablet) 90.0 22 0.5 MG NA KAITLYN DE LEON [facility] Medicaid 0 / 0 PA   1 [id number] 06/17/2024 06/17/2024 clonazePAM (Tablet) 90.0 22 0.5 MG NA CLAUDIA CARCAMO [facility] Medicaid 0 / 0 PA   1 [id number] 04/22/2024 04/22/2024 clonazePAM (Tablet) 90.0 22 0.5 MG NA CLAUDIA CARCAMO [facility]

## 2024-11-20 NOTE — TELEPHONE ENCOUNTER
Refill request received and reviewed. Last filled 10/22/24, no discrepancies or concerns noted. Refill sent to preferred pharmacy. Follow up scheduled for 12/11/24.    Thank you,  Deysi Cohen, DO

## 2024-11-20 NOTE — TELEPHONE ENCOUNTER
Reason for call:   [x] Refill   [] Prior Auth  [] Other:     Office:   [] PCP/Provider -   [] Specialty/Provider - Psych    Medication: clonazePAM (KlonoPIN) 1 mg     Dose/Frequency: Take 1 tablet (1 mg total) by mouth 2 (two) times a day     Quantity: 60    Pharmacy: [place] [id number] [place] [facility] - [address]     Does the patient have enough for 3 days?   [] Yes   [x] No - Send as HP to POD

## 2024-12-04 ENCOUNTER — TELEMEDICINE (OUTPATIENT)
Dept: PSYCHIATRY | Facility: CLINIC | Age: 44
End: 2024-12-04

## 2024-12-04 DIAGNOSIS — F41.9 ANXIETY DISORDER, UNSPECIFIED TYPE: Primary | ICD-10-CM

## 2024-12-04 DIAGNOSIS — F39 UNSPECIFIED MOOD (AFFECTIVE) DISORDER (HCC): ICD-10-CM

## 2024-12-04 PROCEDURE — PBNCHG PB NO CHARGE PLACEHOLDER

## 2024-12-04 NOTE — PATIENT INSTRUCTIONS
Continue Haldol to 1 mg twice daily (one 1 mg tablet twice daily)  Continue Cogentin to 0.5 mg (1/2 tablet) twice daily with Haldol  Continue Prozac 40 mg (two 20 mg capsules) daily  Continue Klonopin as needed for anxiety    Please call the office nursing staff for medication issues including refills, problems obtaining medications, side effects, etc.  Please return for a follow up appointment as discussed. If you are running late or are unable to attend your appointment, please call our [place] office at [phone].    If you are in psychological crisis including not limited to suicidal/homicidal thoughts or thoughts of self-injury, do not hesitate to call/contact your County Crisis hotline (see below), call [phone], call [phone] (mental health crisis), or go to the nearest emergency department.  [facility] Crisis: [phone]  [facility] Crisis: [phone]  [facility] Crisis: [phone]  [facility] Crisis: [phone]  [facility] Crisis: [phone]  [facility] Crisis: [phone]  [facility] Crisis: [phone]  National Suicide Prevention Hotline: 1-564.293.3652

## 2024-12-04 NOTE — PSYCH
Psychiatric Follow Up Visit - Behavioral Health       [facility]  MRN: [id number]  Virtual Regular Visit    Verification of patient location:    Patient is located at Home in the following state in which I hold an active license [place]      Assessment/Plan:    Problem List Items Addressed This Visit    None    Reason for visit is   Chief Complaint   Patient presents with    Virtual Regular Visit          Encounter provider Deysi Cohen DO      Recent Visits  No visits were found meeting these conditions.  Showing recent visits within past 7 days and meeting all other requirements  Today's Visits  Date Type Provider Dept   12/04/24 Telemedicine Deysi Cohen DO  [facility]   Showing today's visits and meeting all other requirements  Future Appointments  No visits were found meeting these conditions.  Showing future appointments within next 150 days and meeting all other requirements       The patient was identified by name and date of birth. Melody Huynh was informed that this is a telemedicine visit and that the visit is being conducted throughthe Epic Embedded platform. She agrees to proceed..  My office door was closed. No one else was in the room.  She acknowledged consent and understanding of privacy and security of the video platform. The patient has agreed to participate and understands they can discontinue the visit at any time.    Patient is aware this is a billable service.     Assessment/Plan:   Melody Huynh is a 44 y.o. 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  For review, on initial evaluation, patient presented with symptoms consistent with EPS and akathisia related adverse effects of psychotropic medications. Possible offending agents were previously discontinued (Vraylar, Zyprexa) with adjustments to Prozac and Klonopin dosing.  Patient had acute decompensation after 09/25/24 outpatient appointment requiring inpatient hospitalization. Patient was admitted to [facility] where adjustments were made to Prozac and Haldol was added to medication regimen for psychomotor agitation, anxiety, and mood related symptoms.  Today, patient demonstrates brighter affect compared to prior evaluation. She demonstrates continued improvement in anxiety symptoms - denying inner tension, negative thinking, and reports no panic attacks or panic related symptoms since prior evaluation. The patient reports intermittent restlessness, though it is improved compared to prior and since lowering Haldol to 1 mg BID at last evaluation. The patient reports blurry vision and stiffness have resolved since medication adjustments as well. The patient is experiencing intermittent dry mouth - reports it is tolerable at this time. Discussed with patient reduction of Cogentin in setting of continued dry mouth and risk/benefits of return of EPS in setting of ongoing Haldol use. Patient with preference to continue current doses of medications and reassess in the new year. Patient in agreement with medication management follow up and to continue psychotherapy with individual therapist.    Assessment & Plan  Anxiety disorder, unspecified type  Continue  Prozac 40 mg daily for mood and anxiety  PARQ completed including serotonin syndrome, SIADH, worsening depression, suicidality, induction of ghada, GI upset, headaches, activation, sexual side effects, sedation, potential drug interactions, and others.  Continue Haldol to 1 mg BID for anxiety, psychomotor agitation, and augmentation of antidepressant in setting of adverse effects  PARQ of Haldol including EPS side effects suck as Tardive Dyskinesia, parkinsonism, and tardive dystonia in addition to prolactin increase/galactorrhea, amenorrhea, dizziness, sedation, hypotension, weight gain/metabolic complications, Akathisia, dry mouth, constipation, urinary retention, blurred vision, decreased sweating, tachycardia, hypertension, and rarely NMS, seizures, jaundice, agranulocytosis, leukopenia, and increased risk of CVA in elderly patient's with dementia-related psychosis.  Continue Cogentin to 0.5 mg BID for EPS related adverse effects in setting of Haldol and anticholinergic adverse effects  PARQ completed including dry mouth, blurred vision, diplopia, confusion, hallucinations, constipation, nausea, vomiting, dilation of colon/paralytic ileus/bowel obstruction, erectile dysfunction, angle-closure glaucoma, heat stroke (mostly in elderly), tachycardia, cardiac arrhymias, hypotension, urinary retention, and rarely tardive dyskinesia unmasking.  Continue Klonopin 1 mg twice daily as needed for anxiety  PDMP reviewed, last filled in 10/2024 after hospitalization  Discussed with patient the risks of sedation, respiratory depression, impairment in judgment and motor function. Depression, mood changes, GI changes, and others discussed. Patient informed of risks of being on or starting opioid medications due to drug interactions and potential for serious respiratory depression and death.  Continue psychotherapy with own therapist  Recommended increasing frequency of sessions  Unspecified mood (affective) disorder  (HCC)  Continue Prozac as above  Continue Haldol as above  Continue psychotherapy with own therapist.    Aware of need to follow up with family physician for medical issues  Aware of 24 hour and weekend coverage for urgent situations accessed by calling [facility] main practice number  Medication management every 4 weeks    Medication Risks/Benefits      Risks, Benefits And Possible Side Effects Of Medications:    Risks, benefits, and possible side effects of medications explained to Melody and she verbalizes understanding and agreement for treatment.    Controlled Medication Discussion:     Melody has been filling controlled prescriptions on time as prescribed according to [place] Prescription Drug Monitoring Program  Discussed with Melody the risks of sedation, respiratory depression, impairment of ability to drive and potential for abuse and addiction related to treatment with benzodiazepine medications. She understands risk of treatment with benzodiazepine medications, agrees to not drive if feels impaired and agrees to take medications as prescribed  Discussed with Melody Black Box warning on concurrent use of benzodiazepines and opioid medications including sedation, respiratory depression, coma and death. She understands the risk of treatment with benzodiazepines in addition to opioids and wants to continue taking those medications  Discussed with Melody increased risk of impairment related to concurrent use of benzodiazepines and hypnotic medications including excessive sedation, psychomotor impairment and respiratory depression. She understands the risk of treatment with benzodiazepines in addition to hypnotic medications and wants to continue taking those medications  ------------------------------------  History of Present Illness   Melody Huynh is presenting to follow up for anxiety, depression, and EPS related symptoms . Melody endorses continued improvement  in anxiety related symptoms. She has decreased in increased rate of thoughts, negative thinking, and denies panic attacks or panic related symptoms since prior evaluation. Patient's Klonopin use has decreased to once daily in the morning and averaging a second dose 3x/week compared to prior daily BID use.  She endorses good sleep and appetite, denies changes in motivation, denies anhedonia, and denies down, depressed mood.  The patient denies SI, HI, passive death wishes, or thoughts to harm self or others at time of evaluation.  The patient demonstrates a brighter affect compared to prior and is smiling freely (no evidence of hypomimia on exam today).  The patient brightens further discussing upcoming trip to [facility] with family next week - reviewed possible anxiety triggers and coping strategies she can engaged while on the trip.    The patient does not endorse or demonstrate symptoms consistent with ghada/hypomania or positive or negative symptoms of psychosis at time of evaluation.    The patient endorses adjustments in medication - Haldol and Cogentin - at prior appointment has helped significantly with stiffness, blurry vision, and akathisia related adverse effects. She continues with intermittent dry mouth. She does not report recurrence of lactation events since reduction in medication.  Discussed with patient reduction of Cogentin in setting of continued dry mouth and risk/benefits of return of EPS in setting of ongoing Haldol use and decreasing Cogentin further. Patient with preference to continue current doses of medications and reassess in 1 month. Patient in agreement with medication management follow up and to continue psychotherapy with individual therapist.    Psychiatric Review Of Systems:  Melody reports Symptoms as described in HPI.  Melody denies Current suicidal thoughts, plan, or intent, Current thoughts of self-harm, or Current homicidal thoughts, plan, or intent.    Medical Review Of  Systems:  Complete review of systems is negative except as noted above.  [place] Akathisia Rating Scale (BARS) - Today's assessment on 12/04/24 0 and Global Clinical Assessment score of 0  Prior score on 10/23/23: 8 and Global Clinical Assessment score of 3  11/14/23: 7 and Global Clinical Assessment score of 2  12/04/23: Global Clinical Assessment score of 3 due to distress to patient  02/29/24: 0 and Global Clinical Assessment score of 0  04/22/24: 0 and Global Clinical Assessment score of 0  09/25/24 0 and Global Clinical Assessment score of 0  10/22/24 2 and Global Clinical Assessment score of 1  11/05/24: 0 and Global Clinical Assessment score of 1  Objective   0 Normal, occasional fidgety movements of the limbs  1 Presence of characteristic restless movements: shuffling or tramping movements of the legs/feet, or swinging of one leg while sitting, and/or rocking from foot to foot or “walking on the spot” when standing, but movements present for less than half the time observed  2 Observed phenomena, as described in (1) above, which are present for at least half the observation period  3 Patient is constantly engaged in characteristic restless movements, and/or has the inability to remain seated or standing without walking or pacing, during the time observed  Subjective  0 Absence of inner restlessness  1 Non-specific sense of inner restlessness  2 The patient is aware of an inability to keep the legs still, or a desire to move the legs, and/or complains of inner restlessness aggravated specifically by being required to stand still  3 Awareness of intense compulsion to move most of the time and/or reports strong desire to walk or pace most of the time  Distress related to restlessness  0 No distress  1 Mild  2 Moderate  3 Severe  Global Clinical Assessment of Akathisia  0 Absent. No evidence of awareness of restlessness. Observation of characteristic movements of akathisia in the absence of a subjective report of  inner restlessness or compulsive desire to move the legs should be classified as pseudoakathisia  1 Questionable. Non-specific inner tension and fidgety movements  2 Mild akathisia. Awareness of restlessness in the legs and/or inner restlessness worse when required to stand still. Fidgety movements present, but characteristic restless movements of akathisia not necessarily observed. Condition causes little or no distress.  3 Moderate akathisia. Awareness of restlessness as described for mild akathisia above, combined with characteristic restless movements such as rocking from foot to foot when standing. Patient finds the condition distressing.  4 Marked akathisia. Subjective experience of restlessness includes a compulsive desire to walk or pace. However, the patient is able to remain seated for at least five minutes. The condition is obviously distressing.  5 Severe akathisia. The patient reports a strong compulsion to pace up and down most of the time. Unable to sit or lie down for more than a few minutes. Constant restlessness which is associated with intense distress and insomnia.  ------------------------------------  All italicized information has been copied from previous psychiatric evaluation. Information has been reviewed with the patient.     Past Psychiatric History:   Prior psychiatric diagnoses: anxiety, panic disorder, mood disorder, OCD  Inpatient hospitalizations: inpatient hospitalizations - first hospitalization 2001 - [facility], 2008 - [facility], 2021 - panic disorder, mood symptoms, most recent at [facility] in 09/2024  Emergency room visits often in-between for severe panic attacks - 2016, multiple recent ED visits in 09/2024 for severe panic attacks  Suicide attempts/self-harm: patient denies, patient denies self harm behaviors  Violent/aggressive behavior: patient denies   Patient did have some aggressive outbursts towards  and  providers  Outpatient psychiatric providers: currently has outpatient psychiatric provider - [facility]   Was without a psychiatrist from [phone] (Lexapro from [facility] during that time), prior psychiatrist, Dr. Martines, retired  Past/current psychotherapy: patient has a therapist weekly, Dr. Foster, [facility]  Other Services: patient denies  Psychiatric medication trial:   Multiple medication trials - including below,  Antidepressants  Prozac (up to 40 mg), Zoloft (short period of time), Lexapro (multiple years), Luvox  Antipsychotics  Vraylar, Zyprexa, Seroquel, Haldol  Sedative hypnotics  Ativan, Klonopin (0.5 mg QID PRN)  Others  Hydroxyzine, Gabapentin, BuSpar (60 mg total daily), Cogentin     Substance Abuse History:  I have assessed this patient for substance use within the past 12 months.  Patient reports a few cigarettes per day, quit 2007/2008. Patient reports history of daily marijuana use - had medical marijuana card - few months without smoking.   Denies history of other recreational use including opioids, methamphetamines/amphetamines, cocaine use. Denies past legal actions or arrests secondary to substance intoxication. The patient denies prior DWIs/DUIs. Melody does not exhibit objective evidence of substance withdrawal during today's examination nor does Melody appear under the influence of any psychoactive substance.       Family Psychiatric History:   Mother: anxiety (on antidepressant - Lexapro)  No family history of substance use disorder or suicide attempts or completions.     Social History  Early life/developmental: Denies a history of milestone/developmental delay. Denies a history of in-utero exposure to toxins/illicit substances. Denies ADHD history. No history of IEP/504, denies special supports in school.  Marital history: /Civil Union   Children: yes, 1 daughter (7 y/o - 2nd grade)  1 older brother   Living arrangement: Lives in a home with   and daughter (family lost their dog in May).  Support system: identifies  as the biggest source of support  Mother also a big support  Education: college graduate - communications ([employer])  Had to withdraw from college twice due to anxiety  Occupational History: unemployed since January 2023, applying for permanent disability - previously in advertising in 10-15 years  Other Pertinent History: no reported  or legal history  Access to firearms:  has firearm, locked and secured      Traumatic History:   Abuse:  patient denied  Other Traumatic Events:  medical trauma, father passed when she was 16 y/o    History Review: The following portions of the patient's history were reviewed and updated as appropriate: allergies, current medications, past family history, past medical history, past social history, past surgical history, and problem list.    Visit Vitals  OB Status Implant   Smoking Status Never      Wt Readings from Last 6 Encounters:   10/08/24 63 kg (138 lb 12.8 oz)   10/06/24 63.4 kg (139 lb 12.8 oz)   10/03/24 62.1 kg (137 lb)   09/27/24 63.1 kg (139 lb 1.8 oz)   04/16/24 65.8 kg (145 lb)   12/18/23 59.3 kg (130 lb 11.2 oz)        Rating Scales    ELOY-7 Flowsheet Screening      Flowsheet Row Most Recent Value   Over the last two weeks, how often have you been bothered by the following problems?     Feeling nervous, anxious, or on edge 0    Not being able to stop or control worrying 0    Worrying too much about different things 1    Trouble relaxing  1    Being so restless that it's hard to sit still 1    Becoming easily annoyed or irritable  0    Feeling afraid as if something awful might happen 0    How difficult have these problems made it for you to do your work, take care of things at home, or get along with other people?  Very difficult    ELOY Score  3             Mental Status Exam:  Appearance:  alert, good eye contact, appears stated age, casually dressed, and appropriate  "grooming and hygiene   Behavior:  calm, cooperative, and sitting comfortably   Motor: No tremor noted in hands b/l, unable to assess gait and balance due to virtual visit   Speech:  spontaneous, clear, normal rate, not pressured, normal volume, not hyperverbal, and coherent   Mood:  \"Good, more myself\"   Affect:  mood-congruent, appropriate range, and brighter than previous   Thought Process:  Organized, logical, goal-directed, intermittent increased rate of thoughts   Thought Content: no verbalized delusions or overt paranoia   Perceptual disturbances: no reported hallucinations and does not appear to be responding to internal stimuli at this time   Risk Potential: No active or passive suicidal or homicidal ideation was verbalized during interview   Cognition: oriented to person, place, time, and situation, memory grossly intact, appears to be of average intelligence, age-appropriate attention span and concentration, and cognition not formally tested   Insight:  Good   Judgment: Good       Meds/Allergies    Allergies   Allergen Reactions    Demerol [Meperidine] Hyperactivity    Macrolides And Ketolides      Current Outpatient Medications   Medication Instructions    benztropine (COGENTIN) 0.5 mg, Oral, 2 times daily    clonazePAM (KLONOPIN) 1 mg, Oral, 2 times daily    cyanocobalamin 1,000 mcg, Intramuscular, Every 30 days    FLUoxetine (PROZAC) 40 mg, Oral, Daily    haloperidol (HALDOL) 1 mg, Oral, 2 times daily    ondansetron (ZOFRAN-ODT) 4 mg, Oral, Every 6 hours PRN        Labs & Imaging:  I have personally reviewed all pertinent laboratory tests and imaging results.   Appointment on 10/08/2024   Component Date Value Ref Range Status    Sodium 10/08/2024 138  135 - 147 mmol/L Final    Potassium 10/08/2024 4.0  3.5 - 5.3 mmol/L Final    Chloride 10/08/2024 102  96 - 108 mmol/L Final    CO2 10/08/2024 24  21 - 32 mmol/L Final    ANION GAP 10/08/2024 12  4 - 13 mmol/L Final    BUN 10/08/2024 10  5 - 25 mg/dL Final "    Creatinine 10/08/2024 0.78  0.60 - 1.30 mg/dL Final    Standardized to IDMS reference method    Glucose 10/08/2024 64 (L)  65 - 140 mg/dL Final    If the patient is fasting, the ADA then defines impaired fasting glucose as > 100 mg/dL and diabetes as > or equal to 123 mg/dL.    Calcium 10/08/2024 9.3  8.4 - 10.2 mg/dL Final    eGFR 10/08/2024 94  ml/min/1.73sq m Final    Color, UA 10/08/2024 Light Yellow   Final    Clarity, UA 10/08/2024 Clear   Final    Specific Gravity, UA 10/08/2024 1.018  1.003 - 1.030 Final    pH, UA 10/08/2024 6.0  4.5, 5.0, 5.5, 6.0, 6.5, 7.0, 7.5, 8.0 Final    Leukocytes, UA 10/08/2024 Negative  Negative Final    Nitrite, UA 10/08/2024 Negative  Negative Final    Protein, UA 10/08/2024 Negative  Negative mg/dl Final    Glucose, UA 10/08/2024 Negative  Negative mg/dl Final    Ketones, UA 10/08/2024 Negative  Negative mg/dl Final    Urobilinogen, UA 10/08/2024 <2.0  <2.0 mg/dl mg/dl Final    Bilirubin, UA 10/08/2024 Negative  Negative Final    Occult Blood, UA 10/08/2024 Negative  Negative Final   Admission on 09/27/2024, Discharged on 10/07/2024   Component Date Value Ref Range Status    Color, UA 09/28/2024 Yellow  Yellow, Straw Final    Clarity, UA 09/28/2024 Cloudy (A)  Hazy, Clear Final    Specific Gravity, UA 09/28/2024 >=1.030 (H)  >1.005 - <1.030 Final    pH, UA 09/28/2024 6.0  5.0, 5.5, 6.0, 6.5, 7.0, 7.5 Final    Leukocytes, UA 09/28/2024 Trace (A)  Negative Final    Nitrite, UA 09/28/2024 Negative  Negative Final    Protein, UA 09/28/2024 1+ (A)  Negative, Interference- unable to analyze mg/dl Final    Glucose, UA 09/28/2024 Negative  Negative mg/dl Final    Ketones, UA 09/28/2024 Trace (A)  Negative mg/dl Final    Urobilinogen, UA 09/28/2024 1.0  0.2, 1.0 E.U./dl E.U./dl Final    Bilirubin, UA 09/28/2024 2+ (A)  Negative Final    Occult Blood, UA 09/28/2024 Negative  Negative Final    RBC, UA 09/28/2024 0-1 (A)  None Seen, 2-4 /hpf Final    WBC, UA 09/28/2024 10-20 (A)  None  Seen, 2-4, 5-60 /hpf Final    Epithelial Cells 09/28/2024 Moderate (A)  None Seen, Occasional /hpf Final    Bacteria, UA 09/28/2024 Moderate (A)  None Seen, Occasional /hpf Final    Ca Oxalate Mini, UA 09/28/2024 Occasional (A)  None Seen /hpf Final    Sodium 09/28/2024 140  135 - 147 mmol/L Final    Potassium 09/28/2024 3.3 (L)  3.5 - 5.3 mmol/L Final    Chloride 09/28/2024 108  96 - 108 mmol/L Final    CO2 09/28/2024 24  21 - 32 mmol/L Final    ANION GAP 09/28/2024 8  4 - 13 mmol/L Final    BUN 09/28/2024 19  5 - 25 mg/dL Final    Creatinine 09/28/2024 1.09  0.60 - 1.30 mg/dL Final    Standardized to IDMS reference method    Glucose 09/28/2024 93  65 - 140 mg/dL Final    If the patient is fasting, the ADA then defines impaired fasting glucose as > 100 mg/dL and diabetes as > or equal to 123 mg/dL.    Glucose, Fasting 09/28/2024 93  65 - 99 mg/dL Final    Calcium 09/28/2024 9.4  8.4 - 10.2 mg/dL Final    AST 09/28/2024 12 (L)  13 - 39 U/L Final    ALT 09/28/2024 11  7 - 52 U/L Final    Specimen collection should occur prior to Sulfasalazine administration due to the potential for falsely depressed results.     Alkaline Phosphatase 09/28/2024 50  34 - 104 U/L Final    Total Protein 09/28/2024 7.1  6.4 - 8.4 g/dL Final    Albumin 09/28/2024 4.4  3.5 - 5.0 g/dL Final    Total Bilirubin 09/28/2024 1.73 (H)  0.20 - 1.00 mg/dL Final    Use of this assay is not recommended for patients undergoing treatment with eltrombopag due to the potential for falsely elevated results.  N-acetyl-p-benzoquinone imine (metabolite of Acetaminophen) will generate erroneously low results in samples for patients that have taken an overdose of Acetaminophen.    eGFR 09/28/2024 62  ml/min/1.73sq m Final    WBC 09/28/2024 8.12  4.31 - 10.16 Thousand/uL Final    RBC 09/28/2024 4.45  3.81 - 5.12 Million/uL Final    Hemoglobin 09/28/2024 13.2  11.5 - 15.4 g/dL Final    Hematocrit 09/28/2024 39.1  34.8 - 46.1 % Final    MCV 09/28/2024 88  82 - 98  fL Final    MCH 09/28/2024 29.7  26.8 - 34.3 pg Final    MCHC 09/28/2024 33.8  31.4 - 37.4 g/dL Final    RDW 09/28/2024 12.2  11.6 - 15.1 % Final    MPV 09/28/2024 10.1  8.9 - 12.7 fL Final    Platelets 09/28/2024 244  149 - 390 Thousands/uL Final    nRBC 09/28/2024 0  /100 WBCs Final    Segmented % 09/28/2024 41 (L)  43 - 75 % Final    Immature Grans % 09/28/2024 0  0 - 2 % Final    Lymphocytes % 09/28/2024 46 (H)  14 - 44 % Final    Monocytes % 09/28/2024 9  4 - 12 % Final    Eosinophils Relative 09/28/2024 3  0 - 6 % Final    Basophils Relative 09/28/2024 1  0 - 1 % Final    Absolute Neutrophils 09/28/2024 3.34  1.85 - 7.62 Thousands/µL Final    Absolute Immature Grans 09/28/2024 0.01  0.00 - 0.20 Thousand/uL Final    Absolute Lymphocytes 09/28/2024 3.74  0.60 - 4.47 Thousands/µL Final    Absolute Monocytes 09/28/2024 0.71  0.17 - 1.22 Thousand/µL Final    Eosinophils Absolute 09/28/2024 0.26  0.00 - 0.61 Thousand/µL Final    Basophils Absolute 09/28/2024 0.06  0.00 - 0.10 Thousands/µL Final    Preg, Serum 09/28/2024 Negative  Negative Final    TSH 3RD GENERATON 09/28/2024 1.689  0.450 - 4.500 uIU/mL Final    The recommended reference ranges for TSH during pregnancy are as follows:   First trimester 0.100 to 2.500 uIU/mL   Second trimester  0.200 to 3.000 uIU/mL   Third trimester 0.300 to 3.000 uIU/m    Note: Normal ranges may not apply to patients who are transgender, non-binary, or whose legal sex, sex at birth, and gender identity differ.  Adult TSH (3rd generation) reference range follows the recommended guidelines of the American Thy[employer] Association, January, 2020.    Vitamin B-12 09/28/2024 279  180 - 914 pg/mL Final    Folate 09/28/2024 11.9  >5.9 ng/mL Final    The World Health Organization has determined deficient folate concentrations are considered to be <4.0 ng/mL.    Vit D, 25-Hydroxy 09/28/2024 38.6  30.0 - 100.0 ng/mL Final    Vitamin D guidelines established by Clinical Guidelines Subcommittee  of  the Endocrine Society Task Force, 2011    Deficiency <20ng/ml   Insufficiency 20-30ng/ml   Sufficient  ng/ml     Cholesterol 09/28/2024 147  See Comment mg/dL Final    Cholesterol:         Pediatric <18 Years        Desirable          <170 mg/dL      Borderline High    170-199 mg/dL      High               >=200 mg/dL        Adult >=18 Years            Desirable         <200 mg/dL      Borderline High   200-239 mg/dL      High              >239 mg/dL      Triglycerides 09/28/2024 124  See Comment mg/dL Final    Triglyceride:     0-9Y            <75mg/dL     10Y-17Y         <90 mg/dL       >=18Y     Normal          <150 mg/dL     Borderline High 150-199 mg/dL     High            200-499 mg/dL        Very High       >499 mg/dL    Specimen collection should occur prior to Metamizole administration due to the potential for falsely depressed results.    HDL, Direct 09/28/2024 38 (L)  >=50 mg/dL Final    LDL Calculated 09/28/2024 84  0 - 100 mg/dL Final    LDL Cholesterol:     Optimal           <100 mg/dl     Near Optimal      100-129 mg/dl     Above Optimal       Borderline High 130-159 mg/dl       High            160-189 mg/dl       Very High       >189 mg/dl         This screening LDL is a calculated result.   It does not have the accuracy of the Direct Measured LDL in the monitoring of patients with hyperlipidemia and/or statin therapy.   Direct Measure LDL (KLO160) must be ordered separately in these patients.    Non-HDL-Chol (CHOL-HDL) 09/28/2024 109  mg/dl Final    Syphilis Total Antibody 09/28/2024 Non-reactive  Non-Reactive Final    No serological evidence of infection with T. pallidum.  Early or incubating syphilis infection cannot be excluded.  Consider repeat testing based on clinical suspicion.    Ventricular Rate 09/27/2024 67  BPM Final    Atrial Rate 09/27/2024 67  BPM Final    [place] Interval 09/27/2024 144  ms Final    QRSD Interval 09/27/2024 92  ms Final    QT Interval 09/27/2024 450  ms Final    QTC  Interval 09/27/2024 475  ms Final    P Axis 09/27/2024 40  degrees Final    QRS [place] 09/27/2024 80  degrees Final    T Wave Axis 09/27/2024 69  degrees Final    Potassium 09/30/2024 4.3  3.5 - 5.3 mmol/L Final    Slightly Hemolyzed:Results may be affected.     ---------------------------------------    Although patient's acute lethality risk is low, long-term/chronic lethality risk is mildly elevated in the presence of recent inpatient ps. At the current moment, Melody is future-oriented, forward-thinking, and demonstrates ability to act in a self-preserving manner as evidenced by volitionally presenting to the clinic today, seeking treatment. At this juncture, inpatient hospitalization is not currently warranted. To mitigate future risk, patient should adhere to the recommendations of this writer, avoid alcohol/illicit substance use, utilize community-based resources and familiar support and prioritize mental health treatment.     Based on today's assessment and clinical criteria, Melody Huyhn contracts for safety and is not an imminent risk of harm to self or others. Outpatient level of care is deemed appropriate at this present time. Melody understands that if they are no longer able to contract for safety, they need to call/contact the outpatient office including this writer, call/contact crisis and/orattend to the nearest Emergency Department for immediate evaluation.    Although patient's acute lethality risk is low, long-term/chronic lethality risk is mildly elevated in the presence of recent inpatient psychiatric admission, anxiety, history of depression, history of substance use, history of traumatic experiences, access to firearms (locked and secured).   Risk of Harm to Self:  The following ratings are based on assessment at the time of the interview  Demographic risk factors include:   Historical Risk Factors include: history of depression, chronic anxiety symptoms, history of mood  disorder, history of substance use, history of traumatic experiences  Recent Specific Risk Factors include: diagnosis of mood disorder, current depressive symptoms, current anxiety symptoms, unemployed, medication adverse effects, recent inpatient psychiatric admission  Protective Factors: no current suicidal ideation, access to mental health treatment, being a parent, being , compliant with medications, compliant with mental health treatment, connection to own children, having a desire to be alive, having a sense of purpose or meaning in life, opportunities to participate in community, responsibilities and duties to others, stable living environment, supportive family  Weapons: gun. The following steps have been taken to ensure weapons are properly secured: locked, secured, confirmed with   Based on today's assessment, Melody presents the following risk of harm to self: low     Risk of Harm to Others:  The following ratings are based on assessment at the time of the interview  Demographic Risk Factors include: unemployed.  Historical Risk Factors include: history of substance use.  Recent Specific Risk Factors include: weapons or other means available, concomitant mood disorder, multiple stressors.  Protective Factors: no current homicidal ideation, access to mental health treatment, being a parent, being , compliant with medications, compliant with mental health treatment, opportunities to participate in community, resilience, responsibilities and duties to others, safe and stable living environment, supportive family  Weapons: gun. The following steps have been taken to ensure weapons are properly secured: locked, secured, confirmed by   Based on today's assessment, Melody presents the following risk of harm to others: low      Psychotherapy Provided:     Individual psychotherapy provided: Yes  Counseling was provided during the session today for 10 minutes.  Medications, treatment  progress and treatment plan reviewed with Melody.    Treatment Plan:    Completed and signed during the session: Not applicable - Treatment Plan not due at this session    This note was shared with patient.    Visit Time:  Visit Start Time: 1245  Visit Stop Time: 1305  Total Visit Duration:  20 minutes    Deysi Cohen DO  Psychiatry Resident, PGY-IV    This note has been constructed using a voice recognition system. There may be translation, syntax, or grammatical errors. If you have any questions, please contact the dictating provider.

## 2024-12-17 ENCOUNTER — TELEPHONE (OUTPATIENT)
Age: 44
End: 2024-12-17

## 2024-12-17 NOTE — TELEPHONE ENCOUNTER
Patient called in after receiving a text message about an appointment today that is supposed to have been rescheduled.    Writer researched and was able to inform the patient that the appointment was rescheduled and their next upcoming appointment is on 1/7/25.    Patient expressed verbal understanding.

## 2024-12-18 DIAGNOSIS — F41.9 ANXIETY DISORDER, UNSPECIFIED TYPE: ICD-10-CM

## 2024-12-18 NOTE — TELEPHONE ENCOUNTER
Reason for call:   [x] Refill   [] Prior Auth  [] Other:     Office: [facility]   [] PCP/Provider -   [x] Specialty/Provider - Psychiatry/ Deysi Cohen     Medication: clonazepam     Dose/Frequency: 1 mg/ twice daily     Quantity: 30 day supply     Pharmacy: [facility]     Does the patient have enough for 3 days?   [x] Yes   [] No - Send as HP to POD

## 2024-12-18 NOTE — TELEPHONE ENCOUNTER
Medication:  PDMP  11/20/2024 11/20/2024 clonazePAM (Tablet) 60.0 30 1 MG NA CLAUDIA CARCAMO [facility] Medicaid 0 / 0 [place]   1 [id number] 10/22/2024 10/22/2024 clonazePAM (Tablet) 60.0 30 1 MG NA CLAUDIA CARCAMO [facility] Medicaid 0 / 0 [place]   1 [id number] 10/03/2024 10/03/2024 clonazePAM (Tablet) 20.0 10 1 MG NA ANNY ALEXANDRE [facility] Medicaid 0 / 0  Active agreement on file -

## 2024-12-19 RX ORDER — CLONAZEPAM 1 MG/1
1 TABLET ORAL 2 TIMES DAILY
Qty: 60 TABLET | Refills: 0 | Status: SHIPPED | OUTPATIENT
Start: 2024-12-19 | End: 2025-01-18

## 2024-12-19 NOTE — TELEPHONE ENCOUNTER
Refill request received and reviewed. PDMP reviewed, no concerns or discrepancies noted. Refill sent to preferred pharmacy.    Thank you,  Deysi Cohen, DO

## 2024-12-26 DIAGNOSIS — F41.9 ANXIETY DISORDER, UNSPECIFIED TYPE: ICD-10-CM

## 2024-12-26 DIAGNOSIS — F39 UNSPECIFIED MOOD (AFFECTIVE) DISORDER (HCC): ICD-10-CM

## 2025-01-02 DIAGNOSIS — F39 UNSPECIFIED MOOD (AFFECTIVE) DISORDER (HCC): ICD-10-CM

## 2025-01-02 NOTE — TELEPHONE ENCOUNTER
Reason for call:   [x] Refill   [] Prior Auth  [] Other:     Office:   [x] Specialty/Provider - psych / Spoleti    Medication: benztropine    Dose/Frequency: 1mg (0.5 tab bid)     Quantity: 60    Pharmacy: [id number] [place] [facility] [facility] - [address]     Does the patient have enough for 3 days?   [x] Yes   [] No - Send as HP to POD

## 2025-01-03 RX ORDER — BENZTROPINE MESYLATE 0.5 MG/1
0.5 TABLET ORAL 2 TIMES DAILY
Qty: 60 TABLET | Refills: 2 | Status: SHIPPED | OUTPATIENT
Start: 2025-01-03 | End: 2025-04-03

## 2025-01-07 ENCOUNTER — TELEMEDICINE (OUTPATIENT)
Dept: PSYCHIATRY | Facility: CLINIC | Age: 45
End: 2025-01-07

## 2025-01-07 DIAGNOSIS — F41.9 ANXIETY DISORDER, UNSPECIFIED TYPE: Primary | ICD-10-CM

## 2025-01-07 DIAGNOSIS — F39 UNSPECIFIED MOOD (AFFECTIVE) DISORDER (HCC): ICD-10-CM

## 2025-01-07 PROCEDURE — PBNCHG PB NO CHARGE PLACEHOLDER

## 2025-01-07 NOTE — PSYCH
Psychiatric Follow Up Visit - Behavioral Health       [facility]  MRN: [id number]      Virtual Regular Visit    Verification of patient location:    Patient is located at Home in the following state in which I hold an active license [place]      Assessment/Plan:    Problem List Items Addressed This Visit       Unspecified mood (affective) disorder (HCC)     Other Visit Diagnoses         Anxiety disorder, unspecified type    -  Primary          Depression Follow-up Plan Completed: Not applicable    Reason for visit is   Chief Complaint   Patient presents with    Virtual Regular Visit          Encounter provider Deysi Cohen DO      Recent Visits  No visits were found meeting these conditions.  Showing recent visits within past 7 days and meeting all other requirements  Today's Visits  Date Type Provider Dept   01/07/25 Telemedicine Deysi Cohen DO  [facility]   Showing today's visits and meeting all other requirements  Future Appointments  No visits were found meeting these conditions.  Showing future appointments within next 150 days and meeting all other requirements       The patient was identified by name and date of birth. Melody Huynh was informed that this is a telemedicine visit and that the visit is being conducted throughthe Epic Embedded platform. She agrees to proceed..  My office door was closed. No one else was in the room.  She acknowledged consent and understanding of privacy and security of the video platform. The patient has agreed to participate and understands they can discontinue the visit at any time.    Patient is aware this is a billable service.     Assessment/Plan:   Melody Huynh is a 44 y.o. 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  For review, on initial evaluation, patient presented with symptoms consistent with EPS and akathisia related adverse effects of psychotropic medications. Possible offending agents were previously discontinued (Vraylar, Zyprexa) with adjustments to Prozac and Klonopin dosing.  Patient had acute decompensation after 09/25/24 outpatient appointment requiring inpatient hospitalization. [facility] adjustments were made to Prozac and Haldol was added to medication regimen for psychomotor agitation, anxiety, and mood related symptoms.  Today, the patient is experiencing continued improvement in anxiety related symptoms over the last month. She denies increases in inner tension, negative thinking, with no panic related symptoms including during her trip to [place] last month. The patient has not experienced a recurrence of depression related symptoms. The patient denies adverse effects of medications at time of evaluation - blurry vision has resolved, with no stiffness or restlessness.  Patient agreeable to continue medications at current doses and is in agreement with continued medication management follow up.  Assessment & Plan  Anxiety disorder, unspecified type  Continue Prozac 40 mg daily for mood and anxiety  PARQ completed including serotonin syndrome, SIADH, worsening depression, suicidality, induction of ghada, GI upset, headaches, activation, sexual  side effects, sedation, potential drug interactions, and others.  Continue Haldol to 1 mg BID for anxiety, psychomotor agitation, and augmentation of antidepressant in setting of adverse effects  PARQ of Haldol including EPS side effects suck as Tardive Dyskinesia, parkinsonism, and tardive dystonia in addition to prolactin increase/galactorrhea, amenorrhea, dizziness, sedation, hypotension, weight gain/metabolic complications, Akathisia, dry mouth, constipation, urinary retention, blurred vision, decreased sweating, tachycardia, hypertension, and rarely NMS, seizures, jaundice, agranulocytosis, leukopenia, and increased risk of CVA in elderly patient's with dementia-related psychosis.  Continue Cogentin to 0.5 mg BID for EPS related adverse effects in setting of Haldol and anticholinergic adverse effects  PARQ completed including dry mouth, blurred vision, diplopia, confusion, hallucinations, constipation, nausea, vomiting, dilation of colon/paralytic ileus/bowel obstruction, erectile dysfunction, angle-closure glaucoma, heat stroke (mostly in elderly), tachycardia, cardiac arrhymias, hypotension, urinary retention, and rarely tardive dyskinesia unmasking.  Continue Klonopin 1 mg twice daily as needed for anxiety  PDMP reviewed, last filled in 10/2024 after hospitalization  Discussed with patient the risks of sedation, respiratory depression, impairment in judgment and motor function. Depression, mood changes, GI changes, and others discussed. Patient informed of risks of being on or starting opioid medications due to drug interactions and potential for serious respiratory depression and death.  Continue psychotherapy with own therapist  Recommended increasing frequency of sessions  Unspecified mood (affective) disorder (HCC)  Continue Prozac as above  Continue Haldol as above  Continue psychotherapy with own therapist.       Aware of need to follow up with family physician for medical issues  Aware of 24 hour and  weekend coverage for urgent situations accessed by calling [facility] main practice number  Medication management every 4-6 weeks    Medication Risks/Benefits      Risks, Benefits And Possible Side Effects Of Medications:    Risks, benefits, and possible side effects of medications explained to Melody and she verbalizes understanding and agreement for treatment.    Controlled Medication Discussion:     Melody has been filling controlled prescriptions on time as prescribed according to [place] Prescription Drug Monitoring Program  Discussed with Melody the risks of sedation, respiratory depression, impairment of ability to drive and potential for abuse and addiction related to treatment with benzodiazepine medications. She understands risk of treatment with benzodiazepine medications, agrees to not drive if feels impaired and agrees to take medications as prescribed  Discussed with Melody Black Box warning on concurrent use of benzodiazepines and opioid medications including sedation, respiratory depression, coma and death. She understands the risk of treatment with benzodiazepines in addition to opioids and wants to continue taking those medications  Discussed with Melody increased risk of impairment related to concurrent use of benzodiazepines and hypnotic medications including excessive sedation, psychomotor impairment and respiratory depression. She understands the risk of treatment with benzodiazepines in addition to hypnotic medications and wants to continue taking those medications  ------------------------------------  History of Present Illness   Melody Huynh is presenting to follow up for anxiety, depression, and EPS related symptoms . Melody demonstrates a bright affect discussing her family trip to [place] last month. She discusses positive moments of the trip and notes she did not have panic attack or other increase in anxiety related symptoms during the trip.  She feels overall her anxiety symptoms have continued to improve and are managed at this time. She denies increase in rate of thoughts, negative thinking, inner tension, or restlessness. The patient is utilizing Klonopin 1x/morning and evening dose on some, not all days.    The patient denies down, depressed mood. She denies sleep or appetite disturbance. She denies SI, HI, passive death wishes, or thoughts to harm self or others at time of evaluation.    The patient does not endorse or demonstrate symptoms consistent with ghada/hypomania or positive or negative symptoms of psychosis at time of evaluation.    Discussed medications with patient and she is in agreement with continuing medications at current doses. The patient denies adverse effects - including blurry vision, akathisia related symptoms, stiffness, and restlessness.  Patient agreeable to continue medication management follow up and individual therapy.    Psychiatric Review Of Systems:  Melody reports Symptoms as described in HPI.  Melody denies Current suicidal thoughts, plan, or intent, Current thoughts of self-harm, or Current homicidal thoughts, plan, or intent.    Medical Review Of Systems:  Complete review of systems is negative except as noted above.  Complete review of systems is negative except as noted above.  [place] Akathisia Rating Scale (BARS) - Today's assessment on 12/04/24 0 and Global Clinical Assessment score of 0  Prior score on 10/23/23: 8 and Global Clinical Assessment score of 3  11/14/23: 7 and Global Clinical Assessment score of 2  12/04/23: Global Clinical Assessment score of 3 due to distress to patient  02/29/24: 0 and Global Clinical Assessment score of 0  04/22/24: 0 and Global Clinical Assessment score of 0  09/25/24 0 and Global Clinical Assessment score of 0  10/22/24 2 and Global Clinical Assessment score of 1  11/05/24: 0 and Global Clinical Assessment score of 1  Objective   0 Normal, occasional fidgety movements of  the limbs  1 Presence of characteristic restless movements: shuffling or tramping movements of the legs/feet, or swinging of one leg while sitting, and/or rocking from foot to foot or “walking on the spot” when standing, but movements present for less than half the time observed  2 Observed phenomena, as described in (1) above, which are present for at least half the observation period  3 Patient is constantly engaged in characteristic restless movements, and/or has the inability to remain seated or standing without walking or pacing, during the time observed  Subjective  0 Absence of inner restlessness  1 Non-specific sense of inner restlessness  2 The patient is aware of an inability to keep the legs still, or a desire to move the legs, and/or complains of inner restlessness aggravated specifically by being required to stand still  3 Awareness of intense compulsion to move most of the time and/or reports strong desire to walk or pace most of the time  Distress related to restlessness  0 No distress  1 Mild  2 Moderate  3 Severe  Global Clinical Assessment of Akathisia  0 Absent. No evidence of awareness of restlessness. Observation of characteristic movements of akathisia in the absence of a subjective report of inner restlessness or compulsive desire to move the legs should be classified as pseudoakathisia  1 Questionable. Non-specific inner tension and fidgety movements  2 Mild akathisia. Awareness of restlessness in the legs and/or inner restlessness worse when required to stand still. Fidgety movements present, but characteristic restless movements of akathisia not necessarily observed. Condition causes little or no distress.  3 Moderate akathisia. Awareness of restlessness as described for mild akathisia above, combined with characteristic restless movements such as rocking from foot to foot when standing. Patient finds the condition distressing.  4 Marked akathisia. Subjective experience of restlessness  includes a compulsive desire to walk or pace. However, the patient is able to remain seated for at least five minutes. The condition is obviously distressing.  5 Severe akathisia. The patient reports a strong compulsion to pace up and down most of the time. Unable to sit or lie down for more than a few minutes. Constant restlessness which is associated with intense distress and insomnia.  ------------------------------------  All italicized information has been copied from previous psychiatric evaluation. Information has been reviewed with the patient.     Past Psychiatric History:   Prior psychiatric diagnoses: anxiety, panic disorder, mood disorder, OCD  Inpatient hospitalizations: inpatient hospitalizations - first hospitalization 2001 - [facility], 2008 - [facility] [place], 2021 - panic disorder, mood symptoms, most recent at [facility] in 09/2024  Emergency room visits often in-between for severe panic attacks - 2016, multiple recent ED visits in 09/2024 for severe panic attacks  Suicide attempts/self-harm: patient denies, patient denies self harm behaviors  Violent/aggressive behavior: patient denies   Patient did have some aggressive outbursts towards  and providers  Outpatient psychiatric providers: currently has outpatient psychiatric provider - [facility]   Was without a psychiatrist from [phone] (Lexapro from [facility] during that time), prior psychiatrist, Dr. Martines, retired  Past/current psychotherapy: patient has a therapist weekly, Dr. Foster, [facility]  Other Services: patient denies  Psychiatric medication trial:   Multiple medication trials - including below,  Antidepressants  Prozac (up to 40 mg), Zoloft (short period of time), Lexapro (multiple years), Luvox  Antipsychotics  Vraylar, Zyprexa, Seroquel, Haldol  Sedative hypnotics  Ativan, Klonopin (0.5 mg QID PRN)  Others  Hydroxyzine, Gabapentin, BuSpar (60 mg total daily),  Cogentin     Substance Abuse History:  I have assessed this patient for substance use within the past 12 months.  Patient reports a few cigarettes per day, quit 2007/2008. Patient reports history of daily marijuana use - had medical marijuana card - few months without smoking.   Denies history of other recreational use including opioids, methamphetamines/amphetamines, cocaine use. Denies past legal actions or arrests secondary to substance intoxication. The patient denies prior DWIs/DUIs. Melody does not exhibit objective evidence of substance withdrawal during today's examination nor does Melody appear under the influence of any psychoactive substance.       Family Psychiatric History:   Mother: anxiety (on antidepressant - Lexapro)  No family history of substance use disorder or suicide attempts or completions.     Social History  Early life/developmental: Denies a history of milestone/developmental delay. Denies a history of in-utero exposure to toxins/illicit substances. Denies ADHD history. No history of IEP/504, denies special supports in school.  Marital history: /Civil Union   Children: yes, 1 daughter (7 y/o - 2nd grade)  1 older brother   Living arrangement: Lives in a home with  and daughter (family lost their dog in May).  Support system: identifies  as the biggest source of support  Mother also a big support  Education: college graduate - communications ([employer])  Had to withdraw from college twice due to anxiety  Occupational History: unemployed since January 2023, applying for permanent disability - previously in advertising in 10-15 years  Other Pertinent History: no reported  or legal history  Access to firearms:  has firearm, locked and secured      Traumatic History:   Abuse:  patient denied  Other Traumatic Events:  medical trauma, father passed when she was 16 y/o      History Review: The following portions of the patient's history were reviewed and  "updated as appropriate: allergies, current medications, past family history, past medical history, past social history, past surgical history, and problem list.    Visit Vitals  OB Status Implant   Smoking Status Never      Wt Readings from Last 6 Encounters:   10/08/24 63 kg (138 lb 12.8 oz)   10/06/24 63.4 kg (139 lb 12.8 oz)   10/03/24 62.1 kg (137 lb)   09/27/24 63.1 kg (139 lb 1.8 oz)   04/16/24 65.8 kg (145 lb)   12/18/23 59.3 kg (130 lb 11.2 oz)        Mental Status Exam:  Appearance:  alert, good eye contact, appears stated age, casually dressed, and appropriate grooming and hygiene   Behavior:  calm, cooperative, and sitting comfortably   Motor: no abnormal movements and unable to assess gait and balance due to virtual visit   Speech:  spontaneous, clear, normal rate, not pressured, normal volume, not hyperverbal, and coherent   Mood:  \"Doing good\"   Affect:  mood-congruent and euthymic   Thought Process:  Organized, logical, goal-directed, intermittent increased rate of thoughts   Thought Content: no verbalized delusions or overt paranoia   Perceptual disturbances: no reported hallucinations and does not appear to be responding to internal stimuli at this time   Risk Potential: No active or passive suicidal or homicidal ideation was verbalized during interview   Cognition: oriented to person, place, time, and situation, memory grossly intact, appears to be of average intelligence, age-appropriate attention span and concentration, and cognition not formally tested   Insight:  Good   Judgment: Good       Meds/Allergies    Allergies   Allergen Reactions    Demerol [Meperidine] Hyperactivity    Macrolides And Ketolides      Current Outpatient Medications   Medication Instructions    benztropine (COGENTIN) 0.5 mg, Oral, 2 times daily    clonazePAM (KLONOPIN) 1 mg, Oral, 2 times daily    FLUoxetine (PROZAC) 40 mg, Oral, Daily    haloperidol (HALDOL) 1 mg, Oral, 2 times daily        Labs & Imaging:  I have " personally reviewed all pertinent laboratory tests and imaging results.   No visits with results within 2 Month(s) from this visit.   Latest known visit with results is:   Appointment on 10/08/2024   Component Date Value Ref Range Status    Sodium 10/08/2024 138  135 - 147 mmol/L Final    Potassium 10/08/2024 4.0  3.5 - 5.3 mmol/L Final    Chloride 10/08/2024 102  96 - 108 mmol/L Final    CO2 10/08/2024 24  21 - 32 mmol/L Final    ANION GAP 10/08/2024 12  4 - 13 mmol/L Final    BUN 10/08/2024 10  5 - 25 mg/dL Final    Creatinine 10/08/2024 0.78  0.60 - 1.30 mg/dL Final    Standardized to IDMS reference method    Glucose 10/08/2024 64 (L)  65 - 140 mg/dL Final    If the patient is fasting, the ADA then defines impaired fasting glucose as > 100 mg/dL and diabetes as > or equal to 123 mg/dL.    Calcium 10/08/2024 9.3  8.4 - 10.2 mg/dL Final    eGFR 10/08/2024 94  ml/min/1.73sq m Final    Color, UA 10/08/2024 Light Yellow   Final    Clarity, UA 10/08/2024 Clear   Final    Specific Gravity, UA 10/08/2024 1.018  1.003 - 1.030 Final    pH, UA 10/08/2024 6.0  4.5, 5.0, 5.5, 6.0, 6.5, 7.0, 7.5, 8.0 Final    Leukocytes, UA 10/08/2024 Negative  Negative Final    Nitrite, UA 10/08/2024 Negative  Negative Final    Protein, UA 10/08/2024 Negative  Negative mg/dl Final    Glucose, UA 10/08/2024 Negative  Negative mg/dl Final    Ketones, UA 10/08/2024 Negative  Negative mg/dl Final    Urobilinogen, UA 10/08/2024 <2.0  <2.0 mg/dl mg/dl Final    Bilirubin, UA 10/08/2024 Negative  Negative Final    Occult Blood, UA 10/08/2024 Negative  Negative Final     ---------------------------------------    Although patient's acute lethality risk is low, long-term/chronic lethality risk is mildly elevated in the presence of recent inpatient psychiatric admission (September 2024), anxiety, mood related symptoms, history of substance use, history of traumatic experiences, access to firearms (locked and secured). At the current moment, Melody is  future-oriented, forward-thinking, and demonstrates ability to act in a self-preserving manner as evidenced by volitionally presenting to the clinic today, seeking treatment. At this juncture, inpatient hospitalization is not currently warranted. To mitigate future risk, patient should adhere to the recommendations of this writer, avoid alcohol/illicit substance use, utilize community-based resources and familiar support and prioritize mental health treatment.     Based on today's assessment and clinical criteria, Melody Huynh contracts for safety and is not an imminent risk of harm to self or others. Outpatient level of care is deemed appropriate at this present time. Melody understands that if they are no longer able to contract for safety, they need to call/contact the outpatient office including this writer, call/contact crisis and/orattend to the nearest Emergency Department for immediate evaluation.    Risk of Harm to Self:  The following ratings are based on assessment at the time of the interview  Demographic risk factors include:   Historical Risk Factors include: history of depression, chronic anxiety symptoms, history of mood disorder, history of substance use, history of traumatic experiences  Recent Specific Risk Factors include: diagnosis of mood disorder, current depressive symptoms, current anxiety symptoms, unemployed, medication adverse effects, recent inpatient psychiatric admission  Protective Factors: no current suicidal ideation, access to mental health treatment, being a parent, being , compliant with medications, compliant with mental health treatment, connection to own children, having a desire to be alive, having a sense of purpose or meaning in life, opportunities to participate in community, responsibilities and duties to others, stable living environment, supportive family  Weapons: gun. The following steps have been taken to ensure weapons are properly secured:  locked, secured, confirmed with   Based on today's assessment, Melody presents the following risk of harm to self: low     Risk of Harm to Others:  The following ratings are based on assessment at the time of the interview  Demographic Risk Factors include: unemployed.  Historical Risk Factors include: history of substance use.  Recent Specific Risk Factors include: weapons or other means available, concomitant mood disorder, multiple stressors.  Protective Factors: no current homicidal ideation, access to mental health treatment, being a parent, being , compliant with medications, compliant with mental health treatment, opportunities to participate in community, resilience, responsibilities and duties to others, safe and stable living environment, supportive family  Weapons: gun. The following steps have been taken to ensure weapons are properly secured: locked, secured, confirmed by   Based on today's assessment, Melody presents the following risk of harm to others: low       Psychotherapy Provided:     Individual psychotherapy provided: Yes  Counseling was provided during the session today for 10 minutes.  Medications, treatment progress and treatment plan reviewed with Melody.    Treatment Plan:    Completed and signed during the session: Not applicable - Treatment Plan not due at this session    This note was shared with patient.    Visit Time:  Visit Start Time: 1415  Visit Stop Time: 1440  Total Visit Duration:  25 minutes    Deysi Cohen DO  Psychiatry Resident, PGY-IV    This note has been constructed using a voice recognition system. There may be translation, syntax, or grammatical errors. If you have any questions, please contact the dictating provider.

## 2025-01-08 NOTE — PATIENT INSTRUCTIONS
Continue Haldol to 1 mg twice daily (one 1 mg tablet twice daily)  Continue Cogentin to 0.5 mg (1/2 tablet) twice daily with Haldol  Continue Prozac 40 mg (two 20 mg capsules) daily  Continue Klonopin as needed for anxiety    Please call the office nursing staff for medication issues including refills, problems obtaining medications, side effects, etc.  Please return for a follow up appointment as discussed. If you are running late or are unable to attend your appointment, please call our [place] office at [phone].    If you are in psychological crisis including not limited to suicidal/homicidal thoughts or thoughts of self-injury, do not hesitate to call/contact your County Crisis hotline (see below), call [phone], call [phone] (mental health crisis), or go to the nearest emergency department.  [facility] Crisis: [phone]  [facility] Crisis: [phone]  [facility] Crisis: [phone]  [facility] Crisis: [phone]  [facility] Crisis: [phone]  [facility] Crisis: [phone]  [facility] Crisis: [phone]  National Suicide Prevention Hotline: 1-626.236.6865

## 2025-01-21 DIAGNOSIS — F41.9 ANXIETY DISORDER, UNSPECIFIED TYPE: ICD-10-CM

## 2025-01-21 NOTE — TELEPHONE ENCOUNTER
12/19/2024 12/19/2024 clonazePAM (Tablet) 60.0 30 1 MG NA CLAUDIA CARCAMO [facility] Medicaid 0 / 0 [place]   1 [id number] 11/20/2024 11/20/2024 clonazePAM (Tablet) 60.0 30 1 MG NA CLAUDIA CARCAMO [facility] [place] 0 / 0 [place]   1 [id number] 10/22/2024 10/22/2024 clonazePAM (Tablet) 60.0 30 1 MG NA CLAUDIA CARCAMO [facility] [place] 0 / 0 [place]   1 [id number] 10/03/2024 10/03/2024 clonazePAM (Tablet) 20.0 10 1 MG NA ANNY ALEXANDRE [facility] [place] 0 / 0 [place]   1 [id number] 09/19/2024 09/19/2024 clonazePAM (Tablet) 90.0 22 0.5 MG NA KAITLYN DE LEON [facility] [place] 0 / 0 [place]   1 [id number] 08/06/2024 08/06/2024 clonazePAM (Tablet) 90.0 22 0.5 MG NA KAITLYN DE LEON [facility] [place] 0 / 0 [place]   1 [id number] 06/17/2024 06/17/2024 clonazePAM (Tablet) 90.0 22 0.5 MG NA CLAUDIA CARCAMO

## 2025-01-21 NOTE — TELEPHONE ENCOUNTER
Reason for call:   [x] Refill   [] Prior Auth  [] Other:     Office:   [] PCP/Provider -   [x] Specialty/Provider - autumn Cohen  Psych jai    Medication: clonazepam    Dose/Frequency: 1mg  1 tab 2 times daily    Quantity: 60    Ph[facility]:  [id number]    Does the patient have enough for 3 days?   [x] Yes   [] No - Send as HP to POD

## 2025-01-22 RX ORDER — CLONAZEPAM 1 MG/1
1 TABLET ORAL 2 TIMES DAILY
Qty: 60 TABLET | Refills: 0 | Status: SHIPPED | OUTPATIENT
Start: 2025-01-22 | End: 2025-02-21

## 2025-01-22 NOTE — TELEPHONE ENCOUNTER
Refill request received and reviewed. Last filled 12/19/24, no discrepancies or concerns noted. Follow up appointment scheduled for 02/19/25.  Refill sent to preferred pharmacy.    Thank you,  Deysi Cohen, DO

## 2025-01-27 DIAGNOSIS — F39 UNSPECIFIED MOOD (AFFECTIVE) DISORDER (HCC): ICD-10-CM

## 2025-01-28 DIAGNOSIS — F39 UNSPECIFIED MOOD (AFFECTIVE) DISORDER (HCC): ICD-10-CM

## 2025-01-28 RX ORDER — HALOPERIDOL 1 MG/1
1 TABLET ORAL 2 TIMES DAILY
Qty: 60 TABLET | Refills: 2 | Status: SHIPPED | OUTPATIENT
Start: 2025-01-28

## 2025-01-28 RX ORDER — BENZTROPINE MESYLATE 0.5 MG/1
0.5 TABLET ORAL 2 TIMES DAILY
Qty: 60 TABLET | Refills: 2 | Status: SHIPPED | OUTPATIENT
Start: 2025-01-28 | End: 2025-04-28

## 2025-02-19 ENCOUNTER — TELEMEDICINE (OUTPATIENT)
Dept: PSYCHIATRY | Facility: CLINIC | Age: 45
End: 2025-02-19

## 2025-02-19 DIAGNOSIS — F39 UNSPECIFIED MOOD (AFFECTIVE) DISORDER (HCC): ICD-10-CM

## 2025-02-19 DIAGNOSIS — F41.9 ANXIETY DISORDER, UNSPECIFIED TYPE: Primary | ICD-10-CM

## 2025-02-19 PROCEDURE — PBNCHG PB NO CHARGE PLACEHOLDER

## 2025-02-19 RX ORDER — CLONAZEPAM 1 MG/1
1 TABLET ORAL 2 TIMES DAILY PRN
Qty: 60 TABLET | Refills: 0 | Status: SHIPPED | OUTPATIENT
Start: 2025-02-19 | End: 2025-03-21

## 2025-02-19 RX ORDER — HALOPERIDOL 1 MG/1
0.5 TABLET ORAL 2 TIMES DAILY
Start: 2025-03-03

## 2025-02-19 NOTE — PATIENT INSTRUCTIONS
On Monday, 03/03/25 decrease Haldol to 0.5 mg (1/2 tablet) twice daily  Continue Cogentin 0.5 mg twice daily - take with Haldol   Continue Prozac 40 mg daily   Continue Klonopin 1 mg twice daily as needed for severe anxiety    Please call the office nursing staff for medication issues including refills, problems obtaining medications, side effects, etc.  Please return for a follow up appointment as discussed. If you are running late or are unable to attend your appointment, please call our [place] office at [phone].    If you are in psychological crisis including not limited to suicidal/homicidal thoughts or thoughts of self-injury, do not hesitate to call/contact your County Crisis hotline (see below), call [phone], call [phone] (mental health crisis), or go to the nearest emergency department.  [facility] Crisis: [phone]  [facility] Crisis: [phone]  [facility] Crisis: [phone]  [facility] Crisis: [phone]  [facility] Crisis: [phone]  [facility] Crisis: [phone]  [facility] Crisis: [phone]  National Suicide Prevention Hotline: 1-351.104.6747

## 2025-02-19 NOTE — ASSESSMENT & PLAN NOTE
Differential diagnosis includes Major depressive disorder with anxious distress, Bipolar affective disorder, Cyclothymia  Continue Prozac as above  Continue Haldol as above  Continue psychotherapy with own therapist.

## 2025-02-19 NOTE — PSYCH
Psychiatric Follow Up Visit - Behavioral Health       [facility]  MRN: [id number]    Virtual Regular VisitName: Melody Huynh      : 1980      MRN: [id number]  Encounter Provider: Deysi Cohen DO  Encounter Date: 2025   [facility]    Verification of patient location:     Patient is located at Home in the following state in which I hold an active license [place]     Depression Follow-up Plan Completed: Yes; see assessment and plan     Encounter provider Deysi Cohen DO     The patient was identified by name and date of birth. Melody Huynh was informed that this is a telemedicine visit and that the visit is being conducted throughthe Epic Embedded platform. She agrees to proceed..  My office door was closed. No one else was in the room.  She acknowledged consent and understanding of privacy and security of the video platform. The patient has agreed to participate and understands they can discontinue the visit at any time.     Patient is aware this is a billable service.     Assessment/Plan:   Melody Huynh is a 44 y.o. 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  For review, on initial evaluation, patient presented with  symptoms consistent with EPS and akathisia related adverse effects of psychotropic medications. Possible offending agents were previously discontinued (Vraylar, Zyprexa) with adjustments to Prozac and Klonopin dosing.  Patient had acute decompensation after 09/25/24 outpatient appointment requiring inpatient hospitalization. Patient was admitted to [facility] where adjustments were made to Prozac and Haldol was added to medication regimen for psychomotor agitation, anxiety, and mood related symptoms.  Today, the patient continues to experience stable anxiety related and mood related symptoms. There have been no panic related symptoms since prior evaluation. The patient has not experienced recurrence in depressive related symptoms, she has had good sleep and good attendance to self care needs. Patient with questions regarding Haldol prescription as her goal is to eventually discontinue this medication - reviewed tapering that has been successful so far from the dose which allowed for stabilization in the hospital. Discussed recommendation to continue medication for at least 6 months from hospitalization. Discussed plan to taper to 0.5 mg BID prior to next evaluation and reassess, as well as to continue other medications at current doses. Patient is agreeable to this plan and is in agreement with continued medication management follow up and follow up with individual psychotherapist.  Assessment & Plan  Anxiety disorder, unspecified type  Differential diagnosis includes Generalized anxiety disorder, Panic disorder  Continue Prozac 40 mg daily for mood and anxiety  PARQ completed including serotonin syndrome, SIADH, worsening depression, suicidality, induction of ghada, GI upset, headaches, activation, sexual side effects, sedation, potential drug interactions, and others.  Continue Haldol to 1 mg BID for anxiety, psychomotor agitation, and augmentation of antidepressant  Plan is to continue taper of  medication  Recommend decreasing to 0.5 mg BID on 03/03/25 as patient approaches 6 months since last hospitalization, with reassessment for recurrence of symptoms on next evaluation  PARQ of Haldol including EPS side effects suck as Tardive Dyskinesia, parkinsonism, and tardive dystonia in addition to prolactin increase/galactorrhea, amenorrhea, dizziness, sedation, hypotension, weight gain/metabolic complications, Akathisia, dry mouth, constipation, urinary retention, blurred vision, decreased sweating, tachycardia, hypertension, and rarely NMS, seizures, jaundice, agranulocytosis, leukopenia, and increased risk of CVA in elderly patient's with dementia-related psychosis.  Continue Cogentin to 0.5 mg BID for EPS related adverse effects in setting of Haldol and anticholinergic adverse effects  PARQ completed including dry mouth, blurred vision, diplopia, confusion, hallucinations, constipation, nausea, vomiting, dilation of colon/paralytic ileus/bowel obstruction, erectile dysfunction, angle-closure glaucoma, heat stroke (mostly in elderly), tachycardia, cardiac arrhymias, hypotension, urinary retention, and rarely tardive dyskinesia unmasking.  Continue Klonopin 1 mg twice daily as needed for anxiety  PDMP reviewed, last filled 01/22/25  Discussed with patient the risks of sedation, respiratory depression, impairment in judgment and motor function. Depression, mood changes, GI changes, and others discussed. Patient informed of risks of being on or starting opioid medications due to drug interactions and potential for serious respiratory depression and death.  Continue psychotherapy with own therapist  Unspecified mood (affective) disorder (HCC)  Differential diagnosis includes Major depressive disorder with anxious distress, Bipolar affective disorder, Cyclothymia  Continue Prozac as above  Continue Haldol as above  Continue psychotherapy with own therapist.    Aware of need to follow up with family physician for  "medical issues  Aware of 24 hour and weekend coverage for urgent situations accessed by calling [facility] main practice number  Medication management every 8 weeks    Medication Risks/Benefits      Risks, Benefits And Possible Side Effects Of Medications:    Risks, benefits, and possible side effects of medications explained to Melody and she verbalizes understanding and agreement for treatment.    Controlled Medication Discussion:     Melody has been filling controlled prescriptions on time as prescribed according to [place] Prescription Drug Monitoring Program  Discussed with Melody the risks of sedation, respiratory depression, impairment of ability to drive and potential for abuse and addiction related to treatment with benzodiazepine medications. She understands risk of treatment with benzodiazepine medications, agrees to not drive if feels impaired and agrees to take medications as prescribed  Discussed with Melody Black Box warning on concurrent use of benzodiazepines and opioid medications including sedation, respiratory depression, coma and death. She understands the risk of treatment with benzodiazepines in addition to opioids and wants to continue taking those medications  Discussed with Melody increased risk of impairment related to concurrent use of benzodiazepines and hypnotic medications including excessive sedation, psychomotor impairment and respiratory depression. She understands the risk of treatment with benzodiazepines in addition to hypnotic medications and wants to continue taking those medications  ------------------------------------  History of Present Illness   Melody Huynh is presenting to follow up for anxiety, depression, and history of EPS related symptoms . Melody endorses her mood has, \"been good.\" She denies recurrence of other depressive related symptoms including low motivation, energy, sleep disruption. Patient has been sleeping, " "\"great,\" endorses sleeping from 10 pm until 7 am. Patient denies nightmares and vivid dreams - she notes they have decreased compared to prior and have not occurred in the last 1-2 weeks. The patient does not endorse SI, HI, passive death wishes, or thoughts to harm self or others at time of evaluation.    She notes anxiety related symptoms are stable at this time with reduction in inner tension, restlessness, and negative thinking. The patient does experience anxiety more in the mornings and has utilized PRN Klonopin for such, the patient has been taking less Klonopin in the afternoons/evenings - averaging 4x/week use in the afternoons and evenings.    The patient does not endorse or demonstrate symptoms consistent with ghada/hypomania or positive or negative symptoms of psychosis at time of evaluation.    Discussed with patient her long term goal of discontinuing Haldol. Discussed taper so far and recommendation to continue slow taper, and to maintain on medication for at least 6 months since it was needed for stabilization during psychiatric inpatient admission. Discussed lowering to 0.5 mg BID on 03/03/25 - 3 weeks prior to next evaluation and for reassessment at next evaluation. Discussed signs and symptoms of decompensation to monitor for in setting of tapering medication - and to contact provider if such occur, and patient expressed understanding.  Patient agreeable to continue medication management follow up and individual therapy.    Psychiatric Review Of Systems:  Melody reports Symptoms as described in HPI.  Melody denies Current suicidal thoughts, plan, or intent, Current thoughts of self-harm, or Current homicidal thoughts, plan, or intent.    Medical Review Of Systems:  Complete review of systems is negative except as noted above.  C[place]ion Akathisia Rating Scale (BARS) - Today's assessment on 02/19/24 is 0 and Global Clinical Assessment score of 0  Prior score on 10/23/23: 8 and Global Clinical " Assessment score of 3  11/14/23: 7 and Global Clinical Assessment score of 2  12/04/23: Global Clinical Assessment score of 3 due to distress to patient  02/29/24: 0 and Global Clinical Assessment score of 0  04/22/24: 0 and Global Clinical Assessment score of 0  09/25/24 0 and Global Clinical Assessment score of 0  10/22/24 2 and Global Clinical Assessment score of 1  11/05/24: 0 and Global Clinical Assessment score of 1  12/04/24 0 and Global Clinical Assessment score of 0  Objective   0 Normal, occasional fidgety movements of the limbs  1 Presence of characteristic restless movements: shuffling or tramping movements of the legs/feet, or swinging of one leg while sitting, and/or rocking from foot to foot or “walking on the spot” when standing, but movements present for less than half the time observed  2 Observed phenomena, as described in (1) above, which are present for at least half the observation period  3 Patient is constantly engaged in characteristic restless movements, and/or has the inability to remain seated or standing without walking or pacing, during the time observed  Subjective  0 Absence of inner restlessness  1 Non-specific sense of inner restlessness  2 The patient is aware of an inability to keep the legs still, or a desire to move the legs, and/or complains of inner restlessness aggravated specifically by being required to stand still  3 Awareness of intense compulsion to move most of the time and/or reports strong desire to walk or pace most of the time  Distress related to restlessness  0 No distress  1 Mild  2 Moderate  3 Severe  Global Clinical Assessment of Akathisia  0 Absent. No evidence of awareness of restlessness. Observation of characteristic movements of akathisia in the absence of a subjective report of inner restlessness or compulsive desire to move the legs should be classified as pseudoakathisia  1 Questionable. Non-specific inner tension and fidgety movements  2 Mild  akathisia. Awareness of restlessness in the legs and/or inner restlessness worse when required to stand still. Fidgety movements present, but characteristic restless movements of akathisia not necessarily observed. Condition causes little or no distress.  3 Moderate akathisia. Awareness of restlessness as described for mild akathisia above, combined with characteristic restless movements such as rocking from foot to foot when standing. Patient finds the condition distressing.  4 Marked akathisia. Subjective experience of restlessness includes a compulsive desire to walk or pace. However, the patient is able to remain seated for at least five minutes. The condition is obviously distressing.  5 Severe akathisia. The patient reports a strong compulsion to pace up and down most of the time. Unable to sit or lie down for more than a few minutes. Constant restlessness which is associated with intense distress and insomnia.  ------------------------------------  All italicized information has been copied from previous psychiatric evaluation. Information has been reviewed with the patient.   Past Psychiatric History:   Prior psychiatric diagnoses: anxiety, panic disorder, mood disorder, OCD  Inpatient hospitalizations: inpatient hospitalizations - first hospitalization 2001 - [facility], 2008 - [facility] [place], 2021 - panic disorder, mood symptoms, most recent at [facility] in 09/2024  Emergency room visits often in-between for severe panic attacks - 2016, multiple recent ED visits in 09/2024 for severe panic attacks  Suicide attempts/self-harm: patient denies, patient denies self harm behaviors  Violent/aggressive behavior: patient denies   Patient did have some aggressive outbursts towards  and providers  Outpatient psychiatric providers: currently has outpatient psychiatric provider - [facility]   Was without a psychiatrist from [phone] (Lexapro from [facility] during that  time), prior psychiatrist, Dr. Martines, retired  Past/current psychotherapy: patient has a therapist weekly, Dr. Foster, [facility]  Other Services: patient denies  Psychiatric medication trial:   Multiple medication trials - including below,  Antidepressants  Prozac (up to 40 mg), Zoloft (short period of time), Lexapro (multiple years), Luvox  Antipsychotics  Vraylar, Zyprexa, Seroquel, Haldol  Sedative hypnotics  Ativan, Klonopin (0.5 mg QID PRN)  Others  Hydroxyzine, Gabapentin, BuSpar (60 mg total daily), Cogentin     Substance Abuse History:  I have assessed this patient for substance use within the past 12 months.  Patient reports a few cigarettes per day, quit 2007/2008. Patient reports history of daily marijuana use - had medical marijuana card - few months without smoking.   Denies history of other recreational use including opioids, methamphetamines/amphetamines, cocaine use. Denies past legal actions or arrests secondary to substance intoxication. The patient denies prior DWIs/DUIs. Melody does not exhibit objective evidence of substance withdrawal during today's examination nor does Melody appear under the influence of any psychoactive substance.       Family Psychiatric History:   Mother: anxiety (on antidepressant - Lexapro)  No family history of substance use disorder or suicide attempts or completions.     Social History  Early life/developmental: Denies a history of milestone/developmental delay. Denies a history of in-utero exposure to toxins/illicit substances. Denies ADHD history. No history of IEP/504, denies special supports in school.  Marital history: /Civil Union   Children: yes, 1 daughter (9 y/o - 2nd grade)  1 older brother   Living arrangement: Lives in a home with  and daughter (family lost their dog in May).  Support system: identifies  as the biggest source of support  Mother also a big support  Education: college graduate -  masoud ([employer])  Had to withdraw from college twice due to anxiety  Occupational History: unemployed since January 2023, applying for permanent disability - previously in advertising in 10-15 years  Other Pertinent History: no reported  or legal history  Access to firearms:  has firearm, locked and secured      Traumatic History:   Abuse:  patient denied  Other Traumatic Events:  medical trauma, father passed when she was 16 y/o    History Review: The following portions of the patient's history were reviewed and updated as appropriate: allergies, current medications, past family history, past medical history, past social history, past surgical history, and problem list.    Visit Vitals  OB Status Implant   Smoking Status Never      Wt Readings from Last 6 Encounters:   10/08/24 63 kg (138 lb 12.8 oz)   10/06/24 63.4 kg (139 lb 12.8 oz)   10/03/24 62.1 kg (137 lb)   09/27/24 63.1 kg (139 lb 1.8 oz)   04/16/24 65.8 kg (145 lb)   12/18/23 59.3 kg (130 lb 11.2 oz)        Rating Scales    ELOY-7 Flowsheet Screening      Flowsheet Row Most Recent Value   Over the last two weeks, how often have you been bothered by the following problems?     Feeling nervous, anxious, or on edge 0    Not being able to stop or control worrying 0    Worrying too much about different things 1    Trouble relaxing  1    Being so restless that it's hard to sit still 1    Becoming easily annoyed or irritable  0    Feeling afraid as if something awful might happen 0    How difficult have these problems made it for you to do your work, take care of things at home, or get along with other people?  Not difficult at all    ELOY Score  3             Mental Status Exam:  Appearance:  alert, good eye contact, appears stated age, casually dressed, and appropriate grooming and hygiene   Behavior:  calm, cooperative, and sitting comfortably   Motor: no abnormal movements and unable to assess gait and balance due to virtual visit  "  Speech:  spontaneous, clear, normal rate, not pressured, normal volume, not hyperverbal, and coherent   Mood:  \"good\"   Affect:  mood-congruent, appropriate range, and smiling   Thought Process:  Organized, logical, goal-directed, increased rate of thoughts   Thought Content: no verbalized delusions or overt paranoia   Perceptual disturbances: no reported hallucinations and does not appear to be responding to internal stimuli at this time   Risk Potential: No active or passive suicidal or homicidal ideation was verbalized during interview   Cognition: oriented to person, place, time, and situation, memory grossly intact, appears to be of average intelligence, age-appropriate attention span and concentration, and cognition not formally tested   Insight:  Good   Judgment: Good       Meds/Allergies    Allergies   Allergen Reactions    Demerol [Meperidine] Hyperactivity    Macrolides And Ketolides      Current Outpatient Medications   Medication Instructions    benztropine (COGENTIN) 0.5 mg, Oral, 2 times daily    clonazePAM (KLONOPIN) 1 mg, Oral, 2 times daily    FLUoxetine (PROZAC) 40 mg, Oral, Daily    haloperidol (HALDOL) 1 mg, Oral, 2 times daily        Labs & Imaging:  I have personally reviewed all pertinent laboratory tests and imaging results.   No visits with results within 2 Month(s) from this visit.   Latest known visit with results is:   Appointment on 10/08/2024   Component Date Value Ref Range Status    Sodium 10/08/2024 138  135 - 147 mmol/L Final    Potassium 10/08/2024 4.0  3.5 - 5.3 mmol/L Final    Chloride 10/08/2024 102  96 - 108 mmol/L Final    CO2 10/08/2024 24  21 - 32 mmol/L Final    ANION GAP 10/08/2024 12  4 - 13 mmol/L Final    BUN 10/08/2024 10  5 - 25 mg/dL Final    Creatinine 10/08/2024 0.78  0.60 - 1.30 mg/dL Final    Standardized to IDMS reference method    Glucose 10/08/2024 64 (L)  65 - 140 mg/dL Final    If the patient is fasting, the ADA then defines impaired fasting glucose as > " 100 mg/dL and diabetes as > or equal to 123 mg/dL.    Calcium 10/08/2024 9.3  8.4 - 10.2 mg/dL Final    eGFR 10/08/2024 94  ml/min/1.73sq m Final    Color, UA 10/08/2024 Light Yellow   Final    Clarity, UA 10/08/2024 Clear   Final    Specific Gravity, UA 10/08/2024 1.018  1.003 - 1.030 Final    pH, UA 10/08/2024 6.0  4.5, 5.0, 5.5, 6.0, 6.5, 7.0, 7.5, 8.0 Final    Leukocytes, UA 10/08/2024 Negative  Negative Final    Nitrite, UA 10/08/2024 Negative  Negative Final    Protein, UA 10/08/2024 Negative  Negative mg/dl Final    Glucose, UA 10/08/2024 Negative  Negative mg/dl Final    Ketones, UA 10/08/2024 Negative  Negative mg/dl Final    Urobilinogen, UA 10/08/2024 <2.0  <2.0 mg/dl mg/dl Final    Bilirubin, UA 10/08/2024 Negative  Negative Final    Occult Blood, UA 10/08/2024 Negative  Negative Final     ---------------------------------------    Although patient's acute lethality risk is low, long-term/chronic lethality risk is mildly elevated in the presence of anxiety, mood related symptoms, inpatient psychiatric admission within 6 months (09/2024), history of substance use, history of traumatic experiences, access to firearms (locked and secured). At the current moment, Melody is future-oriented, forward-thinking, and demonstrates ability to act in a self-preserving manner as evidenced by volitionally presenting to the clinic today, seeking treatment. At this juncture, inpatient hospitalization is not currently warranted. To mitigate future risk, patient should adhere to the recommendations of this writer, avoid alcohol/illicit substance use, utilize community-based resources and familiar support and prioritize mental health treatment.     Based on today's assessment and clinical criteria, Melody Huynh contracts for safety and is not an imminent risk of harm to self or others. Outpatient level of care is deemed appropriate at this present time. Melody understands that if they are no longer able to contract  for safety, they need to call/contact the outpatient office including this writer, call/contact crisis and/orattend to the nearest Emergency Department for immediate evaluation.    Risk of Harm to Self:  The following ratings are based on assessment at the time of the interview  Demographic risk factors include:   Historical Risk Factors include: history of depression, chronic anxiety symptoms, history of mood disorder, history of substance use, history of traumatic experiences  Recent Specific Risk Factors include: diagnosis of mood disorder, current depressive symptoms, current anxiety symptoms, unemployed, medication adverse effects, recent inpatient psychiatric admission  Protective Factors: no current suicidal ideation, access to mental health treatment, being a parent, being , compliant with medications, compliant with mental health treatment, connection to own children, having a desire to be alive, having a sense of purpose or meaning in life, opportunities to participate in community, responsibilities and duties to others, stable living environment, supportive family  Weapons: gun. The following steps have been taken to ensure weapons are properly secured: locked, secured, confirmed with   Based on today's assessment, Melody presents the following risk of harm to self: low     Risk of Harm to Others:  The following ratings are based on assessment at the time of the interview  Demographic Risk Factors include: unemployed.  Historical Risk Factors include: history of substance use.  Recent Specific Risk Factors include: weapons or other means available, concomitant mood disorder, multiple stressors.  Protective Factors: no current homicidal ideation, access to mental health treatment, being a parent, being , compliant with medications, compliant with mental health treatment, opportunities to participate in community, resilience, responsibilities and duties to others, safe and  stable living environment, supportive family  Weapons: gun. The following steps have been taken to ensure weapons are properly secured: locked, secured, confirmed by   Based on today's assessment, Melody presents the following risk of harm to others: low       Psychotherapy Provided:     Individual psychotherapy provided: Yes  Counseling was provided during the session today for 10 minutes.  Medications, treatment progress and treatment plan reviewed with Melody.    Treatment Plan:    Completed and signed during the session: Not applicable - Treatment Plan not due at this session    This note was shared with patient.    Visit Time:  Visit Start Time: 1330  Visit Stop Time: 1355    Total Visit Duration:  25 minutes    Deysi Cohen,   Psychiatry Resident, PGY-IV    This note has been constructed using a voice recognition system. There may be translation, syntax, or grammatical errors. If you have any questions, please contact the dictating provider.

## 2025-02-21 ENCOUNTER — ANNUAL EXAM (OUTPATIENT)
Dept: OBGYN CLINIC | Facility: CLINIC | Age: 45
End: 2025-02-21
Payer: COMMERCIAL

## 2025-02-21 VITALS — BODY MASS INDEX: 27.49 KG/M2 | DIASTOLIC BLOOD PRESSURE: 84 MMHG | SYSTOLIC BLOOD PRESSURE: 126 MMHG | WEIGHT: 155.2 LBS

## 2025-02-21 DIAGNOSIS — F39 UNSPECIFIED MOOD (AFFECTIVE) DISORDER (HCC): ICD-10-CM

## 2025-02-21 DIAGNOSIS — Z12.4 CERVICAL CANCER SCREENING: ICD-10-CM

## 2025-02-21 DIAGNOSIS — Z01.419 ENCOUNTER FOR ANNUAL ROUTINE GYNECOLOGICAL EXAMINATION: Primary | ICD-10-CM

## 2025-02-21 DIAGNOSIS — Z12.31 SCREENING MAMMOGRAM, ENCOUNTER FOR: ICD-10-CM

## 2025-02-21 PROCEDURE — G0145 SCR C/V CYTO,THINLAYER,RESCR: HCPCS | Performed by: OBSTETRICS & GYNECOLOGY

## 2025-02-21 PROCEDURE — G0476 HPV COMBO ASSAY CA SCREEN: HCPCS | Performed by: OBSTETRICS & GYNECOLOGY

## 2025-02-21 PROCEDURE — S0612 ANNUAL GYNECOLOGICAL EXAMINA: HCPCS | Performed by: OBSTETRICS & GYNECOLOGY

## 2025-02-21 RX ORDER — BENZTROPINE MESYLATE 0.5 MG/1
0.5 TABLET ORAL 2 TIMES DAILY
Qty: 180 TABLET | Refills: 1 | OUTPATIENT
Start: 2025-02-21

## 2025-02-21 NOTE — PROGRESS NOTES
Name: Melody Huynh      : 1980      MRN: [id number]  Encounter Provider: Sallie Flower MD  Encounter Date: 2025   Encounter department: [facility]  :  Assessment & Plan  Encounter for annual routine gynecological examination  Pap/HPV current  Mammogram ordered    IUD placed . No breakthrough bleeding.     Encourage healthy diet, exercise, Calcium 1200mg per day and at least 800 iu Vitamin D daily.             Screening mammogram, encounter for    Orders:    Mammo screening bilateral w 3d and cad; Future    Cervical cancer screening    Orders:    Liquid-based pap, screening        History of Present Illness   Gynecologic Exam  She reports no genital itching, genital lesions, genital odor, genital rash, pelvic pain, vaginal bleeding or vaginal discharge. The patient is experiencing no pain. Pertinent negatives include no chills, constipation, diarrhea, fever, nausea, sore throat or vomiting.     Melody Huynh is a 44 y.o. female who presents Patient presents for a routine annual visit    Menarche- Y/O  Last Pap Smear- 3/9/20 neg/neg--collect today  LMP-none  Birth control-iud (23)  Mammogram-25    Non smoker  -daily thc use  Social drinker  Currently sexually active  Mother--breast cancer    No concerns/questions for today's visit      Review of Systems   Constitutional:  Negative for activity change, appetite change, chills, fatigue and fever.   HENT:  Negative for rhinorrhea, sneezing and sore throat.    Eyes:  Negative for visual disturbance.   Respiratory:  Negative for cough, shortness of breath and wheezing.    Cardiovascular:  Negative for chest pain, palpitations and leg swelling.   Gastrointestinal:  Negative for abdominal distention, constipation, diarrhea, nausea and vomiting.   Genitourinary:  Negative for difficulty urinating, pelvic pain and vaginal discharge.   Neurological:  Negative for syncope and  light-headedness.          Objective   /84 (BP Location: Left arm, Patient Position: Sitting, Cuff Size: Standard)   Wt 70.4 kg (155 lb 3.2 oz)   BMI 27.49 kg/m²      Physical Exam  Chest:   Breasts:     Breasts are symmetrical.      Right: No inverted nipple, mass, nipple discharge, skin change or tenderness.      Left: No inverted nipple, mass, nipple discharge, skin change or tenderness.   Genitourinary:     Labia:         Right: No rash, tenderness, lesion or injury.         Left: No rash, tenderness, lesion or injury.       Vagina: Normal. No vaginal discharge, erythema, tenderness or bleeding.      Cervix: No cervical motion tenderness, discharge, friability, erythema or cervical bleeding.      Uterus: Not deviated, not enlarged, not fixed and not tender.       Adnexa:         Right: No mass, tenderness or fullness.          Left: No mass, tenderness or fullness.     Neurological:      Mental Status: She is alert and oriented to person, place, and time.

## 2025-02-27 LAB
LAB AP GYN PRIMARY INTERPRETATION: NORMAL
Lab: NORMAL

## 2025-03-04 ENCOUNTER — RESULTS FOLLOW-UP (OUTPATIENT)
Dept: OBGYN CLINIC | Facility: CLINIC | Age: 45
End: 2025-03-04

## 2025-03-19 NOTE — RESULT ENCOUNTER NOTE
Called and LM stating my reason for calling was to review lab results. Appears patient has viewed these results on [facility] already. If she calls back, please inform pt of normal pap results.

## 2025-03-26 ENCOUNTER — TELEMEDICINE (OUTPATIENT)
Dept: PSYCHIATRY | Facility: CLINIC | Age: 45
End: 2025-03-26

## 2025-03-26 DIAGNOSIS — F41.9 ANXIETY DISORDER, UNSPECIFIED TYPE: Primary | ICD-10-CM

## 2025-03-26 DIAGNOSIS — F39 UNSPECIFIED MOOD (AFFECTIVE) DISORDER (HCC): ICD-10-CM

## 2025-03-26 PROCEDURE — PBNCHG PB NO CHARGE PLACEHOLDER

## 2025-03-26 RX ORDER — FLUOXETINE HYDROCHLORIDE 40 MG/1
40 CAPSULE ORAL DAILY
Qty: 30 CAPSULE | Refills: 2 | Status: SHIPPED | OUTPATIENT
Start: 2025-03-26 | End: 2025-06-24

## 2025-03-26 RX ORDER — CLONAZEPAM 1 MG/1
1 TABLET ORAL 2 TIMES DAILY PRN
Qty: 60 TABLET | Refills: 0 | Status: SHIPPED | OUTPATIENT
Start: 2025-03-26 | End: 2025-04-25

## 2025-03-26 RX ORDER — BENZTROPINE MESYLATE 0.5 MG/1
0.25 TABLET ORAL 2 TIMES DAILY
Start: 2025-03-26 | End: 2025-06-24

## 2025-03-26 RX ORDER — HALOPERIDOL 1 MG/1
0.5 TABLET ORAL 2 TIMES DAILY
Start: 2025-03-26 | End: 2025-06-24

## 2025-03-26 NOTE — PSYCH
Psychiatric Follow Up Visit - Behavioral Health       [facility]  MRN: [id number]    Administrative Statements   Encounter provider Deysi Cohen, DO    The Patient is located at Home and in the following state in which I hold an active license [place].    The patient was identified by name and date of birth. Melody Huynh was informed that this is a telemedicine visit and that the visit is being conducted through the Epic Embedded platform. She agrees to proceed..  My office door was closed. No one else was in the room.  She acknowledged consent and understanding of privacy and security of the video platform. The patient has agreed to participate and understands they can discontinue the visit at any time.    I have spent a total time of 25 minutes in caring for this patient on the day of the visit/encounter including Counseling / Coordination of care, not including the time spent for establishing the audio/video connection.        Assessment/Plan:   Melody Huynh is a 44 y.o. female, /Civil Union and presently living with her  and daughter (7 y/o), currently unemployed - worked for 15 years in [employer] (mainly with a [employer]), with prior psychiatric diagnoses of panic disorder, anxiety, mood disorder - depression, and OCD, with past medical history significant for akithisia, GERD, cholecystectomy in 2006, perineorraphy and colporrhaphy in 2016, leiomyoma of uterus with 4 prior psychiatric admissions (first at age 21 y/o, most recent 09/2024 at [facility]), with suicide risk factors including access to lethal means (locked and secured firearm), history of traumatic experiences, and anxiety, who is presenting today for follow up virtually.  For review, on initial evaluation, patient presented with symptoms consistent with EPS and akathisia related adverse effects of psychotropic medications. Possible offending agents were  previously discontinued (Vraylar, Zyprexa) with adjustments to Prozac and Klonopin dosing.  Patient had acute decompensation after 09/25/24 outpatient appointment requiring inpatient hospitalization. Patient was admitted to [facility] where adjustments were made to Prozac and Haldol was added to medication regimen for psychomotor agitation, anxiety, and mood related symptoms.  Today, the patient has experienced stable mood related symptoms. There have been no panic attacks since prior evaluation and she has experienced overall stable anxiety related symptoms. She does endorse 2-5 minutes of anxiety upon awakening several days per week. Reviewed coping strategies and PRN medication options with patient for these symptoms.  Patient successfully lowered Haldol to 0.5 mg BID as previously prescribed, with no recurrence of mood or anxiety symptoms. Discussed remaining at current dose and lowering on further evaluations. Discussed lowering Cogentin in the setting of Haldol being tapered. Patient is agreeable to plan and is in agreement with continued medication management follow up and follow up with individual psychotherapist.  Assessment & Plan  Anxiety disorder, unspecified type  Differential diagnosis includes Generalized anxiety disorder, Panic disorder  Continue Prozac 40 mg daily for mood and anxiety  PARQ completed including serotonin syndrome, SIADH, worsening depression, suicidality, induction of ghada, GI upset, headaches, activation, sexual side effects, sedation, potential drug interactions, and others.  Continue Haldol 0.5 mg BID for anxiety, psychomotor agitation, and augmentation of antidepressant  Plan is to continue taper of medication  PARQ of Haldol including EPS side effects suck as Tardive Dyskinesia, parkinsonism, and tardive dystonia in addition to prolactin increase/galactorrhea, amenorrhea, dizziness, sedation, hypotension, weight gain/metabolic complications, Akathisia, dry  mouth, constipation, urinary retention, blurred vision, decreased sweating, tachycardia, hypertension, and rarely NMS, seizures, jaundice, agranulocytosis, leukopenia, and increased risk of CVA in elderly patient's with dementia-related psychosis.  Decrease Cogentin to 0.25 mg BID for EPS related adverse effects in setting of Haldol and anticholinergic adverse effects   PARQ completed including dry mouth, blurred vision, diplopia, confusion, hallucinations, constipation, nausea, vomiting, dilation of colon/paralytic ileus/bowel obstruction, erectile dysfunction, angle-closure glaucoma, heat stroke (mostly in elderly), tachycardia, cardiac arrhymias, hypotension, urinary retention, and rarely tardive dyskinesia unmasking.  Continue Klonopin 1 mg twice daily as needed for anxiety  PDMP reviewed, last filled 02/19/25  Discussed with patient the risks of sedation, respiratory depression, impairment in judgment and motor function. Depression, mood changes, GI changes, and others discussed. Patient informed of risks of being on or starting opioid medications due to drug interactions and potential for serious respiratory depression and death.  Continue psychotherapy with own therapist  Unspecified mood (affective) disorder (HCC)  Differential diagnosis includes Major depressive disorder with anxious distress, Bipolar affective disorder, Cyclothymia  Continue Prozac as above  Continue Haldol as above  Continue psychotherapy with own therapist      Aware of need to follow up with family physician for medical issues  Aware of 24 hour and weekend coverage for urgent situations accessed by calling [facility] main practice number  Medication management every 4-8 weeks    Medication Risks/Benefits      Risks, Benefits And Possible Side Effects Of Medications:    Risks, benefits, and possible side effects of medications explained to Melody and she verbalizes understanding and agreement for  treatment.    Controlled Medication Discussion:     Melody has been filling controlled prescriptions on time as prescribed according to [place] Prescription Drug Monitoring Program  Discussed with Melody the risks of sedation, respiratory depression, impairment of ability to drive and potential for abuse and addiction related to treatment with benzodiazepine medications. She understands risk of treatment with benzodiazepine medications, agrees to not drive if feels impaired and agrees to take medications as prescribed  Discussed with Melody Black Box warning on concurrent use of benzodiazepines and opioid medications including sedation, respiratory depression, coma and death. She understands the risk of treatment with benzodiazepines in addition to opioids and wants to continue taking those medications  Discussed with Melody increased risk of impairment related to concurrent use of benzodiazepines and hypnotic medications including excessive sedation, psychomotor impairment and respiratory depression. She understands the risk of treatment with benzodiazepines in addition to hypnotic medications and wants to continue taking those medications  ------------------------------------  History of Present Illness   Melody Huynh is presenting  to follow up for anxiety, depression, and history of EPS related symptoms . Melody endorses an overall stable mood since prior evaluation, she denies down, depressed mood. She denies changes to sleep or appetite. Patient reports no recent nightmares or distressing dreams. The patient denies SI, HI, passive death wishes, or thoughts to harm self or others at time of evaluation.    The patient endorses awakening feeling anxious several times per week - lasting 2-5 minutes which subsides without medication intervention. Patient is utilizing Klonopin 1x/day in the morning, around 0830. Discussed recommendation to use coping strategies for early morning anxiety, and if it  persists for prolonged period of time, Klonopin PRN can be use earlier in the day. The patient has not been utilizing Klonopin in the afternoons.    The patient does not endorse or demonstrate symptoms consistent with ghada/hypomania at time of evaluation, and does not endorse or demonstrate symptoms consistent with negative or positive symptoms of psychosis at time of evaluation.    Patient successfully lowered Haldol to 0.5 mg BID as previously prescribed earlier this month. Patient continues to desire eventual discontinuation of Haldol and is agreeable to remain on current dose until next evaluation where plan is to taper further if symptoms remain stable.   Discussed lowering Cogentin to 0.25 mg BID in the setting of Haldol being tapered. Patient is agreeable to plan.    Psychiatric Review Of Systems:  Melody reports Symptoms as described in HPI.  Melody denies Current suicidal thoughts, plan, or intent, Current thoughts of self-harm, or Current homicidal thoughts, plan, or intent.    Medical Review Of Systems:  Complete review of systems is negative except as noted above.  C[place]ion Akathisia Rating Scale (BARS) - Today's assessment on 03/26/25 is 0 and Global Clinical Assessment score of 0  Prior score on 10/23/23: 8 and Global Clinical Assessment score of 3  11/14/23: 7 and Global Clinical Assessment score of 2  12/04/23: Global Clinical Assessment score of 3 due to distress to patient  02/29/24: 0 and Global Clinical Assessment score of 0  04/22/24: 0 and Global Clinical Assessment score of 0  09/25/24 0 and Global Clinical Assessment score of 0  10/22/24 2 and Global Clinical Assessment score of 1  11/05/24: 0 and Global Clinical Assessment score of 1  12/04/24 0 and Global Clinical Assessment score of 0  02/19/25 0 and Global Clinical Assessment score 0  Objective   0 Normal, occasional fidgety movements of the limbs  1 Presence of characteristic restless movements: shuffling or tramping movements of the  legs/feet, or swinging of one leg while sitting, and/or rocking from foot to foot or “walking on the spot” when standing, but movements present for less than half the time observed  2 Observed phenomena, as described in (1) above, which are present for at least half the observation period  3 Patient is constantly engaged in characteristic restless movements, and/or has the inability to remain seated or standing without walking or pacing, during the time observed  Subjective  0 Absence of inner restlessness  1 Non-specific sense of inner restlessness  2 The patient is aware of an inability to keep the legs still, or a desire to move the legs, and/or complains of inner restlessness aggravated specifically by being required to stand still  3 Awareness of intense compulsion to move most of the time and/or reports strong desire to walk or pace most of the time  Distress related to restlessness  0 No distress  1 Mild  2 Moderate  3 Severe  Global Clinical Assessment of Akathisia  0 Absent. No evidence of awareness of restlessness. Observation of characteristic movements of akathisia in the absence of a subjective report of inner restlessness or compulsive desire to move the legs should be classified as pseudoakathisia  1 Questionable. Non-specific inner tension and fidgety movements  2 Mild akathisia. Awareness of restlessness in the legs and/or inner restlessness worse when required to stand still. Fidgety movements present, but characteristic restless movements of akathisia not necessarily observed. Condition causes little or no distress.  3 Moderate akathisia. Awareness of restlessness as described for mild akathisia above, combined with characteristic restless movements such as rocking from foot to foot when standing. Patient finds the condition distressing.  4 Marked akathisia. Subjective experience of restlessness includes a compulsive desire to walk or pace. However, the patient is able to remain seated for at  least five minutes. The condition is obviously distressing.  5 Severe akathisia. The patient reports a strong compulsion to pace up and down most of the time. Unable to sit or lie down for more than a few minutes. Constant restlessness which is associated with intense distress and insomnia.  ------------------------------------  All italicized information has been copied from previous psychiatric evaluation. Information has been reviewed with the patient.     Past Psychiatric History:   Prior psychiatric diagnoses: anxiety, panic disorder, mood disorder, OCD  Inpatient hospitalizations: inpatient hospitalizations - first hospitalization 2001 - [facility], [facility] - [facility] - [place], 2021 - panic disorder, mood symptoms, most recent at [facility] in 09/2024  Emergency room visits often in-between for severe panic attacks - 2016, multiple recent ED visits in 09/2024 for severe panic attacks  Suicide attempts/self-harm: patient denies, patient denies self harm behaviors  Violent/aggressive behavior: patient denies   Patient did have some aggressive outbursts towards  and providers  Outpatient psychiatric providers: currently has outpatient psychiatric provider - [facility]   Was without a psychiatrist from [phone] (Lexapro from [facility] during that time), prior psychiatrist, Dr. Martines, retired  Past/current psychotherapy: patient has a therapist weekly, Dr. Foster, [facility]  Other Services: patient denies  Psychiatric medication trial:   Multiple medication trials - including below,  Antidepressants  Prozac (up to 40 mg), Zoloft (short period of time), Lexapro (multiple years), Luvox  Antipsychotics  Vraylar, Zyprexa, Seroquel, Haldol  Sedative hypnotics  Ativan, Klonopin (0.5 mg QID PRN)  Others  Hydroxyzine, Gabapentin, BuSpar (60 mg total daily), Cogentin     Substance Abuse History:  I have assessed this patient for substance use within the  past 12 months.  Patient reports a few cigarettes per day, quit 2007/2008. Patient reports history of daily marijuana use - had medical marijuana card - few months without smoking.   Denies history of other recreational use including opioids, methamphetamines/amphetamines, cocaine use. Denies past legal actions or arrests secondary to substance intoxication. The patient denies prior DWIs/DUIs. Melody does not exhibit objective evidence of substance withdrawal during today's examination nor does Melody appear under the influence of any psychoactive substance.       Family Psychiatric History:   Mother: anxiety (on antidepressant - Lexapro)  No family history of substance use disorder or suicide attempts or completions.     Social History  Early life/developmental: Denies a history of milestone/developmental delay. Denies a history of in-utero exposure to toxins/illicit substances. Denies ADHD history. No history of IEP/504, denies special supports in school.  Marital history: /Civil Union   Children: yes, 1 daughter (7 y/o - 2nd grade)  1 older brother   Living arrangement: Lives in a home with  and daughter (family lost their dog in May).  Support system: identifies  as the biggest source of support  Mother also a big support  Education: college graduate - communications ([employer])  Had to withdraw from college twice due to anxiety  Occupational History: unemployed since January 2023, applying for permanent disability - previously in advertising in 10-15 years  Other Pertinent History: no reported  or legal history  Access to firearms:  has firearm, locked and secured      Traumatic History:   Abuse:  patient denied  Other Traumatic Events:  medical trauma, father passed when she was 16 y/o    History Review: The following portions of the patient's history were reviewed and updated as appropriate: allergies, current medications, past family history, past medical history,  "past social history, past surgical history, and problem list.    Visit Vitals  OB Status Implant   Smoking Status Never      Wt Readings from Last 6 Encounters:   02/21/25 70.4 kg (155 lb 3.2 oz)   10/08/24 63 kg (138 lb 12.8 oz)   10/06/24 63.4 kg (139 lb 12.8 oz)   10/03/24 62.1 kg (137 lb)   09/27/24 63.1 kg (139 lb 1.8 oz)   04/16/24 65.8 kg (145 lb)        Mental Status Exam:  Appearance:  alert, good eye contact, appears stated age, casually dressed, and appropriate grooming and hygiene   Behavior:  calm, cooperative, and sitting comfortably   Motor: no abnormal movements and unable to assess gait and balance due to virtual visit   Speech:  spontaneous, clear, normal rate, not pressured, normal volume, not hyperverbal, and coherent   Mood:  \"good\"   Affect:  mood-congruent and smiling   Thought Process:  Organized, logical, goal-directed   Thought Content: no verbalized delusions or overt paranoia, intermittent negative thinking   Perceptual disturbances: no reported hallucinations and does not appear to be responding to internal stimuli at this time   Risk Potential: No active or passive suicidal or homicidal ideation was verbalized during interview   Cognition: oriented to person, place, time, and situation, memory grossly intact, appears to be of average intelligence, age-appropriate attention span and concentration, and cognition not formally tested   Insight:  Good   Judgment: Good       Meds/Allergies    Allergies   Allergen Reactions    Demerol [Meperidine] Hyperactivity    Macrolides And Ketolides      Current Outpatient Medications   Medication Instructions    benztropine (COGENTIN) 0.5 mg, Oral, 2 times daily    clonazePAM (KLONOPIN) 1 mg, Oral, 2 times daily PRN    FLUoxetine (PROZAC) 40 mg, Oral, Daily    haloperidol (HALDOL) 0.5 mg, Oral, 2 times daily        Labs & Imaging:  I have personally reviewed all pertinent laboratory tests and imaging results.   Annual Exam on 02/21/2025   Component Date " Value Ref Range Status    Case Report 02/21/2025    Final                    Value:Gynecologic Cytology Report                       Case: [id number]                                  Authorizing Provider:  Sallie Flower MD   Collected:           02/21/2025 1126              Ordering Location:     [facility]     Received:            02/21/2025 1126                                     [facility]                                                                               [place]                                                                       First Screen:          Erica Quintero                                                            Specimen:    LIQUID-BASED PAP, SCREENING, Cervix                                                        Primary Interpretation 02/21/2025 Negative for intraepithelial lesion or malignancy   Final    Specimen Adequacy 02/21/2025 Satisfactory for evaluation. Endocervical/transformation zone component present.   Final    Additional Information 02/21/2025    Final                    Value:[employer]'s FDA approved ,  and ThinPrep Imaging Duo System are utilized with strict adherence to the 's instruction manual to prepare gynecologic and non-gynecologic cytology specimens for the production of ThinPrep slides as well as for gynecologic ThinPrep imaging. These processes have been validated by our laboratory and/or by the .  The Pap test is not a diagnostic procedure and should not be used as the sole means to detect cervical cancer. It is only a screening procedure to aid in the detection of cervical cancer and its precursors. Both false-negative and false-positive results have been experienced. Your patient's test result should be interpreted in this context together with the history and clinical findings.      Gross Description 02/21/2025    Final                    Value:20 ml , colorless, cloudy received in a  ThinPrep vial.      HPV Other HR 02/21/2025 Negative  Negative Final    HPV types: 31,33,35,39,45,51,52,56,58,59,66 and 68 DNA are undetectable or below the pre-set threshold.    HPV16 02/21/2025 Negative  Negative Final    HPV18 02/21/2025 Negative  Negative Final     ---------------------------------------    Although patient's acute lethality risk is low, long-term/chronic lethality risk is mildly elevated in the presence of anxiety, mood related symptoms, history of substance use, history of traumatic experiences, inpatient psychiatric admission within 6 months (09/2024), access to firearms (locked and secured). At the current moment, Melody is future-oriented, forward-thinking, and demonstrates ability to act in a self-preserving manner as evidenced by volitionally presenting to the clinic today, seeking treatment. At this juncture, inpatient hospitalization is not currently warranted. To mitigate future risk, patient should adhere to the recommendations of this writer, avoid alcohol/illicit substance use, utilize community-based resources and familiar support and prioritize mental health treatment.     Based on today's assessment and clinical criteria, Melody Huynh contracts for safety and is not an imminent risk of harm to self or others. Outpatient level of care is deemed appropriate at this present time. Melody understands that if they are no longer able to contract for safety, they need to call/contact the outpatient office including this writer, call/contact crisis and/orattend to the nearest Emergency Department for immediate evaluation.     Risk of Harm to Self:  The following ratings are based on assessment at the time of the interview  Demographic risk factors include:   Historical Risk Factors include: history of depression, chronic anxiety symptoms, history of mood disorder, history of substance use, history of traumatic experiences  Recent Specific Risk Factors include: diagnosis  of mood disorder, current depressive symptoms, current anxiety symptoms, unemployed, medication adverse effects, recent inpatient psychiatric admission  Protective Factors: no current suicidal ideation, access to mental health treatment, being a parent, being , compliant with medications, compliant with mental health treatment, connection to own children, having a desire to be alive, having a sense of purpose or meaning in life, opportunities to participate in community, responsibilities and duties to others, stable living environment, supportive family  Weapons: gun. The following steps have been taken to ensure weapons are properly secured: locked, secured, confirmed with   Based on today's assessment, Melody presents the following risk of harm to self: low     Risk of Harm to Others:  The following ratings are based on assessment at the time of the interview  Demographic Risk Factors include: unemployed.  Historical Risk Factors include: history of substance use.  Recent Specific Risk Factors include: weapons or other means available, concomitant mood disorder, multiple stressors.  Protective Factors: no current homicidal ideation, access to mental health treatment, being a parent, being , compliant with medications, compliant with mental health treatment, opportunities to participate in community, resilience, responsibilities and duties to others, safe and stable living environment, supportive family  Weapons: gun. The following steps have been taken to ensure weapons are properly secured: locked, secured, confirmed by   Based on today's assessment, Melody presents the following risk of harm to others: low       Psychotherapy Provided:     Individual psychotherapy provided: Yes  Counseling was provided during the session today for 15 minutes.  Medications, treatment progress and treatment plan reviewed with Melody.    Treatment Plan:    Completed and signed during the session:  Yes - Treatment Plan done but not signed at time of office visit due to:  Plan reviewed by video and verbal consent given due to virtual visit. Treatment Plan sent to patient via [employer] for signature.    This note was shared with patient.    Visit Time:  Visit Start Time: 1330  Visit Stop Time: 1350  Total Visit Duration:  20 minutes    Deysi Cohen DO  Psychiatry Resident, PGY-IV    This note has been constructed using a voice recognition system. There may be translation, syntax, or grammatical errors. If you have any questions, please contact the dictating provider.

## 2025-03-26 NOTE — ASSESSMENT & PLAN NOTE
Differential diagnosis includes Major depressive disorder with anxious distress, Bipolar affective disorder, Cyclothymia  Continue Prozac as above  Continue Haldol as above  Continue psychotherapy with own therapist

## 2025-03-26 NOTE — BH TREATMENT PLAN
Hourly rounds completed on patient. Patient denies any needs at this time. Will report to oncoming nurse. TREATMENT PLAN (Medication Management Only)        [facility]    Name and Date of Birth:  Melody Huynh 44 y.o. 1980  MRN: [id number]  Date of Treatment Plan: March 26, 2025  Diagnosis/Diagnoses:    1. Anxiety disorder, unspecified type    2. Unspecified mood (affective) disorder (HCC)      Strengths/Personal Resources for Self-Care: supportive family, taking medications as prescribed, ability to communicate needs, ability to communicate well, ability to listen, ability to reason, ability to understand psychiatric illness, average or above intelligence, family ties, financial means, general fund of knowledge, good physical health, motivation for treatment, willingness to work on problems.  Area/Areas of need (in own words): anxiety symptoms, depressive symptoms  1. Long Term Goal:   maintain control of anxiety, and depressive symptoms  Target Date:6 months - 9/27/2025  Person/Persons responsible for completion of goal: Melody  2.  Short Term Objective (s) - How will we reach this goal?:   A.  Provider new recommended medication/dosage changes and/or continue medication(s): continue current medications as prescribed Haldol, Cogentin, Klonopin as needed, and Prozac.  B.  Attend medication management appointments regularly. Attend psychotherapy regularly..  C.  Visit supportive people.  D. Continue positive daily habits - movement and positive sleep hygiene habits.  Target Date:6 months - 9/27/2025  Person/Persons Responsible for Completion of Goal: Melody/self and provider  Progress Towards Goals: continuing treatment, moderate progress  Treatment Modality: medication management every 4 weeks, continue psychotherapy with own therapist  Review due 180 days from date of this plan: 6 months - 9/27/2025/self  Expected length of service: ongoing treatment unless revised    My Physician and I have developed this plan together and I agree to work on the goals  and objectives. I understand the treatment goals that were developed for my treatment.   Electronic Signatures: on file (unless signed below)    Deysi Cohen DO 03/26/25  Psychiatry Resident, PGY-IV

## 2025-03-26 NOTE — PATIENT INSTRUCTIONS
Continue Haldol to 0.5 mg (1/2 tablet) twice daily - new prescription will be for 0.5 mg tablets   Decrease Cogentin to 0.25 mg (1/2 tablet) twice daily - take with Haldol   Continue Prozac 40 mg daily   Continue Klonopin 1 mg twice daily as needed for severe anxiety    Please call the office nursing staff for medication issues including refills, problems obtaining medications, side effects, etc.  Please return for a follow up appointment as discussed. If you are running late or are unable to attend your appointment, please call our [place] office at [phone].    If you are in psychological crisis including not limited to suicidal/homicidal thoughts or thoughts of self-injury, do not hesitate to call/contact your County Crisis hotline (see below), call 612, call 565 (mental health crisis), or go to the nearest emergency department.  [facility] Crisis: [phone]  [facility] Crisis: [phone]  [facility] Crisis: [phone]  [facility] Crisis: [phone]  [facility] Crisis: [phone]  [facility] Crisis: [phone]  [facility] Crisis: [phone]  National Suicide Prevention Hotline: 1-387.574.8086

## 2025-04-19 DIAGNOSIS — F39 UNSPECIFIED MOOD (AFFECTIVE) DISORDER (HCC): ICD-10-CM

## 2025-04-19 DIAGNOSIS — F41.9 ANXIETY DISORDER, UNSPECIFIED TYPE: ICD-10-CM

## 2025-04-22 RX ORDER — FLUOXETINE HYDROCHLORIDE 40 MG/1
40 CAPSULE ORAL DAILY
Qty: 90 CAPSULE | Refills: 0 | Status: SHIPPED | OUTPATIENT
Start: 2025-04-22 | End: 2025-07-21

## 2025-04-23 DIAGNOSIS — F39 UNSPECIFIED MOOD (AFFECTIVE) DISORDER (HCC): ICD-10-CM

## 2025-04-23 RX ORDER — HALOPERIDOL 0.5 MG/1
0.5 TABLET ORAL 2 TIMES DAILY
Qty: 60 TABLET | Refills: 2 | Status: SHIPPED | OUTPATIENT
Start: 2025-04-23 | End: 2025-04-30

## 2025-04-23 RX ORDER — BENZTROPINE MESYLATE 0.5 MG/1
0.5 TABLET ORAL 2 TIMES DAILY
Qty: 60 TABLET | Refills: 1 | Status: SHIPPED | OUTPATIENT
Start: 2025-04-23 | End: 2025-04-23

## 2025-04-23 RX ORDER — BENZTROPINE MESYLATE 0.5 MG/1
0.25 TABLET ORAL 2 TIMES DAILY
Qty: 30 TABLET | Refills: 2 | Status: SHIPPED | OUTPATIENT
Start: 2025-04-23 | End: 2025-04-30

## 2025-04-24 ENCOUNTER — APPOINTMENT (OUTPATIENT)
Age: 45
End: 2025-04-24
Payer: COMMERCIAL

## 2025-04-24 ENCOUNTER — OFFICE VISIT (OUTPATIENT)
Age: 45
End: 2025-04-24
Payer: COMMERCIAL

## 2025-04-24 VITALS
WEIGHT: 159.6 LBS | BODY MASS INDEX: 28.28 KG/M2 | OXYGEN SATURATION: 99 % | HEART RATE: 76 BPM | HEIGHT: 63 IN | TEMPERATURE: 96.8 F | DIASTOLIC BLOOD PRESSURE: 68 MMHG | SYSTOLIC BLOOD PRESSURE: 104 MMHG | RESPIRATION RATE: 14 BRPM

## 2025-04-24 DIAGNOSIS — Z00.00 ANNUAL PHYSICAL EXAM: ICD-10-CM

## 2025-04-24 DIAGNOSIS — Z13.6 SCREENING FOR CARDIOVASCULAR CONDITION: ICD-10-CM

## 2025-04-24 DIAGNOSIS — Z00.00 ANNUAL PHYSICAL EXAM: Primary | ICD-10-CM

## 2025-04-24 DIAGNOSIS — F41.0 PANIC DISORDER WITHOUT AGORAPHOBIA WITH SEVERE PANIC ATTACKS: ICD-10-CM

## 2025-04-24 DIAGNOSIS — F39 UNSPECIFIED MOOD (AFFECTIVE) DISORDER (HCC): ICD-10-CM

## 2025-04-24 PROBLEM — F41.1 GENERALIZED ANXIETY DISORDER: Status: ACTIVE | Noted: 2020-01-24

## 2025-04-24 LAB
ALBUMIN SERPL BCG-MCNC: 4.4 G/DL (ref 3.5–5)
ALP SERPL-CCNC: 53 U/L (ref 34–104)
ALT SERPL W P-5'-P-CCNC: 26 U/L (ref 7–52)
ANION GAP SERPL CALCULATED.3IONS-SCNC: 7 MMOL/L (ref 4–13)
AST SERPL W P-5'-P-CCNC: 21 U/L (ref 13–39)
BASOPHILS # BLD AUTO: 0.06 THOUSANDS/ÂΜL (ref 0–0.1)
BASOPHILS NFR BLD AUTO: 1 % (ref 0–1)
BILIRUB SERPL-MCNC: 1.12 MG/DL (ref 0.2–1)
BUN SERPL-MCNC: 15 MG/DL (ref 5–25)
CALCIUM SERPL-MCNC: 9.1 MG/DL (ref 8.4–10.2)
CHLORIDE SERPL-SCNC: 104 MMOL/L (ref 96–108)
CHOLEST SERPL-MCNC: 179 MG/DL (ref ?–200)
CO2 SERPL-SCNC: 27 MMOL/L (ref 21–32)
CREAT SERPL-MCNC: 0.82 MG/DL (ref 0.6–1.3)
EOSINOPHIL # BLD AUTO: 0.15 THOUSAND/ÂΜL (ref 0–0.61)
EOSINOPHIL NFR BLD AUTO: 3 % (ref 0–6)
ERYTHROCYTE [DISTWIDTH] IN BLOOD BY AUTOMATED COUNT: 12.6 % (ref 11.6–15.1)
GFR SERPL CREATININE-BSD FRML MDRD: 87 ML/MIN/1.73SQ M
GLUCOSE P FAST SERPL-MCNC: 91 MG/DL (ref 65–99)
HCT VFR BLD AUTO: 40.5 % (ref 34.8–46.1)
HDLC SERPL-MCNC: 56 MG/DL
HGB BLD-MCNC: 13.9 G/DL (ref 11.5–15.4)
IMM GRANULOCYTES # BLD AUTO: 0.01 THOUSAND/UL (ref 0–0.2)
IMM GRANULOCYTES NFR BLD AUTO: 0 % (ref 0–2)
LDLC SERPL CALC-MCNC: 107 MG/DL (ref 0–100)
LYMPHOCYTES # BLD AUTO: 2.31 THOUSANDS/ÂΜL (ref 0.6–4.47)
LYMPHOCYTES NFR BLD AUTO: 40 % (ref 14–44)
MCH RBC QN AUTO: 30.2 PG (ref 26.8–34.3)
MCHC RBC AUTO-ENTMCNC: 34.3 G/DL (ref 31.4–37.4)
MCV RBC AUTO: 88 FL (ref 82–98)
MONOCYTES # BLD AUTO: 0.33 THOUSAND/ÂΜL (ref 0.17–1.22)
MONOCYTES NFR BLD AUTO: 6 % (ref 4–12)
NEUTROPHILS # BLD AUTO: 2.97 THOUSANDS/ÂΜL (ref 1.85–7.62)
NEUTS SEG NFR BLD AUTO: 50 % (ref 43–75)
NRBC BLD AUTO-RTO: 0 /100 WBCS
PLATELET # BLD AUTO: 248 THOUSANDS/UL (ref 149–390)
PMV BLD AUTO: 10.2 FL (ref 8.9–12.7)
POTASSIUM SERPL-SCNC: 4.3 MMOL/L (ref 3.5–5.3)
PROT SERPL-MCNC: 7.3 G/DL (ref 6.4–8.4)
RBC # BLD AUTO: 4.6 MILLION/UL (ref 3.81–5.12)
SODIUM SERPL-SCNC: 138 MMOL/L (ref 135–147)
T4 FREE SERPL-MCNC: 0.89 NG/DL (ref 0.61–1.12)
TRIGL SERPL-MCNC: 79 MG/DL (ref ?–150)
TSH SERPL DL<=0.05 MIU/L-ACNC: 1.35 UIU/ML (ref 0.45–4.5)
WBC # BLD AUTO: 5.83 THOUSAND/UL (ref 4.31–10.16)

## 2025-04-24 PROCEDURE — 85025 COMPLETE CBC W/AUTO DIFF WBC: CPT

## 2025-04-24 PROCEDURE — 84439 ASSAY OF FREE THYROXINE: CPT

## 2025-04-24 PROCEDURE — 84443 ASSAY THYROID STIM HORMONE: CPT

## 2025-04-24 PROCEDURE — 99396 PREV VISIT EST AGE 40-64: CPT

## 2025-04-24 PROCEDURE — 36415 COLL VENOUS BLD VENIPUNCTURE: CPT

## 2025-04-24 PROCEDURE — 80061 LIPID PANEL: CPT

## 2025-04-24 PROCEDURE — 80053 COMPREHEN METABOLIC PANEL: CPT

## 2025-04-24 NOTE — PROGRESS NOTES
Adult Annual Physical  Name: Melody Huynh      : 1980      MRN: [id number]  Encounter Provider: Annel Emery PA-C  Encounter Date: 2025   Encounter department: [facility] [place]    :  Assessment & Plan  Annual physical exam    Annual physical exam completed today.  - Medical history reviewed, including existing medical conditions, medications, and surgeries.   - Labs discussed to evaluate cholesterol, blood sugar, kidney function, liver function, and other important markers of health.  - BMI evaluated and discussed.  - Cancer screenings discussed: Mammogram/pap smear/colonoscopy due at age 46.   - Vaccination status reviewed and pertinent immunizations and booster shots discussed.   - Bone health reviewed.   - Skin examination.  Discussed importance of sunscreen and other preventative measures for skin cancer.    - Lifestyle and health counseling completed including diet, exercise habits, smoking status, alcohol consumption.   - Mental health and wellbeing evaluated and discussed.  - Family history obtained to identify any of hereditary health risks.     Orders:  •  CBC and differential; Future  •  Comprehensive metabolic panel; Future    Panic disorder without agoraphobia with severe panic attacks  Very well-controlled, follows with psychiatry.  Will check thyroid with her labs today.  Orders:  •  TSH + Free T4; Future    Unspecified mood (affective) disorder (HCC)  Stable, continue current medications and follow-up with psychiatry.       Screening for cardiovascular condition  Patient tries to eat healthy and exercises regularly.  Low ASCVD risk score.  Orders:  •  Lipid Panel with Direct LDL reflex; Future        Preventive Screenings:  - Diabetes Screening: orders placed  - Cholesterol Screening: orders placed   - Hepatitis C screening: screening up-to-date   - HIV screening: screening up-to-date   - Cervical cancer screening: screening up-to-date   - Breast cancer  screening: screening up-to-date   - Colon cancer screening: screening not indicated   - Lung cancer screening: screening not indicated     Immunizations:  - Immunizations due: Tdap    Counseling/Anticipatory Guidance:  - Alcohol: discussed moderation in alcohol intake and recommendations for healthy alcohol use.   - Dental health: discussed importance of regular tooth brushing, flossing, and dental visits.   - Sexual health: discussed sexually transmitted diseases, partner selection, use of condoms, avoidance of unintended pregnancy, and contraceptive alternatives.   - Diet: discussed recommendations for a healthy/well-balanced diet.   - Exercise: the importance of regular exercise/physical activity was discussed. Recommend exercise 3-5 times per week for at least 30 minutes.   - Injury prevention: discussed safety/seat belts, safety helmets, smoke detectors, carbon monoxide detectors, and smoking near bedding or upholstery.       Depression Screening and Follow-up Plan: Patient was screened for depression during today's encounter. They screened negative with a PHQ-2 score of 0.          History of Present Illness     Adult Annual Physical:  Patient presents for annual physical.     Diet and Physical Activity:  - Diet/Nutrition: no special diet, frequent junk food and intermittent fasting.  - Exercise: moderate cardiovascular exercise, strength training exercises, 5-7 times a week on average and 1-2 hours on average.    Depression Screening:  - PHQ-2 Score: 0    General Health:  - Sleep: sleeps well and 7-8 hours of sleep on average.  - Hearing: normal hearing bilateral ears.  - Vision: no vision problems, wears glasses and contacts and most recent eye exam < 1 year ago.  - Dental: regular dental visits, brushes teeth twice daily and does not floss.    /GYN Health:  - Follows with GYN: yes.   - Menopause: premenopausal.   - History of STDs: no  - Contraception: IUD placement.      Advanced Care Planning:  - Has an  "advanced directive?: no    - Has a durable medical POA?: no    - ACP document given to patient?: no      Review of Systems   Constitutional: Negative.    HENT: Negative.     Respiratory:  Negative for cough and shortness of breath.    Cardiovascular:  Negative for chest pain.   Gastrointestinal: Negative.    Genitourinary: Negative.    Musculoskeletal:  Negative for gait problem.   Skin:  Negative for rash.   Neurological:  Negative for syncope, light-headedness and headaches.   All other systems reviewed and are negative.        Objective   /68 (BP Location: Left arm, Patient Position: Sitting, Cuff Size: Standard)   Pulse 76   Temp (!) 96.8 °F (36 °C) (Tympanic)   Resp 14   Ht 5' 3\" (1.6 m)   Wt 72.4 kg (159 lb 9.6 oz)   SpO2 99%   BMI 28.27 kg/m²     Physical Exam  Constitutional:       General: She is not in acute distress.     Appearance: Normal appearance. She is not toxic-appearing.   HENT:      Head: Normocephalic and atraumatic.      Right Ear: Hearing, tympanic membrane, ear canal and external ear normal.      Left Ear: Hearing, tympanic membrane, ear canal and external ear normal.      Nose: Nose normal.      Mouth/Throat:      Lips: Pink.      Mouth: Mucous membranes are moist. No oral lesions.      Dentition: Normal dentition.      Pharynx: Oropharynx is clear.   Eyes:      General: Lids are normal. No scleral icterus.     Conjunctiva/sclera: Conjunctivae normal.      Pupils: Pupils are equal, round, and reactive to light.   Neck:      Thyroid: No thyroid mass, thyromegaly or thyroid tenderness.      Vascular: No carotid bruit.   Cardiovascular:      Rate and Rhythm: Normal rate and regular rhythm.      Pulses:           Radial pulses are 2+ on the right side and 2+ on the left side.        Posterior tibial pulses are 2+ on the right side and 2+ on the left side.      Heart sounds: Normal heart sounds.   Pulmonary:      Effort: Pulmonary effort is normal. No respiratory distress.      " Breath sounds: Normal breath sounds.   Abdominal:      General: Bowel sounds are normal. There is no distension.      Palpations: Abdomen is soft.      Tenderness: There is no abdominal tenderness.   Musculoskeletal:         General: No tenderness or deformity.      Cervical back: Normal range of motion and neck supple.      Right lower leg: No edema.      Left lower leg: No edema.      Comments:      Lymphadenopathy:      Cervical: No cervical adenopathy.   Skin:     General: Skin is warm and dry.      Coloration: Skin is not jaundiced.   Neurological:      Mental Status: She is alert and oriented to person, place, and time.      Motor: No weakness or tremor.      Gait: Gait is intact.   Psychiatric:         Mood and Affect: Mood normal.         Behavior: Behavior is cooperative.

## 2025-04-24 NOTE — ASSESSMENT & PLAN NOTE
Very well-controlled, follows with psychiatry.  Will check thyroid with her labs today.  Orders:  •  TSH + Free T4; Future

## 2025-04-25 ENCOUNTER — RESULTS FOLLOW-UP (OUTPATIENT)
Age: 45
End: 2025-04-25

## 2025-04-30 ENCOUNTER — TELEMEDICINE (OUTPATIENT)
Dept: PSYCHIATRY | Facility: CLINIC | Age: 45
End: 2025-04-30

## 2025-04-30 DIAGNOSIS — F39 UNSPECIFIED MOOD (AFFECTIVE) DISORDER (HCC): ICD-10-CM

## 2025-04-30 DIAGNOSIS — F41.9 ANXIETY DISORDER, UNSPECIFIED TYPE: ICD-10-CM

## 2025-04-30 PROCEDURE — PBNCHG PB NO CHARGE PLACEHOLDER

## 2025-04-30 RX ORDER — BENZTROPINE MESYLATE 0.5 MG/1
0.25 TABLET ORAL EVERY MORNING
Start: 2025-04-30 | End: 2025-07-29

## 2025-04-30 RX ORDER — CLONAZEPAM 1 MG/1
1 TABLET ORAL 2 TIMES DAILY PRN
Qty: 60 TABLET | Refills: 0 | Status: SHIPPED | OUTPATIENT
Start: 2025-04-30 | End: 2025-05-30

## 2025-04-30 RX ORDER — HALOPERIDOL 0.5 MG/1
0.5 TABLET ORAL EVERY MORNING
Start: 2025-04-30 | End: 2025-07-29

## 2025-04-30 NOTE — PSYCH
Psychiatric Follow Up Visit - Behavioral Health       [facility]  MRN: [id number]      Assessment/Plan:   Melody Huynh is a 44 y.o. 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  For review, on initial evaluation, patient presented with symptoms consistent with EPS and akathisia related adverse effects of psychotropic medications. Possible offending agents were previously discontinued (Vraylar, Zyprexa) with adjustments to Prozac and Klonopin dosing.  Patient had acute decompensation after 09/25/24 outpatient appointment requiring inpatient hospitalization. Patient was admitted to [facility] where adjustments were made to Prozac and Haldol was added to medication regimen for psychomotor agitation, anxiety, and mood related symptoms.  Today, the patient continues to experience stable mood related symptoms, no recurrence of depressive related symptoms. No recent panic attacks. There has been an increase in anxiety due to stressor of  experiencing a back injury - patient reviews coping strategies she has been utilizing and has been managing the related stress well.  Discussed lowering Haldol further in setting of ongoing stability and patient's long term goal of being off of medication  - reviewed with patient possible benefit of being on medication long term and ability to further titrate or taper based on symptoms, and she expressed understanding.  Patient agreeable to decrease to Haldol 0.5 mg daily and Cogentin 0.25 mg daily from BID dosing, and to continue Prozac and Klonopin PRN at current doses.  Discussed continued medication management follow up and upcoming transfer due to this provider's graduation.  Assessment & Plan  Anxiety disorder, unspecified type  Differential diagnosis includes Generalized anxiety disorder, Panic disorder    Continue Prozac 40 mg daily for mood and anxiety  PARQ completed including serotonin syndrome, SIADH, worsening depression, suicidality, induction of ghada, GI upset, headaches, activation, sexual side effects, sedation, potential drug interactions, and others.  Decrease Haldol to 0.5 mg daily for anxiety, psychomotor agitation, and augmentation of antidepressant  PARQ of Haldol including EPS side effects suck as Tardive Dyskinesia, parkinsonism, and tardive dystonia in addition to prolactin increase/galactorrhea, amenorrhea, dizziness, sedation, hypotension, weight gain/metabolic complications, Akathisia, dry mouth, constipation, urinary retention, blurred vision, decreased sweating, tachycardia, hypertension, and rarely NMS, seizures, jaundice, agranulocytosis, leukopenia, and increased risk of CVA in elderly patient's with dementia-related psychosis.  Decrease Cogentin to 0.25 mg daily for EPS related adverse effects in setting of Haldol and anticholinergic adverse effects   PARQ completed including dry mouth, blurred vision, diplopia, confusion, hallucinations, constipation, nausea, vomiting, dilation of colon/paralytic ileus/bowel obstruction, erectile dysfunction, angle-closure glaucoma, heat stroke (mostly in elderly), tachycardia, cardiac arrhymias, hypotension, urinary retention, and rarely tardive dyskinesia unmasking.  Continue Klonopin 1 mg twice  daily as needed for anxiety  PDMP reviewed, last filled 02/19/25  Discussed with patient the risks of sedation, respiratory depression, impairment in judgment and motor function. Depression, mood changes, GI changes, and others discussed. Patient informed of risks of being on or starting opioid medications due to drug interactions and potential for serious respiratory depression and death.  Continue psychotherapy with own therapist  Unspecified mood (affective) disorder (HCC)  Differential diagnosis includes Major depressive disorder with anxious distress, Bipolar affective disorder, Cyclothymia    Continue Prozac as above  Continue Haldol as above  Continue psychotherapy with own therapist    Aware of need to follow up with family physician for medical issues  Aware of 24 hour and weekend coverage for urgent situations accessed by calling [facility] main practice number  Medication management every 4-8 weeks    Medication Risks/Benefits      Risks, Benefits And Possible Side Effects Of Medications:    Risks, benefits, and possible side effects of medications explained to Melody and she verbalizes understanding and agreement for treatment.    Controlled Medication Discussion:     Melody has been filling controlled prescriptions on time as prescribed according to [place] Prescription Drug Monitoring Program  Discussed with Melody the risks of sedation, respiratory depression, impairment of ability to drive and potential for abuse and addiction related to treatment with benzodiazepine medications. She understands risk of treatment with benzodiazepine medications, agrees to not drive if feels impaired and agrees to take medications as prescribed  Discussed with Melody Black Box warning on concurrent use of benzodiazepines and opioid medications including sedation, respiratory depression, coma and death. She understands the risk of treatment with benzodiazepines in addition to opioids  and wants to continue taking those medications  Discussed with Melody increased risk of impairment related to concurrent use of benzodiazepines and hypnotic medications including excessive sedation, psychomotor impairment and respiratory depression. She understands the risk of treatment with benzodiazepines in addition to hypnotic medications and wants to continue taking those medications  ------------------------------------  History of Present Illness   Melody Huynh is presenting to follow up for anxiety, depression, and history of EPS related symptoms . Melody reports stable mood and no recurrence of depressive related symptoms. She continues to sleep well. She does not endorse SI, HI, passive death wishes, or thoughts to harm self or others at time of evaluation.    The patient discusses anxiety was improving, with increase only in the mornings upon waking. There was an acute stressor of  experiencing a back injury, and she has had increased home and  related responsibilities as a result. She notes she has been coping well with the increased stress and discussed mowing the lawn and other activities she has taken on and feels good about. She reports being proud of herself for being able to adjust to the change and manage the associated stress. Provided support.     The patient does not endorse or demonstrate symptoms consistent with ghada/hypomania at time of evaluation, and does not endorse or demonstrate symptoms consistent with negative or positive symptoms of psychosis at time of evaluation.     Discussed lowering Haldol to 0.5 mg daily and Cogentin to 0.25 mg daily in the setting of continued stability in the setting of Haldol being tapered. Patient is agreeable to plan. Patient continues to desire eventual discontinuation of Haldol and reviewed with patient possible benefit of being on medication long term and ability to further titrate or taper based on symptoms, and she expressed  understanding.    Psychiatric Review Of Systems:  Melody reports Symptoms as described in HPI.  Melody denies Current suicidal thoughts, plan, or intent, Current thoughts of self-harm, or Current homicidal thoughts, plan, or intent.    Medical Review Of Systems:  Complete review of systems is negative except as noted above.  [place] Akathisia Rating Scale (BARS) - Today's assessment on 04/30/25 is 0 and Global Clinical Assessment score of 0  Prior score on 10/23/23: 8 and Global Clinical Assessment score of 3  11/14/23: 7 and Global Clinical Assessment score of 2  12/04/23: Global Clinical Assessment score of 3 due to distress to patient  02/29/24: 0 and Global Clinical Assessment score of 0  04/22/24: 0 and Global Clinical Assessment score of 0  09/25/24 0 and Global Clinical Assessment score of 0  10/22/24 2 and Global Clinical Assessment score of 1  11/05/24: 0 and Global Clinical Assessment score of 1  12/04/24 0 and Global Clinical Assessment score of 0  02/19/25 0 and Global Clinical Assessment score 0  03/26/25 is 0 and Global Clinical Assessment score of 0  Objective   0 Normal, occasional fidgety movements of the limbs  1 Presence of characteristic restless movements: shuffling or tramping movements of the legs/feet, or swinging of one leg while sitting, and/or rocking from foot to foot or “walking on the spot” when standing, but movements present for less than half the time observed  2 Observed phenomena, as described in (1) above, which are present for at least half the observation period  3 Patient is constantly engaged in characteristic restless movements, and/or has the inability to remain seated or standing without walking or pacing, during the time observed  Subjective  0 Absence of inner restlessness  1 Non-specific sense of inner restlessness  2 The patient is aware of an inability to keep the legs still, or a desire to move the legs, and/or complains of inner restlessness aggravated  specifically by being required to stand still  3 Awareness of intense compulsion to move most of the time and/or reports strong desire to walk or pace most of the time  Distress related to restlessness  0 No distress  1 Mild  2 Moderate  3 Severe  Global Clinical Assessment of Akathisia  0 Absent. No evidence of awareness of restlessness. Observation of characteristic movements of akathisia in the absence of a subjective report of inner restlessness or compulsive desire to move the legs should be classified as pseudoakathisia  1 Questionable. Non-specific inner tension and fidgety movements  2 Mild akathisia. Awareness of restlessness in the legs and/or inner restlessness worse when required to stand still. Fidgety movements present, but characteristic restless movements of akathisia not necessarily observed. Condition causes little or no distress.  3 Moderate akathisia. Awareness of restlessness as described for mild akathisia above, combined with characteristic restless movements such as rocking from foot to foot when standing. Patient finds the condition distressing.  4 Marked akathisia. Subjective experience of restlessness includes a compulsive desire to walk or pace. However, the patient is able to remain seated for at least five minutes. The condition is obviously distressing.  5 Severe akathisia. The patient reports a strong compulsion to pace up and down most of the time. Unable to sit or lie down for more than a few minutes. Constant restlessness which is associated with intense distress and insomnia.  ------------------------------------  All italicized information has been copied from previous psychiatric evaluation. Information has been reviewed with the patient.     Past Psychiatric History:   Prior psychiatric diagnoses: anxiety, panic disorder, mood disorder, OCD  Inpatient hospitalizations: inpatient hospitalizations - first hospitalization 2001 - [facility], 2008 - [facility]  - [place], 2021 - panic disorder, mood symptoms, most recent at [facility] in 09/2024  Emergency room visits often in-between for severe panic attacks - 2016, multiple recent ED visits in 09/2024 for severe panic attacks  Suicide attempts/self-harm: patient denies, patient denies self harm behaviors  Violent/aggressive behavior: patient denies   Patient did have some aggressive outbursts towards  and providers  Outpatient psychiatric providers: [facility]   Was without a psychiatrist from [phone] (Lexapro from [facility] during that time), prior psychiatrist, Dr. Martines, retired  Past/current psychotherapy: patient has a therapist weekly, Dr. Foster, [facility]  Other Services: patient denies  Psychiatric medication trial:   Multiple medication trials - including below,  Antidepressants  Prozac (up to 40 mg), Zoloft (short period of time), Lexapro (multiple years), Luvox  Antipsychotics  Vraylar, Zyprexa, Seroquel, Haldol  Sedative hypnotics  Ativan, Klonopin (0.5 mg QID PRN)  Others  Hydroxyzine, Gabapentin, BuSpar (60 mg total daily), Cogentin     Substance Abuse History:  I have assessed this patient for substance use within the past 12 months.  Patient reports a few cigarettes per day, quit 2007/2008. Patient reports history of daily marijuana use - had medical marijuana card - few months without smoking.   Denies history of other recreational use including opioids, methamphetamines/amphetamines, cocaine use. Denies past legal actions or arrests secondary to substance intoxication. The patient denies prior DWIs/DUIs. Melody does not exhibit objective evidence of substance withdrawal during today's examination nor does Melody appear under the influence of any psychoactive substance.       Family Psychiatric History:   Mother: anxiety (on antidepressant - Lexapro)  No family history of substance use disorder or suicide  attempts or completions.     Social History  Early life/developmental: Denies a history of milestone/developmental delay. Denies a history of in-utero exposure to toxins/illicit substances. Denies ADHD history. No history of IEP/504, denies special supports in school.  Marital history: /Civil Union   Children: yes, 1 daughter (7 y/o - 2nd grade)  1 older brother   Living arrangement: Lives in a home with  and daughter (family lost their dog in May).  Support system: identifies  as the biggest source of support  Mother also a big support  Education: college graduate - communications ([employer])  Had to withdraw from college twice due to anxiety  Occupational History: unemployed since January 2023, applying for permanent disability - previously in advertising in 10-15 years  Other Pertinent History: no reported  or legal history  Access to firearms:  has firearm, locked and secured      Traumatic History:   Abuse:  patient denied  Other Traumatic Events:  medical trauma, father passed when she was 16 y/o    History Review: The following portions of the patient's history were reviewed and updated as appropriate: allergies, current medications, past family history, past medical history, past social history, past surgical history, and problem list.    Visit Vitals  OB Status Implant   Smoking Status Never      Wt Readings from Last 6 Encounters:   04/24/25 72.4 kg (159 lb 9.6 oz)   02/21/25 70.4 kg (155 lb 3.2 oz)   10/08/24 63 kg (138 lb 12.8 oz)   10/06/24 63.4 kg (139 lb 12.8 oz)   10/03/24 62.1 kg (137 lb)   09/27/24 63.1 kg (139 lb 1.8 oz)            Mental Status Exam:  Appearance:  alert, good eye contact, appears stated age, casually dressed, and appropriate grooming and hygiene   Behavior:  calm, cooperative, and sitting comfortably   Motor: no abnormal movements and unable to assess gait and balance due to virtual visit   Speech:  spontaneous, normal rate, not pressured,  "normal volume, not hyperverbal, and coherent   Mood:  \"Feeling good\"   Affect:  mood-congruent, appropriate range, and smiling   Thought Process:  Organized, logical, goal-directed   Thought Content: no verbalized delusions or overt paranoia   Perceptual disturbances: no reported hallucinations and does not appear to be responding to internal stimuli at this time   Risk Potential: No active or passive suicidal or homicidal ideation was verbalized during interview   Cognition: oriented to person, place, time, and situation, memory grossly intact, appears to be of average intelligence, normal abstract reasoning, age-appropriate attention span and concentration, and cognition not formally tested   Insight:  Good   Judgment: Good       Meds/Allergies    Allergies   Allergen Reactions    Demerol [Meperidine] Hyperactivity    Macrolides And Ketolides      Current Outpatient Medications   Medication Instructions    benztropine (COGENTIN) 0.25 mg, Oral, 2 times daily    clonazePAM (KLONOPIN) 1 mg, Oral, 2 times daily PRN    FLUoxetine (PROZAC) 40 mg, Oral, Daily    haloperidol (HALDOL) 0.5 mg, Oral, 2 times daily        Labs & Imaging:  I have personally reviewed all pertinent laboratory tests and imaging results.   Appointment on 04/24/2025   Component Date Value Ref Range Status    WBC 04/24/2025 5.83  4.31 - 10.16 Thousand/uL Final    RBC 04/24/2025 4.60  3.81 - 5.12 Million/uL Final    Hemoglobin 04/24/2025 13.9  11.5 - 15.4 g/dL Final    Hematocrit 04/24/2025 40.5  34.8 - 46.1 % Final    MCV 04/24/2025 88  82 - 98 fL Final    MCH 04/24/2025 30.2  26.8 - 34.3 pg Final    MCHC 04/24/2025 34.3  31.4 - 37.4 g/dL Final    RDW 04/24/2025 12.6  11.6 - 15.1 % Final    MPV 04/24/2025 10.2  8.9 - 12.7 fL Final    Platelets 04/24/2025 248  149 - 390 Thousands/uL Final    nRBC 04/24/2025 0  /100 WBCs Final    Segmented % 04/24/2025 50  43 - 75 % Final    Immature Grans % 04/24/2025 0  0 - 2 % Final    Lymphocytes % 04/24/2025 40  " 14 - 44 % Final    Monocytes % 04/24/2025 6  4 - 12 % Final    Eosinophils Relative 04/24/2025 3  0 - 6 % Final    Basophils Relative 04/24/2025 1  0 - 1 % Final    Absolute Neutrophils 04/24/2025 2.97  1.85 - 7.62 Thousands/µL Final    Absolute Immature Grans 04/24/2025 0.01  0.00 - 0.20 Thousand/uL Final    Absolute Lymphocytes 04/24/2025 2.31  0.60 - 4.47 Thousands/µL Final    Absolute Monocytes 04/24/2025 0.33  0.17 - 1.22 Thousand/µL Final    Eosinophils Absolute 04/24/2025 0.15  0.00 - 0.61 Thousand/µL Final    Basophils Absolute 04/24/2025 0.06  0.00 - 0.10 Thousands/µL Final    Sodium 04/24/2025 138  135 - 147 mmol/L Final    Potassium 04/24/2025 4.3  3.5 - 5.3 mmol/L Final    Chloride 04/24/2025 104  96 - 108 mmol/L Final    CO2 04/24/2025 27  21 - 32 mmol/L Final    ANION GAP 04/24/2025 7  4 - 13 mmol/L Final    BUN 04/24/2025 15  5 - 25 mg/dL Final    Creatinine 04/24/2025 0.82  0.60 - 1.30 mg/dL Final    Standardized to IDMS reference method    Glucose, Fasting 04/24/2025 91  65 - 99 mg/dL Final    Calcium 04/24/2025 9.1  8.4 - 10.2 mg/dL Final    AST 04/24/2025 21  13 - 39 U/L Final    ALT 04/24/2025 26  7 - 52 U/L Final    Specimen collection should occur prior to Sulfasalazine administration due to the potential for falsely depressed results.     Alkaline Phosphatase 04/24/2025 53  34 - 104 U/L Final    Total Protein 04/24/2025 7.3  6.4 - 8.4 g/dL Final    Albumin 04/24/2025 4.4  3.5 - 5.0 g/dL Final    Total Bilirubin 04/24/2025 1.12 (H)  0.20 - 1.00 mg/dL Final    Use of this assay is not recommended for patients undergoing treatment with eltrombopag due to the potential for falsely elevated results.  N-acetyl-p-benzoquinone imine (metabolite of Acetaminophen) will generate erroneously low results in samples for patients that have taken an overdose of Acetaminophen.    eGFR 04/24/2025 87  ml/min/1.73sq m Final    Cholesterol 04/24/2025 179  See Comment mg/dL Final    Cholesterol:         Pediatric  <18 Years        Desirable          <170 mg/dL      Borderline High    170-199 mg/dL      High               >=200 mg/dL        Adult >=18 Years            Desirable         <200 mg/dL      Borderline High   200-239 mg/dL      High              >239 mg/dL      Triglycerides 04/24/2025 79  See Comment mg/dL Final    Triglyceride:     0-9Y            <75mg/dL     10Y-17Y         <90 mg/dL       >=18Y     Normal          <150 mg/dL     Borderline High 150-199 mg/dL     High            200-499 mg/dL        Very High       >499 mg/dL    Specimen collection should occur prior to Metamizole administration due to the potential for falsely depressed results.    HDL, Direct 04/24/2025 56  >=50 mg/dL Final    LDL Calculated 04/24/2025 107 (H)  0 - 100 mg/dL Final    LDL Cholesterol:     Optimal           <100 mg/dl     Near Optimal      100-129 mg/dl     Above Optimal       Borderline High 130-159 mg/dl       High            160-189 mg/dl       Very High       >189 mg/dl         This screening LDL is a calculated result.   It does not have the accuracy of the Direct Measured LDL in the monitoring of patients with hyperlipidemia and/or statin therapy.   Direct Measure LDL ([id number]) must be ordered separately in these patients.    TSH 3RD GENERATON 04/24/2025 1.353  0.450 - 4.500 uIU/mL Final    The recommended reference ranges for TSH during pregnancy are as follows:   First trimester 0.100 to 2.500 uIU/mL   Second trimester  0.200 to 3.000 uIU/mL   Third trimester 0.300 to 3.000 uIU/m    Note: Normal ranges may not apply to patients who are transgender, non-binary, or whose legal sex, sex at birth, and gender identity differ.  Adult TSH (3rd generation) reference range follows the recommended guidelines of the American Thyroid Association, January, 2020.    Free T4 04/24/2025 0.89  0.61 - 1.12 ng/dL Final    Specimens with biotin concentrations > 10 ng/mL can lead to significant (> 10%) positive bias in result.      ---------------------------------------    Although patient's acute lethality risk is low, long-term/chronic lethality risk is mildly elevated in the presence of anxiety, history of substance use, history of traumatic experiences, access to firearms (locked and secured). At the current moment, Melody is future-oriented, forward-thinking, and demonstrates ability to act in a self-preserving manner as evidenced by volitionally presenting to the clinic today, seeking treatment. At this juncture, inpatient hospitalization is not currently warranted. To mitigate future risk, patient should adhere to the recommendations of this writer, avoid alcohol/illicit substance use, utilize community-based resources and familiar support and prioritize mental health treatment.     Based on today's assessment and clinical criteria, Melody Huynh contracts for safety and is not an imminent risk of harm to self or others. Outpatient level of care is deemed appropriate at this present time. Melody understands that if they are no longer able to contract for safety, they need to call/contact the outpatient office including this writer, call/contact crisis and/orattend to the nearest Emergency Department for immediate evaluation.     Risk of Harm to Self:  The following ratings are based on assessment at the time of the interview  Demographic risk factors include:   Historical Risk Factors include: history of depression, chronic anxiety symptoms, history of mood disorder, history of substance use, history of traumatic experiences  Recent Specific Risk Factors include: diagnosis of mood disorder, current depressive symptoms, current anxiety symptoms, unemployed, medication adverse effects  Protective Factors: no current suicidal ideation, access to mental health treatment, being a parent, being , compliant with medications, compliant with mental health treatment, connection to own children, having a desire to be  alive, having a sense of purpose or meaning in life, opportunities to participate in community, responsibilities and duties to others, stable living environment, supportive family  Weapons: gun. The following steps have been taken to ensure weapons are properly secured: locked, secured, confirmed with   Based on today's assessment, Melody presents the following risk of harm to self: low     Risk of Harm to Others:  The following ratings are based on assessment at the time of the interview  Demographic Risk Factors include: unemployed.  Historical Risk Factors include: history of substance use.  Recent Specific Risk Factors include: weapons or other means available, concomitant mood disorder, multiple stressors.  Protective Factors: no current homicidal ideation, access to mental health treatment, being a parent, being , compliant with medications, compliant with mental health treatment, opportunities to participate in community, resilience, responsibilities and duties to others, safe and stable living environment, supportive family  Weapons: gun. The following steps have been taken to ensure weapons are properly secured: locked, secured, confirmed by   Based on today's assessment, Melody presents the following risk of harm to others: low       Psychotherapy Provided:     Individual psychotherapy provided: Yes  Counseling was provided during the session today for 10 minutes.  Medications, treatment progress and treatment plan reviewed with Melody.    Treatment Plan:    Completed and signed during the session: Not applicable - Treatment Plan not due at this session    This note was shared with patient.    Administrative Statements   Encounter provider Desyi Cohen, DO    The Patient is located at Home and in the following state in which I hold an active license [place].    The patient was identified by name and date of birth. Melody Huynh was informed that this is a telemedicine visit  and that the visit is being conducted through the Epic Embedded platform. She agrees to proceed.. My office door was closed. No one else was in the room.  She acknowledged consent and understanding of privacy and security of the video platform. The patient has agreed to participate and understands they can discontinue the visit at any time.    I have spent a total time of 20 minutes in caring for this patient on the day of the visit/encounter including Instructions for management and Counseling / Coordination of care, not including the time spent for establishing the audio/video connection.     Visit Time:  Visit Start Time: 1245  Visit Stop Time: 0105  Total Visit Duration:  20 minutes    Deysi Cohen DO  Psychiatry Resident, PGY-IV    This note has been constructed using a voice recognition system. There may be translation, syntax, or grammatical errors. If you have any questions, please contact the dictating provider.

## 2025-04-30 NOTE — PATIENT INSTRUCTIONS
Decrease Haldol to 0.5 mg daily in the morning - stop afternoon dose  Decrease Cogentin to 0.25 mg (1/2 tablet) daily - take with Haldol   Continue Prozac 40 mg daily   Continue Klonopin 1 mg twice daily as needed for severe anxiety    Please call the office nursing staff for medication issues including refills, problems obtaining medications, side effects, etc.  Please return for a follow up appointment as discussed. If you are running late or are unable to attend your appointment, please call our [place] office at [phone].    If you are in psychological crisis including not limited to suicidal/homicidal thoughts or thoughts of self-injury, do not hesitate to call/contact your County Crisis hotline (see below), call [phone], call [phone] (mental health crisis), or go to the nearest emergency department.  [facility] Crisis: [phone]  [facility] Crisis: [phone]  [facility] Crisis: [phone]  [facility] Crisis: [phone]  [facility] Crisis: [phone]  [facility] Crisis: [phone]  [facility] Crisis: [phone]  National Suicide Prevention Hotline: 1-923.463.4494

## 2025-06-11 ENCOUNTER — TELEMEDICINE (OUTPATIENT)
Dept: PSYCHIATRY | Facility: CLINIC | Age: 45
End: 2025-06-11

## 2025-06-11 DIAGNOSIS — F41.9 ANXIETY DISORDER, UNSPECIFIED TYPE: Primary | ICD-10-CM

## 2025-06-11 DIAGNOSIS — F39 UNSPECIFIED MOOD (AFFECTIVE) DISORDER (HCC): ICD-10-CM

## 2025-06-11 PROCEDURE — PBNCHG PB NO CHARGE PLACEHOLDER

## 2025-06-11 RX ORDER — CLONAZEPAM 1 MG/1
1 TABLET ORAL 2 TIMES DAILY PRN
Qty: 60 TABLET | Refills: 0 | Status: SHIPPED | OUTPATIENT
Start: 2025-06-11 | End: 2025-07-11

## 2025-06-11 NOTE — PSYCH
Psychiatric Follow Up Visit - Behavioral Health       [facility]  MRN: [id number]      Assessment/Plan:   Melody Huynh is a 44 y.o. 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  For review, on initial evaluation, patient presented with symptoms consistent with EPS and akathisia related adverse effects of psychotropic medications. Possible offending agents were previously discontinued (Vraylar, Zyprexa) with adjustments to Prozac and Klonopin dosing.  Patient had acute decompensation after 09/25/24 outpatient appointment requiring inpatient hospitalization. Patient was admitted to [facility] where adjustments were made to Prozac and Haldol was added to medication regimen for psychomotor agitation, anxiety, and mood related symptoms.  Today, the patient has experienced stable mood, though an increase in anxiety related symptoms, notably in the mornings which has worsened compared to prior. Discussed potential triggers with patient and coping strategies.  Patient does not not endorse SI, HI, passive death wishes, or thoughts to harm self or others at time of evaluation.  Discussed continuing medications at current doses and adapting coping strategies for anxiety as discussed. Patient  requesting psychotherapy referral - in network and out of network options provided. Patient aware of upcoming transfer of care in setting of this provider's upcoming graduation, patient agreeable to follow up.  Assessment & Plan  Anxiety disorder, unspecified type  Differential diagnosis includes Generalized anxiety disorder, Panic disorder     Continue Prozac 40 mg daily for mood and anxiety  PARQ completed including serotonin syndrome, SIADH, worsening depression, suicidality, induction of ghada, GI upset, headaches, activation, sexual side effects, sedation, potential drug interactions, and others.  Continue Haldol to 0.5 mg daily for anxiety, psychomotor agitation, and augmentation of antidepressant  PARQ of Haldol including EPS side effects suck as Tardive Dyskinesia, parkinsonism, and tardive dystonia in addition to prolactin increase/galactorrhea, amenorrhea, dizziness, sedation, hypotension, weight gain/metabolic complications, Akathisia, dry mouth, constipation, urinary retention, blurred vision, decreased sweating, tachycardia, hypertension, and rarely NMS, seizures, jaundice, agranulocytosis, leukopenia, and increased risk of CVA in elderly patient's with dementia-related psychosis.  Continue Cogentin to 0.25 mg daily for EPS related adverse effects in setting of Haldol and anticholinergic adverse effects   PARQ completed including dry mouth, blurred vision, diplopia, confusion, hallucinations, constipation, nausea, vomiting, dilation of colon/paralytic ileus/bowel obstruction, erectile dysfunction, angle-closure glaucoma, heat stroke (mostly in elderly), tachycardia, cardiac arrhymias, hypotension, urinary retention, and rarely tardive dyskinesia unmasking.  Continue Klonopin 1 mg twice daily as needed for anxiety  PDMP reviewed, last filled 04/30/25  Discussed with patient the risks of sedation, respiratory depression, impairment in judgment and motor function. Depression, mood changes, GI changes, and  others discussed. Patient informed of risks of being on or starting opioid medications due to drug interactions and potential for serious respiratory depression and death.  Continue psychotherapy with own therapist  Unspecified mood (affective) disorder (HCC)  Differential diagnosis includes Major depressive disorder with anxious distress, Bipolar affective disorder, Cyclothymia     Continue Prozac as above  Continue Haldol as above  Continue psychotherapy with own therapist       Aware of need to follow up with family physician for medical issues  Aware of 24 hour and weekend coverage for urgent situations accessed by calling [facility] main practice number  Medication management every 4-8 weeks    Medication Risks/Benefits      Risks, Benefits And Possible Side Effects Of Medications:    Risks, benefits, and possible side effects of medications explained to Melody and she verbalizes understanding and agreement for treatment.    Controlled Medication Discussion:     Melody has been filling controlled prescriptions on time as prescribed according to [place] Prescription Drug Monitoring Program  Discussed with Melody the risks of sedation, respiratory depression, impairment of ability to drive and potential for abuse and addiction related to treatment with benzodiazepine medications. She understands risk of treatment with benzodiazepine medications, agrees to not drive if feels impaired and agrees to take medications as prescribed  Discussed with Melody Black Box warning on concurrent use of benzodiazepines and opioid medications including sedation, respiratory depression, coma and death. She understands the risk of treatment with benzodiazepines in addition to opioids and wants to continue taking those medications  Discussed with Melody increased risk of impairment related to concurrent use of benzodiazepines and hypnotic medications including excessive sedation, psychomotor  impairment and respiratory depression. She understands the risk of treatment with benzodiazepines in addition to hypnotic medications and wants to continue taking those medications  ------------------------------------  History of Present Illness   Melody Huynh is presenting to follow up for anxiety, depression, and history of EPS related symptoms. Melody has had no change or worsening in mood related symptoms. She has had an increase in anxiety related symptoms, notably in the mornings which has worsened compared to prior. Discussed potential triggers, which include substance use. Reviewed coping strategies and options with patient to help reduce morning anxiety.  She does not endorse SI, HI, passive death wishes, or thoughts to harm self or others at time of evaluation.    The patient does not endorse or demonstrate symptoms consistent with ghada/hypomania at time of evaluation, and does not endorse or demonstrate symptoms consistent with negative or positive symptoms of psychosis at time of evaluation.     Discussed continuing medications at current doses - with ongoing long term goal of tapering Haldol off. Will not taper further at this visit due to increase in anxiety in recent weeks, which has been helped with Haldol. Patient can also utilize PRN Klonopin at this time for increases in anxiety.    The patient discusses recent positive happenings and goals - provided space for patient to discuss. Reviewed with patient healthy vs unhealthy coping strategies.     Patient agreeable to continue medication at current doses and for follow up, which she is aware will be a transfer of care evaluation in setting of this provider's upcoming graduation.    Psychiatric Review Of Systems:  Melody reports Symptoms as described in HPI.  Melody denies Current suicidal thoughts, plan, or intent, Current thoughts of self-harm, or Current homicidal thoughts, plan, or intent.    Medical Review Of Systems:  Complete  review of systems is negative except as noted above.    [place] Akathisia Rating Scale (BARS) - Today's assessment on 06/11/25, 0 and 0   Prior score on 10/23/23: 8 and Global Clinical Assessment score of 3  11/14/23: 7 and Global Clinical Assessment score of 2  12/04/23: Global Clinical Assessment score of 3 due to distress to patient  02/29/24: 0 and Global Clinical Assessment score of 0  04/22/24: 0 and Global Clinical Assessment score of 0  09/25/24 0 and Global Clinical Assessment score of 0  10/22/24 2 and Global Clinical Assessment score of 1  11/05/24: 0 and Global Clinical Assessment score of 1  12/04/24 0 and Global Clinical Assessment score of 0  02/19/25 0 and Global Clinical Assessment score 0  03/26/25 is 0 and Global Clinical Assessment score of 0  04/30/25 is 0 and Global Clinical Assessment score of 0  Objective   0 Normal, occasional fidgety movements of the limbs  1 Presence of characteristic restless movements: shuffling or tramping movements of the legs/feet, or swinging of one leg while sitting, and/or rocking from foot to foot or “walking on the spot” when standing, but movements present for less than half the time observed  2 Observed phenomena, as described in (1) above, which are present for at least half the observation period  3 Patient is constantly engaged in characteristic restless movements, and/or has the inability to remain seated or standing without walking or pacing, during the time observed  Subjective  0 Absence of inner restlessness  1 Non-specific sense of inner restlessness  2 The patient is aware of an inability to keep the legs still, or a desire to move the legs, and/or complains of inner restlessness aggravated specifically by being required to stand still  3 Awareness of intense compulsion to move most of the time and/or reports strong desire to walk or pace most of the time  Distress related to restlessness  0 No distress  1 Mild  2 Moderate  3 Severe  Global Clinical  Assessment of Akathisia  0 Absent. No evidence of awareness of restlessness. Observation of characteristic movements of akathisia in the absence of a subjective report of inner restlessness or compulsive desire to move the legs should be classified as pseudoakathisia  1 Questionable. Non-specific inner tension and fidgety movements  2 Mild akathisia. Awareness of restlessness in the legs and/or inner restlessness worse when required to stand still. Fidgety movements present, but characteristic restless movements of akathisia not necessarily observed. Condition causes little or no distress.  3 Moderate akathisia. Awareness of restlessness as described for mild akathisia above, combined with characteristic restless movements such as rocking from foot to foot when standing. Patient finds the condition distressing.  4 Marked akathisia. Subjective experience of restlessness includes a compulsive desire to walk or pace. However, the patient is able to remain seated for at least five minutes. The condition is obviously distressing.  5 Severe akathisia. The patient reports a strong compulsion to pace up and down most of the time. Unable to sit or lie down for more than a few minutes. Constant restlessness which is associated with intense distress and insomnia.  ------------------------------------  All italicized information has been copied from previous psychiatric evaluation. Information has been reviewed with the patient.     Past Psychiatric History:   Prior psychiatric diagnoses: anxiety, panic disorder, mood disorder, OCD  Inpatient hospitalizations: inpatient hospitalizations - first hospitalization 2001 - [facility] [place] [facility], 2008 - [facility] - [place], 2021 - panic disorder, mood symptoms, most recent at [facility] in 09/2024  Emergency room visits often in-between for severe panic attacks - 2016, multiple recent ED visits in 09/2024 for severe panic attacks  Suicide  attempts/self-harm: patient denies, patient denies self harm behaviors  Violent/aggressive behavior: patient denies   Patient did have some aggressive outbursts towards  and providers  Outpatient psychiatric providers: currently has outpatient psychiatric provider - [facility]   Was without a psychiatrist from [phone] (Lexapro from [facility] during that time), prior psychiatrist, Dr. Martines, retired  Past/current psychotherapy: patient has a therapist weekly, Dr. Foster, [facility]  Other Services: patient denies  Psychiatric medication trial:   Multiple medication trials - including below,  Antidepressants  Prozac (up to 40 mg), Zoloft (short period of time), Lexapro (multiple years), Luvox  Antipsychotics  Vraylar, Zyprexa, Seroquel, Haldol  Sedative hypnotics  Ativan, Klonopin (0.5 mg QID PRN)  Others  Hydroxyzine, Gabapentin, BuSpar (60 mg total daily), Cogentin     Substance Abuse History:  I have assessed this patient for substance use within the past 12 months.  Patient reports a few cigarettes per day, quit 2007/2008. Patient reports history of daily marijuana use - had medical marijuana card - few months without smoking.   Denies history of other recreational use including opioids, methamphetamines/amphetamines, cocaine use. Denies past legal actions or arrests secondary to substance intoxication. The patient denies prior DWIs/DUIs. Melody does not exhibit objective evidence of substance withdrawal during today's examination nor does Melody appear under the influence of any psychoactive substance.       Family Psychiatric History:   Mother: anxiety (on antidepressant - Lexapro)  No family history of substance use disorder or suicide attempts or completions.     Social History  Early life/developmental: Denies a history of milestone/developmental delay. Denies a history of in-utero exposure to toxins/illicit substances. Denies ADHD history. No history of IEP/504, denies  special supports in school.  Marital history: /Civil Union   Children: yes, 1 daughter (9 y/o - 2nd grade)  1 older brother   Living arrangement: Lives in a home with  and daughter (family lost their dog in May).  Support system: identifies  as the biggest source of support  Mother also a big support  Education: college graduate - communications ([employer])  Had to withdraw from college twice due to anxiety  Occupational History: unemployed since January 2023, applying for permanent disability - previously in advertising in 10-15 years  Other Pertinent History: no reported  or legal history  Access to firearms:  has firearm, locked and secured      Traumatic History:   Abuse:  patient denied  Other Traumatic Events:  medical trauma, father passed when she was 16 y/o    History Review: The following portions of the patient's history were reviewed and updated as appropriate: allergies, current medications, past family history, past medical history, past social history, past surgical history, and problem list.    Visit Vitals  OB Status Implant   Smoking Status Never      Wt Readings from Last 6 Encounters:   04/24/25 72.4 kg (159 lb 9.6 oz)   02/21/25 70.4 kg (155 lb 3.2 oz)   10/08/24 63 kg (138 lb 12.8 oz)   10/06/24 63.4 kg (139 lb 12.8 oz)   10/03/24 62.1 kg (137 lb)   09/27/24 63.1 kg (139 lb 1.8 oz)        Mental Status Exam:  Appearance:  alert, good eye contact, appears stated age, casually dressed, appropriate grooming and hygiene, and smiling   Behavior:  calm, cooperative, and sitting comfortably   Motor: no abnormal movements and unable to assess gait and balance due to virtual visit   Speech:  spontaneous, clear, normal rate, not pressured, normal volume, not hyperverbal, and coherent   Mood:  anxious   Affect:  mood-congruent, appropriate range, and anxious   Thought Process:  Organized, logical, goal-directed   Thought Content: no verbalized delusions or overt  paranoia, negative thoughts   Perceptual disturbances: no reported hallucinations and does not appear to be responding to internal stimuli at this time   Risk Potential: No active or passive suicidal or homicidal ideation was verbalized during interview   Cognition: oriented to person, place, time, and situation, memory grossly intact, appears to be of average intelligence, normal abstract reasoning, age-appropriate attention span and concentration, and cognition not formally tested   Insight:  Fair   Judgment: Fair       Meds/Allergies    Allergies[1]  Current Outpatient Medications   Medication Instructions    benztropine (COGENTIN) 0.25 mg, Oral, Every morning    clonazePAM (KLONOPIN) 1 mg, Oral, 2 times daily PRN    FLUoxetine (PROZAC) 40 mg, Oral, Daily    haloperidol (HALDOL) 0.5 mg, Oral, Every morning        Labs & Imaging:  I have personally reviewed all pertinent laboratory tests and imaging results.   Appointment on 04/24/2025   Component Date Value Ref Range Status    WBC 04/24/2025 5.83  4.31 - 10.16 Thousand/uL Final    RBC 04/24/2025 4.60  3.81 - 5.12 Million/uL Final    Hemoglobin 04/24/2025 13.9  11.5 - 15.4 g/dL Final    Hematocrit 04/24/2025 40.5  34.8 - 46.1 % Final    MCV 04/24/2025 88  82 - 98 fL Final    MCH 04/24/2025 30.2  26.8 - 34.3 pg Final    MCHC 04/24/2025 34.3  31.4 - 37.4 g/dL Final    RDW 04/24/2025 12.6  11.6 - 15.1 % Final    MPV 04/24/2025 10.2  8.9 - 12.7 fL Final    Platelets 04/24/2025 248  149 - 390 Thousands/uL Final    nRBC 04/24/2025 0  /100 WBCs Final    Segmented % 04/24/2025 50  43 - 75 % Final    Immature Grans % 04/24/2025 0  0 - 2 % Final    Lymphocytes % 04/24/2025 40  14 - 44 % Final    Monocytes % 04/24/2025 6  4 - 12 % Final    Eosinophils Relative 04/24/2025 3  0 - 6 % Final    Basophils Relative 04/24/2025 1  0 - 1 % Final    Absolute Neutrophils 04/24/2025 2.97  1.85 - 7.62 Thousands/µL Final    Absolute Immature Grans 04/24/2025 0.01  0.00 - 0.20 Thousand/uL  Final    Absolute Lymphocytes 04/24/2025 2.31  0.60 - 4.47 Thousands/µL Final    Absolute Monocytes 04/24/2025 0.33  0.17 - 1.22 Thousand/µL Final    Eosinophils Absolute 04/24/2025 0.15  0.00 - 0.61 Thousand/µL Final    Basophils Absolute 04/24/2025 0.06  0.00 - 0.10 Thousands/µL Final    Sodium 04/24/2025 138  135 - 147 mmol/L Final    Potassium 04/24/2025 4.3  3.5 - 5.3 mmol/L Final    Chloride 04/24/2025 104  96 - 108 mmol/L Final    CO2 04/24/2025 27  21 - 32 mmol/L Final    ANION GAP 04/24/2025 7  4 - 13 mmol/L Final    BUN 04/24/2025 15  5 - 25 mg/dL Final    Creatinine 04/24/2025 0.82  0.60 - 1.30 mg/dL Final    Standardized to IDMS reference method    Glucose, Fasting 04/24/2025 91  65 - 99 mg/dL Final    Calcium 04/24/2025 9.1  8.4 - 10.2 mg/dL Final    AST 04/24/2025 21  13 - 39 U/L Final    ALT 04/24/2025 26  7 - 52 U/L Final    Specimen collection should occur prior to Sulfasalazine administration due to the potential for falsely depressed results.     Alkaline Phosphatase 04/24/2025 53  34 - 104 U/L Final    Total Protein 04/24/2025 7.3  6.4 - 8.4 g/dL Final    Albumin 04/24/2025 4.4  3.5 - 5.0 g/dL Final    Total Bilirubin 04/24/2025 1.12 (H)  0.20 - 1.00 mg/dL Final    Use of this assay is not recommended for patients undergoing treatment with eltrombopag due to the potential for falsely elevated results.  N-acetyl-p-benzoquinone imine (metabolite of Acetaminophen) will generate erroneously low results in samples for patients that have taken an overdose of Acetaminophen.    eGFR 04/24/2025 87  ml/min/1.73sq m Final    Cholesterol 04/24/2025 179  See Comment mg/dL Final    Cholesterol:         Pediatric <18 Years        Desirable          <170 mg/dL      Borderline High    170-199 mg/dL      High               >=200 mg/dL        Adult >=18 Years            Desirable         <200 mg/dL      Borderline High   200-239 mg/dL      High              >239 mg/dL      Triglycerides 04/24/2025 79  See Comment  mg/dL Final    Triglyceride:     0-9Y            <75mg/dL     10Y-17Y         <90 mg/dL       >=18Y     Normal          <150 mg/dL     Borderline High 150-199 mg/dL     High            200-499 mg/dL        Very High       >499 mg/dL    Specimen collection should occur prior to Metamizole administration due to the potential for falsely depressed results.    HDL, Direct 04/24/2025 56  >=50 mg/dL Final    LDL Calculated 04/24/2025 107 (H)  0 - 100 mg/dL Final    LDL Cholesterol:     Optimal           <100 mg/dl     Near Optimal      100-129 mg/dl     Above Optimal       Borderline High 130-159 mg/dl       High            160-189 mg/dl       Very High       >189 mg/dl         This screening LDL is a calculated result.   It does not have the accuracy of the Direct Measured LDL in the monitoring of patients with hyperlipidemia and/or statin therapy.   Direct Measure LDL ([id number]) must be ordered separately in these patients.    TSH 3RD GENERATON 04/24/2025 1.353  0.450 - 4.500 uIU/mL Final    The recommended reference ranges for TSH during pregnancy are as follows:   First trimester 0.100 to 2.500 uIU/mL   Second trimester  0.200 to 3.000 uIU/mL   Third trimester 0.300 to 3.000 uIU/m    Note: Normal ranges may not apply to patients who are transgender, non-binary, or whose legal sex, sex at birth, and gender identity differ.  Adult TSH (3rd generation) reference range follows the recommended guidelines of the American Thyroid Association, January, 2020.    Free T4 04/24/2025 0.89  0.61 - 1.12 ng/dL Final    Specimens with biotin concentrations > 10 ng/mL can lead to significant (> 10%) positive bias in result.     ---------------------------------------    Although patient's acute lethality risk is low, long-term/chronic lethality risk is mildly elevated in the presence of anxiety, history of substance use, history of traumatic experiences, access to firearms (locked and secured). At the current moment, Melody is  future-oriented, forward-thinking, and demonstrates ability to act in a self-preserving manner as evidenced by volitionally presenting to the clinic today, seeking treatment. At this juncture, inpatient hospitalization is not currently warranted. To mitigate future risk, patient should adhere to the recommendations of this writer, avoid alcohol/illicit substance use, utilize community-based resources and familiar support and prioritize mental health treatment.     Based on today's assessment and clinical criteria, Melody Huynh contracts for safety and is not an imminent risk of harm to self or others. Outpatient level of care is deemed appropriate at this present time. Melody understands that if they are no longer able to contract for safety, they need to call/contact the outpatient office including this writer, call/contact crisis and/orattend to the nearest Emergency Department for immediate evaluation.    Risk of Harm to Self:  The following ratings are based on assessment at the time of the interview  Demographic risk factors include:   Historical Risk Factors include: history of depression, chronic anxiety symptoms, history of mood disorder, history of substance use, history of traumatic experiences  Recent Specific Risk Factors include: diagnosis of mood disorder, current depressive symptoms, current anxiety symptoms, unemployed, medication adverse effects  Protective Factors: no current suicidal ideation, access to mental health treatment, being a parent, being , compliant with medications, compliant with mental health treatment, connection to own children, having a desire to be alive, having a sense of purpose or meaning in life, opportunities to participate in community, responsibilities and duties to others, stable living environment, supportive family  Weapons: gun. The following steps have been taken to ensure weapons are properly secured: locked, secured, confirmed with    Based on today's assessment, Melody presents the following risk of harm to self: low     Risk of Harm to Others:  The following ratings are based on assessment at the time of the interview  Demographic Risk Factors include: unemployed.  Historical Risk Factors include: history of substance use.  Recent Specific Risk Factors include: weapons or other means available, concomitant mood disorder, multiple stressors.  Protective Factors: no current homicidal ideation, access to mental health treatment, being a parent, being , compliant with medications, compliant with mental health treatment, opportunities to participate in community, resilience, responsibilities and duties to others, safe and stable living environment, supportive family  Weapons: gun. The following steps have been taken to ensure weapons are properly secured: locked, secured, confirmed by   Based on today's assessment, Melody presents the following risk of harm to others: low      Psychotherapy Provided:     Individual psychotherapy provided: Yes  Counseling was provided during the session today for 5 minutes.  Medications, treatment progress and treatment plan reviewed with Melody.    Treatment Plan:    Completed and signed during the session: Not applicable - Treatment Plan not due at this session    Administrative Statements   Encounter provider Deysi Cohen, DO    The Patient is located at Home and in the following state in which I hold an active license [place].    The patient was identified by name and date of birth. Melodylisa Huynh was informed that this is a telemedicine visit and that the visit is being conducted through the Epic Embedded platform. She agrees to proceed..  My office door was closed. No one else was in the room.  She acknowledged consent and understanding of privacy and security of the video platform. The patient has agreed to participate and understands they can discontinue the visit at any  time.    I have spent a total time of 35 minutes in caring for this patient on the day of the visit/encounter including Instructions for management and Counseling / Coordination of care, not including the time spent for establishing the audio/video connection.    This note was shared with patient.    Visit Time:  Visit Start Time: 1245  Visit Stop Time: 1320  Total Visit Duration: 35 minutes    Deysi Cohen DO  Psychiatry Resident, PGY-IV    This note has been constructed using a voice recognition system. There may be translation, syntax, or grammatical errors. If you have any questions, please contact the dictating provider.         [1]   Allergies  Allergen Reactions    Demerol [Meperidine] Hyperactivity    Macrolides And Ketolides

## 2025-06-11 NOTE — PATIENT INSTRUCTIONS
Continue Haldol to 0.5 mg daily in the morning - stop afternoon dose  Continue Cogentin to 0.25 mg (1/2 tablet) daily - take with Haldol   Continue Prozac 40 mg daily   Continue Klonopin 1 mg twice daily as needed for severe anxiety    You were referred to psychotherapy services through [facility]. Please also review other local options:  [facility] - [phone], [email or web address]  [facility] - [phone], [email or web address]    Please call the office nursing staff for medication issues including refills, problems obtaining medications, side effects, etc.  Please return for a follow up appointment as discussed. If you are running late or are unable to attend your appointment, please call our [place] office at [phone].    If you are in psychological crisis including not limited to suicidal/homicidal thoughts or thoughts of self-injury, do not hesitate to call/contact your County Crisis hotline (see below), call [phone], call [phone] (mental health crisis), or go to the nearest emergency department.  [facility] Crisis: [phone]  [facility] Crisis: [phone]  [facility] Crisis: [phone]  [facility] Crisis: [phone]  [facility] Crisis: [phone]  [facility] Crisis: [phone]  [facility] Crisis: [phone]  National Suicide Prevention Hotline: 1-507.460.7640

## 2025-06-26 ENCOUNTER — TELEPHONE (OUTPATIENT)
Age: 45
End: 2025-06-26

## 2025-07-18 NOTE — PSYCH
PSYCHIATRIC EVALUATION     Name: Melody Huynh      : 1980      MRN: [id number]  Encounter Provider: Catherine Man DO  Encounter Date: 2025   Encounter department: [facility]    Insurance: Payor: [employer] CROSS / Plan: [employer] EMPLOYEE PROGRAM / Product Type: Blue Fee for Service /      Reason for visit:   Chief Complaint   Patient presents with    Transfer of Care     Assessment and Plan    Melody Huynh is a 44 y.o. female, domiciled with her  and daughter (8 y.o.), , unemployed, now on disability - previously worked for 15 years in [employer] (mainly with [employer]), with a past medical history of akathisia, GERD, leiomyoma of the uterus, a past psychiatric history of anxiety, panic disorder, mood disorder vs depression, and OCD, 4 previous inpatient hospitalizations, without past suicide attempts, presently following with therapy at the [facility] who presents to [facility] for transfer of care from Dr. Deysi Cohen.    Today, Melody reports overall mood stability and denies depressive symptomatology. Denies active or passive SI. She reports intermittent anxiety secondary to psychosocial stressors. Discussed acknowledging stressors as such, rather than labeling them as worsening of anxiety. Notes that she is not ready to come off of Haldol at this time but agreeable to further dose adjustment at next appointment. Taking Klonopin 1 mg daily and 1 mg PRN sparingly with good effect. Refraining from marijuana use despite voiced difficulties, encouraged to talk with supports () during these moments. No medication adjustments at this time. Patient is in agreement with the treatment plan as detailed below, and agrees to call the office with any concerns or side effects between appointments. Patient is amenable to calling/contacting crisis and/or attending to the nearest emergency department if their clinical condition deteriorates to  assure their safety and stability.     Assessment & Plan  Anxiety disorder, unspecified type  Differential: ELOY, panic disorder  Continue Prozac 40 mg QD for anxiety and mood  PARQ completed including serotonin syndrome, SIADH, worsening depression, suicidality, induction of ghada, GI upset, headaches, activation, sexual side effects, sedation, potential drug interactions, and others.    Continue Haldol 0.5 mg daily for anxiety, psychomotor agitation, and augmentation of antidepressant  Tentative plan to decrease to 0.25 mg daily or discontinue medication completely at next scheduled visit   PARQ of Haldol including EPS side effects suck as Tardive Dyskinesia, parkinsonism, and tardive dystonia in addition to prolactin increase/galactorrhea, amenorrhea, dizziness, sedation, hypotension, weight gain/metabolic complications, Akathisia, dry mouth, constipation, urinary retention, blurred vision, decreased sweating, tachycardia, hypertension, and rarely NMS, seizures, jaundice, agranulocytosis, leukopenia, and increased risk of CVA in elderly patient's with dementia-related psychosis.     Continue Cogentin 0.25 mg daily for EPS related adverse effects in the setting of Haldol use  PARQ for cogentin discussed including racing heart, significant anticholinergic effects (including rare psychosis; blurred vision, xerostomia, constipation, etc), N/V/C, headaches, edema, and others.      Continue Klonopin 1 mg daily and 1 mg PRN - reports that she is taking Klonopin 1 mg daily and 1 mg in the afternoon sparingly, previously ordered as 1 mg BID PRN  PDMP reviewed, last filled 6/11/25 - refilled today   Discussed with patient the risks of physical and psychological dependence, withdrawal, sedation, respiratory depression, impairment in judgment and motor function, depression, mood changes, and others discussed.       Patient was also informed of risks of being on or starting opioid medications due to drug interactions and  potential for serious respiratory depression and death.     Orders:    clonazePAM (KlonoPIN) 1 mg tablet; Take 1 tablet (1 mg total) by mouth 2 (two) times a day as needed for anxiety    Unspecified mood (affective) disorder (HCC)  Differential: MDD with anxious distress, bipolar disorder  Continue Prozac as above  Continue Haldol as above   Continue Cogentin as above           Treatment Recommendations/Precautions:    Educated about diagnosis and treatment modalities. Verbalizes understanding and agreement with the treatment plan.  Discussed self monitoring of symptoms, and symptom monitoring tools.  Discussed medications and if treatment adjustment was needed or desired.  Medication management every 6 weeks  Aware of need to follow up with family physician for medical issues  Aware of 24 hour and weekend coverage for urgent situations accessed by calling [facility] main practice number  On a waiting list for individual psychotherapy at [facility]  I am scheduling this patient out for greater than 3 months: No    Medications Risks/Benefits:      Risks, Benefits And Possible Side Effects Of Medications:    Risks, benefits, and possible side effects of medications explained to Melody and she (or legal representative) verbalizes understanding and agreement for treatment.    Controlled Medication Discussion:     Melody has been filling controlled prescriptions on time as prescribed according to [place] Prescription Drug Monitoring Program.  Discussed with Melody the risks of sedation, respiratory depression, impairment of ability to drive and potential for abuse and addiction related to treatment with benzodiazepine medications. She understands risk of treatment with benzodiazepine medications, agrees to not drive if feels impaired and agrees to take medications as prescribed.  Melody is using medication appropriately.      History of Present Illness     Chief  Complaint / reason for visit: Transfer of care     Melody Huynh is a 44 y.o. female, domiciled with her  and daughter (8 y.o.), , unemployed - previously worked for 15 years in [employer] (mainly with [employer]), with a past medical history of akathisia, GERD, leiomyoma of the uterus, a past psychiatric history of anxiety, panic disorder, mood disorder vs depression, and OCD, 4 previous inpatient hospitalizations, without past suicide attempts, presently following with therapy at the [facility] who presents to [facility] for transfer of care from Dr. Deysi Cohen.    The chart was reviewed in advance by this provider and all information was reviewed directly with the patient. At her last visit with Dr. Cohen, Evelin reports mood stability. Noted increased anxiety in the mornings. Psychotherapy referral for in-network was placed. No medication changes made, maintained on Prozac 40 mg, Haldol 0.5 mg daily, Cogentin 0.25 mg daily, and Klonopin 1 mg BID. Tentative plan to taper off of Haldol.    Today, Mi reports stability in terms of her anxiety. She is taking the Klonopin daily in the morning and using the afternoon dose sparingly (once or twice a week). She reports that things that would normally throw her through a loop are not as bothersome. She is spending a lot of time with her 9-year-old daughter, voices that she worries about the fall and her daughter returning to school. Notes that her daughter will be going to the intermediate school and worries about the things she will learn and people she will meet. She is concerned about what she will do with her time when her daughter is at school. We discussed potential ways that she can plan to keep herself busy, such as, organizing her home and continuing to exercise, she was agreeable. She denies recent panic attacks but shares that in the past, her panic attacks were characterized by nausea, emesis, chest pain, palpitations,  "diaphoresis, and tearfulness. She notes that they would last for a long time, upwards of days of constant panic. She reports that her last one was in September 2024.     In terms of the Haldol which is being tapered down slowly, she does not wish to decrease it further due to aforementioned concerns about her daughter. We made a tentative agreement to decrease it further versus potentially stop the medication completely at the next appointment along with the Cogentin.     She reports that she has been refraining from marijuana use due to potential increased in anxiety discussed at the last visit. It has been about two months since her last use. She notes that she missing it a lot and yesterday, she had two shots of vodka in order to \"catch a little buzz.\" She feels embarrassed by this and mad at herself. She does not wish to do this again. She was encouraged to speak to her  whom is of great support when she is having these cravings for the substance.     She notes that her mood is \"good\" and that she and her family are getting ready go to on vacation to [place] at the end of the week. She is really looking forward to this as they have been going to this same location since her daughter was very young. She denies anhedonia. Her energy level is fair and concentration is normal. Her sleep is \"great.\" She sleeps about 7 to 9 hours a night. Her appetite is fair, reports weight gain of about 10 pounds, but she is not unhappy about this. She reflects that when she was hospitalized before, she lost a lot of weight so this makes her feel that she is at a better place mentality and she acknowledges this to be true. She denies active or passive SI or HI.    In terms of bipolar disorder, Melody denies history of manic or hypomanic episodes, including distractibility, impulsive behavior, grandiosity, flight of ideas, increased goal oriented behavior, decreased need for sleep, or talkativeness. Melody did not display " any overt delusions during the evaluation and denied feelings of paranoia. She denies past or present auditory or visual hallucinations. Melody denies historical symptomatology suggestive of PTSD, or disordered eating. In terms of OCD, she reports previous obsessions about her mental state and well-being. She describes it as primarily internal and she has not experienced in quite awhile. She denied compulsive behaviors as a result.       Psychiatric Review Of Systems:    Pertinent items are noted in HPI; all others negative    Review Of Systems: A review of systems is obtained and is negative except for the pertinent positives listed in HPI/Subjective above.      Current Rating Scores:     Not Applicable    Areas of Improvement: reviewed in HPI/Subjective Section and reviewed in Assessment and Plan Section      Historical Information      Review of Historical Information:  History was reviewed with patient at today's visit.   Unchanged information from previous assessment by Dr. Cohne is copied and italicized; information that has changed is bolded.      Past Psychiatric History:     Prior psychiatric diagnoses: anxiety, panic disorder, mood disorder, OCD  Inpatient hospitalizations: multiple inpatient hospitalizations - first hospitalization 2001 - [facility], 2008 - [facility], 2021 - panic disorder, mood symptoms, most recent at [facility] in 09/2024  Emergency room visits often in-between for severe panic attacks - 2016, multiple recent ED visits in 09/2024 for severe panic attacks  Suicide attempts/self-harm: patient denies, patient denies self harm behaviors  Violent/aggressive behavior: patient denies   Patient did have some aggressive outbursts towards  and providers  Outpatient psychiatric providers: recently followed with Dr. Cohen until June 2025, previously followed with [facility] which was not a good experience  Was without a psychiatrist  from [phone] (Lexapro from [facility] during that time), many years followed with prior psychiatrist, Dr. Martines, retired  Past/current psychotherapy: follows Dr. Foster, [facility] - reports that she is calling him PRN  Other Services: patient denies  Psychiatric medication trial:   Multiple medication trials - including below,  Antidepressants  Prozac (up to 40 mg), Zoloft (short period of time), Lexapro (multiple years), Luvox  Antipsychotics  Vraylar, Zyprexa, Seroquel, Haldol  Patient was unable to tolerated SGA due to akathisia and other EPS   Sedative hypnotics  Ativan, Klonopin   Others  Hydroxyzine, Gabapentin, BuSpar, Cogentin    Traumatic History:     Abuse:none  Other Traumatic Events: death of father at age 16 - has accepted and processed this    Family Psychiatric History:     Mother: anxiety (on antidepressant - Lexapro)  No family history of substance use disorder or suicide attempts or completions.    Substance Use History:    Tobacco, Alcohol and Drug Use History     Tobacco Use    Smoking status: Never     Passive exposure: Never    Smokeless tobacco: Never   Vaping Use    Vaping status: Never Used   Substance Use Topics    Alcohol use: Yes     Alcohol/week: 1.0 standard drink of alcohol     Types: 1 Glasses of wine per week     Comment: socially; 3-4 drinks/week (8/13)    Drug use: Yes     Types: Marijuana     Comment: Pt has a medical marijuana card (8/13)     Alcohol Use: Unknown (3/23/2021)    AUDIT-C     Frequency of Alcohol Consumption: Monthly or less     I have assessed this patient for substance use within the past 12 months.  Patient reports a few cigarettes per day, quit 2007/2008. She is no longer smoking marijuana daily, she removed all cannabis products from the home.   Denies history of other recreational use including opioids, methamphetamines/amphetamines, cocaine use. Denies past legal actions or arrests secondary to substance intoxication. The patient denies  prior DWIs/DUIs. Melody does not exhibit objective evidence of substance withdrawal during today's examination nor does Melody appear under the influence of any psychoactive substance.      Social History:    Early life/developmental: Denies a history of milestone/developmental delay. Denies a history of in-utero exposure to toxins/illicit substances. Denies ADHD history. No history of IEP/504, denies special supports in school.  Marital history: /Civil Union   Children: yes, 1 daughter -  8 yo, going into 4th grade in the fall  Siblings: 1 older brother (45 yo)  Living arrangement: Lives in a home with  and daughter   Support system: identifies  as the biggest source of support  Mother also a big support  Education: college graduate - communications ([employer])  Had to withdraw from college twice due to anxiety  Occupational History: unemployed since January 2023, on permanent disability as of 2025 - previously in advertising in 10-15 years  Other Pertinent History: no reported  or legal history  Access to firearms:  has firearm, locked and secured     Social History     Socioeconomic History    Marital status: /Civil Union     Spouse name: Not on file    Number of children: Not on file    Years of education: Not on file    Highest education level: Not on file   Occupational History    Not on file   Other Topics Concern    Not on file   Social History Narrative    H/O of pregnancy,first,obstetrical care, third trimester        Always uses seatbelt     Past Medical History[1]  Past Surgical History[2]  Allergies: Allergies[3]    Current Outpatient Medications   Medication Instructions    benztropine (COGENTIN) 0.25 mg, Oral, Every morning    clonazePAM (KLONOPIN) 1 mg, Oral, 2 times daily PRN    FLUoxetine (PROZAC) 40 mg, Oral, Daily    haloperidol (HALDOL) 0.5 mg, Oral, Every morning        Medical History Reviewed by provider this encounter:  Tobacco  Allergies   Meds  Problems  Med Hx  Surg Hx  Fam Hx         Objective   There were no vitals taken for this visit.     Mental Status Evaluation:    Appearance Age appropriate, dressed in athletic wear, dark curly hair in a bun, good hygiene   Behavior pleasant, cooperative, calm   Speech normal rate, normal volume, normal pitch, spontaneous   Mood euthymic   Affect normal range and intensity, appropriate   Thought Processes organized, logical, coherent, goal directed   Thought Content no overt delusions   Perceptual Disturbances: no auditory hallucinations, no visual hallucinations   Abnormal Thoughts  Risk Potential Suicidal ideation - None  Homicidal ideation - None  Potential for aggression - No   Orientation oriented to person, place, time/date, and situation   Memory recent and remote memory grossly intact   Consciousness alert and awake   Attention Span Concentration Span attention span and concentration are age appropriate   Intellect appears to be of average intelligence   Insight intact   Judgement intact   Muscle Strength and  Gait normal muscle strength and normal muscle tone, normal gait and normal balance   Motor activity no abnormal movements   Language no difficulty naming common objects, no difficulty repeating a phrase, no difficulty writing a sentence   Fund of Knowledge adequate knowledge of current events  adequate fund of knowledge regarding past history  adequate fund of knowledge regarding vocabulary          Laboratory Results: I have personally reviewed all pertinent laboratory/tests results    Recent Labs (last 2 months):   No visits with results within 2 Month(s) from this visit.   Latest known visit with results is:   Appointment on 04/24/2025   Component Date Value    WBC 04/24/2025 5.83     RBC 04/24/2025 4.60     Hemoglobin 04/24/2025 13.9     Hematocrit 04/24/2025 40.5     MCV 04/24/2025 88     MCH 04/24/2025 30.2     MCHC 04/24/2025 34.3     RDW 04/24/2025 12.6     MPV 04/24/2025 10.2      Platelets 04/24/2025 248     nRBC 04/24/2025 0     Segmented % 04/24/2025 50     Immature Grans % 04/24/2025 0     Lymphocytes % 04/24/2025 40     Monocytes % 04/24/2025 6     Eosinophils Relative 04/24/2025 3     Basophils Relative 04/24/2025 1     Absolute Neutrophils 04/24/2025 2.97     Absolute Immature Grans 04/24/2025 0.01     Absolute Lymphocytes 04/24/2025 2.31     Absolute Monocytes 04/24/2025 0.33     Eosinophils Absolute 04/24/2025 0.15     Basophils Absolute 04/24/2025 0.06     Sodium 04/24/2025 138     Potassium 04/24/2025 4.3     Chloride 04/24/2025 104     CO2 04/24/2025 27     ANION GAP 04/24/2025 7     BUN 04/24/2025 15     Creatinine 04/24/2025 0.82     Glucose, Fasting 04/24/2025 91     Calcium 04/24/2025 9.1     AST 04/24/2025 21     ALT 04/24/2025 26     Alkaline Phosphatase 04/24/2025 53     Total Protein 04/24/2025 7.3     Albumin 04/24/2025 4.4     Total Bilirubin 04/24/2025 1.12 (H)     eGFR 04/24/2025 87     Cholesterol 04/24/2025 179     Triglycerides 04/24/2025 79     HDL, Direct 04/24/2025 56     LDL Calculated 04/24/2025 107 (H)     TSH 3RD GENERATION 04/24/2025 1.353     Free T4 04/24/2025 0.89        Suicide/Homicide Risk Assessment:    Risk of Harm to Self:  The following ratings are based on assessment at the time of the interview  Demographic risk factors include:   Historical Risk Factors include: history of depression, chronic anxiety symptoms, history of mood disorder, history of substance use, history of traumatic experiences  Recent Specific Risk Factors include: diagnosis of mood disorder, current depressive symptoms, current anxiety symptoms, unemployed, medication adverse effects  Protective Factors: no current suicidal ideation, access to mental health treatment, being a parent, being , compliant with medications, compliant with mental health treatment, connection to own children, having a desire to be alive, having a sense of purpose or meaning in life,  opportunities to participate in community, responsibilities and duties to others, stable living environment, supportive family  Weapons: gun. The following steps have been taken to ensure weapons are properly secured: locked, secured, confirmed with   Based on today's assessment, Melody presents the following risk of harm to self: low     Risk of Harm to Others:  The following ratings are based on assessment at the time of the interview  Demographic Risk Factors include: unemployed.  Historical Risk Factors include: history of substance use.  Recent Specific Risk Factors include: weapons or other means available, concomitant mood disorder, multiple stressors.  Protective Factors: no current homicidal ideation, access to mental health treatment, being a parent, being , compliant with medications, compliant with mental health treatment, opportunities to participate in community, resilience, responsibilities and duties to others, safe and stable living environment, supportive family  Weapons: gun. The following steps have been taken to ensure weapons are properly secured: locked, secured, confirmed by   Based on today's assessment, Melody presents the following risk of harm to others: low      Although patient's acute lethality risk is LOW, long-term/chronic lethality risk is mildly elevated given the risk factors listed above. However, at the current moment, Melody is future-oriented, forward-thinking, and demonstrates ability to act in a self-preserving manner as evidenced by volitionally seeking psychiatric evaluation and treatment today. To mitigate future risk, patient should adhere to treatment recommendations, avoid alcohol/illicit substance use, utilize community-based resources and familiar support, and prioritize mental health treatment. The diagnosis and treatment plan were reviewed with the patient. Risks, benefits, and alternatives to treatment were discussed. The importance of  medication and treatment compliance was reviewed with the patient.       The following interventions are recommended: Continue medication management. Safety plan completed/updated and signed. No other intervention changes indicated at this time.    Treatment Plan:    Completed and signed during the session: Not applicable - Treatment Plan not due at this session.    Depression Follow-up Plan Completed: Not applicable    Note Share: This note was shared with patient.    Administrative Statements   Administrative Statements   I have spent a total time of 75 minutes in caring for this patient on the day of the visit/encounter including Risks and benefits of tx options, Instructions for management, Patient and family education, Importance of tx compliance, Documenting in the medical record, Reviewing/placing orders in the medical record (including tests, medications, and/or procedures), Obtaining or reviewing history  , and Communicating with other healthcare professionals .    Visit Time  Visit Start Time: 1:40  Visit Stop Time: 2:40  Total Visit Duration: 60 minutes    Catherine Man,    Psychiatry Residency, PGY-III           [1]   Past Medical History:  Diagnosis Date    Anxiety     Depression     Disruption of episiotomy wound in the puerperium     Last assessed 06/22/16    External hemorrhoids     Last assessed 02/05/16    Fibroid uterus     GERD (gastroesophageal reflux disease)     Headache     Leiomyoma of uterus     Migraine     Migraine     Obsessive-compulsive disorder     Panic attack     Polyhydramnios in third trimester, not applicable or unspecified fetus     Last assessed 01/25/16    Pregnancy headache in third trimester     Resolved 02/05/16    Protein in urine     Last assessed 01/25/16    Sciatica of right side     Third degree laceration of perineum, type 3b 01/26/2016    Unspecified mood (affective) disorder (HCC) 09/28/2024    Varicella    [2]   Past Surgical History:  Procedure Laterality  Date    CHOLECYSTECTOMY  2006    MYOMECTOMY      NH POST COLPORRHAPHY RECTOCELE W/WO PERINEORRHAPHY N/A 3/24/2016    Procedure: PERINEORRAPHY ;  Surgeon: Sallie Flower MD;  Location: [facility];  Service: Gynecology    WISDOM TOOTH EXTRACTION     [3]   Allergies  Allergen Reactions    Demerol [Meperidine] Hyperactivity    Macrolides And Ketolides

## 2025-07-21 ENCOUNTER — OFFICE VISIT (OUTPATIENT)
Dept: PSYCHIATRY | Facility: CLINIC | Age: 45
End: 2025-07-21
Payer: COMMERCIAL

## 2025-07-21 DIAGNOSIS — F41.9 ANXIETY DISORDER, UNSPECIFIED TYPE: Primary | ICD-10-CM

## 2025-07-21 DIAGNOSIS — F39 UNSPECIFIED MOOD (AFFECTIVE) DISORDER (HCC): ICD-10-CM

## 2025-07-21 PROCEDURE — 90792 PSYCH DIAG EVAL W/MED SRVCS: CPT

## 2025-07-21 RX ORDER — CLONAZEPAM 1 MG/1
1 TABLET ORAL 2 TIMES DAILY PRN
Qty: 60 TABLET | Refills: 0 | Status: SHIPPED | OUTPATIENT
Start: 2025-07-21 | End: 2025-08-20

## 2025-07-21 NOTE — BH CRISIS PLAN
Client Name: Melody Huynh       Client YOB: 1980    [facility] Safety Plan      Creation Date: 7/21/25    Created By: Catherine Man DO       Step 1: Warning Signs:   Warning Signs   Worsening anxiety and panic            Step 2: Internal Coping Strategies:   Internal Coping Strategies   Meditation luther on phone   Breathing techniques            Step 3: People and social settings that provide distraction:   Name Contact Information    - German Huynh Number in phone, lives with   Mom - Pat Number in phone, lives close by   Friend group since elementary school - Kat, Essence, Angelia, Kathie, and Kristel Numbers in phone    Places   Rocking chair on the porch           Step 4: People whom I can ask for help during a crisis:      Name Contact Information    , German Huynh Lives with, number in phone    Mother, Pat Number in phone, lives nearby      Step 5: Professionals or agencies I can contact during a crisis:      Clinican/Agency Name Phone Emergency Contact    [facility] [phone] Dr. Catherine Foster - therapist [phone]       Local Emergency Department Emergency Department Phone Emergency Department Address    [facility]          Crisis Phone Numbers:   Suicide Prevention Lifeline: Call or Text  342 Crisis Text Line: Text HOME to 479-405   Please note: Some [facility] do not have a separate number for Child/Adolescent specific crisis. If your county is not listed under Child/Adolescent, please call the adult number for your county      Adult Crisis Numbers: Child/Adolescent Crisis Numbers   [facility]: [phone] [facility]: [phone]   [facility]: [phone] [facility]: [phone]   [facility]: [phone] [place]: [phone]   [facility]: [phone] [place] [facility]: [phone]   [place] [facility]: [phone]   [facility]: [phone]   [facility]: [phone]   [place]  Crisis Services: [phone] (daytime) [phone] (after hours, weekends, holidays)      Step 6: Making the environment safer (plan for lethal means safety):   Plan:  has one firearm and it is locked/secured, she does not know where it is and does not have access     Optional: What is most important to me and worth living for?   My  and my daughter, Cheyanne Evangelista Safety Plan. Demetria Gonzalez and Sorin Hand. Used with permission of the authors.

## 2025-07-21 NOTE — ASSESSMENT & PLAN NOTE
Differential: MDD with anxious distress, bipolar disorder  Continue Prozac as above  Continue Haldol as above   Continue Cogentin as above

## 2025-07-21 NOTE — PATIENT INSTRUCTIONS
Continue Prozac 40 mg daily   Continue Haldol 0.5 mg daily   Continue Cogentin 0.25 mg daily   Continue Klonopin 1 mg twice daily as needed  Please call the office nursing staff for medication issues including refills, problems obtaining medications, side effects, etc.  Please return for a follow up appointment as discussed. If you are running late or are unable to attend your appointment, please call our [place] office at [phone].     If you are in psychological crisis including not limited to suicidal/homicidal thoughts or thoughts of self-injury, do not hesitate to call/contact your [facility] Crisis hotline (see below), call 501, call 447 (mental health crisis), or go to the nearest emergency department.  [facility] Crisis: [phone]  [facility] Crisis: [phone]  [facility] Crisis: [phone]  [facility] Crisis: [phone]  [facility] Crisis: [phone]  [facility] Crisis: [phone]  [facility] Crisis: [phone]  National Suicide Prevention Hotline: 1-771.985.7501

## 2025-07-22 DIAGNOSIS — F41.9 ANXIETY DISORDER, UNSPECIFIED TYPE: ICD-10-CM

## 2025-07-22 DIAGNOSIS — F39 UNSPECIFIED MOOD (AFFECTIVE) DISORDER (HCC): ICD-10-CM

## 2025-07-22 RX ORDER — FLUOXETINE HYDROCHLORIDE 40 MG/1
40 CAPSULE ORAL DAILY
Qty: 90 CAPSULE | Refills: 0 | Status: SHIPPED | OUTPATIENT
Start: 2025-07-22 | End: 2025-10-20

## 2025-07-22 NOTE — TELEPHONE ENCOUNTER
Reason for call:   [x] Refill   [] Prior Auth  [] Other:     Office:   [] PCP/Provider -   [x] Specialty/Provider - Last ordered by another provider. Please review.     FLUoxetine (PROzac) 40 MG capsule  Take 1 capsule (40 mg total) by mouth daily  Normal, Disp-90 capsule, R-0      [id number] [place] [facility] [place] [facility] [phone]      Local Pharmacy   Does the patient have enough for 3 days?   [] Yes   [x] No - Send as HP to POD    Mail Away Pharmacy   Does the patient have enough for 10 days?   [] Yes   [] No - Send as HP to POD